# Patient Record
Sex: MALE | Race: WHITE | NOT HISPANIC OR LATINO | Employment: OTHER | URBAN - METROPOLITAN AREA
[De-identification: names, ages, dates, MRNs, and addresses within clinical notes are randomized per-mention and may not be internally consistent; named-entity substitution may affect disease eponyms.]

---

## 2017-01-18 ENCOUNTER — ANESTHESIA EVENT (OUTPATIENT)
Dept: GASTROENTEROLOGY | Facility: HOSPITAL | Age: 77
End: 2017-01-18

## 2017-01-19 ENCOUNTER — ANESTHESIA (OUTPATIENT)
Dept: GASTROENTEROLOGY | Facility: HOSPITAL | Age: 77
End: 2017-01-19

## 2017-04-07 ENCOUNTER — ALLSCRIPTS OFFICE VISIT (OUTPATIENT)
Dept: OTHER | Facility: OTHER | Age: 77
End: 2017-04-07

## 2017-07-10 ENCOUNTER — ALLSCRIPTS OFFICE VISIT (OUTPATIENT)
Dept: OTHER | Facility: OTHER | Age: 77
End: 2017-07-10

## 2017-07-25 ENCOUNTER — APPOINTMENT (EMERGENCY)
Dept: RADIOLOGY | Facility: HOSPITAL | Age: 77
DRG: 683 | End: 2017-07-25
Payer: COMMERCIAL

## 2017-07-25 ENCOUNTER — HOSPITAL ENCOUNTER (INPATIENT)
Facility: HOSPITAL | Age: 77
LOS: 2 days | Discharge: HOME HEALTH | DRG: 683 | End: 2017-07-28
Attending: EMERGENCY MEDICINE | Admitting: INTERNAL MEDICINE
Payer: COMMERCIAL

## 2017-07-25 DIAGNOSIS — M10.9 GOUT: ICD-10-CM

## 2017-07-25 DIAGNOSIS — N17.9 AKI (ACUTE KIDNEY INJURY) (HCC): ICD-10-CM

## 2017-07-25 DIAGNOSIS — R55 SYNCOPE: Primary | ICD-10-CM

## 2017-07-25 PROBLEM — J44.9 COPD (CHRONIC OBSTRUCTIVE PULMONARY DISEASE) (HCC): Status: ACTIVE | Noted: 2017-07-25

## 2017-07-25 PROBLEM — I10 HYPERTENSION: Status: ACTIVE | Noted: 2017-07-25

## 2017-07-25 PROBLEM — Z72.0 TOBACCO ABUSE: Status: ACTIVE | Noted: 2017-07-25

## 2017-07-25 PROBLEM — K20.90 ESOPHAGITIS: Status: ACTIVE | Noted: 2017-07-25

## 2017-07-25 LAB
ALBUMIN SERPL BCP-MCNC: 3.2 G/DL (ref 3.5–5)
ALP SERPL-CCNC: 113 U/L (ref 46–116)
ALT SERPL W P-5'-P-CCNC: 20 U/L (ref 12–78)
ANION GAP SERPL CALCULATED.3IONS-SCNC: 8 MMOL/L (ref 4–13)
AST SERPL W P-5'-P-CCNC: 28 U/L (ref 5–45)
ATRIAL RATE: 62 BPM
BASOPHILS # BLD AUTO: 0.03 THOUSANDS/ΜL (ref 0–0.1)
BASOPHILS NFR BLD AUTO: 0 % (ref 0–1)
BILIRUB SERPL-MCNC: 0.51 MG/DL (ref 0.2–1)
BUN SERPL-MCNC: 15 MG/DL (ref 5–25)
CALCIUM SERPL-MCNC: 8.5 MG/DL (ref 8.3–10.1)
CHLORIDE SERPL-SCNC: 99 MMOL/L (ref 100–108)
CO2 SERPL-SCNC: 26 MMOL/L (ref 21–32)
CREAT SERPL-MCNC: 1.4 MG/DL (ref 0.6–1.3)
EOSINOPHIL # BLD AUTO: 0.39 THOUSAND/ΜL (ref 0–0.61)
EOSINOPHIL NFR BLD AUTO: 5 % (ref 0–6)
ERYTHROCYTE [DISTWIDTH] IN BLOOD BY AUTOMATED COUNT: 11.9 % (ref 11.6–15.1)
GFR SERPL CREATININE-BSD FRML MDRD: 49 ML/MIN/1.73SQ M
GLUCOSE SERPL-MCNC: 120 MG/DL (ref 65–140)
HCT VFR BLD AUTO: 39.7 % (ref 36.5–49.3)
HGB BLD-MCNC: 13.7 G/DL (ref 12–17)
LYMPHOCYTES # BLD AUTO: 1.39 THOUSANDS/ΜL (ref 0.6–4.47)
LYMPHOCYTES NFR BLD AUTO: 17 % (ref 14–44)
MCH RBC QN AUTO: 31.4 PG (ref 26.8–34.3)
MCHC RBC AUTO-ENTMCNC: 34.5 G/DL (ref 31.4–37.4)
MCV RBC AUTO: 91 FL (ref 82–98)
MONOCYTES # BLD AUTO: 0.69 THOUSAND/ΜL (ref 0.17–1.22)
MONOCYTES NFR BLD AUTO: 8 % (ref 4–12)
NEUTROPHILS # BLD AUTO: 5.88 THOUSANDS/ΜL (ref 1.85–7.62)
NEUTS SEG NFR BLD AUTO: 70 % (ref 43–75)
P AXIS: 77 DEGREES
PLATELET # BLD AUTO: 264 THOUSANDS/UL (ref 149–390)
PMV BLD AUTO: 8.6 FL (ref 8.9–12.7)
POTASSIUM SERPL-SCNC: 4.2 MMOL/L (ref 3.5–5.3)
PR INTERVAL: 174 MS
PROT SERPL-MCNC: 6.7 G/DL (ref 6.4–8.2)
QRS AXIS: 63 DEGREES
QRSD INTERVAL: 94 MS
QT INTERVAL: 446 MS
QTC INTERVAL: 452 MS
RBC # BLD AUTO: 4.36 MILLION/UL (ref 3.88–5.62)
SODIUM SERPL-SCNC: 133 MMOL/L (ref 136–145)
SPECIMEN SOURCE: NORMAL
T WAVE AXIS: 70 DEGREES
TROPONIN I BLD-MCNC: 0 NG/ML (ref 0–0.08)
VENTRICULAR RATE: 62 BPM
WBC # BLD AUTO: 8.38 THOUSAND/UL (ref 4.31–10.16)

## 2017-07-25 PROCEDURE — 36415 COLL VENOUS BLD VENIPUNCTURE: CPT

## 2017-07-25 PROCEDURE — 85025 COMPLETE CBC W/AUTO DIFF WBC: CPT | Performed by: EMERGENCY MEDICINE

## 2017-07-25 PROCEDURE — 84484 ASSAY OF TROPONIN QUANT: CPT

## 2017-07-25 PROCEDURE — 93005 ELECTROCARDIOGRAM TRACING: CPT | Performed by: EMERGENCY MEDICINE

## 2017-07-25 PROCEDURE — 80053 COMPREHEN METABOLIC PANEL: CPT | Performed by: EMERGENCY MEDICINE

## 2017-07-25 PROCEDURE — 71020 HB CHEST X-RAY 2VW FRONTAL&LATL: CPT

## 2017-07-25 RX ADMIN — SODIUM CHLORIDE 1000 ML: 0.9 INJECTION, SOLUTION INTRAVENOUS at 21:14

## 2017-07-26 ENCOUNTER — APPOINTMENT (OUTPATIENT)
Dept: CT IMAGING | Facility: HOSPITAL | Age: 77
DRG: 683 | End: 2017-07-26
Payer: COMMERCIAL

## 2017-07-26 LAB
ANION GAP SERPL CALCULATED.3IONS-SCNC: 9 MMOL/L (ref 4–13)
BUN SERPL-MCNC: 14 MG/DL (ref 5–25)
CALCIUM SERPL-MCNC: 8 MG/DL (ref 8.3–10.1)
CHLORIDE SERPL-SCNC: 106 MMOL/L (ref 100–108)
CO2 SERPL-SCNC: 23 MMOL/L (ref 21–32)
CREAT SERPL-MCNC: 1.21 MG/DL (ref 0.6–1.3)
ERYTHROCYTE [DISTWIDTH] IN BLOOD BY AUTOMATED COUNT: 12.4 % (ref 11.6–15.1)
GFR SERPL CREATININE-BSD FRML MDRD: 59 ML/MIN/1.73SQ M
GLUCOSE P FAST SERPL-MCNC: 88 MG/DL (ref 65–99)
GLUCOSE SERPL-MCNC: 88 MG/DL (ref 65–140)
HCT VFR BLD AUTO: 35 % (ref 36.5–49.3)
HGB BLD-MCNC: 12.3 G/DL (ref 12–17)
MCH RBC QN AUTO: 31.7 PG (ref 26.8–34.3)
MCHC RBC AUTO-ENTMCNC: 35.1 G/DL (ref 31.4–37.4)
MCV RBC AUTO: 90 FL (ref 82–98)
PLATELET # BLD AUTO: 209 THOUSANDS/UL (ref 149–390)
PMV BLD AUTO: 9.2 FL (ref 8.9–12.7)
POTASSIUM SERPL-SCNC: 3.3 MMOL/L (ref 3.5–5.3)
RBC # BLD AUTO: 3.88 MILLION/UL (ref 3.88–5.62)
SODIUM SERPL-SCNC: 138 MMOL/L (ref 136–145)
TROPONIN I SERPL-MCNC: <0.02 NG/ML
TROPONIN I SERPL-MCNC: <0.02 NG/ML
WBC # BLD AUTO: 7.27 THOUSAND/UL (ref 4.31–10.16)

## 2017-07-26 PROCEDURE — 94760 N-INVAS EAR/PLS OXIMETRY 1: CPT

## 2017-07-26 PROCEDURE — 85027 COMPLETE CBC AUTOMATED: CPT | Performed by: PHYSICIAN ASSISTANT

## 2017-07-26 PROCEDURE — 84484 ASSAY OF TROPONIN QUANT: CPT | Performed by: PHYSICIAN ASSISTANT

## 2017-07-26 PROCEDURE — 70450 CT HEAD/BRAIN W/O DYE: CPT

## 2017-07-26 PROCEDURE — 99285 EMERGENCY DEPT VISIT HI MDM: CPT

## 2017-07-26 PROCEDURE — 80048 BASIC METABOLIC PNL TOTAL CA: CPT | Performed by: PHYSICIAN ASSISTANT

## 2017-07-26 RX ORDER — POTASSIUM CHLORIDE 20 MEQ/1
40 TABLET, EXTENDED RELEASE ORAL ONCE
Status: COMPLETED | OUTPATIENT
Start: 2017-07-26 | End: 2017-07-26

## 2017-07-26 RX ORDER — CLONIDINE HYDROCHLORIDE 0.1 MG/1
0.1 TABLET ORAL DAILY
Status: DISCONTINUED | OUTPATIENT
Start: 2017-07-26 | End: 2017-07-28 | Stop reason: HOSPADM

## 2017-07-26 RX ORDER — ATENOLOL 50 MG/1
50 TABLET ORAL DAILY
Status: DISCONTINUED | OUTPATIENT
Start: 2017-07-26 | End: 2017-07-28 | Stop reason: HOSPADM

## 2017-07-26 RX ORDER — HEPARIN SODIUM 5000 [USP'U]/ML
5000 INJECTION, SOLUTION INTRAVENOUS; SUBCUTANEOUS EVERY 8 HOURS SCHEDULED
Status: DISCONTINUED | OUTPATIENT
Start: 2017-07-26 | End: 2017-07-28 | Stop reason: HOSPADM

## 2017-07-26 RX ORDER — SODIUM CHLORIDE 9 MG/ML
100 INJECTION, SOLUTION INTRAVENOUS CONTINUOUS
Status: DISCONTINUED | OUTPATIENT
Start: 2017-07-26 | End: 2017-07-27

## 2017-07-26 RX ORDER — ACETAMINOPHEN 325 MG/1
650 TABLET ORAL EVERY 6 HOURS PRN
Status: DISCONTINUED | OUTPATIENT
Start: 2017-07-26 | End: 2017-07-28 | Stop reason: HOSPADM

## 2017-07-26 RX ORDER — HYDRALAZINE HYDROCHLORIDE 20 MG/ML
5 INJECTION INTRAMUSCULAR; INTRAVENOUS EVERY 6 HOURS PRN
Status: DISCONTINUED | OUTPATIENT
Start: 2017-07-26 | End: 2017-07-28 | Stop reason: HOSPADM

## 2017-07-26 RX ORDER — DIPHENHYDRAMINE HCL 25 MG
25 TABLET ORAL
Status: DISCONTINUED | OUTPATIENT
Start: 2017-07-26 | End: 2017-07-28 | Stop reason: HOSPADM

## 2017-07-26 RX ORDER — ONDANSETRON 2 MG/ML
4 INJECTION INTRAMUSCULAR; INTRAVENOUS EVERY 6 HOURS PRN
Status: DISCONTINUED | OUTPATIENT
Start: 2017-07-26 | End: 2017-07-28 | Stop reason: HOSPADM

## 2017-07-26 RX ORDER — NICOTINE 21 MG/24HR
1 PATCH, TRANSDERMAL 24 HOURS TRANSDERMAL DAILY
Status: DISCONTINUED | OUTPATIENT
Start: 2017-07-26 | End: 2017-07-28 | Stop reason: HOSPADM

## 2017-07-26 RX ORDER — ZOLPIDEM TARTRATE 5 MG/1
5 TABLET ORAL
Status: DISCONTINUED | OUTPATIENT
Start: 2017-07-26 | End: 2017-07-28 | Stop reason: HOSPADM

## 2017-07-26 RX ORDER — MAGNESIUM HYDROXIDE/ALUMINUM HYDROXICE/SIMETHICONE 120; 1200; 1200 MG/30ML; MG/30ML; MG/30ML
30 SUSPENSION ORAL EVERY 6 HOURS PRN
Status: DISCONTINUED | OUTPATIENT
Start: 2017-07-26 | End: 2017-07-28 | Stop reason: HOSPADM

## 2017-07-26 RX ADMIN — ZOLPIDEM TARTRATE 5 MG: 5 TABLET, FILM COATED ORAL at 22:15

## 2017-07-26 RX ADMIN — ATENOLOL 50 MG: 50 TABLET ORAL at 09:30

## 2017-07-26 RX ADMIN — SODIUM CHLORIDE 100 ML/HR: 0.9 INJECTION, SOLUTION INTRAVENOUS at 11:48

## 2017-07-26 RX ADMIN — HEPARIN SODIUM 5000 UNITS: 5000 INJECTION, SOLUTION INTRAVENOUS; SUBCUTANEOUS at 15:15

## 2017-07-26 RX ADMIN — DIPHENHYDRAMINE HCL 25 MG: 25 TABLET ORAL at 03:37

## 2017-07-26 RX ADMIN — HEPARIN SODIUM 5000 UNITS: 5000 INJECTION, SOLUTION INTRAVENOUS; SUBCUTANEOUS at 22:15

## 2017-07-26 RX ADMIN — HEPARIN SODIUM 5000 UNITS: 5000 INJECTION, SOLUTION INTRAVENOUS; SUBCUTANEOUS at 00:41

## 2017-07-26 RX ADMIN — SODIUM CHLORIDE 100 ML/HR: 0.9 INJECTION, SOLUTION INTRAVENOUS at 00:23

## 2017-07-26 RX ADMIN — CLONIDINE HYDROCHLORIDE 0.1 MG: 0.1 TABLET ORAL at 09:30

## 2017-07-26 RX ADMIN — POTASSIUM CHLORIDE 40 MEQ: 1500 TABLET, EXTENDED RELEASE ORAL at 17:11

## 2017-07-26 RX ADMIN — HYDRALAZINE HYDROCHLORIDE 5 MG: 20 INJECTION INTRAMUSCULAR; INTRAVENOUS at 00:42

## 2017-07-26 RX ADMIN — HEPARIN SODIUM 5000 UNITS: 5000 INJECTION, SOLUTION INTRAVENOUS; SUBCUTANEOUS at 06:25

## 2017-07-26 RX ADMIN — ACETAMINOPHEN 650 MG: 325 TABLET, FILM COATED ORAL at 23:36

## 2017-07-27 ENCOUNTER — APPOINTMENT (INPATIENT)
Dept: NON INVASIVE DIAGNOSTICS | Facility: HOSPITAL | Age: 77
DRG: 683 | End: 2017-07-27
Payer: COMMERCIAL

## 2017-07-27 PROBLEM — R50.9 FEVER: Status: ACTIVE | Noted: 2017-07-27

## 2017-07-27 LAB
ANION GAP SERPL CALCULATED.3IONS-SCNC: 6 MMOL/L (ref 4–13)
BUN SERPL-MCNC: 12 MG/DL (ref 5–25)
CALCIUM SERPL-MCNC: 7.9 MG/DL (ref 8.3–10.1)
CHLORIDE SERPL-SCNC: 104 MMOL/L (ref 100–108)
CO2 SERPL-SCNC: 26 MMOL/L (ref 21–32)
CREAT SERPL-MCNC: 1.21 MG/DL (ref 0.6–1.3)
GFR SERPL CREATININE-BSD FRML MDRD: 59 ML/MIN/1.73SQ M
GLUCOSE SERPL-MCNC: 97 MG/DL (ref 65–140)
POTASSIUM SERPL-SCNC: 3.8 MMOL/L (ref 3.5–5.3)
SODIUM SERPL-SCNC: 136 MMOL/L (ref 136–145)

## 2017-07-27 PROCEDURE — 80048 BASIC METABOLIC PNL TOTAL CA: CPT | Performed by: INTERNAL MEDICINE

## 2017-07-27 PROCEDURE — 93306 TTE W/DOPPLER COMPLETE: CPT

## 2017-07-27 RX ORDER — NICOTINE 21 MG/24HR
1 PATCH, TRANSDERMAL 24 HOURS TRANSDERMAL DAILY
Qty: 28 PATCH | Refills: 0 | Status: CANCELLED | OUTPATIENT
Start: 2017-07-27

## 2017-07-27 RX ORDER — INDOMETHACIN 25 MG/1
25 CAPSULE ORAL
Status: DISCONTINUED | OUTPATIENT
Start: 2017-07-27 | End: 2017-07-27

## 2017-07-27 RX ORDER — COLCHICINE 0.6 MG/1
0.6 TABLET ORAL 2 TIMES DAILY
Status: DISCONTINUED | OUTPATIENT
Start: 2017-07-27 | End: 2017-07-27

## 2017-07-27 RX ADMIN — PREDNISONE 30 MG: 20 TABLET ORAL at 15:10

## 2017-07-27 RX ADMIN — HEPARIN SODIUM 5000 UNITS: 5000 INJECTION, SOLUTION INTRAVENOUS; SUBCUTANEOUS at 22:04

## 2017-07-27 RX ADMIN — COLCHICINE 0.6 MG: 0.6 TABLET, FILM COATED ORAL at 12:52

## 2017-07-27 RX ADMIN — ATENOLOL 50 MG: 50 TABLET ORAL at 08:01

## 2017-07-27 RX ADMIN — ZOLPIDEM TARTRATE 5 MG: 5 TABLET, FILM COATED ORAL at 22:04

## 2017-07-27 RX ADMIN — CLONIDINE HYDROCHLORIDE 0.1 MG: 0.1 TABLET ORAL at 08:01

## 2017-07-27 RX ADMIN — ACETAMINOPHEN 650 MG: 325 TABLET, FILM COATED ORAL at 08:01

## 2017-07-27 RX ADMIN — HEPARIN SODIUM 5000 UNITS: 5000 INJECTION, SOLUTION INTRAVENOUS; SUBCUTANEOUS at 13:57

## 2017-07-27 RX ADMIN — HEPARIN SODIUM 5000 UNITS: 5000 INJECTION, SOLUTION INTRAVENOUS; SUBCUTANEOUS at 05:50

## 2017-07-28 ENCOUNTER — APPOINTMENT (INPATIENT)
Dept: PHYSICAL THERAPY | Facility: HOSPITAL | Age: 77
DRG: 683 | End: 2017-07-28
Payer: COMMERCIAL

## 2017-07-28 VITALS
TEMPERATURE: 97.1 F | SYSTOLIC BLOOD PRESSURE: 127 MMHG | OXYGEN SATURATION: 100 % | WEIGHT: 110.23 LBS | RESPIRATION RATE: 18 BRPM | DIASTOLIC BLOOD PRESSURE: 75 MMHG | BODY MASS INDEX: 17.3 KG/M2 | HEART RATE: 65 BPM | HEIGHT: 67 IN

## 2017-07-28 PROBLEM — N17.9 AKI (ACUTE KIDNEY INJURY) (HCC): Status: RESOLVED | Noted: 2017-07-25 | Resolved: 2017-07-28

## 2017-07-28 PROBLEM — R55 SYNCOPE: Status: RESOLVED | Noted: 2017-07-25 | Resolved: 2017-07-28

## 2017-07-28 PROBLEM — R50.9 FEVER: Status: RESOLVED | Noted: 2017-07-27 | Resolved: 2017-07-28

## 2017-07-28 PROCEDURE — G8978 MOBILITY CURRENT STATUS: HCPCS

## 2017-07-28 PROCEDURE — 97163 PT EVAL HIGH COMPLEX 45 MIN: CPT

## 2017-07-28 PROCEDURE — G8979 MOBILITY GOAL STATUS: HCPCS

## 2017-07-28 RX ORDER — PREDNISONE 10 MG/1
TABLET ORAL
Qty: 11 TABLET | Refills: 0 | Status: SHIPPED | OUTPATIENT
Start: 2017-07-28 | End: 2018-03-05

## 2017-07-28 RX ADMIN — ATENOLOL 50 MG: 50 TABLET ORAL at 08:14

## 2017-07-28 RX ADMIN — CLONIDINE HYDROCHLORIDE 0.1 MG: 0.1 TABLET ORAL at 08:14

## 2017-07-28 RX ADMIN — PREDNISONE 30 MG: 20 TABLET ORAL at 08:14

## 2017-07-28 RX ADMIN — HEPARIN SODIUM 5000 UNITS: 5000 INJECTION, SOLUTION INTRAVENOUS; SUBCUTANEOUS at 06:27

## 2017-07-31 ENCOUNTER — ALLSCRIPTS OFFICE VISIT (OUTPATIENT)
Dept: OTHER | Facility: OTHER | Age: 77
End: 2017-07-31

## 2017-10-01 DIAGNOSIS — I10 ESSENTIAL (PRIMARY) HYPERTENSION: ICD-10-CM

## 2017-10-01 DIAGNOSIS — M10.9 GOUT: ICD-10-CM

## 2017-10-01 DIAGNOSIS — F10.10 UNCOMPLICATED ALCOHOL ABUSE: ICD-10-CM

## 2017-10-10 ENCOUNTER — ALLSCRIPTS OFFICE VISIT (OUTPATIENT)
Dept: OTHER | Facility: OTHER | Age: 77
End: 2017-10-10

## 2017-10-11 NOTE — PROGRESS NOTES
Assessment  1  Benign essential hypertension (401 1) (I10)   2  Gout (274 9) (M10 9)   3  Tobacco use (305 1) (Z72 0)   4  Uncomplicated alcohol abuse (305 00) (F10 10)   5  Chronic obstructive pulmonary disease (496) (J44 9)    Plan  Benign essential hypertension, Chronic obstructive pulmonary disease, Gout, SocHx:  Tobacco use, Uncomplicated alcohol abuse    · Follow-up visit in 3 months Evaluation and Treatment  Follow-up  Status: Hold For -  Scheduling  Requested for: 88RQE6156    Discussion/Summary    He needs to get his labs as directed, as he is noncompliant  COPD and duodenitis and esophagitis stable on omeprazole  Quit tobacco and decrease alcohol use  Gout stable  Had a recent flare due to alcohol intake  Rec  continuing same meds as directed for HTN  Get labs as directed, refuses flu shot  The patient was counseled regarding  The treatment plan was reviewed with the patient/guardian  The patient/guardian understands and agrees with the treatment plan      Chief Complaint  didn't get the blood work done, no concerns today   Patient is here today for follow up of chronic conditions described in HPI  History of Present Illness  Hx of HTN and also insomnia which are stable  His COPD is stable also  Pt here for a 3 month f/u for blood pressure  No other concerns  Still smokes cigarettes 1/2 PPD  He also still drinks alcohol  No cp or sob, or ha  Gout is stable and his duodenitis and esophagitis are stable  He did not get labs as directed  He had a recent flare of gout because he drinks alcohol 3 beers a day  COPD stable  Review of Systems    Constitutional: No fever or chills, feels well, no tiredness, no recent weight gain or weight loss  Eyes: No complaints of eye pain, no red eyes, no discharge from eyes, no itchy eyes  ENT: no complaints of earache, no hearing loss, no nosebleeds, no nasal discharge, no sore throat, no hoarseness     Cardiovascular: No complaints of slow heart rate, no fast heart rate, no chest pain, no palpitations, no leg claudication, no lower extremity  Respiratory: as noted in HPI  Gastrointestinal: as noted in HPI  Genitourinary: No complaints of dysuria, no incontinence, no hesitancy, no nocturia, no genital lesion, no testicular pain  Musculoskeletal: as noted in HPI  Integumentary: No complaints of skin rash or skin lesions, no itching, no skin wound, no dry skin  Neurological: No compliants of headache, no confusion, no convulsions, no numbness or tingling, no dizziness or fainting, no limb weakness, no difficulty walking  Psychiatric: Is not suicidal, no sleep disturbances, no anxiety or depression, no change in personality, no emotional problems  Endocrine: No complaints of proptosis, no hot flashes, no muscle weakness, no erectile dysfunction, no deepening of the voice, no feelings of weakness  Hematologic/Lymphatic: No complaints of swollen glands, no swollen glands in the neck, does not bleed easily, no easy bruising  Active Problems  1  Acute kidney injury (nontraumatic) (584 9) (N17 9)   2  Benign essential hypertension (401 1) (I10)   3  Chronic obstructive pulmonary disease (496) (J44 9)   4  Colon cancer screening (V76 51) (Z12 11)   5  Cough (786 2) (R05)   6  Duodenitis (535 60) (K29 80)   7  Dysphagia (787 20) (R13 10)   8  Esophageal obstruction due to food impaction (530 3,935 1) (K22 2,T18 128A)   9  Esophagitis (530 10) (K20 9)   10  Gout (274 9) (M10 9)   11  Hyperglycemia (790 29) (R73 9)   12  Insomnia (780 52) (G47 00)   13  Medicare annual wellness visit, subsequent (V70 0) (Z00 00)   14  Pain of right hand (729 5) (M79 641)   15  Screening for genitourinary condition (V81 6) (Z13 89)   16  Special screening examination for neoplasm of prostate (V76 44) (Z12 5)   17  Tobacco use (305 1) (Z72 0)   18  Uncomplicated alcohol abuse (305 00) (F10 10)   19  Vasovagal syncope (780 2) (R55)    Past Medical History  1   History of Accidental Obstruction Of Respiratory Tract By Food (E911)   2  History of acute bronchitis (V12 69) (Z87 09)   3  History of bronchitis (V12 69) (Z87 09)   4  History of cellulitis (V13 3) (Z87 2)   5  History of eczema (V13 3) (Z87 2)   6  History of seborrheic dermatitis (V13 3) (Z87 2)   7  History of Nonspecific abnormal results of function study of thyroid (794 5) (R94 6)   8  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)   9  History of Skin rash (782 1) (R21)    Surgical History  1  History of Spinal Diskectomy Lumbar    Family History  Mother    1  Family history of Family Health Status Of Mother -   Family History    2  Family history of Situs Inversus    Social History   · Alcohol Use (History)   · Current Some Day Smoker (305 1)   ·    · Has 3 children   · Tobacco use (305 1) (Z72 0)   · Unemployed (V62 0) (Z56 0)    Current Meds   1  Atenolol 50 MG Oral Tablet; TAKE 1 TABLET DAILY; Therapy: 98TIQ2732 to (Last Rx:33Ikb1195)  Requested for: 95Ohw0938 Ordered   2  CloNIDine HCl - 0 1 MG Oral Tablet; TAKE 1 TABLET DAILY; Therapy: 85RKB6887 to (Alia Garza)  Requested for: 43Snk1507; Last   Rx:78Vhe7334 Ordered   3  Indomethacin 50 MG Oral Capsule; take 1 capsule three times daily PRN gout; Therapy: 81Dko3657 to (Evaluate:30Ako2337)  Requested for: 74YTQ3254; Last   Rx:2017 Ordered   4  Proventil  (90 Base) MCG/ACT Inhalation Aerosol Solution; INHALE 2 PUFFS   EVERY 4 HOURS AS NEEDED FOR COUGH AND WHEEZE;   Therapy: 51ETX0211 to (Evaluate:2017)  Requested for: 31OGC6674; Last   Rx:29Zzu8842 Ordered   5  Zolpidem Tartrate 5 MG Oral Tablet; TAKE 1 TABLET AT BEDTIME AS NEEDED FOR   SLEEP; Therapy: 20ZYE9326 to (82138 52 84 57)  Requested for: 16Ubj6555; Last   Rx:99Puq2311 Ordered    Allergies  1   Norvasc TABS    Vitals  Vital Signs    Recorded: 12BQG8590 96:39XP   Systolic 143   Diastolic 80   Height 5 ft 6 in   Weight 110 lb 4 oz   BMI Calculated 17 79   BSA Calculated 1 55     Physical Exam    Constitutional   General appearance: No acute distress, well appearing and well nourished  Eyes   Conjunctiva and lids: No swelling, erythema, or discharge  Pupils and irises: Equal, round and reactive to light  Ears, Nose, Mouth, and Throat   External inspection of ears and nose: Normal     Otoscopic examination: Tympanic membrance translucent with normal light reflex  Canals patent without erythema  Nasal mucosa, septum, and turbinates: Normal without edema or erythema  Oropharynx: Normal with no erythema, edema, exudate or lesions  Pulmonary   Respiratory effort: No increased work of breathing or signs of respiratory distress  Auscultation of lungs: Clear to auscultation, equal breath sounds bilaterally, no wheezes, no rales, no rhonci  Cardiovascular   Palpation of heart: Normal PMI, no thrills  Auscultation of heart: Normal rate and rhythm, normal S1 and S2, without murmurs  Examination of extremities for edema and/or varicosities: Normal     Carotid pulses: Normal     Lymphatic   Palpation of lymph nodes in neck: No lymphadenopathy  Musculoskeletal   Gait and station: Normal     Digits and nails: Normal without clubbing or cyanosis  Inspection/palpation of joints, bones, and muscles: Normal     Skin   Skin and subcutaneous tissue: Normal without rashes or lesions  Neurologic   Cranial nerves: Cranial nerves 2-12 intact  Reflexes: 2+ and symmetric  Sensation: No sensory loss  Psychiatric   Orientation to person, place and time: Normal     Mood and affect: Normal          Health Management  Colon cancer screening   COLONOSCOPY; every 10 years; Next Due: 43BYL7496; Overdue  Health Maintenance   Medicare Annual Wellness Visit; every 1 year; Last 07Apr2017; Next Due: 60LYS5051;  Active    Signatures   Electronically signed by : Esteban Ruggiero DO; Oct 10 2017  2:46PM EST                       (Author)

## 2018-01-09 NOTE — RESULT NOTES
Message   no acute fracture but may be associated with gout the swelling he is having, f-up as directed with [facility]  Verified Results  * XR HAND 3+ VIEW RIGHT 24KUH2502 02:24PM Hedy Frederick Order Number: [id number]     Test Name Result Flag Reference   XR HAND 3+ VW RIGHT (Report)     RIGHT HAND     INDICATION: Right hand pain  Pain in long finger near the DIP joint, lump on radial side of the finger  Pain for 6 months but worsening in the last 2-3 weeks, numbness in thumb  Patient states he has gout, possible arthritis  COMPARISON: None     VIEWS: 3; 3 images     For the purposes of institution wide universal language the following terms will apply: (thumb=1st digit/finger, index finger=2nd digit/finger, long finger=3rd digit/finger, ring=4th digit/finger and small finger=5th digit/finger)     FINDINGS:     There is no acute fracture or dislocation  Small degenerative changes seen of the distal interphalangeal joints, otherwise joint spaces are relatively well preserved  No lytic or blastic lesions are seen  There is marked soft tissue swelling along the radial aspect of the distal 3rd digit at the level of the distal interphalangeal joint  There is no bony erosion, or periarticular erosion  The joint space is preserved  Differential diagnosis includes    infectious or inflammatory causes  Given history of gout gouty tophus is not excluded  IMPRESSION:     No acute fracture or dislocation  Increased soft tissue density and swelling associated with the radial aspect of the distal interphalangeal joint of the 3rd digit  Differential diagnosis includes infectious or inflammatory causes, including patient's stated history of gout  No    definitive bony erosion identified         Workstation performed: [id number]     Signed by:   Cailin Cintron MD   12/1/16

## 2018-01-10 ENCOUNTER — ALLSCRIPTS OFFICE VISIT (OUTPATIENT)
Dept: OTHER | Facility: OTHER | Age: 78
End: 2018-01-10

## 2018-01-10 DIAGNOSIS — K29.80 DUODENITIS WITHOUT BLEEDING: ICD-10-CM

## 2018-01-10 DIAGNOSIS — Z12.5 ENCOUNTER FOR SCREENING FOR MALIGNANT NEOPLASM OF PROSTATE: ICD-10-CM

## 2018-01-10 DIAGNOSIS — Z72.0 TOBACCO USE: ICD-10-CM

## 2018-01-10 DIAGNOSIS — N17.9 ACUTE KIDNEY FAILURE (HCC): ICD-10-CM

## 2018-01-10 DIAGNOSIS — M10.9 GOUT: ICD-10-CM

## 2018-01-10 DIAGNOSIS — J44.9 CHRONIC OBSTRUCTIVE PULMONARY DISEASE (HCC): ICD-10-CM

## 2018-01-10 DIAGNOSIS — I10 ESSENTIAL (PRIMARY) HYPERTENSION: ICD-10-CM

## 2018-01-10 NOTE — MISCELLANEOUS
Assessment    1  Vasovagal syncope (780 2) (R55)   2  Benign essential hypertension (401 1) (I10)   3  Chronic obstructive pulmonary disease (496) (J44 9)   4  Acute kidney injury (nontraumatic) (584 9) (N17 9)    Plan  Benign essential hypertension    · (1) COMPREHENSIVE METABOLIC PANEL; Status:Active - Retrospective By Protocol  Authorization; Requested for:01Oct2017;    Perform:[facility]; OIV:[id number]; Last Updated By:Ki Maya; 7/31/2017 5:21:19 PM;Ordered; For:Benign essential hypertension; Ordered By:Stephanie Castro;   · (1) LIPID PANEL, FASTING; Status:Active - Retrospective By Protocol Authorization; Requested for:01Oct2017;    Perform:[facility]; IRX:[id number]; Last Updated By:Ki Maya; 7/31/2017 5:21:19 PM;Ordered; For:Benign essential hypertension; Ordered By:Stephanie Castro;   · (1) MICROALBUMIN CREATININE RATIO, RANDOM URINE; Status:Active - Retrospective  By Protocol Authorization; Requested for:01Oct2017;    Perform:[facility]; PSJ:[id number]; Last Updated By:Ki Maya; 7/31/2017 5:21:19 PM;Ordered; For:Benign essential hypertension; Ordered By:Stephanie Castro;   · (1) PROTEIN ELECTRO, SERUM; Status:Active - Retrospective By Protocol Authorization; Requested for:01Oct2017;    Perform:[facility]; GYP:[id number]; Last Updated By:Ki Maya; 7/31/2017 5:21:19 PM;Ordered; For:Benign essential hypertension; Ordered By:Stephanie Castro;   · (1) PTH N-TERMINAL (INTACT); Status:Active - Retrospective By Protocol Authorization; Requested for:01Oct2017;    Perform:[facility]; IDZ:[id number]; Last Updated By:Ki Maya; 7/31/2017 5:21:19 PM;Ordered; For:Benign essential hypertension; Ordered By:Stephanie Castro;   · (1) TSH; Status:Active - Retrospective By Protocol Authorization; Requested  for:01Oct2017;    Perform:[facility]; SIJ:24BJQ8005;  Last Updated By:Ki Maya; 7/31/2017 5:21:19 PM;Ordered; For:Benign essential hypertension; Ordered By:Stephanie Castro;  Benign essential hypertension, Gout    · (1) URIC ACID; Status:Active - Retrospective By Protocol Authorization; Requested  for:01Oct2017;    Perform:[facility]; TFU:[id number]; Last Updated By:Lissette Maya; 7/31/2017 5:21:19 PM;Ordered; For:Benign essential hypertension, Gout; Ordered By:Stephanie Castro;  Benign essential hypertension, Uncomplicated alcohol abuse    · (1) GGT; Status:Active - Retrospective By Protocol Authorization; Requested  for:01Oct2017;    Perform:[facility]; YMM:[id number]; Last Updated By:Lissette Maya; 7/31/2017 5:21:19 PM;Ordered; For:Benign essential hypertension, Uncomplicated alcohol abuse; Ordered By:Stephanie Castro;    Discussion/Summary  Discussion Summary:   Hosp revd in full  Syncope likely dehydration in origin  HTN stable  Reduced GFR : alondra w CKD profile before next OV  Stay hydrated  OCT w CE  Counseling Documentation With Imm: The patient was counseled regarding diagnostic results, instructions for management, risk factor reductions, prognosis, patient and family education, impressions, risks and benefits of treatment options, importance of compliance with treatment  total time of encounter was 25 minutes and > 50% minutes was spent counseling  Medication SE Review and Pt Understands Tx: The treatment plan was reviewed with the patient/guardian  The patient/guardian understands and agrees with the treatment plan      Chief Complaint  Chief Complaint Free Text Note Form: TCM  ksd,cma      History of Present Illness  Syncope (Brief): The patient is being seen for follow-up of a hospitalization for syncope  The syncopal episode occurred while the patient was seated  There is no known event that preceded symptom onset  TCM Communication [facility]: The patient is being contacted for follow-up after hospitalization  Hospital records were reviewed   He was hospitalized at [facility]  The dates of hospitalization: 07/25/17-07/28/17  Diagnosis: syncope  He was discharged to home  Medications reviewed and updated today  He scheduled a follow up appointment  Follow-up appointments with other specialists: [facility]  The patient is currently asymptomatic  Counseling was provided to the patient  Communication performed and completed by jeremy hood   HPI: 77 yo frail wm s/p hosp at [facility] s/p syncope  Was at bar and had 1 sip of beer, felt faint and slumped over  911 to ER  No major findings on work up except GFR was low  Pt states did not eat or drink that day  Review of Systems  Complete-Male:   Constitutional: as noted in HPI  Eyes: No complaints of eye pain, no red eyes, no discharge from eyes, no itchy eyes  ENT: no complaints of earache, no hearing loss, no nosebleeds, no nasal discharge, no sore throat, no hoarseness  Cardiovascular: No complaints of slow heart rate, no fast heart rate, no chest pain, no palpitations, no leg claudication, no lower extremity  Respiratory: No complaints of shortness of breath, no wheezing, no cough, no SOB on exertion, no orthopnea or PND  Gastrointestinal: No complaints of abdominal pain, no constipation, no nausea or vomiting, no diarrhea or bloody stools  Genitourinary: No complaints of dysuria, no incontinence, no hesitancy, no nocturia, no genital lesion, no testicular pain  Musculoskeletal: No complaints of arthralgia, no myalgias, no joint swelling or stiffness, no limb pain or swelling  Integumentary: no rashes  Neurological: as noted in HPI  Psychiatric: Is not suicidal, no sleep disturbances, no anxiety or depression, no change in personality, no emotional problems  Hematologic/Lymphatic: no tendency for easy bleeding  Active Problems    1  Benign essential hypertension (401 1) (I10)   2  Chronic obstructive pulmonary disease (496) (J44 9)   3  Colon cancer screening (V76 51) (Z12 11)   4  Cough (786 2) (R05)   5  Duodenitis (535 60) (K29 80)   6  Dysphagia (787 20) (R13 10)   7  Esophageal obstruction due to food impaction (530 3,935 1) (K22 2,T18 128A)   8  Esophagitis (530 10) (K20 9)   9  Gout (274 9) (M10 9)   10  Hyperglycemia (790 29) (R73 9)   11  Insomnia (780 52) (G47 00)   12  Medicare annual wellness visit, subsequent (V70 0) (Z00 00)   13  Pain of right hand (729 5) (M79 641)   14  Screening for genitourinary condition (V81 6) (Z13 89)   15  Special screening examination for neoplasm of prostate (V76 44) (Z12 5)   16  Tobacco use (305 1) (Z72 0)   17  Uncomplicated alcohol abuse (305 00) (F10 10)    Past Medical History    1  History of Accidental Obstruction Of Respiratory Tract By Food (E911)   2  History of acute bronchitis (V12 69) (Z87 09)   3  History of bronchitis (V12 69) (Z87 09)   4  History of cellulitis (V13 3) (Z87 2)   5  History of eczema (V13 3) (Z87 2)   6  History of seborrheic dermatitis (V13 3) (Z87 2)   7  History of Nonspecific abnormal results of function study of thyroid (794 5) (R94 6)   8  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)   9  History of Skin rash (782 1) (R21)    Surgical History    1  History of Spinal Diskectomy Lumbar    Family History  Mother    1  Family history of Family Health Status Of Mother -   Family History    2  Family history of Situs Inversus    Social History    · Alcohol Use (History)   · Current Some Day Smoker (305 1)   ·    · Has 3 children   · Tobacco use (305 1) (Z72 0)   · Unemployed (V62 0) (Z56 0)    Current Meds   1  Atenolol 50 MG Oral Tablet; TAKE 1 TABLET DAILY; Therapy: 40RZG4105 to (Last Rx:17Aei3226)  Requested for: 34Nax4736 Ordered   2  CloNIDine HCl - 0 1 MG Oral Tablet; TAKE 1 TABLET DAILY; Therapy: 91RHC7103 to (Evaluate:94Ivj3892)  Requested for: 09ICR5500; Last   MW:51OGG8674 Ordered   3  Indomethacin 50 MG Oral Capsule; take 1 capsule three times daily PRN gout;    Therapy: 27Idy6956 to (Evaluate:99Mey6287)  Requested for: 01IWW6954; Last   Rx:51Nln2945 Ordered   4  Omeprazole 40 MG Oral Capsule Delayed Release; take 1 capsule by mouth daily; Therapy: 95OBZ4804 to (Charles Ascencio)  Requested for: 31DLG9244; Last   Rx:31Xjc9062 Ordered   5  Proventil  (90 Base) MCG/ACT Inhalation Aerosol Solution; INHALE 2 PUFFS   EVERY 4 HOURS AS NEEDED FOR COUGH AND WHEEZE;   Therapy: 59IYQ8974 to (Evaluate:20Mar2017)  Requested for: 75ZNW3088; Last   Rx:57Iyr8109 Ordered   6  Zolpidem Tartrate 5 MG Oral Tablet; TAKE 1 TABLET AT BEDTIME AS NEEDED FOR   SLEEP; Therapy: 54ZXU8455 to ((56) 2145-2478)  Requested for: 40Xky0710; Last   Rx:23Kky3588; Status: ACTIVE - Retrospective By Protocol Authorization Ordered    Allergies    1  Norvasc TABS    Vitals  Signs   Recorded: 21SHM1900 96:50XJ   Systolic: 292  Diastolic: 74  Height: 5 ft 6 in  Weight: 109 lb   BMI Calculated: 17 59  BSA Calculated: 1 55    Physical Exam    Constitutional   General appearance: Abnormal   underweight  Eyes   Pupils and irises: Equal, round and reactive to light  Ears, Nose, Mouth, and Throat   Oropharynx: Normal with no erythema, edema, exudate or lesions  Pulmonary   Respiratory effort: No increased work of breathing or signs of respiratory distress  Auscultation of lungs: Clear to auscultation, equal breath sounds bilaterally, no wheezes, no rales, no rhonci  Cardiovascular   Auscultation of heart: Normal rate and rhythm, normal S1 and S2, without murmurs  Examination of extremities for edema and/or varicosities: Normal     Carotid pulses: Normal     Abdomen   Abdomen: Non-tender, no masses  Lymphatic   Palpation of lymph nodes in neck: No lymphadenopathy  Musculoskeletal   Gait and station: Normal     Neurologic   Cranial nerves: Cranial nerves 2-12 intact      Psychiatric   Orientation to person, place and time: Normal     Mood and affect: Normal          Health Management  Colon cancer screening   COLONOSCOPY; every 10 years; Next Due: 01ZXV0680; Overdue  Health Maintenance   Medicare Annual Wellness Visit; every 1 year; Last 07Apr2017; Next Due: 92IWV4671;  Active    Future Appointments    Date/Time Provider Specialty Site   10/10/2017 02:30 PM Court Fox DO Family [facility]     Signatures   Electronically signed by : Hien Correia DO; Aug  1 3201 12:18PM EST                       (Author)

## 2018-01-12 VITALS
DIASTOLIC BLOOD PRESSURE: 88 MMHG | HEIGHT: 66 IN | BODY MASS INDEX: 17.07 KG/M2 | SYSTOLIC BLOOD PRESSURE: 128 MMHG | WEIGHT: 106.25 LBS

## 2018-01-12 VITALS
SYSTOLIC BLOOD PRESSURE: 118 MMHG | HEIGHT: 66 IN | BODY MASS INDEX: 17.58 KG/M2 | DIASTOLIC BLOOD PRESSURE: 82 MMHG | WEIGHT: 109.38 LBS

## 2018-01-12 NOTE — PROGRESS NOTES
Assessment   1  Benign essential hypertension (401 1) (I10)   2  Gout (274 9) (M10 9)   3  Tobacco use (305 1) (Z72 0)   4  Chronic obstructive pulmonary disease (496) (J44 9)   5  Duodenitis (535 60) (K29 80)    Plan   Acute kidney injury (nontraumatic), Benign essential hypertension, Chronic obstructive    pulmonary disease, Duodenitis, Gout, SocHx: Tobacco use    · (1) CBC/PLT/DIFF; Status:Active; Requested [id number];    · (1) COMPREHENSIVE METABOLIC PANEL; Status:Active; Requested [id number];    · (1) LIPID PANEL FASTING W DIRECT LDL REFLEX; Status:Active; Requested    [id number];    · (1) VITAMIN D 25-HYDROXY; Status:Active; Requested [id number]; Acute kidney injury (nontraumatic), Benign essential hypertension, Chronic obstructive    pulmonary disease, Duodenitis, Gout, Special screening examination for neoplasm of    prostate, SocHx: Tobacco use    · (1) PSA (SCREEN) (Dx V76 44 Screen for Prostate Cancer); Status:Active; Requested    [id number]; Benign essential hypertension, Chronic obstructive pulmonary disease, Gout, SocHx:    Tobacco use    · Follow-up visit in 3 months Evaluation and Treatment  Follow-up  Status: Hold For -    Scheduling  Requested for: 23ACQ0265    Discussion/Summary      He needs to get his labs as directed, as he is noncompliant  COPD and duodenitis and esophagitis stable on omeprazole  Quit tobacco and decrease alcohol use  Gout stable  Rec  continuing same meds as directed for HTN  Get labs as directed  Advised to quit tobacco as his COPD may get worse over time if he continues to smoke cigarettes  The patient was counseled regarding  Possible side effects of new medications were reviewed with the patient/guardian today  The treatment plan was reviewed with the patient/guardian   The patient/guardian understands and agrees with the treatment plan      Chief Complaint   F/U HTN  ksd,cma    Patient is here today for follow up of chronic conditions described in HPI       History of Present Illness   Here for HTN  No cp or sob, or ha  Has a hx of tobacco use and not quitting  He also has COPD and uses inhaler prn  He also denies any abdominal pain for hx of duodenitis and did see GI in past  Gout is stable  Review of Systems        Constitutional: No fever or chills, feels well, no tiredness, no recent weight gain or weight loss  Eyes: No complaints of eye pain, no red eyes, no discharge from eyes, no itchy eyes  ENT: no complaints of earache, no hearing loss, no nosebleeds, no nasal discharge, no sore throat, no hoarseness  Cardiovascular: No complaints of slow heart rate, no fast heart rate, no chest pain, no palpitations, no leg claudication, no lower extremity  Respiratory: as noted in HPI  Gastrointestinal: as noted in HPI  Genitourinary: No complaints of dysuria, no incontinence, no hesitancy, no nocturia, no genital lesion, no testicular pain  Musculoskeletal: as noted in HPI  Integumentary: No complaints of skin rash or skin lesions, no itching, no skin wound, no dry skin  Neurological: No compliants of headache, no confusion, no convulsions, no numbness or tingling, no dizziness or fainting, no limb weakness, no difficulty walking  Psychiatric: Is not suicidal, no sleep disturbances, no anxiety or depression, no change in personality, no emotional problems  Endocrine: No complaints of proptosis, no hot flashes, no muscle weakness, no erectile dysfunction, no deepening of the voice, no feelings of weakness  Hematologic/Lymphatic: No complaints of swollen glands, no swollen glands in the neck, does not bleed easily, no easy bruising  Active Problems   1  Acute kidney injury (nontraumatic) (584 9) (N17 9)   2  Benign essential hypertension (401 1) (I10)   3  Chronic obstructive pulmonary disease (496) (J44 9)   4  Colon cancer screening (V76 51) (Z12 11)   5  Cough (786 2) (R05)   6   Duodenitis (535 60) (K29 80)   7  Dysphagia (787 20) (R13 10)   8  Esophageal obstruction due to food impaction (530 3,935 1) (K22 2,T18 128A)   9  Esophagitis (530 10) (K20 9)   10  Gout (274 9) (M10 9)   11  Hyperglycemia (790 29) (R73 9)   12  Insomnia (780 52) (G47 00)   13  Medicare annual wellness visit, subsequent (V70 0) (Z00 00)   14  Pain of right hand (729 5) (M79 641)   15  Screening for genitourinary condition (V81 6) (Z13 89)   16  Special screening examination for neoplasm of prostate (V76 44) (Z12 5)   17  Tobacco use (305 1) (Z72 0)   18  Uncomplicated alcohol abuse (305 00) (F10 10)   19  Vasovagal syncope (780 2) (R55)    Past Medical History   1  History of Accidental Obstruction Of Respiratory Tract By Food (E911)   2  History of acute bronchitis (V12 69) (Z87 09)   3  History of bronchitis (V12 69) (Z87 09)   4  History of cellulitis (V13 3) (Z87 2)   5  History of eczema (V13 3) (Z87 2)   6  History of seborrheic dermatitis (V13 3) (Z87 2)   7  History of Nonspecific abnormal results of function study of thyroid (794 5) (R94 6)   8  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)   9  History of Skin rash (782 1) (R21)    Surgical History   1  History of Spinal Diskectomy Lumbar    Family History   Mother    1  Family history of Family Health Status Of Mother -   Family History    2  Family history of Situs Inversus    Social History    · Alcohol Use (History)   · Current Some Day Smoker (305 1)   ·    · Has 3 children   · Tobacco use (305 1) (Z72 0)   · Unemployed (V62 0) (Z56 0)    Current Meds    1  Atenolol 50 MG Oral Tablet; TAKE 1 TABLET DAILY; Therapy: 41ORN3957 to (Last Rx:17Ume8358)  Requested for: 06Amk9692 Ordered   2  CloNIDine HCl - 0 1 MG Oral Tablet; TAKE 1 TABLET DAILY; Therapy: 99KPW3088 to (Juli Pro)  Requested for: 66KNM2210; Last     Rx:2017 Ordered   3  Indomethacin 50 MG Oral Capsule; take 1 capsule three times daily PRN gout;      Therapy: 13Zoq2798 to (Evaluate:88Qyq8266)  Requested for: 2017; Last     Rx:2017 Ordered   4  Proventil  (90 Base) MCG/ACT Inhalation Aerosol Solution; INHALE 2 PUFFS     EVERY 4 HOURS AS NEEDED FOR COUGH AND WHEEZE;     Therapy: 24TXX3649 to (Evaluate:2017)  Requested for: 19YJB0576; Last     Rx:37Ciz1072 Ordered   5  Ventolin  (90 Base) MCG/ACT Inhalation Aerosol Solution; INHALE 2 PUFFS     EVERY 4-6 HOURS AS NEEDED; Therapy: 04JPI7450 to (Last C32VWZ7255)  Requested for: 23IYG8657 Ordered   6  Zolpidem Tartrate 5 MG Oral Tablet; TAKE 1 TABLET AT BEDTIME AS NEEDED FOR     SLEEP; Therapy: 44QRK3794 to (PIVDOTWP:86SYB1767)  Requested for: 2017; Last     Rx:2017 Ordered    Allergies   1  Norvasc TABS    Vitals   Vital Signs    Recorded: 62WZQ7431 73:65VK   Systolic 092   Diastolic 76   Height 5 ft 6 in   Weight 113 lb 2 oz   BMI Calculated 18 26   BSA Calculated 1 57     Physical Exam        Constitutional      General appearance: No acute distress, well appearing and well nourished  Eyes      Conjunctiva and lids: No swelling, erythema, or discharge  Pupils and irises: Equal, round and reactive to light  Ears, Nose, Mouth, and Throat      External inspection of ears and nose: Normal        Otoscopic examination: Tympanic membrance translucent with normal light reflex  Canals patent without erythema  Nasal mucosa, septum, and turbinates: Normal without edema or erythema  Oropharynx: Normal with no erythema, edema, exudate or lesions  Pulmonary      Respiratory effort: No increased work of breathing or signs of respiratory distress  Auscultation of lungs: Clear to auscultation, equal breath sounds bilaterally, no wheezes, no rales, no rhonci  Cardiovascular      Palpation of heart: Normal PMI, no thrills  Auscultation of heart: Normal rate and rhythm, normal S1 and S2, without murmurs         Examination of extremities for edema and/or varicosities: Normal        Carotid pulses: Normal        Abdomen      Abdomen: Non-tender, no masses  Liver and spleen: No hepatomegaly or splenomegaly  Lymphatic      Palpation of lymph nodes in neck: No lymphadenopathy  Musculoskeletal      Gait and station: Normal        Digits and nails: Normal without clubbing or cyanosis  Inspection/palpation of joints, bones, and muscles: Normal        Skin      Skin and subcutaneous tissue: Normal without rashes or lesions  Neurologic      Cranial nerves: Cranial nerves 2-12 intact  Reflexes: 2+ and symmetric  Sensation: No sensory loss  Psychiatric      Orientation to person, place and time: Normal        Mood and affect: Normal           Health Management   Colon cancer screening   COLONOSCOPY; every 10 years; Next Due: 42KFD9070; Overdue  Health Maintenance   Medicare Annual Wellness Visit; every 1 year; Last 07Apr2017; Next Due: 50FNA6192;  Active    Signatures    Electronically signed by : Dean Gayle DO; Luis Alberto 10 2018  1:17PM EST                       (Author)

## 2018-01-13 VITALS
BODY MASS INDEX: 17.72 KG/M2 | WEIGHT: 110.25 LBS | HEIGHT: 66 IN | DIASTOLIC BLOOD PRESSURE: 80 MMHG | SYSTOLIC BLOOD PRESSURE: 120 MMHG

## 2018-01-13 VITALS
DIASTOLIC BLOOD PRESSURE: 74 MMHG | HEIGHT: 66 IN | SYSTOLIC BLOOD PRESSURE: 134 MMHG | WEIGHT: 109 LBS | BODY MASS INDEX: 17.52 KG/M2

## 2018-01-23 VITALS
BODY MASS INDEX: 18.18 KG/M2 | DIASTOLIC BLOOD PRESSURE: 76 MMHG | SYSTOLIC BLOOD PRESSURE: 124 MMHG | WEIGHT: 113.13 LBS | HEIGHT: 66 IN

## 2018-01-26 DIAGNOSIS — G47.00 INSOMNIA, UNSPECIFIED TYPE: Primary | ICD-10-CM

## 2018-01-26 RX ORDER — ZOLPIDEM TARTRATE 5 MG/1
1 TABLET ORAL
COMMUNITY
Start: 2011-09-27 | End: 2018-01-26 | Stop reason: SDUPTHER

## 2018-01-26 RX ORDER — ZOLPIDEM TARTRATE 5 MG/1
5 TABLET ORAL
Qty: 30 TABLET | Refills: 0 | Status: SHIPPED | OUTPATIENT
Start: 2018-01-26 | End: 2018-02-26 | Stop reason: SDUPTHER

## 2018-02-26 DIAGNOSIS — M10.9 GOUT, UNSPECIFIED CAUSE, UNSPECIFIED CHRONICITY, UNSPECIFIED SITE: Primary | ICD-10-CM

## 2018-02-26 DIAGNOSIS — G47.00 INSOMNIA, UNSPECIFIED TYPE: ICD-10-CM

## 2018-02-26 RX ORDER — INDOMETHACIN 50 MG/1
50 CAPSULE ORAL
Qty: 270 CAPSULE | Refills: 0 | Status: SHIPPED | OUTPATIENT
Start: 2018-02-26 | End: 2019-12-16 | Stop reason: SDUPTHER

## 2018-02-26 RX ORDER — ZOLPIDEM TARTRATE 5 MG/1
5 TABLET ORAL
Qty: 90 TABLET | Refills: 0 | OUTPATIENT
Start: 2018-02-26 | End: 2018-05-25 | Stop reason: SDUPTHER

## 2018-02-26 RX ORDER — INDOMETHACIN 50 MG/1
CAPSULE ORAL
COMMUNITY
Start: 2016-08-23 | End: 2018-02-26 | Stop reason: SDUPTHER

## 2018-03-05 ENCOUNTER — OFFICE VISIT (OUTPATIENT)
Dept: FAMILY MEDICINE CLINIC | Facility: CLINIC | Age: 78
End: 2018-03-05
Payer: COMMERCIAL

## 2018-03-05 VITALS
TEMPERATURE: 97.4 F | BODY MASS INDEX: 17.33 KG/M2 | SYSTOLIC BLOOD PRESSURE: 130 MMHG | WEIGHT: 104 LBS | HEIGHT: 65 IN | OXYGEN SATURATION: 88 % | DIASTOLIC BLOOD PRESSURE: 78 MMHG

## 2018-03-05 DIAGNOSIS — J40 BRONCHITIS: ICD-10-CM

## 2018-03-05 DIAGNOSIS — R06.02 SHORTNESS OF BREATH: ICD-10-CM

## 2018-03-05 DIAGNOSIS — R05.9 COUGH: Primary | ICD-10-CM

## 2018-03-05 DIAGNOSIS — I10 ESSENTIAL HYPERTENSION: ICD-10-CM

## 2018-03-05 DIAGNOSIS — Z72.0 TOBACCO ABUSE: ICD-10-CM

## 2018-03-05 DIAGNOSIS — J45.909 MILD ASTHMA WITHOUT COMPLICATION, UNSPECIFIED WHETHER PERSISTENT: ICD-10-CM

## 2018-03-05 PROCEDURE — 99214 OFFICE O/P EST MOD 30 MIN: CPT | Performed by: FAMILY MEDICINE

## 2018-03-05 RX ORDER — ALBUTEROL SULFATE 90 UG/1
2 AEROSOL, METERED RESPIRATORY (INHALATION)
COMMUNITY
Start: 2017-11-06

## 2018-03-05 RX ORDER — AZITHROMYCIN 250 MG/1
TABLET, FILM COATED ORAL
Qty: 6 TABLET | Refills: 0 | Status: SHIPPED | OUTPATIENT
Start: 2018-03-05 | End: 2018-03-10

## 2018-03-05 RX ORDER — PREDNISONE 10 MG/1
TABLET ORAL
Qty: 21 TABLET | Refills: 0 | Status: SHIPPED | OUTPATIENT
Start: 2018-03-05 | End: 2018-04-10 | Stop reason: ALTCHOICE

## 2018-03-05 NOTE — PROGRESS NOTES
Assessment/Plan:  Chief Complaint   Patient presents with    Cold Like Symptoms     symptoms started Wednesday    Cough     thick white mucus    Shortness of Breath     Patient Instructions   Here for cough and shortness of breath, take inhaler as directed, start abx and prednisone and check cxray r/o infiltrate, quit tobacco and recheck in 1 week  Call if worse or ER for cough and SOB  No problem-specific Assessment & Plan notes found for this encounter  Diagnoses and all orders for this visit:    Cough  -     predniSONE 10 mg tablet; Take 60 mg po day#1, 50 mg po day#2, 40 mg po day#3, 30 mg po day#4, 20 mg po day#5m and 10 mg po day#6  -     azithromycin (ZITHROMAX) 250 mg tablet; Take 2 tablets today then 1 tablet daily x 4 days  -     XR chest pa & lateral; Future    Shortness of breath  -     predniSONE 10 mg tablet; Take 60 mg po day#1, 50 mg po day#2, 40 mg po day#3, 30 mg po day#4, 20 mg po day#5m and 10 mg po day#6  -     XR chest pa & lateral; Future    Mild asthma without complication, unspecified whether persistent  -     predniSONE 10 mg tablet; Take 60 mg po day#1, 50 mg po day#2, 40 mg po day#3, 30 mg po day#4, 20 mg po day#5m and 10 mg po day#6    Bronchitis  -     azithromycin (ZITHROMAX) 250 mg tablet; Take 2 tablets today then 1 tablet daily x 4 days    Essential hypertension    Tobacco abuse  -     XR chest pa & lateral; Future          Subjective:      Patient ID: Madhuri Lott is a 78 y o  male  Hx of asthma but has been coughing up white mucous  Has some sob also at times  Has not been taking his inhaler as directed  He is a smoker and has not smoked since last Wednesday  Cough   Associated symptoms include shortness of breath  Shortness of Breath         The following portions of the patient's history were reviewed and updated as appropriate: allergies, current medications, past medical history and problem list     Review of Systems   Constitutional: Negative  HENT: Negative  Eyes: Negative  Respiratory: Positive for cough and shortness of breath  Cardiovascular: Negative  Gastrointestinal: Negative  Endocrine: Negative  Genitourinary: Negative  Musculoskeletal: Negative  Skin: Negative  Allergic/Immunologic: Negative  Neurological: Negative  Hematological: Negative  Psychiatric/Behavioral: Negative  Objective:      /78 (BP Location: Left arm, Patient Position: Sitting, Cuff Size: Standard)   Temp (!) 97 4 °F (36 3 °C) (Tympanic)   Ht 5' 5" (1 651 m)   Wt 47 2 kg (104 lb)   SpO2 (!) 88%   BMI 17 31 kg/m²          Physical Exam   Constitutional: He is oriented to person, place, and time  He appears well-developed and well-nourished  HENT:   Head: Normocephalic and atraumatic  Right Ear: External ear normal    Left Ear: External ear normal    Nose: Nose normal    Mouth/Throat: Oropharynx is clear and moist    Eyes: Conjunctivae and EOM are normal  Pupils are equal, round, and reactive to light  Neck: Normal range of motion  Neck supple  Cardiovascular: Normal rate, regular rhythm, normal heart sounds and intact distal pulses  Pulmonary/Chest: Effort normal and breath sounds normal    cough   Musculoskeletal: Normal range of motion  Neurological: He is alert and oriented to person, place, and time  He has normal reflexes  Skin: Skin is warm and dry  Psychiatric: He has a normal mood and affect   His behavior is normal

## 2018-04-10 ENCOUNTER — OFFICE VISIT (OUTPATIENT)
Dept: FAMILY MEDICINE CLINIC | Facility: CLINIC | Age: 78
End: 2018-04-10
Payer: COMMERCIAL

## 2018-04-10 VITALS
HEIGHT: 67 IN | BODY MASS INDEX: 17.3 KG/M2 | DIASTOLIC BLOOD PRESSURE: 78 MMHG | WEIGHT: 110.2 LBS | SYSTOLIC BLOOD PRESSURE: 132 MMHG

## 2018-04-10 DIAGNOSIS — Z72.0 TOBACCO ABUSE: ICD-10-CM

## 2018-04-10 DIAGNOSIS — M10.9 GOUT, UNSPECIFIED CAUSE, UNSPECIFIED CHRONICITY, UNSPECIFIED SITE: ICD-10-CM

## 2018-04-10 DIAGNOSIS — I10 ESSENTIAL HYPERTENSION: Primary | ICD-10-CM

## 2018-04-10 DIAGNOSIS — J44.9 CHRONIC OBSTRUCTIVE PULMONARY DISEASE, UNSPECIFIED COPD TYPE (HCC): ICD-10-CM

## 2018-04-10 PROCEDURE — 99214 OFFICE O/P EST MOD 30 MIN: CPT | Performed by: FAMILY MEDICINE

## 2018-04-10 PROCEDURE — 3725F SCREEN DEPRESSION PERFORMED: CPT | Performed by: FAMILY MEDICINE

## 2018-04-10 PROCEDURE — 1101F PT FALLS ASSESS-DOCD LE1/YR: CPT | Performed by: FAMILY MEDICINE

## 2018-04-10 NOTE — PATIENT INSTRUCTIONS
He needs to get his labs as directed, as he is noncompliant  COPD and duodenitis and esophagitis stable on omeprazole  Quit tobacco and decrease alcohol use  Gout stable  Rec  continuing same meds as directed for HTN  Get labs as directed  Advised to quit tobacco as his COPD may get worse over time if he continues to smoke cigarettes  he will get his labs as directed  Recheck in 3 months  Filled out a form for a handicap placard for pt  Today as he cannot walk far without having to stop to rest  He has COPD and smokes cigarettes

## 2018-04-10 NOTE — PROGRESS NOTES
Assessment/Plan:  Chief Complaint   Patient presents with    Follow-up    Blood Pressure Check    Hyperlipidemia     Patient Instructions   He needs to get his labs as directed, as he is noncompliant  COPD and duodenitis and esophagitis stable on omeprazole  Quit tobacco and decrease alcohol use  Gout stable  Rec  continuing same meds as directed for HTN  Get labs as directed  Advised to quit tobacco as his COPD may get worse over time if he continues to smoke cigarettes  he will get his labs as directed  Recheck in 3 months  Filled out a form for a handicap placard for pt  Today as he cannot walk far without having to stop to rest  He has COPD and smokes cigarettes  No problem-specific Assessment & Plan notes found for this encounter  Diagnoses and all orders for this visit:    Essential hypertension    Gout, unspecified cause, unspecified chronicity, unspecified site    Chronic obstructive pulmonary disease, unspecified COPD type ([facility] [address] )    Tobacco abuse          Subjective:      Patient ID: Kasey Marie is a 78 y o  male  Here for HTN  No cp or sob, or ha  Has a hx of tobacco use and not quitting  He also has COPD and uses inhaler prn  He also denies any abdominal pain for hx of duodenitis and did see GI in past  Gout is stable  Pt  Refuses to quit tobacco  Pt  Would like a handicap placard as he cannot walk far without stopping to rest          The following portions of the patient's history were reviewed and updated as appropriate: allergies, current medications, past social history and problem list     Review of Systems   Constitutional: Negative  HENT: Negative  Eyes: Negative  Respiratory: Negative  COPD stable   Cardiovascular: Negative  Gastrointestinal: Negative  Gerd stable   Endocrine: Negative  Genitourinary: Negative  Musculoskeletal: Negative  Skin: Negative  Allergic/Immunologic: Negative  Neurological: Negative      Hematological: Negative  Psychiatric/Behavioral: Negative  Objective:      /78   Ht 5' 6 5" (1 689 m)   Wt 50 kg (110 lb 3 2 oz)   BMI 17 52 kg/m²          Physical Exam   Constitutional: He is oriented to person, place, and time  He appears well-developed and well-nourished  HENT:   Head: Normocephalic and atraumatic  Right Ear: External ear normal    Left Ear: External ear normal    Nose: Nose normal    Mouth/Throat: Oropharynx is clear and moist    Eyes: Conjunctivae and EOM are normal  Pupils are equal, round, and reactive to light  Neck: Normal range of motion  Neck supple  Cardiovascular: Normal rate, regular rhythm, normal heart sounds and intact distal pulses  Pulmonary/Chest: Effort normal and breath sounds normal    Musculoskeletal: Normal range of motion  Neurological: He is alert and oriented to person, place, and time  He has normal reflexes  Skin: Skin is warm and dry  Psychiatric: He has a normal mood and affect   His behavior is normal

## 2018-05-03 DIAGNOSIS — I10 HYPERTENSION, UNSPECIFIED TYPE: Primary | ICD-10-CM

## 2018-05-03 RX ORDER — CLONIDINE HYDROCHLORIDE 0.1 MG/1
0.1 TABLET ORAL DAILY
Qty: 90 TABLET | Refills: 1 | Status: SHIPPED | OUTPATIENT
Start: 2018-05-03 | End: 2018-10-30 | Stop reason: SDUPTHER

## 2018-05-25 DIAGNOSIS — G47.00 INSOMNIA, UNSPECIFIED TYPE: ICD-10-CM

## 2018-05-25 RX ORDER — ZOLPIDEM TARTRATE 5 MG/1
5 TABLET ORAL
Qty: 90 TABLET | Refills: 0 | Status: SHIPPED | OUTPATIENT
Start: 2018-05-25 | End: 2018-08-27 | Stop reason: SDUPTHER

## 2018-07-31 DIAGNOSIS — I10 ESSENTIAL HYPERTENSION: Primary | ICD-10-CM

## 2018-07-31 RX ORDER — ATENOLOL 50 MG/1
50 TABLET ORAL DAILY
Qty: 90 TABLET | Refills: 0 | Status: SHIPPED | OUTPATIENT
Start: 2018-07-31 | End: 2018-10-30 | Stop reason: SDUPTHER

## 2018-08-14 ENCOUNTER — OFFICE VISIT (OUTPATIENT)
Dept: FAMILY MEDICINE CLINIC | Facility: CLINIC | Age: 78
End: 2018-08-14
Payer: COMMERCIAL

## 2018-08-14 VITALS
SYSTOLIC BLOOD PRESSURE: 128 MMHG | WEIGHT: 109.4 LBS | BODY MASS INDEX: 17.17 KG/M2 | HEIGHT: 67 IN | DIASTOLIC BLOOD PRESSURE: 78 MMHG

## 2018-08-14 DIAGNOSIS — M10.9 GOUT, UNSPECIFIED CAUSE, UNSPECIFIED CHRONICITY, UNSPECIFIED SITE: ICD-10-CM

## 2018-08-14 DIAGNOSIS — M79.644 THUMB PAIN, RIGHT: ICD-10-CM

## 2018-08-14 DIAGNOSIS — J44.9 CHRONIC OBSTRUCTIVE PULMONARY DISEASE, UNSPECIFIED COPD TYPE (HCC): ICD-10-CM

## 2018-08-14 DIAGNOSIS — I10 ESSENTIAL HYPERTENSION: Primary | ICD-10-CM

## 2018-08-14 DIAGNOSIS — Z72.0 TOBACCO ABUSE: ICD-10-CM

## 2018-08-14 PROCEDURE — 3078F DIAST BP <80 MM HG: CPT | Performed by: FAMILY MEDICINE

## 2018-08-14 PROCEDURE — 3074F SYST BP LT 130 MM HG: CPT | Performed by: FAMILY MEDICINE

## 2018-08-14 PROCEDURE — 3008F BODY MASS INDEX DOCD: CPT | Performed by: FAMILY MEDICINE

## 2018-08-14 PROCEDURE — 99214 OFFICE O/P EST MOD 30 MIN: CPT | Performed by: FAMILY MEDICINE

## 2018-08-14 NOTE — PROGRESS NOTES
Assessment/Plan:  Chief Complaint   Patient presents with    Follow-up     Here for follow up of chronic medical conditions, patient complaining of his right thumb being painful due to gout  Patient Instructions   Here for needing handicap placard form signed as he cannot walk 200 feet without stopping to rest and also needs consult with hand center for right thumb pain and gouty arthritis  He is to take all meds as directed for HTN and get labs as directed  Quit tobacco as directed and COPD stable  Use inhaler prn  No problem-specific Assessment & Plan notes found for this encounter  Diagnoses and all orders for this visit:    Essential hypertension    Gout, unspecified cause, unspecified chronicity, unspecified site    Chronic obstructive pulmonary disease, unspecified COPD type ([facility] [address] )    Tobacco abuse    Thumb pain, right          Subjective:      Patient ID: Kasey Marie is a 78 y o  male  Follow-up (Here for follow up of chronic medical conditions, patient complaining of his right thumb being painful due to gout ) Needs handicap placard due to poor ambulation and needs to rest and cannot walk 200 feet without stopping to rest  Legs are weak  Pt  Is noncompliant with labs ordered  The following portions of the patient's history were reviewed and updated as appropriate: allergies, current medications, past family history, past medical history, past social history, past surgical history and problem list     Review of Systems   Constitutional: Negative  HENT: Negative  Eyes: Negative  Respiratory: Negative  Cardiovascular: Negative  Gastrointestinal: Negative  Endocrine: Negative  Genitourinary: Negative  Musculoskeletal:        Gout right thumb pain   Skin: Negative  Allergic/Immunologic: Negative  Neurological: Negative  Hematological: Negative  Psychiatric/Behavioral: Negative            Objective:      /78 (BP Location: Left arm, Patient Position: Sitting, Cuff Size: Standard)   Ht 5' 6 5" (1 689 m)   Wt 49 6 kg (109 lb 6 4 oz)   BMI 17 39 kg/m²          Physical Exam   Constitutional: He is oriented to person, place, and time  He appears well-developed and well-nourished  HENT:   Head: Normocephalic and atraumatic  Right Ear: External ear normal    Left Ear: External ear normal    Nose: Nose normal    Mouth/Throat: Oropharynx is clear and moist    Eyes: Conjunctivae and EOM are normal  Pupils are equal, round, and reactive to light  Neck: Normal range of motion  Neck supple  Cardiovascular: Normal rate, regular rhythm, normal heart sounds and intact distal pulses  Pulmonary/Chest: Effort normal and breath sounds normal    Musculoskeletal: Normal range of motion  Right thumb pain and right thumb inflamed for a year  Neurological: He is alert and oriented to person, place, and time  He has normal reflexes  Skin: Skin is warm and dry  Psychiatric: He has a normal mood and affect   His behavior is normal

## 2018-08-27 DIAGNOSIS — G47.00 INSOMNIA, UNSPECIFIED TYPE: ICD-10-CM

## 2018-08-27 RX ORDER — ZOLPIDEM TARTRATE 5 MG/1
5 TABLET ORAL
Qty: 90 TABLET | Refills: 0 | Status: SHIPPED | OUTPATIENT
Start: 2018-08-27 | End: 2018-11-21 | Stop reason: SDUPTHER

## 2018-10-30 DIAGNOSIS — I10 ESSENTIAL HYPERTENSION: ICD-10-CM

## 2018-10-30 DIAGNOSIS — I10 HYPERTENSION, UNSPECIFIED TYPE: ICD-10-CM

## 2018-10-30 RX ORDER — ATENOLOL 50 MG/1
50 TABLET ORAL DAILY
Qty: 90 TABLET | Refills: 0 | Status: SHIPPED | OUTPATIENT
Start: 2018-10-30 | End: 2019-01-28 | Stop reason: SDUPTHER

## 2018-10-30 RX ORDER — CLONIDINE HYDROCHLORIDE 0.1 MG/1
0.1 TABLET ORAL DAILY
Qty: 90 TABLET | Refills: 0 | Status: SHIPPED | OUTPATIENT
Start: 2018-10-30 | End: 2019-01-28 | Stop reason: SDUPTHER

## 2018-11-15 ENCOUNTER — OFFICE VISIT (OUTPATIENT)
Dept: FAMILY MEDICINE CLINIC | Facility: CLINIC | Age: 78
End: 2018-11-15
Payer: COMMERCIAL

## 2018-11-15 VITALS
HEIGHT: 67 IN | WEIGHT: 110.2 LBS | SYSTOLIC BLOOD PRESSURE: 124 MMHG | DIASTOLIC BLOOD PRESSURE: 78 MMHG | BODY MASS INDEX: 17.3 KG/M2

## 2018-11-15 DIAGNOSIS — Z23 NEED FOR INFLUENZA VACCINATION: ICD-10-CM

## 2018-11-15 DIAGNOSIS — Z72.0 TOBACCO ABUSE: ICD-10-CM

## 2018-11-15 DIAGNOSIS — I10 ESSENTIAL HYPERTENSION: ICD-10-CM

## 2018-11-15 DIAGNOSIS — Z23 NEED FOR PNEUMOCOCCAL VACCINATION: Primary | ICD-10-CM

## 2018-11-15 DIAGNOSIS — J44.9 CHRONIC OBSTRUCTIVE PULMONARY DISEASE, UNSPECIFIED COPD TYPE (HCC): ICD-10-CM

## 2018-11-15 DIAGNOSIS — Z23 NEED FOR TETANUS BOOSTER: ICD-10-CM

## 2018-11-15 DIAGNOSIS — M10.9 GOUT, UNSPECIFIED CAUSE, UNSPECIFIED CHRONICITY, UNSPECIFIED SITE: ICD-10-CM

## 2018-11-15 PROCEDURE — 3008F BODY MASS INDEX DOCD: CPT | Performed by: FAMILY MEDICINE

## 2018-11-15 PROCEDURE — 3074F SYST BP LT 130 MM HG: CPT | Performed by: FAMILY MEDICINE

## 2018-11-15 PROCEDURE — 3078F DIAST BP <80 MM HG: CPT | Performed by: FAMILY MEDICINE

## 2018-11-15 PROCEDURE — 99214 OFFICE O/P EST MOD 30 MIN: CPT | Performed by: FAMILY MEDICINE

## 2018-11-15 PROCEDURE — 1160F RVW MEDS BY RX/DR IN RCRD: CPT | Performed by: FAMILY MEDICINE

## 2018-11-15 NOTE — PATIENT INSTRUCTIONS
Get handicap placard form signed at last office visit as he cannot walk 200 feet without stopping to rest and also needs consult with hand center for right thumb pain and gouty arthritis  He is to take all meds as directed for HTN and get labs as directed - he is noncompliant  Quit tobacco as directed and COPD stable  Use inhaler prn  Refuses flu shot today

## 2018-11-15 NOTE — PROGRESS NOTES
Assessment/Plan:  Chief Complaint   Patient presents with    Follow-up    Hypertension     Patient Instructions   Get handicap placard form signed at last office visit as he cannot walk 200 feet without stopping to rest and also needs consult with hand center for right thumb pain and gouty arthritis  He is to take all meds as directed for HTN and get labs as directed - he is noncompliant  Quit tobacco as directed and COPD stable  Use inhaler prn  Refuses flu shot today  No problem-specific Assessment & Plan notes found for this encounter  Diagnoses and all orders for this visit:    Need for pneumococcal vaccination  -     PNEUMOCOCCAL POLYSACCHARIDE VACCINE 23-VALENT =>3YO SQ IM    Need for tetanus booster  -     TDAP VACCINE GREATER THAN OR EQUAL TO 6YO IM    Need for influenza vaccination  -     influenza vaccine, [id number], high-dose, PF 0 5 mL, for patients 65 yr+ (FLUZONE HIGH-DOSE)    Essential hypertension    Chronic obstructive pulmonary disease, unspecified COPD type ([facility] )    Tobacco abuse    Gout, unspecified cause, unspecified chronicity, unspecified site          Subjective:      Patient ID: Shawnee Weems is a 70 y o  male  Here for recheck and doing well, gout stable and takes all meds as directed for HTN and gout  He has yet to get handicap placard or see hand specialist for gouty arthritis and thumb pain in right hand  No cp or sob, or ha  He still smokes  The following portions of the patient's history were reviewed and updated as appropriate: allergies, current medications, past family history, past medical history, past social history, past surgical history and problem list     Review of Systems   Constitutional: Negative  HENT: Negative  Eyes: Negative  Respiratory: Negative  Cardiovascular: Negative  Gastrointestinal: Negative  Endocrine: Negative  Genitourinary: Negative  Musculoskeletal:        Right thumb pain    Skin: Negative  Allergic/Immunologic: Negative  Neurological: Negative  Hematological: Negative  Psychiatric/Behavioral: Negative  Objective:      /78   Ht 5' 6 5" (1 689 m)   Wt 50 kg (110 lb 3 2 oz)   BMI 17 52 kg/m²          Physical Exam   Constitutional: He is oriented to person, place, and time  He appears well-developed and well-nourished  HENT:   Head: Normocephalic and atraumatic  Right Ear: External ear normal    Left Ear: External ear normal    Nose: Nose normal    Mouth/Throat: Oropharynx is clear and moist    Eyes: Pupils are equal, round, and reactive to light  Conjunctivae and EOM are normal    Neck: Normal range of motion  Neck supple  Cardiovascular: Normal rate, regular rhythm, normal heart sounds and intact distal pulses  Pulmonary/Chest: Effort normal and breath sounds normal    Musculoskeletal: Normal range of motion  Right thumb pain with motion    Neurological: He is alert and oriented to person, place, and time  He has normal reflexes  Skin: Skin is warm and dry  Psychiatric: He has a normal mood and affect   His behavior is normal

## 2018-11-21 DIAGNOSIS — G47.00 INSOMNIA, UNSPECIFIED TYPE: ICD-10-CM

## 2018-11-21 RX ORDER — ZOLPIDEM TARTRATE 5 MG/1
5 TABLET ORAL
Qty: 90 TABLET | Refills: 0 | Status: SHIPPED | OUTPATIENT
Start: 2018-11-21 | End: 2019-02-22 | Stop reason: SDUPTHER

## 2018-11-21 NOTE — TELEPHONE ENCOUNTER
Patient called the script line asking for a refill and would like 90 day supply    Please authorize script

## 2019-01-28 DIAGNOSIS — I10 HYPERTENSION, UNSPECIFIED TYPE: ICD-10-CM

## 2019-01-28 DIAGNOSIS — I10 ESSENTIAL HYPERTENSION: ICD-10-CM

## 2019-01-28 RX ORDER — ATENOLOL 50 MG/1
50 TABLET ORAL DAILY
Qty: 90 TABLET | Refills: 0 | Status: SHIPPED | OUTPATIENT
Start: 2019-01-28 | End: 2019-04-29 | Stop reason: SDUPTHER

## 2019-01-28 RX ORDER — CLONIDINE HYDROCHLORIDE 0.1 MG/1
0.1 TABLET ORAL DAILY
Qty: 90 TABLET | Refills: 0 | Status: SHIPPED | OUTPATIENT
Start: 2019-01-28 | End: 2019-04-29 | Stop reason: SDUPTHER

## 2019-02-22 ENCOUNTER — OFFICE VISIT (OUTPATIENT)
Dept: FAMILY MEDICINE CLINIC | Facility: CLINIC | Age: 79
End: 2019-02-22
Payer: COMMERCIAL

## 2019-02-22 VITALS
DIASTOLIC BLOOD PRESSURE: 78 MMHG | SYSTOLIC BLOOD PRESSURE: 126 MMHG | WEIGHT: 113.4 LBS | BODY MASS INDEX: 17.8 KG/M2 | HEIGHT: 67 IN

## 2019-02-22 DIAGNOSIS — M10.9 GOUT, UNSPECIFIED CAUSE, UNSPECIFIED CHRONICITY, UNSPECIFIED SITE: ICD-10-CM

## 2019-02-22 DIAGNOSIS — Z72.0 TOBACCO ABUSE: Primary | ICD-10-CM

## 2019-02-22 DIAGNOSIS — Z00.00 MEDICARE ANNUAL WELLNESS VISIT, SUBSEQUENT: ICD-10-CM

## 2019-02-22 DIAGNOSIS — G47.00 INSOMNIA, UNSPECIFIED TYPE: ICD-10-CM

## 2019-02-22 DIAGNOSIS — J44.9 CHRONIC OBSTRUCTIVE PULMONARY DISEASE, UNSPECIFIED COPD TYPE (HCC): ICD-10-CM

## 2019-02-22 DIAGNOSIS — Z00.00 HEALTH CARE MAINTENANCE: ICD-10-CM

## 2019-02-22 DIAGNOSIS — I10 ESSENTIAL HYPERTENSION: ICD-10-CM

## 2019-02-22 PROCEDURE — 3074F SYST BP LT 130 MM HG: CPT | Performed by: FAMILY MEDICINE

## 2019-02-22 PROCEDURE — G0439 PPPS, SUBSEQ VISIT: HCPCS | Performed by: FAMILY MEDICINE

## 2019-02-22 PROCEDURE — 4004F PT TOBACCO SCREEN RCVD TLK: CPT | Performed by: FAMILY MEDICINE

## 2019-02-22 PROCEDURE — 3078F DIAST BP <80 MM HG: CPT | Performed by: FAMILY MEDICINE

## 2019-02-22 PROCEDURE — 1160F RVW MEDS BY RX/DR IN RCRD: CPT | Performed by: FAMILY MEDICINE

## 2019-02-22 PROCEDURE — 1125F AMNT PAIN NOTED PAIN PRSNT: CPT | Performed by: FAMILY MEDICINE

## 2019-02-22 PROCEDURE — 1170F FXNL STATUS ASSESSED: CPT | Performed by: FAMILY MEDICINE

## 2019-02-22 PROCEDURE — 99214 OFFICE O/P EST MOD 30 MIN: CPT | Performed by: FAMILY MEDICINE

## 2019-02-22 RX ORDER — ZOLPIDEM TARTRATE 5 MG/1
5 TABLET ORAL
Qty: 90 TABLET | Refills: 0 | Status: SHIPPED | OUTPATIENT
Start: 2019-02-22 | End: 2019-05-21 | Stop reason: SDUPTHER

## 2019-02-22 NOTE — PROGRESS NOTES
Assessment and Plan:    Problem List Items Addressed This Visit        Respiratory    COPD (chronic obstructive pulmonary disease) ([facility] [address] )       Cardiovascular and Mediastinum    Hypertension       Other    Gout    Tobacco abuse - Primary      Other Visit Diagnoses     Insomnia, unspecified type        Relevant Medications    zolpidem (AMBIEN) 5 mg tablet        Health Maintenance Due   Topic Date Due    Medicare Annual Wellness Visit (AWV)  1940    SLP PLAN OF CARE  1940    BMI: Followup Plan  10/25/1960    DTaP,Tdap,and Td Vaccines (1 - Tdap) 10/25/1963    Pneumococcal PPSV23/PCV13 65+ Years / Low and Medium Risk (1 of 2 - PCV13) 10/25/2007    INFLUENZA VACCINE  07/01/2018         HPI:  Oscar Neal is a 70 y o  male here for his Subsequent Wellness Visit  Patient Active Problem List   Diagnosis    Hypertension    Gout    [facility]) [facility]    Esophagitis    Tobacco abuse     Past Medical History:   Diagnosis Date    Abnormal thyroid function test     non specified /  [place]    1/8/13      COPD (chronic obstructive pulmonary disease) (HCC)     Eczema     [place]   11/26/13       Esophagitis     Gout     Hypertension     Pyloric channel ulcer      Past Surgical History:   Procedure Laterality Date    BACK SURGERY      ESOPHAGOGASTRODUODENOSCOPY N/A 12/1/2016    Procedure: ESOPHAGOGASTRODUODENOSCOPY (EGD); Surgeon: Graciela Castañeda MD;  Location: [facility];   Service:     EYE SURGERY       Family History   Problem Relation Age of Onset    Other Mother         Dec in her 66's (gangrene foot)    Other Family         situs inversus / Dec 89 yo     Social History     Tobacco Use   Smoking Status Current Every Day Smoker    Packs/day: 0 50    Years: 60 00    Pack years: 30 00    Types: Cigarettes   Tobacco Comment    current some day smoker per allscript     Social History     Substance and Sexual Activity   Alcohol Use Yes    Alcohol/week: 16 8 oz    Types: 28 Cans of beer per week      Social History     Substance and Sexual Activity   Drug Use No       Current Outpatient Medications   Medication Sig Dispense Refill    albuterol (VENTOLIN HFA) 90 mcg/act inhaler Inhale 2 puffs      atenolol (TENORMIN) 50 mg tablet Take 1 tablet (50 mg total) by mouth daily 90 tablet 0    cloNIDine (CATAPRES) 0 1 mg tablet Take 1 tablet (0 1 mg total) by mouth daily 90 tablet 0    zolpidem (AMBIEN) 5 mg tablet Take 1 tablet (5 mg total) by mouth daily at bedtime as needed for sleep 90 tablet 0    indomethacin (INDOCIN) 50 mg capsule Take 1 capsule (50 mg total) by mouth 3 (three) times a day with meals for 90 days (Patient not taking: Reported on 11/15/2018 ) 270 capsule 0     No current facility-administered medications for this visit  Allergies   Allergen Reactions    Norvasc [Amlodipine]      There is no immunization history for the selected administration types on file for this patient  Patient Care Team:  Nisa Vincent DO as PCP - General  Shaila Calderon MD    Medicare Screening Tests and Risk Assessments:  Jean Claude Lanza is here for his Subsequent Wellness visit  Last Medicare Wellness visit information reviewed, patient interviewed and updates made to the record today  Health Risk Assessment:  Patient rates overall health as good  Patient feels that their physical health rating is Same  Eyesight was rated as Same  Hearing was rated as Same  Patient feels that their emotional and mental health rating is Same  Pain experienced by patient in the last 7 days has been None  Patient states that he has experienced no weight loss or gain in last 6 months  Emotional/Mental Health:  Patient has been feeling nervous/anxious  PHQ-9 Depression Screening:    Frequency of the following problems over the past two weeks:      1  Little interest or pleasure in doing things: 0 - not at all      2   Feeling down, depressed, or hopeless: 0 - not at all  PHQ-2 Score: 0          Broken Bones/Falls: Fall Risk Assessment:    In the past year, patient has experienced: No history of falling in past year          Bladder/Bowel:  Patient has not leaked urine accidently in the last six months  Patient reports no loss of bowel control  Immunizations:  Patient has not had a flu vaccination within the last year  Patient has not received a pneumonia shot  Patient has not received a shingles shot  Home Safety:  Patient does not have trouble with stairs inside or outside of their home  Patient currently reports that there are no safety hazards present in home, working smoke alarms, working carbon monoxide detectors  Preventative Screenings:   no prostate cancer screen performed, no colon cancer screen completed, cholesterol screen completed, no glaucoma eye exam completed    Nutrition:  Current diet: Regular with servings of the following:    Medications:  Patient is not currently taking any over-the-counter supplements  Patient is able to manage medications  Lifestyle Choices:  Patient reports current tobacco use  Patient reports alcohol use  Alcohol use per week: 24  Patient drives a vehicle  Patient wears seat belt  Current level of exercise of physical activity described by patient as: limited  Activities of Daily Living:  Can get out of bed by his or her self, able to dress self, able to make own meals, able to do own shopping, able to bathe self, can do own laundry/housekeeping, can manage own money, pay bills and track expenses    Previous Hospitalizations:  No hospitalization or ED visit in past 12 months        Advanced Directives:  Patient has decided on a power of   Patient has spoken to designated power of   Patient has not completed advanced directive          Preventative Screening/Counseling:      Cardiovascular:      General: Risks and Benefits Discussed          Diabetes:      General: Risks and Benefits Discussed          Colorectal Cancer:      General: Risks and Benefits Discussed          Prostate Cancer:      General: Risks and Benefits Discussed          Osteoporosis:      General: Risks and Benefits Discussed          AAA:      General: Risks and Benefits Discussed      Counseling: tobacco cessation counseling given          Advanced Directives:   Patient has no living will for healthcare, patient does not have an advanced directive  Information on ACP and/or AD provided

## 2019-02-22 NOTE — PATIENT INSTRUCTIONS
Here for medicare wellness and routine office visit for HTN and gout and also insomnia and COPD  Take all meds as directed for gout and HTN and COPD and also insomnia

## 2019-02-22 NOTE — PROGRESS NOTES
Assessment/Plan:  Chief Complaint   Patient presents with   [facility] [place] Wellness Visit     Patient Instructions   Here for medicare wellness and routine office visit for HTN and gout and also insomnia and COPD  Take all meds as directed for gout and HTN and COPD and also insomnia  No problem-specific Assessment & Plan notes found for this encounter  Diagnoses and all orders for this visit:    Tobacco abuse    Medicare annual wellness visit, subsequent    Health care maintenance    Essential hypertension    Chronic obstructive pulmonary disease, unspecified COPD type ([facility] [address] )    Gout, unspecified cause, unspecified chronicity, unspecified site    Insomnia, unspecified type  -     zolpidem (AMBIEN) 5 mg tablet; Take 1 tablet (5 mg total) by mouth daily at bedtime as needed for sleep          Subjective:      Patient ID: Tamika Silver is a 70 y o  male  Here for medicare wellness and routine office visit for tobacco use and also HTN and gout and insomnia  No cp or sob, or ha  Still smokes 1 Pack per week  Gout is stable  The following portions of the patient's history were reviewed and updated as appropriate: allergies, current medications, past family history, past medical history, past social history, past surgical history and problem list     Review of Systems   Constitutional: Negative  HENT: Negative  Eyes: Negative  Respiratory: Negative  Cardiovascular: Negative  Gastrointestinal: Negative  Endocrine: Negative  Genitourinary: Negative  Musculoskeletal: Negative  Gout stable   Skin: Negative  Allergic/Immunologic: Negative  Neurological: Negative  Hematological: Negative  Psychiatric/Behavioral: Negative  Insomnia stable on med         Objective:      /78   Ht 5' 6 5" (1 689 m)   Wt 51 4 kg (113 lb 6 4 oz)   BMI 18 03 kg/m²          Physical Exam   Constitutional: He is oriented to person, place, and time   He appears well-developed and well-nourished  HENT:   Head: Normocephalic and atraumatic  Right Ear: External ear normal    Left Ear: External ear normal    Nose: Nose normal    Mouth/Throat: Oropharynx is clear and moist    Eyes: Pupils are equal, round, and reactive to light  Conjunctivae and EOM are normal    Neck: Normal range of motion  Neck supple  Cardiovascular: Normal rate, regular rhythm, normal heart sounds and intact distal pulses  Pulmonary/Chest: Effort normal and breath sounds normal    Musculoskeletal: Normal range of motion  Neurological: He is alert and oriented to person, place, and time  He has normal reflexes  Skin: Skin is warm and dry  Psychiatric: He has a normal mood and affect   His behavior is normal

## 2019-04-29 DIAGNOSIS — I10 HYPERTENSION, UNSPECIFIED TYPE: ICD-10-CM

## 2019-04-29 DIAGNOSIS — I10 ESSENTIAL HYPERTENSION: ICD-10-CM

## 2019-04-29 RX ORDER — CLONIDINE HYDROCHLORIDE 0.1 MG/1
0.1 TABLET ORAL DAILY
Qty: 90 TABLET | Refills: 0 | Status: SHIPPED | OUTPATIENT
Start: 2019-04-29 | End: 2019-07-29 | Stop reason: SDUPTHER

## 2019-04-29 RX ORDER — ATENOLOL 50 MG/1
50 TABLET ORAL DAILY
Qty: 90 TABLET | Refills: 0 | Status: SHIPPED | OUTPATIENT
Start: 2019-04-29 | End: 2019-07-29 | Stop reason: SDUPTHER

## 2019-05-21 DIAGNOSIS — G47.00 INSOMNIA, UNSPECIFIED TYPE: ICD-10-CM

## 2019-05-21 RX ORDER — ZOLPIDEM TARTRATE 5 MG/1
5 TABLET ORAL
Qty: 90 TABLET | Refills: 0 | Status: SHIPPED | OUTPATIENT
Start: 2019-05-21 | End: 2019-08-19 | Stop reason: SDUPTHER

## 2019-06-10 ENCOUNTER — OFFICE VISIT (OUTPATIENT)
Dept: FAMILY MEDICINE CLINIC | Facility: CLINIC | Age: 79
End: 2019-06-10
Payer: COMMERCIAL

## 2019-06-10 VITALS
WEIGHT: 108.2 LBS | OXYGEN SATURATION: 99 % | HEART RATE: 60 BPM | DIASTOLIC BLOOD PRESSURE: 80 MMHG | BODY MASS INDEX: 16.98 KG/M2 | TEMPERATURE: 97.7 F | SYSTOLIC BLOOD PRESSURE: 139 MMHG | HEIGHT: 67 IN

## 2019-06-10 DIAGNOSIS — I10 ESSENTIAL HYPERTENSION: Primary | ICD-10-CM

## 2019-06-10 DIAGNOSIS — J44.9 CHRONIC OBSTRUCTIVE PULMONARY DISEASE, UNSPECIFIED COPD TYPE (HCC): ICD-10-CM

## 2019-06-10 DIAGNOSIS — G47.00 INSOMNIA, UNSPECIFIED TYPE: ICD-10-CM

## 2019-06-10 DIAGNOSIS — Z72.0 TOBACCO ABUSE: ICD-10-CM

## 2019-06-10 DIAGNOSIS — M79.644 THUMB PAIN, RIGHT: ICD-10-CM

## 2019-06-10 DIAGNOSIS — M10.9 GOUT, UNSPECIFIED CAUSE, UNSPECIFIED CHRONICITY, UNSPECIFIED SITE: ICD-10-CM

## 2019-06-10 PROCEDURE — 3075F SYST BP GE 130 - 139MM HG: CPT | Performed by: FAMILY MEDICINE

## 2019-06-10 PROCEDURE — 4004F PT TOBACCO SCREEN RCVD TLK: CPT | Performed by: FAMILY MEDICINE

## 2019-06-10 PROCEDURE — 1160F RVW MEDS BY RX/DR IN RCRD: CPT | Performed by: FAMILY MEDICINE

## 2019-06-10 PROCEDURE — 3079F DIAST BP 80-89 MM HG: CPT | Performed by: FAMILY MEDICINE

## 2019-06-10 PROCEDURE — 99214 OFFICE O/P EST MOD 30 MIN: CPT | Performed by: FAMILY MEDICINE

## 2019-07-29 DIAGNOSIS — I10 HYPERTENSION, UNSPECIFIED TYPE: ICD-10-CM

## 2019-07-29 DIAGNOSIS — I10 ESSENTIAL HYPERTENSION: ICD-10-CM

## 2019-07-29 RX ORDER — ATENOLOL 50 MG/1
50 TABLET ORAL DAILY
Qty: 90 TABLET | Refills: 0 | Status: SHIPPED | OUTPATIENT
Start: 2019-07-29 | End: 2019-10-25 | Stop reason: SDUPTHER

## 2019-07-29 RX ORDER — CLONIDINE HYDROCHLORIDE 0.1 MG/1
0.1 TABLET ORAL DAILY
Qty: 90 TABLET | Refills: 0 | Status: SHIPPED | OUTPATIENT
Start: 2019-07-29 | End: 2019-10-25 | Stop reason: SDUPTHER

## 2019-08-19 DIAGNOSIS — G47.00 INSOMNIA, UNSPECIFIED TYPE: ICD-10-CM

## 2019-08-19 RX ORDER — ZOLPIDEM TARTRATE 5 MG/1
5 TABLET ORAL
Qty: 90 TABLET | Refills: 0 | Status: SHIPPED | OUTPATIENT
Start: 2019-08-19 | End: 2019-11-18 | Stop reason: SDUPTHER

## 2019-09-10 ENCOUNTER — OFFICE VISIT (OUTPATIENT)
Dept: FAMILY MEDICINE CLINIC | Facility: CLINIC | Age: 79
End: 2019-09-10
Payer: COMMERCIAL

## 2019-09-10 VITALS
WEIGHT: 107.8 LBS | HEART RATE: 66 BPM | TEMPERATURE: 98.5 F | SYSTOLIC BLOOD PRESSURE: 138 MMHG | DIASTOLIC BLOOD PRESSURE: 88 MMHG | BODY MASS INDEX: 16.92 KG/M2 | HEIGHT: 67 IN | OXYGEN SATURATION: 98 %

## 2019-09-10 DIAGNOSIS — I10 ESSENTIAL HYPERTENSION: ICD-10-CM

## 2019-09-10 DIAGNOSIS — J44.9 CHRONIC OBSTRUCTIVE PULMONARY DISEASE, UNSPECIFIED COPD TYPE (HCC): ICD-10-CM

## 2019-09-10 DIAGNOSIS — M10.9 GOUT, UNSPECIFIED CAUSE, UNSPECIFIED CHRONICITY, UNSPECIFIED SITE: ICD-10-CM

## 2019-09-10 DIAGNOSIS — K20.90 ESOPHAGITIS: ICD-10-CM

## 2019-09-10 DIAGNOSIS — Z72.0 TOBACCO ABUSE: Primary | ICD-10-CM

## 2019-09-10 PROCEDURE — 99214 OFFICE O/P EST MOD 30 MIN: CPT | Performed by: FAMILY MEDICINE

## 2019-09-10 PROCEDURE — 3079F DIAST BP 80-89 MM HG: CPT | Performed by: FAMILY MEDICINE

## 2019-09-10 PROCEDURE — 3075F SYST BP GE 130 - 139MM HG: CPT | Performed by: FAMILY MEDICINE

## 2019-09-10 NOTE — PROGRESS NOTES
Assessment/Plan:  Chief Complaint   Patient presents with    Follow-up     Here for 3 month follow up, no new problems or concerns  Patient Instructions   HTN stable, take BP med as directed and rec  No gout problems  Quit tobacco as he has COPD also  Insomnia stable on zolpidem  Avoid purine rich foods and stay well hydrated for hx of gout  No problem-specific Assessment & Plan notes found for this encounter  Diagnoses and all orders for this visit:    Tobacco abuse    Essential hypertension    Gout, unspecified cause, unspecified chronicity, unspecified site    Esophagitis    Chronic obstructive pulmonary disease, unspecified COPD type ([facility] [address] )          Subjective:      Patient ID: Wayne Hilliard is a 70 y o  male  Follow-up (Here for 3 month follow up, no new problems or concerns ) Smokes 1 Pack every 4 days  No cp or sob, or ha  The following portions of the patient's history were reviewed and updated as appropriate: allergies, current medications, past family history, past medical history, past social history, past surgical history and problem list     Review of Systems   Constitutional: Negative  HENT: Negative  Eyes: Negative  Respiratory: Negative  Cardiovascular: Negative  Gastrointestinal: Negative  Endocrine: Negative  Genitourinary: Negative  Musculoskeletal: Negative  Skin: Negative  Allergic/Immunologic: Negative  Neurological: Negative  Hematological: Negative  Psychiatric/Behavioral: Negative  Objective:      /88   Pulse 66   Temp 98 5 °F (36 9 °C) (Temporal)   Ht 5' 6 5" (1 689 m)   Wt 48 9 kg (107 lb 12 8 oz)   SpO2 98%   BMI 17 14 kg/m²          Physical Exam   Constitutional: He is oriented to person, place, and time  He appears well-developed and well-nourished  HENT:   Head: Normocephalic and atraumatic     Right Ear: External ear normal    Left Ear: External ear normal    Nose: Nose normal    Mouth/Throat: Oropharynx is clear and moist    Eyes: Pupils are equal, round, and reactive to light  Conjunctivae and EOM are normal    Neck: Normal range of motion  Neck supple  Cardiovascular: Normal rate, regular rhythm, normal heart sounds and intact distal pulses  Pulmonary/Chest: Effort normal and breath sounds normal    Musculoskeletal: Normal range of motion  Neurological: He is alert and oriented to person, place, and time  He has normal reflexes  Skin: Skin is warm and dry  Psychiatric: He has a normal mood and affect   His behavior is normal

## 2019-09-10 NOTE — PATIENT INSTRUCTIONS
HTN stable, take BP med as directed and rec  No gout problems  Quit tobacco as he has COPD also  Insomnia stable on zolpidem  Avoid purine rich foods and stay well hydrated for hx of gout

## 2019-10-01 ENCOUNTER — TELEPHONE (OUTPATIENT)
Dept: FAMILY MEDICINE CLINIC | Facility: CLINIC | Age: 79
End: 2019-10-01

## 2019-10-01 DIAGNOSIS — M79.644 THUMB PAIN, RIGHT: Primary | ICD-10-CM

## 2019-10-01 NOTE — TELEPHONE ENCOUNTER
Pt's records were faxed over to [facility]  I did call and left a message for Russel Be advising the office is currently closed and he is to please call the office back tomorrow  I will touch base with him tomorrow If I do not hear from him tomorrow morning

## 2019-10-01 NOTE — TELEPHONE ENCOUNTER
Cleotha Koyanagi called the office regarding wanting to schedule an appointment for his Right  thumb with a specialist  I do see pt was supposed to be referred to [facility] I see nothing in his chart please advise   Pt's number is [phone]

## 2019-10-02 NOTE — TELEPHONE ENCOUNTER
Adriano Shi returned the office's phone call and he was informed that Nicole Cavazos referred him to [facility] [place]  Pt was given their contact number for [facility] and informed that they should call  him to schedule; but he may call if he prefers I advised pt if he calls to informed Nicole Cavazos pt's family doctor referred him for R thumb pain and they will send him to the correct department

## 2019-10-25 DIAGNOSIS — I10 ESSENTIAL HYPERTENSION: ICD-10-CM

## 2019-10-25 DIAGNOSIS — I10 HYPERTENSION, UNSPECIFIED TYPE: ICD-10-CM

## 2019-10-25 RX ORDER — CLONIDINE HYDROCHLORIDE 0.1 MG/1
0.1 TABLET ORAL DAILY
Qty: 90 TABLET | Refills: 0 | Status: SHIPPED | OUTPATIENT
Start: 2019-10-25 | End: 2020-01-22 | Stop reason: SDUPTHER

## 2019-10-25 RX ORDER — ATENOLOL 50 MG/1
50 TABLET ORAL DAILY
Qty: 90 TABLET | Refills: 0 | Status: SHIPPED | OUTPATIENT
Start: 2019-10-25 | End: 2020-01-22 | Stop reason: SDUPTHER

## 2019-11-18 DIAGNOSIS — G47.00 INSOMNIA, UNSPECIFIED TYPE: ICD-10-CM

## 2019-11-18 RX ORDER — ZOLPIDEM TARTRATE 5 MG/1
5 TABLET ORAL
Qty: 90 TABLET | Refills: 0 | Status: SHIPPED | OUTPATIENT
Start: 2019-11-18 | End: 2020-02-12 | Stop reason: SDUPTHER

## 2019-12-10 ENCOUNTER — OFFICE VISIT (OUTPATIENT)
Dept: FAMILY MEDICINE CLINIC | Facility: CLINIC | Age: 79
End: 2019-12-10
Payer: COMMERCIAL

## 2019-12-10 VITALS
SYSTOLIC BLOOD PRESSURE: 136 MMHG | BODY MASS INDEX: 17.45 KG/M2 | HEIGHT: 66 IN | DIASTOLIC BLOOD PRESSURE: 84 MMHG | WEIGHT: 108.6 LBS | TEMPERATURE: 97.1 F | RESPIRATION RATE: 16 BRPM | HEART RATE: 71 BPM | OXYGEN SATURATION: 98 %

## 2019-12-10 DIAGNOSIS — I10 ESSENTIAL HYPERTENSION: ICD-10-CM

## 2019-12-10 DIAGNOSIS — J44.9 CHRONIC OBSTRUCTIVE PULMONARY DISEASE, UNSPECIFIED COPD TYPE (HCC): ICD-10-CM

## 2019-12-10 DIAGNOSIS — M10.9 GOUT, UNSPECIFIED CAUSE, UNSPECIFIED CHRONICITY, UNSPECIFIED SITE: ICD-10-CM

## 2019-12-10 DIAGNOSIS — Z72.0 TOBACCO ABUSE: Primary | ICD-10-CM

## 2019-12-10 DIAGNOSIS — G47.00 INSOMNIA, UNSPECIFIED TYPE: ICD-10-CM

## 2019-12-10 PROCEDURE — 4004F PT TOBACCO SCREEN RCVD TLK: CPT | Performed by: FAMILY MEDICINE

## 2019-12-10 PROCEDURE — 3075F SYST BP GE 130 - 139MM HG: CPT | Performed by: FAMILY MEDICINE

## 2019-12-10 PROCEDURE — 3079F DIAST BP 80-89 MM HG: CPT | Performed by: FAMILY MEDICINE

## 2019-12-10 PROCEDURE — 99214 OFFICE O/P EST MOD 30 MIN: CPT | Performed by: FAMILY MEDICINE

## 2019-12-10 PROCEDURE — 1160F RVW MEDS BY RX/DR IN RCRD: CPT | Performed by: FAMILY MEDICINE

## 2019-12-10 NOTE — PROGRESS NOTES
Assessment/Plan:  Chief Complaint   Patient presents with    Follow-up     Pt presents for a 3 month f/u  Patient Instructions   HTN stable, take BP med as directed and rec  Quitting tobacco  No gout problems other than gout right thumb area  Quit tobacco as he has COPD also  Insomnia stable on zolpidem  Avoid alcohol and purine rich foods and stay well hydrated for hx of gout  Recheck BP in 3 months  Refuses flu shot today  No problem-specific Assessment & Plan notes found for this encounter  Diagnoses and all orders for this visit:    Tobacco abuse    Essential hypertension    Chronic obstructive pulmonary disease, unspecified COPD type ([address] )    Insomnia, unspecified type    Gout, unspecified cause, unspecified chronicity, unspecified site          Subjective:      Patient ID: Nisa Munroe is a 70 y o  male  Follow-up (Pt presents for a 3 month f/u  ) Still smokes 1 pack every 3-4 days  No cp or sob, or ha  Takes all meds as directed for HTN and has gout and also COPD and takes med for insomnia which is stable  He does drink beers 4 bottles of [employer] light when he goes to the bar/club  The following portions of the patient's history were reviewed and updated as appropriate: allergies, current medications, past family history, past medical history, past social history, past surgical history and problem list     Review of Systems   Constitutional: Negative  HENT: Negative  Eyes: Negative  Respiratory: Negative  Cardiovascular: Negative  Gastrointestinal: Negative  Endocrine: Negative  Genitourinary: Negative  Musculoskeletal:        Gout right thumb   Skin: Negative  Allergic/Immunologic: Negative  Neurological: Negative  Hematological: Negative      Psychiatric/Behavioral:        Insomnia         Objective:      /84 (BP Location: Left arm, Patient Position: Sitting, Cuff Size: Adult)   Pulse 71   Temp (!) 97 1 °F (36 2 °C) (Tympanic)   Resp 16 Ht 5' 6" (1 676 m)   Wt 49 3 kg (108 lb 9 6 oz)   SpO2 98%   BMI 17 53 kg/m²          Physical Exam   Constitutional: He is oriented to person, place, and time  He appears well-developed and well-nourished  HENT:   Head: Normocephalic and atraumatic  Right Ear: External ear normal    Left Ear: External ear normal    Nose: Nose normal    Mouth/Throat: Oropharynx is clear and moist    Eyes: Pupils are equal, round, and reactive to light  Conjunctivae and EOM are normal    Neck: Normal range of motion  Neck supple  Cardiovascular: Normal rate, regular rhythm, normal heart sounds and intact distal pulses  Pulmonary/Chest: Effort normal and breath sounds normal    Musculoskeletal: Normal range of motion  Gout right thumb   Neurological: He is alert and oriented to person, place, and time  He has normal reflexes  Skin: Skin is warm and dry  Psychiatric: He has a normal mood and affect   His behavior is normal    Insomnia stable

## 2019-12-10 NOTE — PATIENT INSTRUCTIONS
HTN stable, take BP med as directed and rec  Quitting tobacco  No gout problems other than gout right thumb area  Quit tobacco as he has COPD also  Insomnia stable on zolpidem  Avoid alcohol and purine rich foods and stay well hydrated for hx of gout  Recheck BP in 3 months  Refuses flu shot today

## 2019-12-16 DIAGNOSIS — M10.9 GOUT, UNSPECIFIED CAUSE, UNSPECIFIED CHRONICITY, UNSPECIFIED SITE: ICD-10-CM

## 2019-12-16 RX ORDER — INDOMETHACIN 50 MG/1
50 CAPSULE ORAL
Qty: 270 CAPSULE | Refills: 0 | Status: SHIPPED | OUTPATIENT
Start: 2019-12-16 | End: 2020-11-12

## 2020-01-22 DIAGNOSIS — I10 ESSENTIAL HYPERTENSION: ICD-10-CM

## 2020-01-22 DIAGNOSIS — I10 HYPERTENSION, UNSPECIFIED TYPE: ICD-10-CM

## 2020-01-22 RX ORDER — CLONIDINE HYDROCHLORIDE 0.1 MG/1
0.1 TABLET ORAL DAILY
Qty: 90 TABLET | Refills: 0 | Status: SHIPPED | OUTPATIENT
Start: 2020-01-22 | End: 2020-04-22 | Stop reason: SDUPTHER

## 2020-01-22 RX ORDER — ATENOLOL 50 MG/1
50 TABLET ORAL DAILY
Qty: 90 TABLET | Refills: 0 | Status: SHIPPED | OUTPATIENT
Start: 2020-01-22 | End: 2020-04-22 | Stop reason: SDUPTHER

## 2020-02-12 DIAGNOSIS — G47.00 INSOMNIA, UNSPECIFIED TYPE: ICD-10-CM

## 2020-02-12 RX ORDER — ZOLPIDEM TARTRATE 5 MG/1
5 TABLET ORAL
Qty: 90 TABLET | Refills: 0 | Status: SHIPPED | OUTPATIENT
Start: 2020-02-12 | End: 2020-05-12 | Stop reason: SDUPTHER

## 2020-03-19 DIAGNOSIS — G47.00 INSOMNIA, UNSPECIFIED TYPE: ICD-10-CM

## 2020-03-20 RX ORDER — ZOLPIDEM TARTRATE 5 MG/1
TABLET ORAL
Qty: 90 TABLET | Refills: 0 | OUTPATIENT
Start: 2020-03-20

## 2020-04-11 ENCOUNTER — OFFICE VISIT (OUTPATIENT)
Dept: URGENT CARE | Age: 80
End: 2020-04-11
Payer: COMMERCIAL

## 2020-04-11 VITALS
WEIGHT: 108 LBS | BODY MASS INDEX: 17.36 KG/M2 | HEART RATE: 70 BPM | HEIGHT: 66 IN | DIASTOLIC BLOOD PRESSURE: 84 MMHG | RESPIRATION RATE: 18 BRPM | OXYGEN SATURATION: 98 % | SYSTOLIC BLOOD PRESSURE: 174 MMHG | TEMPERATURE: 97.2 F

## 2020-04-11 DIAGNOSIS — L03.011 CELLULITIS OF RIGHT THUMB: Primary | ICD-10-CM

## 2020-04-11 PROCEDURE — 99203 OFFICE O/P NEW LOW 30 MIN: CPT | Performed by: PHYSICIAN ASSISTANT

## 2020-04-11 RX ORDER — SULFAMETHOXAZOLE AND TRIMETHOPRIM 800; 160 MG/1; MG/1
1 TABLET ORAL EVERY 12 HOURS SCHEDULED
Qty: 14 TABLET | Refills: 0 | Status: SHIPPED | OUTPATIENT
Start: 2020-04-11 | End: 2020-04-18

## 2020-04-11 RX ORDER — CEPHALEXIN 500 MG/1
500 CAPSULE ORAL EVERY 8 HOURS SCHEDULED
Qty: 21 CAPSULE | Refills: 0 | Status: SHIPPED | OUTPATIENT
Start: 2020-04-11 | End: 2020-04-18

## 2020-04-22 DIAGNOSIS — I10 HYPERTENSION, UNSPECIFIED TYPE: ICD-10-CM

## 2020-04-22 DIAGNOSIS — I10 ESSENTIAL HYPERTENSION: ICD-10-CM

## 2020-04-22 RX ORDER — CLONIDINE HYDROCHLORIDE 0.1 MG/1
0.1 TABLET ORAL DAILY
Qty: 90 TABLET | Refills: 0 | Status: SHIPPED | OUTPATIENT
Start: 2020-04-22 | End: 2020-07-20 | Stop reason: SDUPTHER

## 2020-04-22 RX ORDER — ATENOLOL 50 MG/1
50 TABLET ORAL DAILY
Qty: 90 TABLET | Refills: 0 | Status: SHIPPED | OUTPATIENT
Start: 2020-04-22 | End: 2020-07-20 | Stop reason: SDUPTHER

## 2020-05-06 ENCOUNTER — TELEMEDICINE (OUTPATIENT)
Dept: FAMILY MEDICINE CLINIC | Facility: CLINIC | Age: 80
End: 2020-05-06
Payer: COMMERCIAL

## 2020-05-06 ENCOUNTER — TELEPHONE (OUTPATIENT)
Dept: FAMILY MEDICINE CLINIC | Facility: CLINIC | Age: 80
End: 2020-05-06

## 2020-05-06 DIAGNOSIS — I10 ESSENTIAL HYPERTENSION: Primary | ICD-10-CM

## 2020-05-06 DIAGNOSIS — Z13.220 SCREENING FOR HYPERLIPIDEMIA: ICD-10-CM

## 2020-05-06 DIAGNOSIS — J44.9 CHRONIC OBSTRUCTIVE PULMONARY DISEASE, UNSPECIFIED COPD TYPE (HCC): ICD-10-CM

## 2020-05-06 DIAGNOSIS — M10.9 GOUT, UNSPECIFIED CAUSE, UNSPECIFIED CHRONICITY, UNSPECIFIED SITE: ICD-10-CM

## 2020-05-06 DIAGNOSIS — G47.00 INSOMNIA, UNSPECIFIED TYPE: ICD-10-CM

## 2020-05-06 DIAGNOSIS — Z12.5 SCREENING FOR PROSTATE CANCER: ICD-10-CM

## 2020-05-06 DIAGNOSIS — Z72.0 TOBACCO ABUSE: ICD-10-CM

## 2020-05-06 PROCEDURE — 99214 OFFICE O/P EST MOD 30 MIN: CPT | Performed by: FAMILY MEDICINE

## 2020-05-06 PROCEDURE — 1160F RVW MEDS BY RX/DR IN RCRD: CPT | Performed by: FAMILY MEDICINE

## 2020-05-12 DIAGNOSIS — G47.00 INSOMNIA, UNSPECIFIED TYPE: ICD-10-CM

## 2020-05-12 RX ORDER — ZOLPIDEM TARTRATE 5 MG/1
5 TABLET ORAL
Qty: 90 TABLET | Refills: 0 | Status: SHIPPED | OUTPATIENT
Start: 2020-05-12 | End: 2020-08-10 | Stop reason: SDUPTHER

## 2020-06-01 ENCOUNTER — TELEPHONE (OUTPATIENT)
Dept: OTHER | Facility: OTHER | Age: 80
End: 2020-06-01

## 2020-06-08 ENCOUNTER — APPOINTMENT (OUTPATIENT)
Dept: LAB | Age: 80
End: 2020-06-08
Payer: COMMERCIAL

## 2020-06-08 DIAGNOSIS — Z72.0 TOBACCO ABUSE: ICD-10-CM

## 2020-06-08 DIAGNOSIS — R74.8 ELEVATED CREATINE KINASE LEVEL: Primary | ICD-10-CM

## 2020-06-08 DIAGNOSIS — G47.00 INSOMNIA, UNSPECIFIED TYPE: ICD-10-CM

## 2020-06-08 DIAGNOSIS — M10.9 GOUT, UNSPECIFIED CAUSE, UNSPECIFIED CHRONICITY, UNSPECIFIED SITE: ICD-10-CM

## 2020-06-08 DIAGNOSIS — Z12.5 SCREENING FOR PROSTATE CANCER: ICD-10-CM

## 2020-06-08 DIAGNOSIS — I10 ESSENTIAL HYPERTENSION: ICD-10-CM

## 2020-06-08 DIAGNOSIS — Z13.220 SCREENING FOR HYPERLIPIDEMIA: ICD-10-CM

## 2020-06-08 DIAGNOSIS — J44.9 CHRONIC OBSTRUCTIVE PULMONARY DISEASE, UNSPECIFIED COPD TYPE (HCC): ICD-10-CM

## 2020-06-08 LAB
ALBUMIN SERPL BCP-MCNC: 4 G/DL (ref 3.5–5)
ALP SERPL-CCNC: 138 U/L (ref 46–116)
ALT SERPL W P-5'-P-CCNC: 14 U/L (ref 12–78)
ANION GAP SERPL CALCULATED.3IONS-SCNC: 6 MMOL/L (ref 4–13)
AST SERPL W P-5'-P-CCNC: 16 U/L (ref 5–45)
BASOPHILS # BLD AUTO: 0.08 THOUSANDS/ΜL (ref 0–0.1)
BASOPHILS NFR BLD AUTO: 1 % (ref 0–1)
BILIRUB SERPL-MCNC: 0.94 MG/DL (ref 0.2–1)
BUN SERPL-MCNC: 25 MG/DL (ref 5–25)
CALCIUM SERPL-MCNC: 9.3 MG/DL (ref 8.3–10.1)
CHLORIDE SERPL-SCNC: 106 MMOL/L (ref 100–108)
CHOLEST SERPL-MCNC: 155 MG/DL (ref 50–200)
CO2 SERPL-SCNC: 26 MMOL/L (ref 21–32)
CREAT SERPL-MCNC: 1.8 MG/DL (ref 0.6–1.3)
EOSINOPHIL # BLD AUTO: 0.48 THOUSAND/ΜL (ref 0–0.61)
EOSINOPHIL NFR BLD AUTO: 5 % (ref 0–6)
ERYTHROCYTE [DISTWIDTH] IN BLOOD BY AUTOMATED COUNT: 12.6 % (ref 11.6–15.1)
GFR SERPL CREATININE-BSD FRML MDRD: 36 ML/MIN/1.73SQ M
GLUCOSE P FAST SERPL-MCNC: 96 MG/DL (ref 65–99)
HCT VFR BLD AUTO: 42.7 % (ref 36.5–49.3)
HDLC SERPL-MCNC: 37 MG/DL
HGB BLD-MCNC: 13.9 G/DL (ref 12–17)
IMM GRANULOCYTES # BLD AUTO: 0.04 THOUSAND/UL (ref 0–0.2)
IMM GRANULOCYTES NFR BLD AUTO: 0 % (ref 0–2)
LDLC SERPL CALC-MCNC: 102 MG/DL (ref 0–100)
LYMPHOCYTES # BLD AUTO: 3.13 THOUSANDS/ΜL (ref 0.6–4.47)
LYMPHOCYTES NFR BLD AUTO: 32 % (ref 14–44)
MCH RBC QN AUTO: 29.7 PG (ref 26.8–34.3)
MCHC RBC AUTO-ENTMCNC: 32.6 G/DL (ref 31.4–37.4)
MCV RBC AUTO: 91 FL (ref 82–98)
MONOCYTES # BLD AUTO: 0.78 THOUSAND/ΜL (ref 0.17–1.22)
MONOCYTES NFR BLD AUTO: 8 % (ref 4–12)
NEUTROPHILS # BLD AUTO: 5.37 THOUSANDS/ΜL (ref 1.85–7.62)
NEUTS SEG NFR BLD AUTO: 54 % (ref 43–75)
NRBC BLD AUTO-RTO: 0 /100 WBCS
PLATELET # BLD AUTO: 318 THOUSANDS/UL (ref 149–390)
PMV BLD AUTO: 9.4 FL (ref 8.9–12.7)
POTASSIUM SERPL-SCNC: 4.3 MMOL/L (ref 3.5–5.3)
PROT SERPL-MCNC: 7.4 G/DL (ref 6.4–8.2)
RBC # BLD AUTO: 4.68 MILLION/UL (ref 3.88–5.62)
SODIUM SERPL-SCNC: 138 MMOL/L (ref 136–145)
TRIGL SERPL-MCNC: 82 MG/DL
WBC # BLD AUTO: 9.88 THOUSAND/UL (ref 4.31–10.16)

## 2020-06-08 PROCEDURE — 80061 LIPID PANEL: CPT

## 2020-06-08 PROCEDURE — 85025 COMPLETE CBC W/AUTO DIFF WBC: CPT

## 2020-06-08 PROCEDURE — 84153 ASSAY OF PSA TOTAL: CPT

## 2020-06-08 PROCEDURE — 36415 COLL VENOUS BLD VENIPUNCTURE: CPT

## 2020-06-08 PROCEDURE — 84154 ASSAY OF PSA FREE: CPT

## 2020-06-08 PROCEDURE — 80053 COMPREHEN METABOLIC PANEL: CPT

## 2020-06-09 LAB
PSA FREE MFR SERPL: 33.8 %
PSA FREE SERPL-MCNC: 0.27 NG/ML
PSA SERPL-MCNC: 0.8 NG/ML (ref 0–4)

## 2020-07-20 DIAGNOSIS — I10 HYPERTENSION, UNSPECIFIED TYPE: ICD-10-CM

## 2020-07-20 DIAGNOSIS — I10 ESSENTIAL HYPERTENSION: ICD-10-CM

## 2020-07-20 RX ORDER — ATENOLOL 50 MG/1
50 TABLET ORAL DAILY
Qty: 90 TABLET | Refills: 0 | Status: SHIPPED | OUTPATIENT
Start: 2020-07-20 | End: 2020-10-19 | Stop reason: SDUPTHER

## 2020-07-20 RX ORDER — CLONIDINE HYDROCHLORIDE 0.1 MG/1
0.1 TABLET ORAL DAILY
Qty: 90 TABLET | Refills: 0 | Status: SHIPPED | OUTPATIENT
Start: 2020-07-20 | End: 2020-10-19 | Stop reason: SDUPTHER

## 2020-08-06 ENCOUNTER — OFFICE VISIT (OUTPATIENT)
Dept: FAMILY MEDICINE CLINIC | Facility: CLINIC | Age: 80
End: 2020-08-06
Payer: COMMERCIAL

## 2020-08-06 ENCOUNTER — APPOINTMENT (OUTPATIENT)
Dept: LAB | Facility: CLINIC | Age: 80
End: 2020-08-06
Payer: COMMERCIAL

## 2020-08-06 VITALS
TEMPERATURE: 98 F | HEIGHT: 66 IN | HEART RATE: 76 BPM | RESPIRATION RATE: 18 BRPM | DIASTOLIC BLOOD PRESSURE: 80 MMHG | WEIGHT: 103 LBS | SYSTOLIC BLOOD PRESSURE: 140 MMHG | BODY MASS INDEX: 16.55 KG/M2 | OXYGEN SATURATION: 100 %

## 2020-08-06 DIAGNOSIS — N18.30 CKD (CHRONIC KIDNEY DISEASE) STAGE 3, GFR 30-59 ML/MIN (HCC): ICD-10-CM

## 2020-08-06 DIAGNOSIS — M10.9 GOUT, UNSPECIFIED CAUSE, UNSPECIFIED CHRONICITY, UNSPECIFIED SITE: ICD-10-CM

## 2020-08-06 DIAGNOSIS — N18.30 CKD (CHRONIC KIDNEY DISEASE) STAGE 3, GFR 30-59 ML/MIN (HCC): Primary | ICD-10-CM

## 2020-08-06 DIAGNOSIS — Z72.0 TOBACCO ABUSE: ICD-10-CM

## 2020-08-06 DIAGNOSIS — I10 ESSENTIAL HYPERTENSION: ICD-10-CM

## 2020-08-06 DIAGNOSIS — J44.9 CHRONIC OBSTRUCTIVE PULMONARY DISEASE, UNSPECIFIED COPD TYPE (HCC): ICD-10-CM

## 2020-08-06 LAB
ALBUMIN SERPL BCP-MCNC: 3.9 G/DL (ref 3.5–5)
ALP SERPL-CCNC: 112 U/L (ref 46–116)
ALT SERPL W P-5'-P-CCNC: 13 U/L (ref 12–78)
ANION GAP SERPL CALCULATED.3IONS-SCNC: 8 MMOL/L (ref 4–13)
AST SERPL W P-5'-P-CCNC: 12 U/L (ref 5–45)
BILIRUB SERPL-MCNC: 0.61 MG/DL (ref 0.2–1)
BUN SERPL-MCNC: 34 MG/DL (ref 5–25)
CALCIUM SERPL-MCNC: 9.3 MG/DL (ref 8.3–10.1)
CHLORIDE SERPL-SCNC: 105 MMOL/L (ref 100–108)
CO2 SERPL-SCNC: 24 MMOL/L (ref 21–32)
CREAT SERPL-MCNC: 1.86 MG/DL (ref 0.6–1.3)
GFR SERPL CREATININE-BSD FRML MDRD: 34 ML/MIN/1.73SQ M
GLUCOSE SERPL-MCNC: 118 MG/DL (ref 65–140)
POTASSIUM SERPL-SCNC: 4.9 MMOL/L (ref 3.5–5.3)
PROT SERPL-MCNC: 7.3 G/DL (ref 6.4–8.2)
SODIUM SERPL-SCNC: 137 MMOL/L (ref 136–145)

## 2020-08-06 PROCEDURE — 36415 COLL VENOUS BLD VENIPUNCTURE: CPT

## 2020-08-06 PROCEDURE — 80053 COMPREHEN METABOLIC PANEL: CPT

## 2020-08-06 PROCEDURE — 3725F SCREEN DEPRESSION PERFORMED: CPT | Performed by: FAMILY MEDICINE

## 2020-08-06 PROCEDURE — 3077F SYST BP >= 140 MM HG: CPT | Performed by: FAMILY MEDICINE

## 2020-08-06 PROCEDURE — 3079F DIAST BP 80-89 MM HG: CPT | Performed by: FAMILY MEDICINE

## 2020-08-06 PROCEDURE — 3288F FALL RISK ASSESSMENT DOCD: CPT | Performed by: FAMILY MEDICINE

## 2020-08-06 PROCEDURE — 99214 OFFICE O/P EST MOD 30 MIN: CPT | Performed by: FAMILY MEDICINE

## 2020-08-06 PROCEDURE — 1101F PT FALLS ASSESS-DOCD LE1/YR: CPT | Performed by: FAMILY MEDICINE

## 2020-08-06 PROCEDURE — 3008F BODY MASS INDEX DOCD: CPT | Performed by: FAMILY MEDICINE

## 2020-08-06 PROCEDURE — 1160F RVW MEDS BY RX/DR IN RCRD: CPT | Performed by: FAMILY MEDICINE

## 2020-08-06 PROCEDURE — 4004F PT TOBACCO SCREEN RCVD TLK: CPT | Performed by: FAMILY MEDICINE

## 2020-08-06 NOTE — PROGRESS NOTES
Assessment/Plan:  Chief Complaint   Patient presents with    Follow-up     3 months- labs done 6/8/20     Patient Instructions   Pt  Here for recheck and last labs showed CKD and increased creatinine with GFR at 36  Quit tobacco and stay well hydrated and also rec no NSAIDs  He cancelled his appt  With Nephrology  Await CMP today  Recheck GFR and creatinine  Hx of GOUT  Avoid indomethacin as directed  No problem-specific Assessment & Plan notes found for this encounter  Diagnoses and all orders for this visit:    CKD (chronic kidney disease) stage 3, GFR 30-59 ml/min ([facility])  -     Comprehensive metabolic panel; Future    Chronic obstructive pulmonary disease, unspecified COPD type ([facility] [address] )    Essential hypertension    Tobacco abuse    Gout, unspecified cause, unspecified chronicity, unspecified site          Subjective:      Patient ID: Scarlett Hawk is a 70 y o  male  Here for recheck of CKD and states he takes Indomethacin everyday for gout  Takes HTn med also and still smokes cigarettes  The following portions of the patient's history were reviewed and updated as appropriate: allergies, current medications, past family history, past medical history, past social history, past surgical history and problem list     Review of Systems   Constitutional: Negative  HENT: Negative  Eyes: Negative  Respiratory: Negative  Cardiovascular: Negative  Gastrointestinal: Negative  Endocrine: Negative  Genitourinary: Negative  Musculoskeletal: Negative  Skin: Negative  Allergic/Immunologic: Negative  Neurological: Negative  Hematological: Negative  Psychiatric/Behavioral: Negative  Objective:      /80   Pulse 76   Temp 98 °F (36 7 °C) (Temporal)   Resp 18   Ht 5' 6" (1 676 m)   Wt 46 7 kg (103 lb)   SpO2 100%   BMI 16 62 kg/m²          Physical Exam   Constitutional: He is oriented to person, place, and time  He appears well-developed     HENT: Head: Normocephalic and atraumatic  Right Ear: External ear normal    Left Ear: External ear normal    Nose: Nose normal    Eyes: Pupils are equal, round, and reactive to light  Conjunctivae are normal    Neck: Normal range of motion  Neck supple  Cardiovascular: Normal rate, regular rhythm and normal heart sounds  Pulmonary/Chest: Effort normal and breath sounds normal    Musculoskeletal: Normal range of motion  Neurological: He is alert and oriented to person, place, and time  He has normal reflexes  Skin: Skin is warm and dry     Psychiatric: His behavior is normal

## 2020-08-06 NOTE — PATIENT INSTRUCTIONS
Pt  Here for recheck and last labs showed CKD and increased creatinine with GFR at 36  Quit tobacco and stay well hydrated and also rec no NSAIDs  He cancelled his appt  With Nephrology  Await CMP today  Recheck GFR and creatinine  Hx of GOUT  Avoid indomethacin as directed

## 2020-08-10 DIAGNOSIS — G47.00 INSOMNIA, UNSPECIFIED TYPE: ICD-10-CM

## 2020-08-10 RX ORDER — ZOLPIDEM TARTRATE 5 MG/1
5 TABLET ORAL
Qty: 90 TABLET | Refills: 0 | Status: SHIPPED | OUTPATIENT
Start: 2020-08-10 | End: 2020-11-06 | Stop reason: SDUPTHER

## 2020-08-12 ENCOUNTER — TELEPHONE (OUTPATIENT)
Dept: FAMILY MEDICINE CLINIC | Facility: CLINIC | Age: 80
End: 2020-08-12

## 2020-08-12 NOTE — TELEPHONE ENCOUNTER
Pt called and states he is having 9 teeth pulled tomorrow  and is wondering what he can take for the pain  In the last office note you stated to avoid NSAIDS  Please advise   Thanks

## 2020-10-19 DIAGNOSIS — I10 ESSENTIAL HYPERTENSION: ICD-10-CM

## 2020-10-19 DIAGNOSIS — I10 HYPERTENSION, UNSPECIFIED TYPE: ICD-10-CM

## 2020-10-19 RX ORDER — ATENOLOL 50 MG/1
50 TABLET ORAL DAILY
Qty: 90 TABLET | Refills: 0 | Status: SHIPPED | OUTPATIENT
Start: 2020-10-19 | End: 2021-01-18 | Stop reason: SDUPTHER

## 2020-10-19 RX ORDER — CLONIDINE HYDROCHLORIDE 0.1 MG/1
0.1 TABLET ORAL DAILY
Qty: 90 TABLET | Refills: 0 | Status: SHIPPED | OUTPATIENT
Start: 2020-10-19 | End: 2021-01-18 | Stop reason: SDUPTHER

## 2020-11-06 DIAGNOSIS — G47.00 INSOMNIA, UNSPECIFIED TYPE: ICD-10-CM

## 2020-11-06 RX ORDER — ZOLPIDEM TARTRATE 5 MG/1
5 TABLET ORAL
Qty: 90 TABLET | Refills: 0 | Status: SHIPPED | OUTPATIENT
Start: 2020-11-06 | End: 2021-02-05 | Stop reason: SDUPTHER

## 2020-11-12 ENCOUNTER — OFFICE VISIT (OUTPATIENT)
Dept: FAMILY MEDICINE CLINIC | Facility: CLINIC | Age: 80
End: 2020-11-12
Payer: COMMERCIAL

## 2020-11-12 VITALS
WEIGHT: 97.8 LBS | OXYGEN SATURATION: 97 % | TEMPERATURE: 96.8 F | DIASTOLIC BLOOD PRESSURE: 80 MMHG | BODY MASS INDEX: 15.79 KG/M2 | HEART RATE: 76 BPM | SYSTOLIC BLOOD PRESSURE: 122 MMHG

## 2020-11-12 DIAGNOSIS — G47.00 INSOMNIA, UNSPECIFIED TYPE: ICD-10-CM

## 2020-11-12 DIAGNOSIS — Z72.0 TOBACCO ABUSE: ICD-10-CM

## 2020-11-12 DIAGNOSIS — I10 ESSENTIAL HYPERTENSION: ICD-10-CM

## 2020-11-12 DIAGNOSIS — N18.30 STAGE 3 CHRONIC KIDNEY DISEASE, UNSPECIFIED WHETHER STAGE 3A OR 3B CKD (HCC): Primary | ICD-10-CM

## 2020-11-12 DIAGNOSIS — J44.9 CHRONIC OBSTRUCTIVE PULMONARY DISEASE, UNSPECIFIED COPD TYPE (HCC): ICD-10-CM

## 2020-11-12 DIAGNOSIS — M10.9 GOUT, UNSPECIFIED CAUSE, UNSPECIFIED CHRONICITY, UNSPECIFIED SITE: ICD-10-CM

## 2020-11-12 PROCEDURE — 4004F PT TOBACCO SCREEN RCVD TLK: CPT | Performed by: FAMILY MEDICINE

## 2020-11-12 PROCEDURE — 3074F SYST BP LT 130 MM HG: CPT | Performed by: FAMILY MEDICINE

## 2020-11-12 PROCEDURE — 3079F DIAST BP 80-89 MM HG: CPT | Performed by: FAMILY MEDICINE

## 2020-11-12 PROCEDURE — 1160F RVW MEDS BY RX/DR IN RCRD: CPT | Performed by: FAMILY MEDICINE

## 2020-11-12 PROCEDURE — 99214 OFFICE O/P EST MOD 30 MIN: CPT | Performed by: FAMILY MEDICINE

## 2020-11-18 ENCOUNTER — VBI (OUTPATIENT)
Dept: ADMINISTRATIVE | Facility: OTHER | Age: 80
End: 2020-11-18

## 2020-11-20 ENCOUNTER — TELEPHONE (OUTPATIENT)
Dept: FAMILY MEDICINE CLINIC | Facility: CLINIC | Age: 80
End: 2020-11-20

## 2020-11-20 DIAGNOSIS — M10.9 GOUT, UNSPECIFIED CAUSE, UNSPECIFIED CHRONICITY, UNSPECIFIED SITE: Primary | ICD-10-CM

## 2020-11-20 RX ORDER — PREDNISONE 20 MG/1
40 TABLET ORAL DAILY
Qty: 6 TABLET | Refills: 0 | Status: SHIPPED | OUTPATIENT
Start: 2020-11-20 | End: 2020-11-23

## 2020-11-30 ENCOUNTER — TELEPHONE (OUTPATIENT)
Dept: NEPHROLOGY | Facility: CLINIC | Age: 80
End: 2020-11-30

## 2020-12-01 ENCOUNTER — APPOINTMENT (OUTPATIENT)
Dept: LAB | Age: 80
End: 2020-12-01
Payer: COMMERCIAL

## 2020-12-01 ENCOUNTER — CONSULT (OUTPATIENT)
Dept: NEPHROLOGY | Facility: CLINIC | Age: 80
End: 2020-12-01
Payer: COMMERCIAL

## 2020-12-01 VITALS
HEART RATE: 72 BPM | WEIGHT: 102 LBS | RESPIRATION RATE: 18 BRPM | SYSTOLIC BLOOD PRESSURE: 180 MMHG | TEMPERATURE: 97 F | DIASTOLIC BLOOD PRESSURE: 100 MMHG | HEIGHT: 66 IN | BODY MASS INDEX: 16.39 KG/M2

## 2020-12-01 DIAGNOSIS — M10.9 GOUT, UNSPECIFIED CAUSE, UNSPECIFIED CHRONICITY, UNSPECIFIED SITE: ICD-10-CM

## 2020-12-01 DIAGNOSIS — N18.31 STAGE 3A CHRONIC KIDNEY DISEASE (HCC): Primary | ICD-10-CM

## 2020-12-01 DIAGNOSIS — I10 ESSENTIAL HYPERTENSION: ICD-10-CM

## 2020-12-01 DIAGNOSIS — N18.30 STAGE 3 CHRONIC KIDNEY DISEASE, UNSPECIFIED WHETHER STAGE 3A OR 3B CKD (HCC): ICD-10-CM

## 2020-12-01 LAB
ALBUMIN SERPL BCP-MCNC: 3.5 G/DL (ref 3.5–5)
ALP SERPL-CCNC: 111 U/L (ref 46–116)
ALT SERPL W P-5'-P-CCNC: 20 U/L (ref 12–78)
ANION GAP SERPL CALCULATED.3IONS-SCNC: 5 MMOL/L (ref 4–13)
AST SERPL W P-5'-P-CCNC: 16 U/L (ref 5–45)
BILIRUB SERPL-MCNC: 0.78 MG/DL (ref 0.2–1)
BUN SERPL-MCNC: 24 MG/DL (ref 5–25)
CALCIUM SERPL-MCNC: 9.5 MG/DL (ref 8.3–10.1)
CHLORIDE SERPL-SCNC: 109 MMOL/L (ref 100–108)
CO2 SERPL-SCNC: 27 MMOL/L (ref 21–32)
CREAT SERPL-MCNC: 1.41 MG/DL (ref 0.6–1.3)
GFR SERPL CREATININE-BSD FRML MDRD: 47 ML/MIN/1.73SQ M
GLUCOSE P FAST SERPL-MCNC: 102 MG/DL (ref 65–99)
POTASSIUM SERPL-SCNC: 4.7 MMOL/L (ref 3.5–5.3)
PROT SERPL-MCNC: 6.9 G/DL (ref 6.4–8.2)
SL AMB  POCT GLUCOSE, UA: NEGATIVE
SL AMB LEUKOCYTE ESTERASE,UA: NEGATIVE
SL AMB POCT BILIRUBIN,UA: NEGATIVE
SL AMB POCT BLOOD,UA: NEGATIVE
SL AMB POCT CLARITY,UA: CLEAR
SL AMB POCT COLOR,UA: YELLOW
SL AMB POCT KETONES,UA: NEGATIVE
SL AMB POCT NITRITE,UA: NEGATIVE
SL AMB POCT PH,UA: 5
SL AMB POCT SPECIFIC GRAVITY,UA: 1.02
SL AMB POCT URINE PROTEIN: NEGATIVE
SL AMB POCT UROBILINOGEN: NEGATIVE
SODIUM SERPL-SCNC: 141 MMOL/L (ref 136–145)

## 2020-12-01 PROCEDURE — 80053 COMPREHEN METABOLIC PANEL: CPT

## 2020-12-01 PROCEDURE — 3077F SYST BP >= 140 MM HG: CPT | Performed by: INTERNAL MEDICINE

## 2020-12-01 PROCEDURE — 4004F PT TOBACCO SCREEN RCVD TLK: CPT | Performed by: INTERNAL MEDICINE

## 2020-12-01 PROCEDURE — 99204 OFFICE O/P NEW MOD 45 MIN: CPT | Performed by: INTERNAL MEDICINE

## 2020-12-01 PROCEDURE — 81002 URINALYSIS NONAUTO W/O SCOPE: CPT | Performed by: INTERNAL MEDICINE

## 2020-12-01 PROCEDURE — 36415 COLL VENOUS BLD VENIPUNCTURE: CPT

## 2020-12-01 PROCEDURE — 3080F DIAST BP >= 90 MM HG: CPT | Performed by: INTERNAL MEDICINE

## 2021-01-18 DIAGNOSIS — I10 HYPERTENSION, UNSPECIFIED TYPE: ICD-10-CM

## 2021-01-18 DIAGNOSIS — I10 ESSENTIAL HYPERTENSION: ICD-10-CM

## 2021-01-18 RX ORDER — CLONIDINE HYDROCHLORIDE 0.1 MG/1
0.1 TABLET ORAL DAILY
Qty: 90 TABLET | Refills: 0 | Status: SHIPPED | OUTPATIENT
Start: 2021-01-18 | End: 2021-04-16 | Stop reason: SDUPTHER

## 2021-01-18 RX ORDER — ATENOLOL 50 MG/1
50 TABLET ORAL DAILY
Qty: 90 TABLET | Refills: 0 | Status: SHIPPED | OUTPATIENT
Start: 2021-01-18 | End: 2021-04-16 | Stop reason: SDUPTHER

## 2021-02-05 DIAGNOSIS — G47.00 INSOMNIA, UNSPECIFIED TYPE: ICD-10-CM

## 2021-02-05 RX ORDER — ZOLPIDEM TARTRATE 5 MG/1
5 TABLET ORAL
Qty: 90 TABLET | Refills: 0 | Status: SHIPPED | OUTPATIENT
Start: 2021-02-05 | End: 2021-05-07 | Stop reason: SDUPTHER

## 2021-02-05 NOTE — TELEPHONE ENCOUNTER
Pt called the office please refill Zolpidem and send to the [address] in [place]  Pt has 3 pills left     PDMP checked:  11/06/2020  1  11/06/2020  ZOLPIDEM TARTRATE 5 MG TABLET  90 0  90  CR EST  [id number]

## 2021-02-25 ENCOUNTER — OFFICE VISIT (OUTPATIENT)
Dept: FAMILY MEDICINE CLINIC | Facility: CLINIC | Age: 81
End: 2021-02-25
Payer: COMMERCIAL

## 2021-02-25 VITALS
TEMPERATURE: 96.8 F | DIASTOLIC BLOOD PRESSURE: 80 MMHG | OXYGEN SATURATION: 97 % | BODY MASS INDEX: 15.94 KG/M2 | SYSTOLIC BLOOD PRESSURE: 122 MMHG | RESPIRATION RATE: 16 BRPM | WEIGHT: 99.2 LBS | HEIGHT: 66 IN | HEART RATE: 73 BPM

## 2021-02-25 DIAGNOSIS — J44.9 CHRONIC OBSTRUCTIVE PULMONARY DISEASE, UNSPECIFIED COPD TYPE (HCC): ICD-10-CM

## 2021-02-25 DIAGNOSIS — I10 ESSENTIAL HYPERTENSION: ICD-10-CM

## 2021-02-25 DIAGNOSIS — N18.31 STAGE 3A CHRONIC KIDNEY DISEASE (HCC): ICD-10-CM

## 2021-02-25 DIAGNOSIS — M10.9 GOUT, UNSPECIFIED CAUSE, UNSPECIFIED CHRONICITY, UNSPECIFIED SITE: ICD-10-CM

## 2021-02-25 DIAGNOSIS — G47.00 INSOMNIA, UNSPECIFIED TYPE: ICD-10-CM

## 2021-02-25 DIAGNOSIS — Z23 ENCOUNTER FOR IMMUNIZATION: ICD-10-CM

## 2021-02-25 DIAGNOSIS — Z72.0 TOBACCO ABUSE: Primary | ICD-10-CM

## 2021-02-25 PROCEDURE — 4004F PT TOBACCO SCREEN RCVD TLK: CPT | Performed by: FAMILY MEDICINE

## 2021-02-25 PROCEDURE — 3079F DIAST BP 80-89 MM HG: CPT | Performed by: FAMILY MEDICINE

## 2021-02-25 PROCEDURE — 1160F RVW MEDS BY RX/DR IN RCRD: CPT | Performed by: FAMILY MEDICINE

## 2021-02-25 PROCEDURE — 99214 OFFICE O/P EST MOD 30 MIN: CPT | Performed by: FAMILY MEDICINE

## 2021-02-25 PROCEDURE — 3074F SYST BP LT 130 MM HG: CPT | Performed by: FAMILY MEDICINE

## 2021-02-25 NOTE — PATIENT INSTRUCTIONS
Pt  here for recheck and last labs during summer showed CKD and refuses to see nephrologist as directed  Quit tobacco and stay well hydrated and also rec no NSAIDs  Get labs as directed by nephrology  Hx of GOUT  Avoid indomethacin as directed  He takes ambien prn sleep  Refuses covid 19 vaccine  Follow CDC guidelines for covid 19 prevention  Avoid purine rich foods and alcohol as directed for hx of Gout  Tylenol prn pain   Quit tobacco

## 2021-02-25 NOTE — PROGRESS NOTES
Assessment/Plan:  Chief Complaint   Patient presents with    Follow-up     14 week     Patient Instructions   Pt  here for recheck and last labs during summer showed CKD and refuses to see nephrologist as directed  Quit tobacco and stay well hydrated and also rec no NSAIDs  Get labs as directed by nephrology  Hx of GOUT  Avoid indomethacin as directed  He takes ambien prn sleep  Refuses covid 19 vaccine  Follow CDC guidelines for covid 19 prevention  Avoid purine rich foods and alcohol as directed for hx of Gout  Tylenol prn pain  Quit tobacco          No problem-specific Assessment & Plan notes found for this encounter  Diagnoses and all orders for this visit:    Tobacco abuse    Encounter for immunization  -     TDAP VACCINE GREATER THAN OR EQUAL TO 6YO IM  -     PNEUMOCOCCAL POLYSACCHARIDE VACCINE 23-VALENT =>1YO SQ IM    Stage 3a chronic kidney disease    Insomnia, unspecified type    Essential hypertension    Gout, unspecified cause, unspecified chronicity, unspecified site    Chronic obstructive pulmonary disease, unspecified COPD type ([facility] )    Other orders  -     CT lung screening program; Future          Subjective:      Patient ID: Fanny Dey is a 68 y o  male  Follow-up (14 week)      The following portions of the patient's history were reviewed and updated as appropriate: allergies, current medications, past family history, past medical history, past social history, past surgical history and problem list     Review of Systems   Constitutional: Negative  HENT: Negative  Eyes: Negative  Respiratory: Negative  Cardiovascular: Negative  Gastrointestinal: Negative  Endocrine: Negative  Genitourinary: Negative  Musculoskeletal: Negative  Skin: Negative  Allergic/Immunologic: Negative  Neurological: Negative  Hematological: Negative  Psychiatric/Behavioral: Negative            Objective:      /80 (BP Location: Right arm, Patient Position: Sitting, Cuff Size: Adult)   Pulse 73   Temp (!) 96 8 °F (36 °C) (Temporal)   Resp 16   Ht 5' 6" (1 676 m)   Wt 45 kg (99 lb 3 2 oz)   SpO2 97%   BMI 16 01 kg/m²          Physical Exam  Constitutional:       Appearance: He is well-developed  HENT:      Head: Normocephalic and atraumatic  Right Ear: External ear normal       Left Ear: External ear normal       Nose: Nose normal    Eyes:      Conjunctiva/sclera: Conjunctivae normal       Pupils: Pupils are equal, round, and reactive to light  Neck:      Musculoskeletal: Normal range of motion and neck supple  Cardiovascular:      Rate and Rhythm: Normal rate and regular rhythm  Heart sounds: Normal heart sounds  Pulmonary:      Effort: Pulmonary effort is normal       Breath sounds: Normal breath sounds  Musculoskeletal: Normal range of motion  Skin:     General: Skin is warm and dry  Neurological:      Mental Status: He is alert and oriented to person, place, and time  Deep Tendon Reflexes: Reflexes are normal and symmetric     Psychiatric:         Behavior: Behavior normal

## 2021-03-02 ENCOUNTER — TELEPHONE (OUTPATIENT)
Dept: NEPHROLOGY | Facility: CLINIC | Age: 81
End: 2021-03-02

## 2021-03-09 NOTE — TELEPHONE ENCOUNTER
Left another message to schedule pt's follow up with Dr Colette Yan  This is the 2nd attempt a letter will be mailed

## 2021-03-19 ENCOUNTER — LAB (OUTPATIENT)
Dept: LAB | Age: 81
End: 2021-03-19
Payer: COMMERCIAL

## 2021-03-19 DIAGNOSIS — I10 ESSENTIAL HYPERTENSION: ICD-10-CM

## 2021-03-19 DIAGNOSIS — M10.9 GOUT, UNSPECIFIED CAUSE, UNSPECIFIED CHRONICITY, UNSPECIFIED SITE: ICD-10-CM

## 2021-03-19 DIAGNOSIS — N18.31 STAGE 3A CHRONIC KIDNEY DISEASE (HCC): ICD-10-CM

## 2021-03-19 LAB
ANION GAP SERPL CALCULATED.3IONS-SCNC: 5 MMOL/L (ref 4–13)
BACTERIA UR QL AUTO: ABNORMAL /HPF
BILIRUB UR QL STRIP: NEGATIVE
BUN SERPL-MCNC: 26 MG/DL (ref 5–25)
CALCIUM SERPL-MCNC: 9.6 MG/DL (ref 8.3–10.1)
CHLORIDE SERPL-SCNC: 106 MMOL/L (ref 100–108)
CLARITY UR: CLEAR
CO2 SERPL-SCNC: 27 MMOL/L (ref 21–32)
COLOR UR: YELLOW
CREAT SERPL-MCNC: 1.42 MG/DL (ref 0.6–1.3)
CREAT UR-MCNC: 91.4 MG/DL
ERYTHROCYTE [DISTWIDTH] IN BLOOD BY AUTOMATED COUNT: 12.6 % (ref 11.6–15.1)
GFR SERPL CREATININE-BSD FRML MDRD: 47 ML/MIN/1.73SQ M
GLUCOSE SERPL-MCNC: 111 MG/DL (ref 65–140)
GLUCOSE UR STRIP-MCNC: NEGATIVE MG/DL
HCT VFR BLD AUTO: 44.7 % (ref 36.5–49.3)
HGB BLD-MCNC: 14.2 G/DL (ref 12–17)
HGB UR QL STRIP.AUTO: NEGATIVE
HYALINE CASTS #/AREA URNS LPF: ABNORMAL /LPF
KETONES UR STRIP-MCNC: NEGATIVE MG/DL
LEUKOCYTE ESTERASE UR QL STRIP: NEGATIVE
MAGNESIUM SERPL-MCNC: 1.8 MG/DL (ref 1.6–2.6)
MCH RBC QN AUTO: 29 PG (ref 26.8–34.3)
MCHC RBC AUTO-ENTMCNC: 31.8 G/DL (ref 31.4–37.4)
MCV RBC AUTO: 91 FL (ref 82–98)
NITRITE UR QL STRIP: NEGATIVE
NON-SQ EPI CELLS URNS QL MICRO: ABNORMAL /HPF
PH UR STRIP.AUTO: 6 [PH]
PHOSPHATE SERPL-MCNC: 3.4 MG/DL (ref 2.3–4.1)
PLATELET # BLD AUTO: 321 THOUSANDS/UL (ref 149–390)
PMV BLD AUTO: 9.5 FL (ref 8.9–12.7)
POTASSIUM SERPL-SCNC: 4.3 MMOL/L (ref 3.5–5.3)
PROT UR STRIP-MCNC: ABNORMAL MG/DL
PROT UR-MCNC: 25 MG/DL
PROT/CREAT UR: 0.27 MG/G{CREAT} (ref 0–0.1)
PTH-INTACT SERPL-MCNC: 132.1 PG/ML (ref 18.4–80.1)
RBC # BLD AUTO: 4.89 MILLION/UL (ref 3.88–5.62)
RBC #/AREA URNS AUTO: ABNORMAL /HPF
SODIUM SERPL-SCNC: 138 MMOL/L (ref 136–145)
SP GR UR STRIP.AUTO: 1.01 (ref 1–1.03)
URATE SERPL-MCNC: 8.8 MG/DL (ref 4.2–8)
UROBILINOGEN UR QL STRIP.AUTO: 0.2 E.U./DL
WBC # BLD AUTO: 11.11 THOUSAND/UL (ref 4.31–10.16)
WBC #/AREA URNS AUTO: ABNORMAL /HPF

## 2021-03-19 PROCEDURE — 36415 COLL VENOUS BLD VENIPUNCTURE: CPT

## 2021-03-19 PROCEDURE — 84550 ASSAY OF BLOOD/URIC ACID: CPT

## 2021-03-19 PROCEDURE — 83970 ASSAY OF PARATHORMONE: CPT

## 2021-03-19 PROCEDURE — 83735 ASSAY OF MAGNESIUM: CPT

## 2021-03-19 PROCEDURE — 81001 URINALYSIS AUTO W/SCOPE: CPT

## 2021-03-19 PROCEDURE — 80048 BASIC METABOLIC PNL TOTAL CA: CPT

## 2021-03-19 PROCEDURE — 84100 ASSAY OF PHOSPHORUS: CPT

## 2021-03-19 PROCEDURE — 84156 ASSAY OF PROTEIN URINE: CPT

## 2021-03-19 PROCEDURE — 82570 ASSAY OF URINE CREATININE: CPT

## 2021-03-19 PROCEDURE — 85027 COMPLETE CBC AUTOMATED: CPT

## 2021-03-23 ENCOUNTER — TELEPHONE (OUTPATIENT)
Dept: NEPHROLOGY | Facility: CLINIC | Age: 81
End: 2021-03-23

## 2021-03-23 NOTE — TELEPHONE ENCOUNTER
Spoke with patient and made him aware recent labs showed renal function stable at 1 4 and electrolytes stable  However, PTH and uric acid level elevated, informed patient this will be discussed at follow up  Attempted to scheduled follow up and patient stated he can not scheduled at this time and requested call back sometime this week  Patient is on April recall

## 2021-03-23 NOTE — TELEPHONE ENCOUNTER
----- Message from Paige Ruggiero MD sent at 3/22/2021  5:50 PM EDT -----  Please inform patient that renal function is stable at creatinine 1 4, electrolytes are stable, PTH and uric acid level is elevated, will discuss during office visit   please arrange for follow-up appointment, it appears a letter was sent out

## 2021-03-26 ENCOUNTER — TELEPHONE (OUTPATIENT)
Dept: NEPHROLOGY | Facility: CLINIC | Age: 81
End: 2021-03-26

## 2021-03-26 NOTE — TELEPHONE ENCOUNTER
Spoke with patient to attempt to schedule a April follow up appointment  Patient stated his daughter makes all appointment arrangements  I attempted to call daughter, Jackie Edwards and was unsuccessful due to busy dial tone, patient does not know the phone number to verify

## 2021-04-16 DIAGNOSIS — I10 HYPERTENSION, UNSPECIFIED TYPE: ICD-10-CM

## 2021-04-16 DIAGNOSIS — I10 ESSENTIAL HYPERTENSION: ICD-10-CM

## 2021-04-16 RX ORDER — ATENOLOL 50 MG/1
50 TABLET ORAL DAILY
Qty: 90 TABLET | Refills: 0 | Status: SHIPPED | OUTPATIENT
Start: 2021-04-16 | End: 2021-07-14 | Stop reason: SDUPTHER

## 2021-04-16 RX ORDER — CLONIDINE HYDROCHLORIDE 0.1 MG/1
0.1 TABLET ORAL DAILY
Qty: 90 TABLET | Refills: 0 | Status: SHIPPED | OUTPATIENT
Start: 2021-04-16 | End: 2021-07-14 | Stop reason: SDUPTHER

## 2021-04-16 NOTE — TELEPHONE ENCOUNTER
Patient calling for refills for atenolol and clonidine  Please send to the [address] in [place]  Thank you

## 2021-05-07 DIAGNOSIS — G47.00 INSOMNIA, UNSPECIFIED TYPE: ICD-10-CM

## 2021-05-07 RX ORDER — ZOLPIDEM TARTRATE 5 MG/1
5 TABLET ORAL
Qty: 90 TABLET | Refills: 0 | Status: SHIPPED | OUTPATIENT
Start: 2021-05-07 | End: 2021-08-04 | Stop reason: SDUPTHER

## 2021-06-25 ENCOUNTER — OFFICE VISIT (OUTPATIENT)
Dept: FAMILY MEDICINE CLINIC | Facility: CLINIC | Age: 81
End: 2021-06-25
Payer: COMMERCIAL

## 2021-06-25 VITALS
HEART RATE: 67 BPM | RESPIRATION RATE: 16 BRPM | TEMPERATURE: 97.3 F | OXYGEN SATURATION: 97 % | WEIGHT: 96.4 LBS | DIASTOLIC BLOOD PRESSURE: 80 MMHG | HEIGHT: 66 IN | BODY MASS INDEX: 15.49 KG/M2 | SYSTOLIC BLOOD PRESSURE: 140 MMHG

## 2021-06-25 DIAGNOSIS — G47.00 INSOMNIA, UNSPECIFIED TYPE: ICD-10-CM

## 2021-06-25 DIAGNOSIS — Z72.0 TOBACCO ABUSE: ICD-10-CM

## 2021-06-25 DIAGNOSIS — Z12.5 SCREENING FOR PROSTATE CANCER: ICD-10-CM

## 2021-06-25 DIAGNOSIS — H61.22 IMPACTED CERUMEN OF LEFT EAR: ICD-10-CM

## 2021-06-25 DIAGNOSIS — I10 ESSENTIAL HYPERTENSION: ICD-10-CM

## 2021-06-25 DIAGNOSIS — N18.31 STAGE 3A CHRONIC KIDNEY DISEASE (HCC): ICD-10-CM

## 2021-06-25 DIAGNOSIS — Z87.891 PERSONAL HISTORY OF NICOTINE DEPENDENCE: ICD-10-CM

## 2021-06-25 DIAGNOSIS — M10.9 GOUT, UNSPECIFIED CAUSE, UNSPECIFIED CHRONICITY, UNSPECIFIED SITE: ICD-10-CM

## 2021-06-25 DIAGNOSIS — J44.9 CHRONIC OBSTRUCTIVE PULMONARY DISEASE, UNSPECIFIED COPD TYPE (HCC): ICD-10-CM

## 2021-06-25 DIAGNOSIS — Z13.220 SCREENING FOR HYPERLIPIDEMIA: Primary | ICD-10-CM

## 2021-06-25 PROCEDURE — 3079F DIAST BP 80-89 MM HG: CPT | Performed by: FAMILY MEDICINE

## 2021-06-25 PROCEDURE — 3725F SCREEN DEPRESSION PERFORMED: CPT | Performed by: FAMILY MEDICINE

## 2021-06-25 PROCEDURE — 1100F PTFALLS ASSESS-DOCD GE2>/YR: CPT | Performed by: FAMILY MEDICINE

## 2021-06-25 PROCEDURE — 4004F PT TOBACCO SCREEN RCVD TLK: CPT | Performed by: FAMILY MEDICINE

## 2021-06-25 PROCEDURE — 3077F SYST BP >= 140 MM HG: CPT | Performed by: FAMILY MEDICINE

## 2021-06-25 PROCEDURE — 99214 OFFICE O/P EST MOD 30 MIN: CPT | Performed by: FAMILY MEDICINE

## 2021-06-25 PROCEDURE — 3288F FALL RISK ASSESSMENT DOCD: CPT | Performed by: FAMILY MEDICINE

## 2021-06-25 PROCEDURE — 1160F RVW MEDS BY RX/DR IN RCRD: CPT | Performed by: FAMILY MEDICINE

## 2021-06-25 NOTE — PROGRESS NOTES
Assessment/Plan:  Chief Complaint   Patient presents with    Follow-up     4 month followup for blood pressure    Ear Problem     Pt states he cant hear out of left ear      Patient Instructions   Pt  here for recheck and last labs showed CKD and refuses to see nephrologist as directed  Quit tobacco and stay well hydrated and also rec no NSAIDs  He does not want to see Nephrology  Hx of GOUT  Avoid indomethacin as directed  He takes ambien prn sleep  Refuses covid 19 vaccine still, not interested  Follow CDC guidelines for covid 19 prevention  Avoid purine rich foods and alcohol as directed for hx of Gout  Tylenol prn pain  Quit tobacco  Consult ENT for left ear cerumen impaction  No problem-specific Assessment & Plan notes found for this encounter  Diagnoses and all orders for this visit:    Screening for hyperlipidemia  -     Lipid Panel with Direct LDL reflex; Future    Impacted cerumen of left ear  -     Ambulatory Referral to Otolaryngology; Future    Tobacco abuse    Stage 3a chronic kidney disease (HCC)  -     Comprehensive metabolic panel; Future    Insomnia, unspecified type    Essential hypertension  -     Comprehensive metabolic panel; Future    Gout, unspecified cause, unspecified chronicity, unspecified site    Chronic obstructive pulmonary disease, unspecified COPD type ([facility] [address] )    Personal history of nicotine dependence    Screening for prostate cancer  -     PSA, Total Screen; Future    Other orders  -     Cancel: CT lung screening program; Future          Subjective:      Patient ID: Oscar Neal is a 68 y o  male      Follow-up (4 month followup for blood pressure)  Ear Problem (Pt states he cant hear out of left ear )        The following portions of the patient's history were reviewed and updated as appropriate: allergies, current medications, past family history, past medical history, past social history, past surgical history and problem list     Review of Systems Constitutional: Negative  HENT: Positive for hearing loss (left ear for 1 week)  Eyes: Negative  Respiratory: Negative  Cardiovascular: Negative  Gastrointestinal: Negative  Endocrine: Negative  Genitourinary: Negative  Musculoskeletal: Negative  Skin: Negative  Allergic/Immunologic: Negative  Neurological: Negative  Hematological: Negative  Psychiatric/Behavioral: Negative  Objective:      /80   Pulse 67   Temp (!) 97 3 °F (36 3 °C) (Temporal)   Resp 16   Ht 5' 6" (1 676 m)   Wt 43 7 kg (96 lb 6 4 oz)   SpO2 97%   BMI 15 56 kg/m²          Physical Exam  Constitutional:       Appearance: He is well-developed  HENT:      Head: Normocephalic and atraumatic  Right Ear: External ear normal       Left Ear: External ear normal  There is impacted cerumen  Nose: Nose normal    Eyes:      Conjunctiva/sclera: Conjunctivae normal       Pupils: Pupils are equal, round, and reactive to light  Cardiovascular:      Rate and Rhythm: Normal rate and regular rhythm  Heart sounds: Normal heart sounds  Pulmonary:      Effort: Pulmonary effort is normal       Breath sounds: Normal breath sounds  Musculoskeletal:         General: Normal range of motion  Cervical back: Normal range of motion and neck supple  Skin:     General: Skin is warm and dry  Neurological:      Mental Status: He is alert and oriented to person, place, and time  Deep Tendon Reflexes: Reflexes are normal and symmetric     Psychiatric:         Behavior: Behavior normal

## 2021-06-25 NOTE — PATIENT INSTRUCTIONS
Pt  here for recheck and last labs showed CKD and refuses to see nephrologist as directed  Quit tobacco and stay well hydrated and also rec no NSAIDs  He does not want to see Nephrology  Hx of GOUT  Avoid indomethacin as directed  He takes ambien prn sleep  Refuses covid 19 vaccine still, not interested  Follow CDC guidelines for covid 19 prevention  Avoid purine rich foods and alcohol as directed for hx of Gout  Tylenol prn pain  Quit tobacco  Consult ENT for left ear cerumen impaction

## 2021-07-14 DIAGNOSIS — I10 ESSENTIAL HYPERTENSION: ICD-10-CM

## 2021-07-14 DIAGNOSIS — I10 HYPERTENSION, UNSPECIFIED TYPE: ICD-10-CM

## 2021-07-14 RX ORDER — ATENOLOL 50 MG/1
50 TABLET ORAL DAILY
Qty: 90 TABLET | Refills: 0 | Status: SHIPPED | OUTPATIENT
Start: 2021-07-14 | End: 2021-10-12 | Stop reason: SDUPTHER

## 2021-07-14 RX ORDER — CLONIDINE HYDROCHLORIDE 0.1 MG/1
0.1 TABLET ORAL DAILY
Qty: 90 TABLET | Refills: 0 | Status: SHIPPED | OUTPATIENT
Start: 2021-07-14 | End: 2021-10-12 | Stop reason: SDUPTHER

## 2021-08-04 DIAGNOSIS — G47.00 INSOMNIA, UNSPECIFIED TYPE: ICD-10-CM

## 2021-08-04 RX ORDER — ZOLPIDEM TARTRATE 5 MG/1
5 TABLET ORAL
Qty: 90 TABLET | Refills: 0 | Status: SHIPPED | OUTPATIENT
Start: 2021-08-04 | End: 2021-11-01 | Stop reason: SDUPTHER

## 2021-08-04 NOTE — TELEPHONE ENCOUNTER
Requested medication(s) are due for refill today: Yes  Patient has already received a courtesy refill: No  Other reason request has been forwarded to provider:   Patient called requesting refill for Zolpidem 5 mg sent to [address]

## 2021-10-12 DIAGNOSIS — I10 ESSENTIAL HYPERTENSION: ICD-10-CM

## 2021-10-12 DIAGNOSIS — I10 HYPERTENSION, UNSPECIFIED TYPE: ICD-10-CM

## 2021-10-12 RX ORDER — CLONIDINE HYDROCHLORIDE 0.1 MG/1
0.1 TABLET ORAL DAILY
Qty: 90 TABLET | Refills: 0 | Status: SHIPPED | OUTPATIENT
Start: 2021-10-12 | End: 2022-01-11 | Stop reason: SDUPTHER

## 2021-10-12 RX ORDER — ATENOLOL 50 MG/1
50 TABLET ORAL DAILY
Qty: 90 TABLET | Refills: 0 | Status: SHIPPED | OUTPATIENT
Start: 2021-10-12 | End: 2022-01-11 | Stop reason: SDUPTHER

## 2021-10-13 ENCOUNTER — APPOINTMENT (OUTPATIENT)
Dept: LAB | Age: 81
End: 2021-10-13
Payer: COMMERCIAL

## 2021-10-13 DIAGNOSIS — Z12.5 SCREENING FOR PROSTATE CANCER: ICD-10-CM

## 2021-10-13 DIAGNOSIS — Z13.220 SCREENING FOR HYPERLIPIDEMIA: ICD-10-CM

## 2021-10-13 DIAGNOSIS — N18.31 STAGE 3A CHRONIC KIDNEY DISEASE (HCC): ICD-10-CM

## 2021-10-13 DIAGNOSIS — I10 ESSENTIAL HYPERTENSION: ICD-10-CM

## 2021-10-13 LAB
ALBUMIN SERPL BCP-MCNC: 3.7 G/DL (ref 3.5–5)
ALP SERPL-CCNC: 139 U/L (ref 46–116)
ALT SERPL W P-5'-P-CCNC: 25 U/L (ref 12–78)
ANION GAP SERPL CALCULATED.3IONS-SCNC: 6 MMOL/L (ref 4–13)
AST SERPL W P-5'-P-CCNC: 20 U/L (ref 5–45)
BILIRUB SERPL-MCNC: 0.78 MG/DL (ref 0.2–1)
BUN SERPL-MCNC: 21 MG/DL (ref 5–25)
CALCIUM SERPL-MCNC: 10.2 MG/DL (ref 8.3–10.1)
CHLORIDE SERPL-SCNC: 105 MMOL/L (ref 100–108)
CHOLEST SERPL-MCNC: 163 MG/DL (ref 50–200)
CO2 SERPL-SCNC: 27 MMOL/L (ref 21–32)
CREAT SERPL-MCNC: 1.54 MG/DL (ref 0.6–1.3)
GFR SERPL CREATININE-BSD FRML MDRD: 43 ML/MIN/1.73SQ M
GLUCOSE P FAST SERPL-MCNC: 118 MG/DL (ref 65–99)
HDLC SERPL-MCNC: 47 MG/DL
LDLC SERPL CALC-MCNC: 97 MG/DL (ref 0–100)
POTASSIUM SERPL-SCNC: 4.6 MMOL/L (ref 3.5–5.3)
PROT SERPL-MCNC: 7.8 G/DL (ref 6.4–8.2)
PSA SERPL-MCNC: 1.1 NG/ML (ref 0–4)
SODIUM SERPL-SCNC: 138 MMOL/L (ref 136–145)
TRIGL SERPL-MCNC: 96 MG/DL

## 2021-10-13 PROCEDURE — G0103 PSA SCREENING: HCPCS

## 2021-10-13 PROCEDURE — 80053 COMPREHEN METABOLIC PANEL: CPT

## 2021-10-13 PROCEDURE — 80061 LIPID PANEL: CPT

## 2021-10-13 PROCEDURE — 36415 COLL VENOUS BLD VENIPUNCTURE: CPT

## 2021-11-01 DIAGNOSIS — G47.00 INSOMNIA, UNSPECIFIED TYPE: ICD-10-CM

## 2021-11-01 RX ORDER — ZOLPIDEM TARTRATE 5 MG/1
5 TABLET ORAL
Qty: 90 TABLET | Refills: 0 | Status: SHIPPED | OUTPATIENT
Start: 2021-11-01 | End: 2022-02-22 | Stop reason: SDUPTHER

## 2021-11-03 ENCOUNTER — RA CDI HCC (OUTPATIENT)
Dept: OTHER | Facility: HOSPITAL | Age: 81
End: 2021-11-03

## 2021-11-10 ENCOUNTER — OFFICE VISIT (OUTPATIENT)
Dept: FAMILY MEDICINE CLINIC | Facility: CLINIC | Age: 81
End: 2021-11-10
Payer: COMMERCIAL

## 2021-11-10 VITALS
BODY MASS INDEX: 15.91 KG/M2 | HEART RATE: 85 BPM | SYSTOLIC BLOOD PRESSURE: 132 MMHG | DIASTOLIC BLOOD PRESSURE: 80 MMHG | OXYGEN SATURATION: 99 % | TEMPERATURE: 97.2 F | WEIGHT: 99 LBS | HEIGHT: 66 IN

## 2021-11-10 DIAGNOSIS — J44.9 CHRONIC OBSTRUCTIVE PULMONARY DISEASE, UNSPECIFIED COPD TYPE (HCC): ICD-10-CM

## 2021-11-10 DIAGNOSIS — G47.00 INSOMNIA, UNSPECIFIED TYPE: ICD-10-CM

## 2021-11-10 DIAGNOSIS — M10.9 GOUT, UNSPECIFIED CAUSE, UNSPECIFIED CHRONICITY, UNSPECIFIED SITE: ICD-10-CM

## 2021-11-10 DIAGNOSIS — N18.31 STAGE 3A CHRONIC KIDNEY DISEASE (HCC): Primary | ICD-10-CM

## 2021-11-10 DIAGNOSIS — I10 PRIMARY HYPERTENSION: ICD-10-CM

## 2021-11-10 DIAGNOSIS — Z72.0 TOBACCO ABUSE: ICD-10-CM

## 2021-11-10 PROCEDURE — 3079F DIAST BP 80-89 MM HG: CPT | Performed by: FAMILY MEDICINE

## 2021-11-10 PROCEDURE — 99214 OFFICE O/P EST MOD 30 MIN: CPT | Performed by: FAMILY MEDICINE

## 2021-11-10 PROCEDURE — 4004F PT TOBACCO SCREEN RCVD TLK: CPT | Performed by: FAMILY MEDICINE

## 2021-11-10 PROCEDURE — 1160F RVW MEDS BY RX/DR IN RCRD: CPT | Performed by: FAMILY MEDICINE

## 2021-11-10 PROCEDURE — 3075F SYST BP GE 130 - 139MM HG: CPT | Performed by: FAMILY MEDICINE

## 2021-11-26 ENCOUNTER — VBI (OUTPATIENT)
Dept: ADMINISTRATIVE | Facility: OTHER | Age: 81
End: 2021-11-26

## 2022-01-07 ENCOUNTER — VBI (OUTPATIENT)
Dept: ADMINISTRATIVE | Facility: OTHER | Age: 82
End: 2022-01-07

## 2022-01-11 DIAGNOSIS — I10 ESSENTIAL HYPERTENSION: ICD-10-CM

## 2022-01-11 DIAGNOSIS — I10 HYPERTENSION, UNSPECIFIED TYPE: ICD-10-CM

## 2022-01-11 RX ORDER — CLONIDINE HYDROCHLORIDE 0.1 MG/1
0.1 TABLET ORAL DAILY
Qty: 90 TABLET | Refills: 1 | Status: SHIPPED | OUTPATIENT
Start: 2022-01-11 | End: 2022-04-11 | Stop reason: SDUPTHER

## 2022-01-11 RX ORDER — ATENOLOL 50 MG/1
50 TABLET ORAL DAILY
Qty: 90 TABLET | Refills: 1 | Status: SHIPPED | OUTPATIENT
Start: 2022-01-11 | End: 2022-04-11 | Stop reason: SDUPTHER

## 2022-02-22 DIAGNOSIS — G47.00 INSOMNIA, UNSPECIFIED TYPE: ICD-10-CM

## 2022-02-22 RX ORDER — ZOLPIDEM TARTRATE 5 MG/1
5 TABLET ORAL
Qty: 90 TABLET | Refills: 0 | Status: SHIPPED | OUTPATIENT
Start: 2022-02-22 | End: 2022-08-09 | Stop reason: SDUPTHER

## 2022-03-03 ENCOUNTER — RA CDI HCC (OUTPATIENT)
Dept: OTHER | Facility: HOSPITAL | Age: 82
End: 2022-03-03

## 2022-03-03 NOTE — PROGRESS NOTES
[facility]  coding opportunities       Chart reviewed, no opportunity found: CHART REVIEWED, NO OPPORTUNITY FOUND                        Patients insurance company:  [employer] [facility] (Medicare Advantage and Commercial)

## 2022-03-15 ENCOUNTER — OFFICE VISIT (OUTPATIENT)
Dept: FAMILY MEDICINE CLINIC | Facility: CLINIC | Age: 82
End: 2022-03-15
Payer: COMMERCIAL

## 2022-03-15 VITALS
BODY MASS INDEX: 15.62 KG/M2 | SYSTOLIC BLOOD PRESSURE: 122 MMHG | HEIGHT: 66 IN | DIASTOLIC BLOOD PRESSURE: 80 MMHG | WEIGHT: 97.2 LBS | RESPIRATION RATE: 16 BRPM | HEART RATE: 74 BPM | OXYGEN SATURATION: 96 % | TEMPERATURE: 97.5 F

## 2022-03-15 DIAGNOSIS — I10 PRIMARY HYPERTENSION: Primary | ICD-10-CM

## 2022-03-15 DIAGNOSIS — N18.31 STAGE 3A CHRONIC KIDNEY DISEASE (HCC): ICD-10-CM

## 2022-03-15 DIAGNOSIS — M10.9 GOUT, UNSPECIFIED CAUSE, UNSPECIFIED CHRONICITY, UNSPECIFIED SITE: ICD-10-CM

## 2022-03-15 DIAGNOSIS — Z12.2 ENCOUNTER FOR SCREENING FOR LUNG CANCER: ICD-10-CM

## 2022-03-15 DIAGNOSIS — G47.00 INSOMNIA, UNSPECIFIED TYPE: ICD-10-CM

## 2022-03-15 DIAGNOSIS — Z87.891 PERSONAL HISTORY OF NICOTINE DEPENDENCE: ICD-10-CM

## 2022-03-15 DIAGNOSIS — J44.9 CHRONIC OBSTRUCTIVE PULMONARY DISEASE, UNSPECIFIED COPD TYPE (HCC): ICD-10-CM

## 2022-03-15 DIAGNOSIS — Z72.0 TOBACCO ABUSE: ICD-10-CM

## 2022-03-15 PROCEDURE — 99214 OFFICE O/P EST MOD 30 MIN: CPT | Performed by: FAMILY MEDICINE

## 2022-03-15 PROCEDURE — 4004F PT TOBACCO SCREEN RCVD TLK: CPT | Performed by: FAMILY MEDICINE

## 2022-03-15 PROCEDURE — 3079F DIAST BP 80-89 MM HG: CPT | Performed by: FAMILY MEDICINE

## 2022-03-15 PROCEDURE — 1160F RVW MEDS BY RX/DR IN RCRD: CPT | Performed by: FAMILY MEDICINE

## 2022-03-15 NOTE — PROGRESS NOTES
BMI Counseling: There is no height or weight on file to calculate BMI  The BMI is below normal  Patient was advised to gain weight

## 2022-03-15 NOTE — PROGRESS NOTES
Assessment/Plan:  Chief Complaint   Patient presents with    Follow-up     4 month follow up, no COVID vaccine      Patient Instructions   Pt  here for recheck and last labs showed CKD GFR at 43 down from 47 and refuses to see nephrologist as directed  Patient has yet to get updated labs  Quit tobacco and stay well hydrated and also rec no NSAIDs  He does not want to see Nephrology at this time  Hx of GOUT  Avoid NSAIDs as directed  He takes ambien prn sleep 2 5  Refuses covid 19 vaccine still, not interested in this or flu shot this past year  Follow CDC guidelines for covid 19 prevention  Avoid purine rich foods and alcohol as directed for hx of Gout   Tylenol prn pain  Quit tobacco  Await labs which were stable except GFR lower at 43 from 47             No problem-specific Assessment & Plan notes found for this encounter  Diagnoses and all orders for this visit:    Primary hypertension    Encounter for screening for lung cancer    Personal history of nicotine dependence    Tobacco abuse    Insomnia, unspecified type    Chronic obstructive pulmonary disease, unspecified COPD type ([facility] [address] )    Gout, unspecified cause, unspecified chronicity, unspecified site    Stage [facility] )          Subjective:      Patient ID: Patrice Mcclendon is a 80 y o  male  Follow-up (4 month follow up, no COVID vaccine ) Still smoking cigarettes  The following portions of the patient's history were reviewed and updated as appropriate: allergies, current medications, past family history, past medical history, past social history, past surgical history and problem list     Review of Systems   Constitutional: Negative  HENT: Negative  Eyes: Negative  Respiratory: Negative  Cardiovascular: Negative  Gastrointestinal: Negative  Endocrine: Negative  Genitourinary: Negative  Musculoskeletal: Negative  Skin: Negative  Allergic/Immunologic: Negative  Neurological: Negative  Hematological: Negative  Psychiatric/Behavioral: Negative  Objective:      /80 (BP Location: Left arm, Patient Position: Sitting, Cuff Size: Adult)   Pulse 74   Temp 97 5 °F (36 4 °C) (Temporal)   Resp 16   Ht 5' 6" (1 676 m)   Wt 44 1 kg (97 lb 3 2 oz)   SpO2 96%   BMI 15 69 kg/m²          Physical Exam  Constitutional:       Appearance: He is well-developed  HENT:      Head: Normocephalic and atraumatic  Eyes:      Conjunctiva/sclera: Conjunctivae normal       Pupils: Pupils are equal, round, and reactive to light  Cardiovascular:      Rate and Rhythm: Normal rate and regular rhythm  Heart sounds: Normal heart sounds  Pulmonary:      Effort: Pulmonary effort is normal       Breath sounds: Normal breath sounds  Musculoskeletal:         General: Normal range of motion  Cervical back: Normal range of motion and neck supple  Skin:     General: Skin is warm and dry  Neurological:      Mental Status: He is alert and oriented to person, place, and time  Deep Tendon Reflexes: Reflexes are normal and symmetric     Psychiatric:         Behavior: Behavior normal

## 2022-03-15 NOTE — PATIENT INSTRUCTIONS
Pt  here for recheck and last labs showed CKD GFR at 43 down from 47 and refuses to see nephrologist as directed  Patient has yet to get updated labs  Quit tobacco and stay well hydrated and also rec no NSAIDs  He does not want to see Nephrology at this time  Hx of GOUT  Avoid NSAIDs as directed  He takes ambien prn sleep 2 5  Refuses covid 19 vaccine still, not interested in this or flu shot this past year  Follow CDC guidelines for covid 19 prevention  Avoid purine rich foods and alcohol as directed for hx of Gout   Tylenol prn pain   Quit tobacco  Await labs which were stable except GFR lower at 43 from 47

## 2022-03-16 ENCOUNTER — APPOINTMENT (OUTPATIENT)
Dept: LAB | Age: 82
End: 2022-03-16
Payer: COMMERCIAL

## 2022-03-16 DIAGNOSIS — I10 PRIMARY HYPERTENSION: ICD-10-CM

## 2022-03-16 DIAGNOSIS — N18.31 STAGE 3A CHRONIC KIDNEY DISEASE (HCC): ICD-10-CM

## 2022-03-16 DIAGNOSIS — M10.9 GOUT, UNSPECIFIED CAUSE, UNSPECIFIED CHRONICITY, UNSPECIFIED SITE: ICD-10-CM

## 2022-03-16 LAB
ALBUMIN SERPL BCP-MCNC: 3.9 G/DL (ref 3.5–5)
ALP SERPL-CCNC: 112 U/L (ref 46–116)
ALT SERPL W P-5'-P-CCNC: 19 U/L (ref 12–78)
ANION GAP SERPL CALCULATED.3IONS-SCNC: 10 MMOL/L (ref 4–13)
AST SERPL W P-5'-P-CCNC: 23 U/L (ref 5–45)
BILIRUB SERPL-MCNC: 1.2 MG/DL (ref 0.2–1)
BUN SERPL-MCNC: 28 MG/DL (ref 5–25)
CALCIUM SERPL-MCNC: 9.6 MG/DL (ref 8.3–10.1)
CHLORIDE SERPL-SCNC: 106 MMOL/L (ref 100–108)
CO2 SERPL-SCNC: 24 MMOL/L (ref 21–32)
CREAT SERPL-MCNC: 1.65 MG/DL (ref 0.6–1.3)
GFR SERPL CREATININE-BSD FRML MDRD: 38 ML/MIN/1.73SQ M
GLUCOSE SERPL-MCNC: 117 MG/DL (ref 65–140)
POTASSIUM SERPL-SCNC: 4.2 MMOL/L (ref 3.5–5.3)
PROT SERPL-MCNC: 7.5 G/DL (ref 6.4–8.2)
SODIUM SERPL-SCNC: 140 MMOL/L (ref 136–145)

## 2022-03-16 PROCEDURE — 80053 COMPREHEN METABOLIC PANEL: CPT

## 2022-03-16 PROCEDURE — 36415 COLL VENOUS BLD VENIPUNCTURE: CPT

## 2022-04-11 DIAGNOSIS — I10 HYPERTENSION, UNSPECIFIED TYPE: ICD-10-CM

## 2022-04-11 DIAGNOSIS — I10 ESSENTIAL HYPERTENSION: ICD-10-CM

## 2022-04-11 RX ORDER — CLONIDINE HYDROCHLORIDE 0.1 MG/1
0.1 TABLET ORAL DAILY
Qty: 90 TABLET | Refills: 1 | Status: SHIPPED | OUTPATIENT
Start: 2022-04-11 | End: 2022-07-11 | Stop reason: SDUPTHER

## 2022-04-11 RX ORDER — ATENOLOL 50 MG/1
50 TABLET ORAL DAILY
Qty: 90 TABLET | Refills: 1 | Status: SHIPPED | OUTPATIENT
Start: 2022-04-11 | End: 2022-07-11 | Stop reason: SDUPTHER

## 2022-05-20 ENCOUNTER — TELEPHONE (OUTPATIENT)
Dept: NEPHROLOGY | Facility: CLINIC | Age: 82
End: 2022-05-20

## 2022-05-20 NOTE — TELEPHONE ENCOUNTER
Appointment Confirmation   Person confirmed appointment with  If not patient, name of the person Voice msg    Date and time of appointment 5/23 2:00    Patient acknowledged and will be at appointment? no    Did you advise the patient that they will need a urine sample if they are a new patient?  N/A    Did you advise the patient to bring their current medications for verification? (including any OTC) Yes    Additional Information

## 2022-05-23 ENCOUNTER — OFFICE VISIT (OUTPATIENT)
Dept: NEPHROLOGY | Facility: CLINIC | Age: 82
End: 2022-05-23
Payer: COMMERCIAL

## 2022-05-23 VITALS
SYSTOLIC BLOOD PRESSURE: 180 MMHG | HEIGHT: 66 IN | OXYGEN SATURATION: 97 % | DIASTOLIC BLOOD PRESSURE: 90 MMHG | BODY MASS INDEX: 15.75 KG/M2 | WEIGHT: 98 LBS | HEART RATE: 67 BPM

## 2022-05-23 DIAGNOSIS — I10 ESSENTIAL HYPERTENSION: ICD-10-CM

## 2022-05-23 DIAGNOSIS — E79.0 HYPERURICEMIA: ICD-10-CM

## 2022-05-23 DIAGNOSIS — N18.32 STAGE 3B CHRONIC KIDNEY DISEASE (HCC): Primary | ICD-10-CM

## 2022-05-23 DIAGNOSIS — N25.81 SECONDARY HYPERPARATHYROIDISM OF RENAL ORIGIN (HCC): ICD-10-CM

## 2022-05-23 PROCEDURE — 3077F SYST BP >= 140 MM HG: CPT | Performed by: INTERNAL MEDICINE

## 2022-05-23 PROCEDURE — 99214 OFFICE O/P EST MOD 30 MIN: CPT | Performed by: INTERNAL MEDICINE

## 2022-05-23 PROCEDURE — 1160F RVW MEDS BY RX/DR IN RCRD: CPT | Performed by: INTERNAL MEDICINE

## 2022-05-23 PROCEDURE — 3080F DIAST BP >= 90 MM HG: CPT | Performed by: INTERNAL MEDICINE

## 2022-05-23 PROCEDURE — 4004F PT TOBACCO SCREEN RCVD TLK: CPT | Performed by: INTERNAL MEDICINE

## 2022-05-23 NOTE — PATIENT INSTRUCTIONS
Blood pressure is elevated  Recommend home monitoring of blood pressure   -Recommend 2 g sodium diet  -Recommend daily exercise and weight loss  -Discussed home monitoring of BP and maintaining a log of blood pressure   -Recommend goal BP less than 130/80  Please inform me if SBP below 110 or above 140's persistently  -Renal Function is stable  -You have Chronic Kidney Disease Stage 3  -Avoid NSAIDs like Ibuprofen/Motrin, Naproxen/Aleve, Celebrex, meloxicam/Mobic, Diclofenac and other NSAIDs   -Okay to take Acetaminophen/Tylenol if you do not have any liver problems  -Avoid IV contrast used for CT scan and cardiac catheterization     -If plan for any study with IV contrast, please let me know so we could hydrate with fluids before and after IV contrast  -Dosage  of certain medications may need to be adjusted depending on Kidney function  Recommend low purine diet    Follow-up in 4 months with repeat labs    Low Purine Diet   WHAT YOU NEED TO KNOW:   A low-purine diet is a meal plan based on foods that are low in purine content  Purine is a substance that is found in foods and is produced naturally by the body  Purines are broken down by the body and changed to uric acid  The kidneys normally filter the uric acid, and it leaves the body through the urine  However, people with gout sometimes have a buildup of uric acid in the blood  This buildup of uric acid can cause swelling and pain (a gout attack)  A low-purine diet may help to treat and prevent gout attacks  DISCHARGE INSTRUCTIONS:   Foods to include: The following foods are low in purine    Eggs, nuts, and peanut butter    Low-fat and fat-free cheese and ice cream    Skim or 1% milk    Soup made without meat extract or broth    Vegetables that are not on the medium-purine list below    All fruit and fruit juice    Bread, pasta, rice, cakes, cornbread, and popcorn    Water, soda, tea, coffee, and cocoa    Sugar, sweets, and gelatin    Fat and oil    Foods to limit:   Medium-purine foods:     Meats:  Limit the following to 4 to 6 ounces each day  Meat and poultry     Crab, lobster, oysters, and shrimp    Vegetables:  Limit the following vegetables to ½ cup each day  Asparagus    Cauliflower    Spinach    Mushrooms    Green peas    Beans, peas, and lentils (limit to 1 cup each day)    Oats and oatmeal (limit to ? cup uncooked each day)    Wheat germ and bran (limit to ¼ cup each day)    High-purine foods:  Limit or avoid foods high in purine  Anchovies, sardines, scallops, and mussels    [place], codfish, herring, and [employer], like goose and duck    Organ meats, such as brains, heart, kidney, liver, and sweetbreads    Gravies and sauces made with meat    Yeast extracts taken in the form of a supplement    Other guidelines to follow:   Increase liquid intake  Drink 8 to 16 (eight-ounce) cups of liquid each day  At least half of the liquid you drink should be water  Liquid can help your body get rid of extra uric acid  Limit or avoid alcohol  Alcohol (especially beer) increases your risk of a gout attack  Beer contains a high amount of purine  Maintain a healthy weight  If you are overweight, you should lose weight slowly  Weight loss can help decrease the amount of stress on your joints  Regular exercise can help you lose weight if you are overweight, or maintain your weight if you are at a normal weight  Talk to your healthcare provider before you begin an exercise program     © Copyright [employer] 2022 Information is for End User's use only and may not be sold, redistributed or otherwise used for commercial purposes  All illustrations and images included in CareNotes® are the copyrighted property of [employer]  or Marlene Moore   The above information is an  only  It is not intended as medical advice for individual conditions or treatments   Talk to your doctor, nurse or pharmacist before following any medical regimen to see if it is safe and effective for you

## 2022-05-23 NOTE — PROGRESS NOTES
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 and age-related nephron loss  Also use of NSAIDs and smoking contributing  -UA from March 2021 showed trace protein, upc ratio was 0 27, will continue to monitor with repeat upc ratio  -stressed on oral hydration, continue to monitor renal function  Stressed on better blood pressure control to slow CKD progression  Stressed on quitting smoking     Primary hypertension with chronic kidney disease stage 3  -Current medication: on Atenolol 50 mg and clonidine 0 1mg daily      -Current blood pressure: BP elevated but patient reluctant for medication changes  Blood pressure was well controlled during recent PCP visit    -Plan:    ? Could be due to anxiety  Stressed on home monitoring of BP    ? If blood pressure is persistently elevated, would increase the dose of atenolol if heart rate is more than 60  Discussed about risk of stroke with uncontrolled hypertension  -Recommend 2 g sodium diet  -Recommend daily exercise and weight loss  -Discussed home monitoring of BP and maintaining a log of blood pressure   -Recommend goal BP less than 130/80  Secondary hyperparathyroidism of renal origin   1, elevated  -check vitamin D as well as PTH with the next blood work    No need for calcitriol at this time, continue to monitor    Proteinuria, unspecified, last upc ratio was 0 27, continue to monitor  -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  -currently not on ACE inhibitor a or ARB  If blood pressure is persistently elevated and if proteinuria persists may need to add ARB but patient is reluctant with adding more medication at this time     History of gout  -uric level 8 8   -patient denies any recent gout attacks  Discussed about starting allopurinol and risk and benefit of allopurinol as well as as adverse effect like bone marrow suppression and liver toxicity  Patient is not interested at this time, wants to follow-up urine diet and monitor, list provided  Monitor uric acid level       Patient Instructions   Blood pressure is elevated  Recommend home monitoring of blood pressure   -Recommend 2 g sodium diet  -Recommend daily exercise and weight loss  -Discussed home monitoring of BP and maintaining a log of blood pressure   -Recommend goal BP less than 130/80  Please inform me if SBP below 110 or above 140's persistently  -Renal Function is stable  -You have Chronic Kidney Disease Stage 3  -Avoid NSAIDs like Ibuprofen/Motrin, Naproxen/Aleve, Celebrex, meloxicam/Mobic, Diclofenac and other NSAIDs   -Okay to take Acetaminophen/Tylenol if you do not have any liver problems  -Avoid IV contrast used for CT scan and cardiac catheterization     -If plan for any study with IV contrast, please let me know so we could hydrate with fluids before and after IV contrast  -Dosage  of certain medications may need to be adjusted depending on Kidney function  Recommend low purine diet    Follow-up in 4 months with repeat labs    Low Purine Diet   WHAT YOU NEED TO KNOW:   A low-purine diet is a meal plan based on foods that are low in purine content  Purine is a substance that is found in foods and is produced naturally by the body  Purines are broken down by the body and changed to uric acid  The kidneys normally filter the uric acid, and it leaves the body through the urine   However, people with gout sometimes have a buildup of uric acid in the blood  This buildup of uric acid can cause swelling and pain (a gout attack)  A low-purine diet may help to treat and prevent gout attacks  DISCHARGE INSTRUCTIONS:   Foods to include: The following foods are low in purine  · Eggs, nuts, and peanut butter    · Low-fat and fat-free cheese and ice cream    · Skim or 1% milk    · Soup made without meat extract or broth    · Vegetables that are not on the medium-purine list below    · All fruit and fruit juice    · Bread, pasta, rice, cakes, cornbread, and popcorn    · Water, soda, tea, coffee, and cocoa    · Sugar, sweets, and gelatin    · Fat and oil    Foods to limit:   1  Medium-purine foods:     ? Meats:  Limit the following to 4 to 6 ounces each day  ? Meat and poultry     ? Crab, lobster, oysters, and shrimp    ? Vegetables:  Limit the following vegetables to ½ cup each day  ? Asparagus    ? Cauliflower    ? Spinach    ? Mushrooms    ? Green peas    ? Beans, peas, and lentils (limit to 1 cup each day)    ? Oats and oatmeal (limit to ? cup uncooked each day)    ? Wheat germ and bran (limit to ¼ cup each day)    2  High-purine foods:  Limit or avoid foods high in purine  ? Anchovies, sardines, scallops, and mussels    ? Tuna, codfish, herring, and krystle    ? [employer], like goose and duck    ? Organ meats, such as brains, heart, kidney, liver, and sweetbreads    ? Gravies and sauces made with meat    ? Yeast extracts taken in the form of a supplement    Other guidelines to follow:   · Increase liquid intake  Drink 8 to 16 (eight-ounce) cups of liquid each day  At least half of the liquid you drink should be water  Liquid can help your body get rid of extra uric acid  · Limit or avoid alcohol  Alcohol (especially beer) increases your risk of a gout attack  Beer contains a high amount of purine  · Maintain a healthy weight  If you are overweight, you should lose weight slowly   Weight loss can help decrease the amount of stress on your joints  Regular exercise can help you lose weight if you are overweight, or maintain your weight if you are at a normal weight  Talk to your healthcare provider before you begin an exercise program     © Copyright [employer] 2022 Information is for End User's use only and may not be sold, redistributed or otherwise used for commercial purposes  All illustrations and images included in CareNotes® are the copyrighted property of A [employer] A M [employer] Inc  or [facility] Gigi Moore   The above information is an  only  It is not intended as medical advice for individual conditions or treatments  Talk to your doctor, nurse or pharmacist before following any medical regimen to see if it is safe and effective for you  Diagnoses and all orders for this visit:    Stage 3b chronic kidney disease ([facility] [address] )  -     Basic metabolic panel; Future  -     Vitamin D 25 hydroxy; Future  -     PTH, intact; Future  -     Uric acid; Future  -     Protein / creatinine ratio, urine; Future  -     Magnesium; Future  -     Phosphorus; Future    Essential hypertension  -     Basic metabolic panel; Future    Secondary hyperparathyroidism of renal origin ([facility] [address] )  -     PTH, intact; Future    Hyperuricemia  -     Uric acid; Future            SUBJECTIVE / HPI:  Martha Dillon is a 80 y o   male with medical issues of HTN x 1985, history of gout x 20yrs who presents for initial consultation for chronic kidney disease stage III      Patient has elevated creatinine since 2017 when it was around 1 2-1 4  No labs from 2018 and 2019 but in June 2020 creatinine trended up to 1 8, EGFR 36  It was 1 8 in August 2020 but improved  blood work from 12/01/2020 to creatinine 1 4 , EGFR 47    Elevated creatinine was suspected to be due to use of indomethacin and renal function improved with stopping indomethacin    Blood work from 03/16/2022 showed creatinine 1 65 mg/dL with stable electrolytes, total bilirubin was slightly on higher side at 1 2 but stable liver enzymes  Upc ratio was 0 27, uric acid level was 8 8     Patient denies any new complaints- no edema    Does not check BP at home   Reviewed last PCP note and patient is anxious today  REVIEW OF SYSTEMS:    Review of Systems   Constitutional: Negative for activity change, appetite change, chills, diaphoresis, fatigue and fever  HENT: Negative for congestion, facial swelling and nosebleeds  Eyes: Negative for pain and visual disturbance  Respiratory: Negative for cough, chest tightness and shortness of breath  Cardiovascular: Negative for chest pain and palpitations  Gastrointestinal: Negative for abdominal distention, abdominal pain, diarrhea, nausea and vomiting  Genitourinary: Negative for difficulty urinating, dysuria, flank pain, frequency and hematuria  Musculoskeletal: Negative for arthralgias, back pain and joint swelling  Skin: Negative for rash  Neurological: Negative for dizziness, seizures, syncope, weakness and headaches  Psychiatric/Behavioral: Negative for agitation and confusion  The patient is not nervous/anxious  More than 10 point review of systems were obtained and discussed in length with the patient  Complete review of systems were negative / unremarkable except mentioned above  PAST MEDICAL HISTORY:  Past Medical History:   Diagnosis Date    Abnormal thyroid function test     non specified /  [place]    1/8/13      Chronic kidney disease     COPD (chronic obstructive pulmonary disease) (HCC)     Eczema     [place]   11/26/13       Esophagitis     Gout     Hypertension     Pyloric channel ulcer        PAST SURGICAL HISTORY:  Past Surgical History:   Procedure Laterality Date    BACK SURGERY      ESOPHAGOGASTRODUODENOSCOPY N/A 12/1/2016    Procedure: ESOPHAGOGASTRODUODENOSCOPY (EGD); Surgeon: Ronda Ramirez MD;  Location: [facility];   Service:     EYE SURGERY         SOCIAL HISTORY:  Social History     Substance and Sexual Activity   Alcohol Use Not Currently     Social History     Substance and Sexual Activity   Drug Use No     Social History     Tobacco Use   Smoking Status Current Every Day Smoker    Packs/day: 0 50    Years: 60 00    Pack years: 30 00    Types: Cigarettes   Smokeless Tobacco Current User   Tobacco Comment    current some day smoker per allscript       FAMILY HISTORY:  Family History   Problem Relation Age of Onset    Other Mother         Dec in her 66's (gangrene foot)    Heart failure Mother     Diabetes type II Mother     Peripheral vascular disease Mother     Other Family         situs inversus / Dec 90 yo    Rectal cancer Father        MEDICATIONS:    Current Outpatient Medications:     albuterol (PROVENTIL HFA,VENTOLIN HFA) 90 mcg/act inhaler, Inhale 2 puffs, Disp: , Rfl:     atenolol (TENORMIN) 50 mg tablet, Take 1 tablet (50 mg total) by mouth daily, Disp: 90 tablet, Rfl: 1    cloNIDine (CATAPRES) 0 1 mg tablet, Take 1 tablet (0 1 mg total) by mouth daily, Disp: 90 tablet, Rfl: 1    zolpidem (AMBIEN) 5 mg tablet, Take 1 tablet (5 mg total) by mouth daily at bedtime as needed for sleep, Disp: 90 tablet, Rfl: 0      PHYSICAL EXAM:  Vitals:    05/23/22 1350 05/23/22 1421   BP: 142/86 (!) 180/90   BP Location: Left arm    Patient Position: Sitting    Cuff Size: Adult    Pulse: 67    SpO2: 97%    Weight: 44 5 kg (98 lb)    Height: 5' 6" (1 676 m)      Body mass index is 15 82 kg/m²  Physical Exam  Constitutional:       General: He is not in acute distress  Appearance: He is well-developed  He is not diaphoretic  HENT:      Head: Normocephalic and atraumatic  Mouth/Throat:      Mouth: Mucous membranes are moist    Eyes:      General: No scleral icterus  Conjunctiva/sclera: Conjunctivae normal       Pupils: Pupils are equal, round, and reactive to light  Neck:      Thyroid: No thyromegaly  Cardiovascular:      Rate and Rhythm: Normal rate and regular rhythm  Heart sounds: Normal heart sounds  No murmur heard  No friction rub  Pulmonary:      Effort: Pulmonary effort is normal  No respiratory distress  Breath sounds: Normal breath sounds  No wheezing or rales  Abdominal:      General: Bowel sounds are normal  There is no distension  Palpations: Abdomen is soft  Tenderness: There is no abdominal tenderness  Musculoskeletal:         General: No deformity  Cervical back: Neck supple  Right lower leg: No edema  Left lower leg: No edema  Lymphadenopathy:      Cervical: No cervical adenopathy  Skin:     Coloration: Skin is not pale  Nails: There is no clubbing  Neurological:      Mental Status: He is alert and oriented to person, place, and time  He is not disoriented  Psychiatric:         Mood and Affect: Mood normal  Mood is not anxious  Affect is not inappropriate  Behavior: Behavior normal          Thought Content:  Thought content normal          Lab Results:   Results for orders placed or performed in visit on 03/16/22   Comprehensive metabolic panel   Result Value Ref Range    Sodium 140 136 - 145 mmol/L    Potassium 4 2 3 5 - 5 3 mmol/L    Chloride 106 100 - 108 mmol/L    CO2 24 21 - 32 mmol/L    ANION GAP 10 4 - 13 mmol/L    BUN 28 (H) 5 - 25 mg/dL    Creatinine 1 65 (H) 0 60 - 1 30 mg/dL    Glucose 117 65 - 140 mg/dL    Calcium 9 6 8 3 - 10 1 mg/dL    AST 23 5 - 45 U/L    ALT 19 12 - 78 U/L    Alkaline Phosphatase 112 46 - 116 U/L    Total Protein 7 5 6 4 - 8 2 g/dL    Albumin 3 9 3 5 - 5 0 g/dL    Total Bilirubin 1 20 (H) 0 20 - 1 00 mg/dL    eGFR 38 ml/min/1 73sq m

## 2022-06-24 ENCOUNTER — TELEPHONE (OUTPATIENT)
Dept: NEPHROLOGY | Facility: CLINIC | Age: 82
End: 2022-06-24

## 2022-06-24 NOTE — TELEPHONE ENCOUNTER
Patient's daughter Pat Marie called the office to verify if labs are in the [address] [place], [place] [address]'s system for her  I confirmed

## 2022-07-11 DIAGNOSIS — I10 ESSENTIAL HYPERTENSION: ICD-10-CM

## 2022-07-11 DIAGNOSIS — I10 HYPERTENSION, UNSPECIFIED TYPE: ICD-10-CM

## 2022-07-11 RX ORDER — ATENOLOL 50 MG/1
50 TABLET ORAL DAILY
Qty: 90 TABLET | Refills: 1 | Status: SHIPPED | OUTPATIENT
Start: 2022-07-11 | End: 2022-10-07 | Stop reason: SDUPTHER

## 2022-07-11 RX ORDER — CLONIDINE HYDROCHLORIDE 0.1 MG/1
0.1 TABLET ORAL DAILY
Qty: 90 TABLET | Refills: 1 | Status: SHIPPED | OUTPATIENT
Start: 2022-07-11 | End: 2022-10-07 | Stop reason: SDUPTHER

## 2022-07-19 ENCOUNTER — OFFICE VISIT (OUTPATIENT)
Dept: FAMILY MEDICINE CLINIC | Facility: CLINIC | Age: 82
End: 2022-07-19
Payer: COMMERCIAL

## 2022-07-19 VITALS
OXYGEN SATURATION: 99 % | BODY MASS INDEX: 15.76 KG/M2 | HEART RATE: 67 BPM | RESPIRATION RATE: 16 BRPM | HEIGHT: 65 IN | DIASTOLIC BLOOD PRESSURE: 80 MMHG | WEIGHT: 94.6 LBS | TEMPERATURE: 98.6 F | SYSTOLIC BLOOD PRESSURE: 124 MMHG

## 2022-07-19 DIAGNOSIS — Z00.00 MEDICARE ANNUAL WELLNESS VISIT, SUBSEQUENT: ICD-10-CM

## 2022-07-19 DIAGNOSIS — M10.9 GOUT, UNSPECIFIED CAUSE, UNSPECIFIED CHRONICITY, UNSPECIFIED SITE: ICD-10-CM

## 2022-07-19 DIAGNOSIS — Z72.0 TOBACCO ABUSE: ICD-10-CM

## 2022-07-19 DIAGNOSIS — Z87.891 PERSONAL HISTORY OF NICOTINE DEPENDENCE: ICD-10-CM

## 2022-07-19 DIAGNOSIS — I10 ESSENTIAL HYPERTENSION: ICD-10-CM

## 2022-07-19 DIAGNOSIS — J44.9 CHRONIC OBSTRUCTIVE PULMONARY DISEASE, UNSPECIFIED COPD TYPE (HCC): ICD-10-CM

## 2022-07-19 DIAGNOSIS — Z00.00 HEALTH CARE MAINTENANCE: Primary | ICD-10-CM

## 2022-07-19 DIAGNOSIS — N18.32 STAGE 3B CHRONIC KIDNEY DISEASE (HCC): ICD-10-CM

## 2022-07-19 DIAGNOSIS — G47.00 INSOMNIA, UNSPECIFIED TYPE: ICD-10-CM

## 2022-07-19 PROCEDURE — 99214 OFFICE O/P EST MOD 30 MIN: CPT | Performed by: FAMILY MEDICINE

## 2022-07-19 PROCEDURE — 3288F FALL RISK ASSESSMENT DOCD: CPT | Performed by: FAMILY MEDICINE

## 2022-07-19 PROCEDURE — 1160F RVW MEDS BY RX/DR IN RCRD: CPT | Performed by: FAMILY MEDICINE

## 2022-07-19 PROCEDURE — 1125F AMNT PAIN NOTED PAIN PRSNT: CPT | Performed by: FAMILY MEDICINE

## 2022-07-19 PROCEDURE — 3079F DIAST BP 80-89 MM HG: CPT | Performed by: FAMILY MEDICINE

## 2022-07-19 PROCEDURE — 3074F SYST BP LT 130 MM HG: CPT | Performed by: FAMILY MEDICINE

## 2022-07-19 PROCEDURE — G0439 PPPS, SUBSEQ VISIT: HCPCS | Performed by: FAMILY MEDICINE

## 2022-07-19 PROCEDURE — 1170F FXNL STATUS ASSESSED: CPT | Performed by: FAMILY MEDICINE

## 2022-07-19 PROCEDURE — 3725F SCREEN DEPRESSION PERFORMED: CPT | Performed by: FAMILY MEDICINE

## 2022-07-19 NOTE — PROGRESS NOTES
Assessment and Plan:     Problem List Items Addressed This Visit        Respiratory    COPD (chronic obstructive pulmonary disease) ([facility] [address] )       Cardiovascular and Mediastinum    Essential hypertension       Genitourinary    Stage 3b chronic kidney disease ([facility] [address] )       Other    Gout    Tobacco abuse    Insomnia      Other Visit Diagnoses     Health care maintenance    -  Primary    Medicare annual wellness visit, subsequent        Personal history of nicotine dependence               Preventive health issues were discussed with patient, and age appropriate screening tests were ordered as noted in patient's After Visit Summary  Personalized health advice and appropriate referrals for health education or preventive services given if needed, as noted in patient's After Visit Summary  History of Present Illness:     Patient presents for a Medicare Wellness Visit    Here for AWV and recheck  He still smokes and home is safe  Patient Care Team:  Hung Butler DO as PCP - General  Graciela Castañeda MD     Review of Systems:     Review of Systems   Constitutional: Negative  HENT: Negative  Eyes: Negative  Respiratory: Negative  Cardiovascular: Negative  Gastrointestinal: Negative  Endocrine: Negative  Genitourinary: Negative  Musculoskeletal: Negative  Skin: Negative  Allergic/Immunologic: Negative  Neurological: Negative  Hematological: Negative  Psychiatric/Behavioral: Negative           Problem List:     Patient Active Problem List   Diagnosis    Essential hypertension    Gout    COPD (chronic obstructive pulmonary disease) ([facility] [address] )    Esophagitis    Tobacco abuse    Insomnia    Stage 3a chronic kidney disease ([facility] [address] )    Stage 3b chronic kidney disease ([facility] [address] )    Secondary hyperparathyroidism of renal origin ([facility] [address] )    Hyperuricemia      Past Medical and Surgical History:     Past Medical History:   Diagnosis Date    Abnormal thyroid function test     non specified / [place] 1/8/13      Chronic kidney disease     COPD (chronic obstructive pulmonary disease) (HCC)     Eczema     [place]   11/26/13       Esophagitis     Gout     Hypertension     Pyloric channel ulcer      Past Surgical History:   Procedure Laterality Date    BACK SURGERY      ESOPHAGOGASTRODUODENOSCOPY N/A 12/1/2016    Procedure: ESOPHAGOGASTRODUODENOSCOPY (EGD); Surgeon: Renea Maya MD;  Location: [facility];   Service:     EYE SURGERY        Family History:     Family History   Problem Relation Age of Onset    Other Mother         Dec in her 66's (gangrene foot)    Heart failure Mother     Diabetes type II Mother     Peripheral vascular disease Mother     Other Family         situs inversus / Dec 92 yo    Rectal cancer Father       Social History:     Social History     Socioeconomic History    Marital status:      Spouse name: None    Number of children: 3    Years of education: None    Highest education level: None   Occupational History     Comment: unemployed   Tobacco Use    Smoking status: Current Every Day Smoker     Packs/day: 0 50     Years: 60 00     Pack years: 30 00     Types: Cigarettes    Smokeless tobacco: Current User    Tobacco comment: current some day smoker per [employer]script   Vaping Use    Vaping Use: Never used   Substance and Sexual Activity    Alcohol use: Not Currently    Drug use: No    Sexual activity: None   Other Topics Concern    None   Social History Narrative    None     Social Determinants of Health     Financial Resource Strain: Not on file   Food Insecurity: Not on file   Transportation Needs: Not on file   Physical Activity: Not on file   Stress: Not on file   Social Connections: Not on file   Intimate Partner Violence: Not on file   Housing Stability: Not on file      Medications and Allergies:     Current Outpatient Medications   Medication Sig Dispense Refill    albuterol (PROVENTIL HFA,VENTOLIN HFA) 90 mcg/act inhaler Inhale 2 puffs      atenolol (TENORMIN) 50 mg tablet Take 1 tablet (50 mg total) by mouth daily 90 tablet 1    cloNIDine (CATAPRES) 0 1 mg tablet Take 1 tablet (0 1 mg total) by mouth daily 90 tablet 1    zolpidem (AMBIEN) 5 mg tablet Take 1 tablet (5 mg total) by mouth daily at bedtime as needed for sleep 90 tablet 0     No current facility-administered medications for this visit  Allergies   Allergen Reactions    Norvasc [Amlodipine]       Immunizations: There is no immunization history for the selected administration types on file for this patient  Health Maintenance:         Topic Date Due    Hepatitis C Screening  Never done    Lung Cancer Screening  Never done         Topic Date Due    COVID-19 Vaccine (1) Never done    Pneumococcal Vaccine: 65+ Years (1 - PCV) Never done    Influenza Vaccine (1) 09/01/2022      Medicare Screening Tests and Risk Assessments:     Emy Haro is here for his Subsequent Wellness visit  Health Risk Assessment:   Patient rates overall health as good  Patient feels that their physical health rating is slightly worse  Patient is satisfied with their life  Eyesight was rated as same  Hearing was rated as same  Patient feels that their emotional and mental health rating is same  Patients states they are never, rarely angry  Patient states they are never, rarely unusually tired/fatigued  Pain experienced in the last 7 days has been none  Patient states that he has experienced no weight loss or gain in last 6 months  Depression Screening:   PHQ-2 Score: 0      Fall Risk Screening: In the past year, patient has experienced: history of falling in past year    Number of falls: 2 or more  Injured during fall?: No    Feels unsteady when standing or walking?: Yes    Worried about falling?: Yes      Home Safety:  Patient does not have trouble with stairs inside or outside of their home  Patient has working smoke alarms and has working carbon monoxide detector  Home safety hazards include: none  Nutrition:   Current diet is Regular  Medications:   Patient is currently taking over-the-counter supplements  OTC medications include: see medication list  Patient is able to manage medications  Activities of Daily Living (ADLs)/Instrumental Activities of Daily Living (IADLs):   Walk and transfer into and out of bed and chair?: Yes  Dress and groom yourself?: Yes    Bathe or shower yourself?: Yes    Feed yourself? Yes  Do your laundry/housekeeping?: Yes  Manage your money, pay your bills and track your expenses?: Yes  Make your own meals?: Yes    Do your own shopping?: Yes    Previous Hospitalizations:   Any hospitalizations or ED visits within the last 12 months?: No      Advance Care Planning:   Living will: No    Durable POA for healthcare: Yes    Advanced directive: No    Five wishes given: Yes      Comments: Needs living will and advanced directives    PREVENTIVE SCREENINGS      Cardiovascular Screening:    General: Screening Current      Diabetes Screening:     General: Screening Current      Prostate Cancer Screening:    General: Screening Not Indicated      Abdominal Aortic Aneurysm (AAA) Screening:    Risk factors include: tobacco use        Lung Cancer Screening:     General: Screening Not Indicated    Screening, Brief Intervention, and Referral to Treatment (SBIRT)    Screening  Typical number of drinks in a day: 0  Typical number of drinks in a week: 0  Interpretation: Low risk drinking behavior      Single Item Drug Screening:  How often have you used an illegal drug (including marijuana) or a prescription medication for non-medical reasons in the past year? never    Single Item Drug Screen Score: 0  Interpretation: Negative screen for possible drug use disorder    No exam data present     Physical Exam:     /80   Pulse 67   Temp 98 6 °F (37 °C) (Temporal)   Resp 16   Ht 5' 5 35" (1 66 m)   Wt 42 9 kg (94 lb 9 6 oz)   SpO2 99%   BMI 15 57 kg/m²     Physical Exam  Vitals and nursing note reviewed  Constitutional:       Appearance: He is well-developed  HENT:      Head: Normocephalic and atraumatic  Eyes:      Conjunctiva/sclera: Conjunctivae normal    Cardiovascular:      Rate and Rhythm: Normal rate and regular rhythm  Heart sounds: No murmur heard  Pulmonary:      Effort: Pulmonary effort is normal  No respiratory distress  Breath sounds: Normal breath sounds  Abdominal:      Palpations: Abdomen is soft  Tenderness: There is no abdominal tenderness  Musculoskeletal:      Cervical back: Neck supple  Comments: Uses a cane   Skin:     General: Skin is warm and dry  Neurological:      General: No focal deficit present  Mental Status: He is alert and oriented to person, place, and time  Psychiatric:         Mood and Affect: Mood normal          Behavior: Behavior normal          Thought Content:  Thought content normal          Judgment: Judgment normal           Tay Harris, DO

## 2022-07-19 NOTE — PATIENT INSTRUCTIONS
Medicare Preventive Visit Patient Instructions  Thank you for completing your Welcome to Medicare Visit or Medicare Annual Wellness Visit today  Your next wellness visit will be due in one year (7/20/2023)  The screening/preventive services that you may require over the next 5-10 years are detailed below  Some tests may not apply to you based off risk factors and/or age  Screening tests ordered at today's visit but not completed yet may show as past due  Also, please note that scanned in results may not display below  Preventive Screenings:  Service Recommendations Previous Testing/Comments   Colorectal Cancer Screening  Colonoscopy    Fecal Occult Blood Test (FOBT)/Fecal Immunochemical Test (FIT)  Fecal DNA/Cologuard Test  Flexible Sigmoidoscopy Age: 54-65 years old   Colonoscopy: every 10 years (May be performed more frequently if at higher risk)  OR  FOBT/FIT: every 1 year  OR  Cologuard: every 3 years  OR  Sigmoidoscopy: every 5 years  Screening may be recommended earlier than age 48 if at higher risk for colorectal cancer  Also, an individualized decision between you and your healthcare provider will decide whether screening between the ages of 74-80 would be appropriate   Colonoscopy: Not on file  FOBT/FIT: Not on file  Cologuard: Not on file  Sigmoidoscopy: Not on file          Prostate Cancer Screening Individualized decision between patient and health care provider in men between ages of 53-78   Medicare will cover every 12 months beginning on the day after your 50th birthday PSA: 1 1 ng/mL     Screening Not Indicated     Hepatitis C Screening Once for adults born between [facility]  More frequently in patients at high risk for Hepatitis C Hep C Antibody: Not on file        Diabetes Screening 1-2 times per year if you're at risk for diabetes or have pre-diabetes Fasting glucose: 118 mg/dL   A1C: No results in last 5 years    Screening Current   Cholesterol Screening Once every 5 years if you don't have a lipid disorder  May order more often based on risk factors  Lipid panel: 10/13/2021    Screening Current      Other Preventive Screenings Covered by Medicare:  Abdominal Aortic Aneurysm (AAA) Screening: covered once if your at risk  You're considered to be at risk if you have a family history of AAA or a male between the age of 73-68 who smoking at least 100 cigarettes in your lifetime  Lung Cancer Screening: covers low dose CT scan once per year if you meet all of the following conditions: (1) Age 50-69; (2) No signs or symptoms of lung cancer; (3) Current smoker or have quit smoking within the last 15 years; (4) You have a tobacco smoking history of at least 30 pack years (packs per day x number of years you smoked); (5) You get a written order from a healthcare provider  Glaucoma Screening: covered annually if you're considered high risk: (1) You have diabetes OR (2) Family history of glaucoma OR (3)  aged 48 and older OR (3)  American aged 72 and older  Osteoporosis Screening: covered every 2 years if you meet one of the following conditions: (1) Have a vertebral abnormality; (2) On glucocorticoid therapy for more than 3 months; (3) Have primary hyperparathyroidism; (4) On osteoporosis medications and need to assess response to drug therapy  HIV Screening: covered annually if you're between the age of 12-76  Also covered annually if you are younger than 13 and older than 72 with risk factors for HIV infection  For pregnant patients, it is covered up to 3 times per pregnancy      Immunizations:  Immunization Recommendations   Influenza Vaccine Annual influenza vaccination during flu season is recommended for all persons aged >= 6 months who do not have contraindications   Pneumococcal Vaccine (Prevnar and Pneumovax)  * Prevnar = PCV13  * Pneumovax = PPSV23 Adults 25-60 years old: 1-3 doses may be recommended based on certain risk factors  Adults 72 years old: Prevnar (PCV13) vaccine recommended followed by Pneumovax (PPSV23) vaccine  If already received PPSV23 since turning 65, then PCV13 recommended at least one year after PPSV23 dose  Hepatitis B Vaccine 3 dose series if at intermediate or high risk (ex: diabetes, end stage renal disease, liver disease)   Tetanus (Td) Vaccine - COST NOT COVERED BY MEDICARE PART B Following completion of primary series, a booster dose should be given every 10 years to maintain immunity against tetanus  Td may also be given as tetanus wound prophylaxis  Tdap Vaccine - COST NOT COVERED BY MEDICARE PART B Recommended at least once for all adults  For pregnant patients, recommended with each pregnancy  Shingles Vaccine (Shingrix) - COST NOT COVERED BY MEDICARE PART B  2 shot series recommended in those aged 48 and above     Health Maintenance Due:      Topic Date Due    Hepatitis C Screening  Never done    Lung Cancer Screening  Never done     Immunizations Due:      Topic Date Due    COVID-19 Vaccine (1) Never done    Pneumococcal Vaccine: 65+ Years (1 - PCV) Never done    Influenza Vaccine (1) 09/01/2022     Advance Directives   What are advance directives? Advance directives are legal documents that state your wishes and plans for medical care  These plans are made ahead of time in case you lose your ability to make decisions for yourself  Advance directives can apply to any medical decision, such as the treatments you want, and if you want to donate organs  What are the types of advance directives? There are many types of advance directives, and each state has rules about how to use them  You may choose a combination of any of the following:  Living will: This is a written record of the treatment you want  You can also choose which treatments you do not want, which to limit, and which to stop at a certain time  This includes surgery, medicine, IV fluid, and tube feedings  Durable power of  for healthcare [facility]):   This is a written record that states who you want to make healthcare choices for you when you are unable to make them for yourself  This person, called a proxy, is usually a family member or a friend  You may choose more than 1 proxy  Do not resuscitate (DNR) order:  A DNR order is used in case your heart stops beating or you stop breathing  It is a request not to have certain forms of treatment, such as CPR  A DNR order may be included in other types of advance directives  Medical directive: This covers the care that you want if you are in a coma, near death, or unable to make decisions for yourself  You can list the treatments you want for each condition  Treatment may include pain medicine, surgery, blood transfusions, dialysis, IV or tube feedings, and a ventilator (breathing machine)  Values history: This document has questions about your views, beliefs, and how you feel and think about life  This information can help others choose the care that you would choose  Why are advance directives important? An advance directive helps you control your care  Although spoken wishes may be used, it is better to have your wishes written down  Spoken wishes can be misunderstood, or not followed  Treatments may be given even if you do not want them  An advance directive may make it easier for your family to make difficult choices about your care  Fall Prevention    Fall prevention  includes ways to make your home and other areas safer  It also includes ways you can move more carefully to prevent a fall  Health conditions that cause changes in your blood pressure, vision, or muscle strength and coordination may increase your risk for falls  Medicines may also increase your risk for falls if they make you dizzy, weak, or sleepy  Fall prevention tips:   Stand or sit up slowly  Use assistive devices as directed  Wear shoes that fit well and have soles that   Wear a personal alarm  Stay active      Manage your medical conditions  Home Safety Tips:  Add items to prevent falls in the bathroom  Keep paths clear  Install bright lights in your home  Keep items you use often on shelves within reach  [place] or place reflective tape on the edges of your stairs  Cigarette Smoking and Your Health   Risks to your health if you smoke:  Nicotine and other chemicals found in tobacco damage every cell in your body  Even if you are a light smoker, you have an increased risk for cancer, heart disease, and lung disease  If you are pregnant or have diabetes, smoking increases your risk for complications  Benefits to your health if you stop smoking: You decrease respiratory symptoms such as coughing, wheezing, and shortness of breath  You reduce your risk for cancers of the lung, mouth, throat, kidney, bladder, pancreas, stomach, and cervix  If you already have cancer, you increase the benefits of chemotherapy  You also reduce your risk for cancer returning or a second cancer from developing  You reduce your risk for heart disease, blood clots, heart attack, and stroke  You reduce your risk for lung infections, and diseases such as pneumonia, asthma, chronic bronchitis, and emphysema  Your circulation improves  More oxygen can be delivered to your body  If you have diabetes, you lower your risk for complications, such as kidney, artery, and eye diseases  You also lower your risk for nerve damage  Nerve damage can lead to amputations, poor vision, and blindness  You improve your body's ability to heal and to fight infections  For more information and support to stop smoking:   [employer]  Phone: 5- 787 - 329-4463  Web Address: [employer]  How to Quit Using Smokeless Tobacco   Why it is important to stop using smokeless tobacco:  Smokeless tobacco comes in many forms  Examples include chew, snuff, dip, dissolvable tobacco, and snus   All smokeless tobacco products contain nicotine and may contain as much nicotine as 3 cigarettes  You may be physically dependent on nicotine  You may also be emotionally addicted to it  The cravings can be strong, but it is important to quit using smokeless tobacco  You will improve your health and decrease your cancer, stroke, and heart attack risk  Mouth sores and tooth problems will also improve when you quit  You can benefit from quitting no matter how long you have used smokeless tobacco    Prepare to stop using smokeless tobacco:  Nicotine is a highly addictive drug  Withdrawal symptoms can happen when you stop and make it hard to quit  The following can help keep you on track:  Set a quit date  Tell friends, family, and coworkers that you plan to quit  Remove all smokeless tobacco products from your home, car, and workplace  Manage weight gain after you quit:  Nicotine can affect your metabolism  You may gain a few pounds after you quit  The following can help you control your weight:  Eat healthy foods  Drink water before, during, and between meals  Exercise as directed  Underweight  Underweight is defined as having a body mass index (BMI) of less than 18 5 kg/m2   Anorexia  is a loss of appetite, decreased food intake, or both  Your appetite naturally decreases as you get older  You also get full faster than you used to  This occurs because your body needs less energy  Other body changes can also lead to a decreased appetite  Even though some appetite loss is normal, you still need to get enough calories and nutrients to keep you healthy  You can start to lose too much weight if you do not eat as much food as your body needs  Unwanted weight loss can cause health problems, or worsen health problems you already have  You can also become dehydrated if you do not drink enough liquid  How to eat healthy and get enough nutrients:   Choose healthy foods  Eat a variety of fruits, vegetables, whole grains, low-fat dairy foods, lean meats, and other protein foods   Limit foods high in fat, sugar, and salt  Limit or avoid alcohol as directed  Work with a dietitian to help you plan your meals if you need to follow a special diet  A dietitian can also teach you how to modify foods if you have trouble chewing or swallowing  Snack on healthy foods between meals  if you only eat a small amount during meals  Snacks provide extra healthy nutrients and calories between meals  Examples include fruit, cheese, and whole grain crackers  Drink liquids as directed  to avoid dehydration  Drink liquids between meals if they cause you to get full too quickly during meals  Ask how much liquid to drink each day and which liquids are best for you  Use herbs, spices, and flavor enhancers to add flavor to foods  Avoid using herbs and spice blends that also contain sodium  Ask your healthcare provider or dietitian about flavor enhancers  Flavor enhancers with ham, natural fritz, and roast beef flavors can also be sprinkled on food to add flavor  Share meals with others as often as you can  Eating with others may help you to eat better during meal time  Ask family members, neighbors, or friends to join you for lunch  There are also senior centers where you can meet people, and share meals with them  Ask family and friends for help  with shopping or preparing foods  Ask for a ride to the grocery store, if needed  © Copyright [employer] 2018 Information is for End User's use only and may not be sold, redistributed or otherwise used for commercial purposes  All illustrations and images included in CareNotes® are the copyrighted property of [employer]  or [address]         AWV UTD and ADL's stable, home is safe  Rec rechecking yearly and rec also to quit tobacco  Here with son today  Pt  here for recheck and last labs showed CKD GFR at 38 and sees nephrologist as directed  Patient has  to get updated labs before seeing nephrology   Quit tobacco and stay well hydrated and also rec no NSAIDs  Hx of GOUT  Avoid NSAIDs as directed  He takes ambien prn sleep 2 5  Refuses covid 19 vaccine still, not interested in this or flu shot this past year  Follow CDC guidelines for covid 19 prevention  Avoid purine rich foods and alcohol as directed for hx of Gout  Tylenol prn pain  Quit tobacco  Recheck in 6 months

## 2022-08-09 DIAGNOSIS — G47.00 INSOMNIA, UNSPECIFIED TYPE: ICD-10-CM

## 2022-08-09 RX ORDER — ZOLPIDEM TARTRATE 5 MG/1
5 TABLET ORAL
Qty: 90 TABLET | Refills: 0 | Status: SHIPPED | OUTPATIENT
Start: 2022-08-09

## 2022-10-07 DIAGNOSIS — I10 ESSENTIAL HYPERTENSION: ICD-10-CM

## 2022-10-07 DIAGNOSIS — I10 HYPERTENSION, UNSPECIFIED TYPE: ICD-10-CM

## 2022-10-07 RX ORDER — CLONIDINE HYDROCHLORIDE 0.1 MG/1
0.1 TABLET ORAL DAILY
Qty: 90 TABLET | Refills: 1 | Status: SHIPPED | OUTPATIENT
Start: 2022-10-07

## 2022-10-07 RX ORDER — ATENOLOL 50 MG/1
50 TABLET ORAL DAILY
Qty: 90 TABLET | Refills: 1 | Status: SHIPPED | OUTPATIENT
Start: 2022-10-07

## 2022-10-20 ENCOUNTER — APPOINTMENT (OUTPATIENT)
Dept: LAB | Age: 82
End: 2022-10-20
Payer: COMMERCIAL

## 2022-10-20 DIAGNOSIS — I10 ESSENTIAL HYPERTENSION: ICD-10-CM

## 2022-10-20 DIAGNOSIS — E79.0 HYPERURICEMIA: ICD-10-CM

## 2022-10-20 DIAGNOSIS — N18.32 STAGE 3B CHRONIC KIDNEY DISEASE (HCC): ICD-10-CM

## 2022-10-20 DIAGNOSIS — N25.81 SECONDARY HYPERPARATHYROIDISM OF RENAL ORIGIN (HCC): ICD-10-CM

## 2022-10-20 LAB
25(OH)D3 SERPL-MCNC: 63.8 NG/ML (ref 30–100)
ANION GAP SERPL CALCULATED.3IONS-SCNC: 8 MMOL/L (ref 4–13)
BUN SERPL-MCNC: 32 MG/DL (ref 5–25)
CALCIUM SERPL-MCNC: 9.8 MG/DL (ref 8.3–10.1)
CHLORIDE SERPL-SCNC: 105 MMOL/L (ref 96–108)
CO2 SERPL-SCNC: 24 MMOL/L (ref 21–32)
CREAT SERPL-MCNC: 1.57 MG/DL (ref 0.6–1.3)
CREAT UR-MCNC: 62.6 MG/DL
GFR SERPL CREATININE-BSD FRML MDRD: 41 ML/MIN/1.73SQ M
GLUCOSE P FAST SERPL-MCNC: 106 MG/DL (ref 65–99)
MAGNESIUM SERPL-MCNC: 2 MG/DL (ref 1.6–2.6)
PHOSPHATE SERPL-MCNC: 3 MG/DL (ref 2.3–4.1)
POTASSIUM SERPL-SCNC: 4.5 MMOL/L (ref 3.5–5.3)
PROT UR-MCNC: 17 MG/DL
PROT/CREAT UR: 0.27 MG/G{CREAT} (ref 0–0.1)
PTH-INTACT SERPL-MCNC: 77.4 PG/ML (ref 18.4–80.1)
SODIUM SERPL-SCNC: 137 MMOL/L (ref 135–147)
URATE SERPL-MCNC: 9.3 MG/DL (ref 3.5–8.5)

## 2022-10-20 PROCEDURE — 82570 ASSAY OF URINE CREATININE: CPT

## 2022-10-20 PROCEDURE — 82306 VITAMIN D 25 HYDROXY: CPT

## 2022-10-20 PROCEDURE — 84156 ASSAY OF PROTEIN URINE: CPT

## 2022-10-20 PROCEDURE — 84100 ASSAY OF PHOSPHORUS: CPT

## 2022-10-20 PROCEDURE — 80048 BASIC METABOLIC PNL TOTAL CA: CPT

## 2022-10-20 PROCEDURE — 84550 ASSAY OF BLOOD/URIC ACID: CPT

## 2022-10-20 PROCEDURE — 36415 COLL VENOUS BLD VENIPUNCTURE: CPT

## 2022-10-20 PROCEDURE — 83970 ASSAY OF PARATHORMONE: CPT

## 2022-10-20 PROCEDURE — 83735 ASSAY OF MAGNESIUM: CPT

## 2022-10-25 ENCOUNTER — TELEPHONE (OUTPATIENT)
Dept: NEPHROLOGY | Facility: CLINIC | Age: 82
End: 2022-10-25

## 2022-10-25 NOTE — TELEPHONE ENCOUNTER
Appointment Confirmation   Person confirmed appointment with  If not patient, name of the person Patient    Date and time of appointment 10/26    Patient acknowledged and will be at appointment? yes    Did you advise the patient that they will need a urine sample if they are a new patient?  No    Did you advise the patient to bring their current medications for verification? (including any OTC) Yes    Additional Information

## 2022-10-26 ENCOUNTER — OFFICE VISIT (OUTPATIENT)
Dept: NEPHROLOGY | Facility: CLINIC | Age: 82
End: 2022-10-26
Payer: COMMERCIAL

## 2022-10-26 VITALS
HEIGHT: 65 IN | DIASTOLIC BLOOD PRESSURE: 82 MMHG | SYSTOLIC BLOOD PRESSURE: 138 MMHG | BODY MASS INDEX: 16.33 KG/M2 | WEIGHT: 98 LBS | HEART RATE: 60 BPM

## 2022-10-26 DIAGNOSIS — I12.9 BENIGN HYPERTENSION WITH CHRONIC KIDNEY DISEASE, STAGE III (HCC): ICD-10-CM

## 2022-10-26 DIAGNOSIS — R80.1 PERSISTENT PROTEINURIA, UNSPECIFIED: ICD-10-CM

## 2022-10-26 DIAGNOSIS — N25.81 SECONDARY HYPERPARATHYROIDISM OF RENAL ORIGIN (HCC): ICD-10-CM

## 2022-10-26 DIAGNOSIS — N18.30 BENIGN HYPERTENSION WITH CHRONIC KIDNEY DISEASE, STAGE III (HCC): ICD-10-CM

## 2022-10-26 DIAGNOSIS — E79.0 HYPERURICEMIA: ICD-10-CM

## 2022-10-26 DIAGNOSIS — N18.32 STAGE 3B CHRONIC KIDNEY DISEASE (HCC): Primary | ICD-10-CM

## 2022-10-26 PROCEDURE — 99214 OFFICE O/P EST MOD 30 MIN: CPT | Performed by: INTERNAL MEDICINE

## 2022-10-26 RX ORDER — MELATONIN
1000 DAILY
COMMUNITY

## 2022-10-26 NOTE — PATIENT INSTRUCTIONS
-Renal Function is stable  -You have Chronic Kidney Disease Stage 3B  -Avoid NSAIDs like Ibuprofen/Motrin, Naproxen/Aleve, Celebrex, meloxicam/Mobic, Diclofenac and other NSAIDs   -Okay to take Acetaminophen/Tylenol if you do not have any liver problems  -Avoid IV contrast used for CT scan and cardiac catheterization     -If plan for any study with IV contrast, please let me know so we could hydrate with fluids before and after IV contrast  -Dosage  of certain medications may need to be adjusted depending on Kidney function  Blood pressure is improved on repeat check, continue same treatment   -Recommend 2 g sodium diet  -Recommend daily exercise and weight loss  -Discussed home monitoring of BP and maintaining a log of blood pressure   -Recommend goal BP less than 130/80  Please inform me if SBP below 110 or above 140's persistently  Follow-up in 6 months with repeat labs    Low Purine Diet   WHAT YOU NEED TO KNOW:   A low-purine diet is a meal plan based on foods that are low in purine content  Purine is a substance that is found in foods and is produced naturally by the body  Purines are broken down by the body and changed to uric acid  The kidneys normally filter the uric acid, and it leaves the body through the urine  However, people with gout sometimes have a buildup of uric acid in the blood  This buildup of uric acid can cause swelling and pain (a gout attack)  A low-purine diet may help to treat and prevent gout attacks  DISCHARGE INSTRUCTIONS:   Foods to include: The following foods are low in purine    Eggs, nuts, and peanut butter    Low-fat and fat-free cheese and ice cream    Skim or 1% milk    Soup made without meat extract or broth    Vegetables that are not on the medium-purine list below    All fruit and fruit juice    Bread, pasta, rice, cakes, cornbread, and popcorn    Water, soda, tea, coffee, and cocoa    Sugar, sweets, and gelatin    Fat and oil    Foods to limit:   Medium-purine foods: Meats:  Limit the following to 4 to 6 ounces each day  Meat and poultry     Crab, lobster, oysters, and shrimp    Vegetables:  Limit the following vegetables to ½ cup each day  Asparagus    Cauliflower    Spinach    Mushrooms    Green peas    Beans, peas, and lentils (limit to 1 cup each day)    Oats and oatmeal (limit to ? cup uncooked each day)    Wheat germ and bran (limit to ¼ cup each day)    High-purine foods:  Limit or avoid foods high in purine  Anchovies, sardines, scallops, and mussels    [place], codfish, herring, and [employer], like goose and duck    Organ meats, such as brains, heart, kidney, liver, and sweetbreads    Gravies and sauces made with meat    Yeast extracts taken in the form of a supplement    Other guidelines to follow:   Increase liquid intake  Drink 8 to 16 (eight-ounce) cups of liquid each day  At least half of the liquid you drink should be water  Liquid can help your body get rid of extra uric acid  Limit or avoid alcohol  Alcohol (especially beer) increases your risk of a gout attack  Beer contains a high amount of purine  Maintain a healthy weight  If you are overweight, you should lose weight slowly  Weight loss can help decrease the amount of stress on your joints  Regular exercise can help you lose weight if you are overweight, or maintain your weight if you are at a normal weight  Talk to your healthcare provider before you begin an exercise program     © Copyright [employer] 2022 Information is for End User's use only and may not be sold, redistributed or otherwise used for commercial purposes  All illustrations and images included in CareNotes® are the copyrighted property of [employer]  or Marlene Moore   The above information is an  only  It is not intended as medical advice for individual conditions or treatments   Talk to your doctor, nurse or pharmacist before following any medical regimen to see if it is safe and effective for you

## 2022-10-26 NOTE — PROGRESS NOTES
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 Continue same treatment  Recommend home monitoring of blood pressure  ? 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  -currently not on ACE inhibitor a or ARB  Recommended to start Losartan to help with proteinuria but patient not interested with any changes at this time        History of gout  -uric level trended up to 9 3  -patient denies any recent gout attacks    Discussed again about starting allopurinol and risk and benefit of allopurinol as well as as adverse effect like bone marrow suppression and liver toxicity     -Patient is not interested at this time,wants to follow-up low purine diet and monitor, list provided  Monitor uric acid level    Patient Instructions   -Renal Function is stable  -You have Chronic Kidney Disease Stage 3B  -Avoid NSAIDs like Ibuprofen/Motrin, Naproxen/Aleve, Celebrex, meloxicam/Mobic, Diclofenac and other NSAIDs   -Okay to take Acetaminophen/Tylenol if you do not have any liver problems  -Avoid IV contrast used for CT scan and cardiac catheterization     -If plan for any study with IV contrast, please let me know so we could hydrate with fluids before and after IV contrast  -Dosage  of certain medications may need to be adjusted depending on Kidney function  Blood pressure is improved on repeat check, continue same treatment   -Recommend 2 g sodium diet  -Recommend daily exercise and weight loss  -Discussed home monitoring of BP and maintaining a log of blood pressure   -Recommend goal BP less than 130/80  Please inform me if SBP below 110 or above 140's persistently  Follow-up in 6 months with repeat labs       Diagnoses and all orders for this visit:    Stage 3b chronic kidney disease ([facility] [address] )  -     Basic metabolic panel; Future  -     Uric acid; Future  -     PTH, intact; Future  -     Urinalysis with microscopic; Future  -     Protein / creatinine ratio, urine; Future  -     Phosphorus; Future  -     Magnesium; Future    Benign hypertension with chronic kidney disease, stage III (HCC)  -     Basic metabolic panel; Future    Secondary hyperparathyroidism of renal origin [facility])  -     Basic metabolic panel; Future  -     Vitamin D 25 hydroxy; Future    Persistent proteinuria, unspecified  -     Protein / creatinine ratio, urine; Future    Hyperuricemia  -     Uric acid; Future    Other orders  -     cholecalciferol (VITAMIN D3) 1,000 units tablet;  Take 1,000 Units by mouth daily            SUBJECTIVE / HPI:  Bhupendra Nelson is a [de-identified] y o   male with medical issues of HTN x 1985, history of gout x 20yrs who presents for follow-up of chronic kidney disease stage III      Patient has elevated creatinine since 2017 when it was around 1 2-1 4   No labs from 2018 and 2019 but in June 2020 creatinine trended up to 1 8, EGFR 36  It was 1 8 in August 2020 but improved  blood work from 12/01/2020 to creatinine 1 4 , EGFR 47  Elevated creatinine was suspected to be due to use of indomethacin and renal function improved with stopping indomethacin    Baseline creatinine is around 1 5-1 6    Reviewed blood work from 10/20/2022:  Renal function stable at creatinine 1 57 mg/dL, EGFR 41  Electrolytes are stable  Phosphorus and magnesium at normal range  PTH level improving to 77 4 from previous level of 132 1  Upc ratio 0 27  Uric acid elevated and trending up to 9 3      Patient denies any new complaints      REVIEW OF SYSTEMS:    Review of Systems   Constitutional: Negative for activity change, appetite change, chills, diaphoresis, fatigue and fever  HENT: Negative for congestion, facial swelling and nosebleeds  Eyes: Negative for pain and visual disturbance  Respiratory: Negative for cough, chest tightness and shortness of breath  Cardiovascular: Negative for chest pain and palpitations  Gastrointestinal: Negative for abdominal distention, abdominal pain, diarrhea, nausea and vomiting  Genitourinary: Negative for difficulty urinating, dysuria, flank pain, frequency and hematuria  Musculoskeletal: Negative for arthralgias, back pain and joint swelling  Skin: Negative for rash  Neurological: Negative for dizziness, seizures, syncope, weakness and headaches  Psychiatric/Behavioral: Negative for agitation and confusion  The patient is not nervous/anxious  More than 10 point review of systems were obtained and discussed in length with the patient  Complete review of systems were negative / unremarkable except mentioned above  PAST MEDICAL HISTORY:  Past Medical History:   Diagnosis Date   • Abnormal thyroid function test     non specified /  LA    1/8/13     • Chronic kidney disease    • COPD (chronic obstructive pulmonary disease) (HCC)    • Eczema     LA   11/26/13      • Esophagitis    • Gout    • Hypertension    • Pyloric channel ulcer        PAST SURGICAL HISTORY:  Past Surgical History:   Procedure Laterality Date   • BACK SURGERY     • ESOPHAGOGASTRODUODENOSCOPY N/A 12/1/2016    Procedure: ESOPHAGOGASTRODUODENOSCOPY (EGD); Surgeon: Annalisa Patel MD;  Location: [facility];   Service:    • EYE SURGERY         SOCIAL HISTORY:  Social History     Substance and Sexual Activity   Alcohol Use Not Currently     Social History     Substance and Sexual Activity   Drug Use No     Social History     Tobacco Use   Smoking Status Current Every Day Smoker   • Packs/day: 0 50   • Years: 60 00   • Pack years: 30 00   • Types: Cigarettes   Smokeless Tobacco Current User   Tobacco Comment    current some day smoker per allscript       FAMILY HISTORY:  Family History   Problem Relation Age of Onset   • Other Mother         Dec in her 66's (gangrene foot)   • Heart failure Mother    • Diabetes type II Mother    • Peripheral vascular disease Mother    • Other Family         situs inversus / Dec 92 yo   • Rectal cancer Father        MEDICATIONS:    Current Outpatient Medications:   •  albuterol (PROVENTIL HFA,VENTOLIN HFA) 90 mcg/act inhaler, Inhale 2 puffs, Disp: , Rfl:   •  atenolol (TENORMIN) 50 mg tablet, Take 1 tablet (50 mg total) by mouth daily, Disp: 90 tablet, Rfl: 1  •  cholecalciferol (VITAMIN D3) 1,000 units tablet, Take 1,000 Units by mouth daily, Disp: , Rfl:   •  cloNIDine (CATAPRES) 0 1 mg tablet, Take 1 tablet (0 1 mg total) by mouth daily, Disp: 90 tablet, Rfl: 1  •  zolpidem (AMBIEN) 5 mg tablet, Take 1 tablet (5 mg total) by mouth daily at bedtime as needed for sleep, Disp: 90 tablet, Rfl: 0      PHYSICAL EXAM:  Vitals:    10/26/22 1450 10/26/22 1510   BP: 170/90 138/82   BP Location: Left arm    Patient Position: Sitting    Cuff Size: Standard    Pulse:  60   Weight: 44 5 kg (98 lb)    Height: 5' 5 35" (1 66 m)      Body mass index is 16 13 kg/m²  Physical Exam  Constitutional:       General: He is not in acute distress  Appearance: He is well-developed  He is not diaphoretic  HENT:      Head: Normocephalic and atraumatic  Mouth/Throat:      Mouth: Mucous membranes are moist    Eyes:      General: No scleral icterus  Conjunctiva/sclera: Conjunctivae normal       Pupils: Pupils are equal, round, and reactive to light  Neck:      Thyroid: No thyromegaly  Cardiovascular:      Rate and Rhythm: Normal rate and regular rhythm  Heart sounds: Normal heart sounds  No murmur heard  No friction rub  Pulmonary:      Effort: Pulmonary effort is normal  No respiratory distress  Breath sounds: Normal breath sounds  No wheezing or rales  Abdominal:      General: Bowel sounds are normal  There is no distension  Palpations: Abdomen is soft  Tenderness: There is no abdominal tenderness  Musculoskeletal:         General: No deformity  Cervical back: Neck supple  Right lower leg: No edema  Left lower leg: No edema  Lymphadenopathy:      Cervical: No cervical adenopathy  Skin:     Coloration: Skin is not pale  Nails: There is no clubbing  Neurological:      Mental Status: He is alert and oriented to person, place, and time  He is not disoriented  Psychiatric:         Mood and Affect: Mood normal  Mood is not anxious  Affect is not inappropriate  Behavior: Behavior normal          Thought Content:  Thought content normal          Lab Results:   Results for orders placed or performed in visit on 10/60/91   Basic metabolic panel   Result Value Ref Range    Sodium 137 135 - 147 mmol/L    Potassium 4 5 3 5 - 5 3 mmol/L    Chloride 105 96 - 108 mmol/L    CO2 24 21 - 32 mmol/L    ANION GAP 8 4 - 13 mmol/L    BUN 32 (H) 5 - 25 mg/dL    Creatinine 1 57 (H) 0 60 - 1 30 mg/dL    Glucose, Fasting 106 (H) 65 - 99 mg/dL    Calcium 9 8 8 3 - 10 1 mg/dL    eGFR 41 ml/min/1 73sq m   Vitamin D 25 hydroxy   Result Value Ref Range    Vit D, 25-Hydroxy 63 8 30 0 - 100 0 ng/mL   PTH, intact   Result Value Ref Range    PTH 77 4 18 4 - 80 1 pg/mL   Uric acid   Result Value Ref Range    Uric Acid 9 3 (H) 3 5 - 8 5 mg/dL   Protein / creatinine ratio, urine   Result Value Ref Range    Creatinine, Ur 62 6 mg/dL    Protein Urine Random 17 mg/dL    Prot/Creat Ratio, Ur 0 27 (H) 0 00 - 0 10   Magnesium   Result Value Ref Range    Magnesium 2 0 1 6 - 2 6 mg/dL   Phosphorus   Result Value Ref Range    Phosphorus 3 0 2 3 - 4 1 mg/dL

## 2022-11-12 ENCOUNTER — APPOINTMENT (EMERGENCY)
Dept: RADIOLOGY | Facility: HOSPITAL | Age: 82
End: 2022-11-12

## 2022-11-12 ENCOUNTER — APPOINTMENT (EMERGENCY)
Dept: CT IMAGING | Facility: HOSPITAL | Age: 82
End: 2022-11-12

## 2022-11-12 ENCOUNTER — HOSPITAL ENCOUNTER (INPATIENT)
Facility: HOSPITAL | Age: 82
LOS: 2 days | Discharge: HOME | End: 2022-11-15
Attending: EMERGENCY MEDICINE | Admitting: INTERNAL MEDICINE

## 2022-11-12 DIAGNOSIS — R59.0 AXILLARY LYMPHADENOPATHY: ICD-10-CM

## 2022-11-12 DIAGNOSIS — R06.03 RESPIRATORY DISTRESS: ICD-10-CM

## 2022-11-12 DIAGNOSIS — U07.1 COVID-19: ICD-10-CM

## 2022-11-12 DIAGNOSIS — E43 SEVERE PROTEIN-CALORIE MALNUTRITION (HCC): Primary | ICD-10-CM

## 2022-11-12 DIAGNOSIS — J44.1 COPD WITH ACUTE EXACERBATION (HCC): ICD-10-CM

## 2022-11-12 DIAGNOSIS — E87.20 LACTIC ACIDOSIS: ICD-10-CM

## 2022-11-12 LAB
ALBUMIN SERPL BCP-MCNC: 3.9 G/DL (ref 3.5–5)
ALP SERPL-CCNC: 118 U/L (ref 46–116)
ALT SERPL W P-5'-P-CCNC: 24 U/L (ref 12–78)
ANION GAP SERPL CALCULATED.3IONS-SCNC: 10 MMOL/L (ref 4–13)
APTT PPP: 27 SECONDS (ref 23–37)
BASE EX.OXY STD BLDV CALC-SCNC: 25.3 % (ref 60–80)
BASE EXCESS BLDV CALC-SCNC: -3.6 MMOL/L
BASOPHILS # BLD AUTO: 0.04 THOUSANDS/ÂΜL (ref 0–0.1)
BASOPHILS NFR BLD AUTO: 0 % (ref 0–1)
BILIRUB SERPL-MCNC: 0.48 MG/DL (ref 0.2–1)
BUN SERPL-MCNC: 28 MG/DL (ref 5–25)
CALCIUM SERPL-MCNC: 9.1 MG/DL (ref 8.3–10.1)
CARDIAC TROPONIN I PNL SERPL HS: 11 NG/L
CHLORIDE SERPL-SCNC: 101 MMOL/L (ref 96–108)
CO2 SERPL-SCNC: 28 MMOL/L (ref 21–32)
CREAT SERPL-MCNC: 1.58 MG/DL (ref 0.6–1.3)
D DIMER PPP FEU-MCNC: 1.32 UG/ML FEU
EOSINOPHIL # BLD AUTO: 0.31 THOUSAND/ÂΜL (ref 0–0.61)
EOSINOPHIL NFR BLD AUTO: 3 % (ref 0–6)
ERYTHROCYTE [DISTWIDTH] IN BLOOD BY AUTOMATED COUNT: 12.2 % (ref 11.6–15.1)
FLUAV RNA RESP QL NAA+PROBE: NEGATIVE
FLUBV RNA RESP QL NAA+PROBE: NEGATIVE
GFR SERPL CREATININE-BSD FRML MDRD: 40 ML/MIN/1.73SQ M
GLUCOSE SERPL-MCNC: 133 MG/DL (ref 65–140)
HCO3 BLDV-SCNC: 23.8 MMOL/L (ref 24–30)
HCT VFR BLD AUTO: 44.3 % (ref 36.5–49.3)
HGB BLD-MCNC: 14.3 G/DL (ref 12–17)
IMM GRANULOCYTES # BLD AUTO: 0.04 THOUSAND/UL (ref 0–0.2)
IMM GRANULOCYTES NFR BLD AUTO: 0 % (ref 0–2)
INR PPP: 0.99 (ref 0.84–1.19)
LACTATE SERPL-SCNC: 2.3 MMOL/L (ref 0.5–2)
LIPASE SERPL-CCNC: 174 U/L (ref 73–393)
LYMPHOCYTES # BLD AUTO: 2.28 THOUSANDS/ÂΜL (ref 0.6–4.47)
LYMPHOCYTES NFR BLD AUTO: 19 % (ref 14–44)
MCH RBC QN AUTO: 29.7 PG (ref 26.8–34.3)
MCHC RBC AUTO-ENTMCNC: 32.3 G/DL (ref 31.4–37.4)
MCV RBC AUTO: 92 FL (ref 82–98)
MONOCYTES # BLD AUTO: 1.33 THOUSAND/ÂΜL (ref 0.17–1.22)
MONOCYTES NFR BLD AUTO: 11 % (ref 4–12)
NEUTROPHILS # BLD AUTO: 7.78 THOUSANDS/ÂΜL (ref 1.85–7.62)
NEUTS SEG NFR BLD AUTO: 67 % (ref 43–75)
NRBC BLD AUTO-RTO: 0 /100 WBCS
NT-PROBNP SERPL-MCNC: 8799 PG/ML
O2 CT BLDV-SCNC: 5.2 ML/DL
PCO2 BLDV: 52.4 MM HG (ref 42–50)
PH BLDV: 7.28 [PH] (ref 7.3–7.4)
PLATELET # BLD AUTO: 281 THOUSANDS/UL (ref 149–390)
PMV BLD AUTO: 8.8 FL (ref 8.9–12.7)
PO2 BLDV: 19.1 MM HG (ref 35–45)
POTASSIUM SERPL-SCNC: 4.5 MMOL/L (ref 3.5–5.3)
PROCALCITONIN SERPL-MCNC: 0.1 NG/ML
PROT SERPL-MCNC: 7.5 G/DL (ref 6.4–8.4)
PROTHROMBIN TIME: 13.1 SECONDS (ref 11.6–14.5)
RBC # BLD AUTO: 4.81 MILLION/UL (ref 3.88–5.62)
RSV RNA RESP QL NAA+PROBE: NEGATIVE
SARS-COV-2 RNA RESP QL NAA+PROBE: POSITIVE
SODIUM SERPL-SCNC: 139 MMOL/L (ref 135–147)
WBC # BLD AUTO: 11.78 THOUSAND/UL (ref 4.31–10.16)

## 2022-11-12 RX ORDER — SODIUM CHLORIDE FOR INHALATION 0.9 %
3 VIAL, NEBULIZER (ML) INHALATION ONCE
Status: COMPLETED | OUTPATIENT
Start: 2022-11-12 | End: 2022-11-12

## 2022-11-12 RX ORDER — METHYLPREDNISOLONE SODIUM SUCCINATE 125 MG/2ML
125 INJECTION, POWDER, LYOPHILIZED, FOR SOLUTION INTRAMUSCULAR; INTRAVENOUS ONCE
Status: COMPLETED | OUTPATIENT
Start: 2022-11-12 | End: 2022-11-12

## 2022-11-12 RX ORDER — MAGNESIUM SULFATE HEPTAHYDRATE 40 MG/ML
2 INJECTION, SOLUTION INTRAVENOUS ONCE
Status: COMPLETED | OUTPATIENT
Start: 2022-11-12 | End: 2022-11-12

## 2022-11-12 RX ADMIN — MAGNESIUM SULFATE HEPTAHYDRATE 2 G: 40 INJECTION, SOLUTION INTRAVENOUS at 22:51

## 2022-11-12 RX ADMIN — IPRATROPIUM BROMIDE 1 MG: 0.5 SOLUTION RESPIRATORY (INHALATION) at 22:50

## 2022-11-12 RX ADMIN — METHYLPREDNISOLONE SODIUM SUCCINATE 125 MG: 125 INJECTION, POWDER, FOR SOLUTION INTRAMUSCULAR; INTRAVENOUS at 22:50

## 2022-11-12 RX ADMIN — ISODIUM CHLORIDE 3 ML: 0.03 SOLUTION RESPIRATORY (INHALATION) at 22:51

## 2022-11-12 RX ADMIN — ALBUTEROL SULFATE 10 MG: 2.5 SOLUTION RESPIRATORY (INHALATION) at 22:50

## 2022-11-13 PROBLEM — U07.1 COVID: Status: ACTIVE | Noted: 2022-11-13

## 2022-11-13 PROBLEM — J96.01 ACUTE RESPIRATORY FAILURE WITH HYPOXIA (HCC): Status: ACTIVE | Noted: 2022-11-13

## 2022-11-13 PROBLEM — A41.9 SEPSIS (HCC): Status: ACTIVE | Noted: 2022-11-13

## 2022-11-13 PROBLEM — N18.31 STAGE 3A CHRONIC KIDNEY DISEASE (HCC): Status: RESOLVED | Noted: 2020-12-01 | Resolved: 2022-11-13

## 2022-11-13 PROBLEM — E43 SEVERE PROTEIN-CALORIE MALNUTRITION (HCC): Status: ACTIVE | Noted: 2022-11-13

## 2022-11-13 LAB
2HR DELTA HS TROPONIN: 6 NG/L
4HR DELTA HS TROPONIN: 18 NG/L
ALBUMIN SERPL BCP-MCNC: 3.4 G/DL (ref 3.5–5)
ALP SERPL-CCNC: 99 U/L (ref 46–116)
ALT SERPL W P-5'-P-CCNC: 20 U/L (ref 12–78)
ANION GAP SERPL CALCULATED.3IONS-SCNC: 11 MMOL/L (ref 4–13)
APTT PPP: 35 SECONDS (ref 23–37)
AST SERPL W P-5'-P-CCNC: 28 U/L (ref 5–45)
ATRIAL RATE: 105 BPM
ATRIAL RATE: 106 BPM
ATRIAL RATE: 139 BPM
ATRIAL RATE: 147 BPM
BASOPHILS # BLD AUTO: 0 THOUSANDS/ÂΜL (ref 0–0.1)
BASOPHILS NFR BLD AUTO: 0 % (ref 0–1)
BILIRUB SERPL-MCNC: 0.41 MG/DL (ref 0.2–1)
BILIRUB UR QL STRIP: NEGATIVE
BUN SERPL-MCNC: 26 MG/DL (ref 5–25)
CALCIUM ALBUM COR SERPL-MCNC: 9.5 MG/DL (ref 8.3–10.1)
CALCIUM SERPL-MCNC: 9 MG/DL (ref 8.3–10.1)
CARDIAC TROPONIN I PNL SERPL HS: 17 NG/L
CARDIAC TROPONIN I PNL SERPL HS: 29 NG/L
CHLORIDE SERPL-SCNC: 102 MMOL/L (ref 96–108)
CLARITY UR: CLEAR
CO2 SERPL-SCNC: 23 MMOL/L (ref 21–32)
COLOR UR: YELLOW
CREAT SERPL-MCNC: 1.53 MG/DL (ref 0.6–1.3)
D DIMER PPP FEU-MCNC: 0.93 UG/ML FEU
EOSINOPHIL # BLD AUTO: 0 THOUSAND/ÂΜL (ref 0–0.61)
EOSINOPHIL NFR BLD AUTO: 0 % (ref 0–6)
ERYTHROCYTE [DISTWIDTH] IN BLOOD BY AUTOMATED COUNT: 12.2 % (ref 11.6–15.1)
GFR SERPL CREATININE-BSD FRML MDRD: 42 ML/MIN/1.73SQ M
GLUCOSE SERPL-MCNC: 167 MG/DL (ref 65–140)
GLUCOSE SERPL-MCNC: 168 MG/DL (ref 65–140)
GLUCOSE SERPL-MCNC: 172 MG/DL (ref 65–140)
GLUCOSE UR STRIP-MCNC: NEGATIVE MG/DL
HCT VFR BLD AUTO: 39.9 % (ref 36.5–49.3)
HGB BLD-MCNC: 13 G/DL (ref 12–17)
HGB UR QL STRIP.AUTO: NEGATIVE
IMM GRANULOCYTES # BLD AUTO: 0.02 THOUSAND/UL (ref 0–0.2)
IMM GRANULOCYTES NFR BLD AUTO: 0 % (ref 0–2)
INR PPP: 1.12 (ref 0.84–1.19)
KETONES UR STRIP-MCNC: NEGATIVE MG/DL
LACTATE SERPL-SCNC: 2.2 MMOL/L (ref 0.5–2)
LACTATE SERPL-SCNC: 2.6 MMOL/L (ref 0.5–2)
LACTATE SERPL-SCNC: 2.9 MMOL/L (ref 0.5–2)
LACTATE SERPL-SCNC: 3 MMOL/L (ref 0.5–2)
LEUKOCYTE ESTERASE UR QL STRIP: NEGATIVE
LYMPHOCYTES # BLD AUTO: 0.72 THOUSANDS/ÂΜL (ref 0.6–4.47)
LYMPHOCYTES NFR BLD AUTO: 14 % (ref 14–44)
MAGNESIUM SERPL-MCNC: 2.1 MG/DL (ref 1.6–2.6)
MCH RBC QN AUTO: 29.7 PG (ref 26.8–34.3)
MCHC RBC AUTO-ENTMCNC: 32.6 G/DL (ref 31.4–37.4)
MCV RBC AUTO: 91 FL (ref 82–98)
MONOCYTES # BLD AUTO: 0.15 THOUSAND/ÂΜL (ref 0.17–1.22)
MONOCYTES NFR BLD AUTO: 3 % (ref 4–12)
NEUTROPHILS # BLD AUTO: 4.4 THOUSANDS/ÂΜL (ref 1.85–7.62)
NEUTS SEG NFR BLD AUTO: 83 % (ref 43–75)
NITRITE UR QL STRIP: NEGATIVE
NRBC BLD AUTO-RTO: 0 /100 WBCS
P AXIS: 265 DEGREES
P AXIS: 269 DEGREES
P AXIS: 84 DEGREES
P AXIS: 96 DEGREES
PH UR STRIP.AUTO: 6 [PH]
PLATELET # BLD AUTO: 231 THOUSANDS/UL (ref 149–390)
PMV BLD AUTO: 9.1 FL (ref 8.9–12.7)
POTASSIUM SERPL-SCNC: 4.8 MMOL/L (ref 3.5–5.3)
PR INTERVAL: 154 MS
PR INTERVAL: 160 MS
PR INTERVAL: 168 MS
PR INTERVAL: 178 MS
PROCALCITONIN SERPL-MCNC: 0.19 NG/ML
PROT SERPL-MCNC: 6.7 G/DL (ref 6.4–8.4)
PROT UR STRIP-MCNC: NEGATIVE MG/DL
PROTHROMBIN TIME: 14.5 SECONDS (ref 11.6–14.5)
QRS AXIS: 55 DEGREES
QRS AXIS: 63 DEGREES
QRS AXIS: 67 DEGREES
QRS AXIS: 72 DEGREES
QRSD INTERVAL: 82 MS
QRSD INTERVAL: 94 MS
QRSD INTERVAL: 96 MS
QRSD INTERVAL: 96 MS
QT INTERVAL: 284 MS
QT INTERVAL: 296 MS
QT INTERVAL: 322 MS
QT INTERVAL: 368 MS
QTC INTERVAL: 425 MS
QTC INTERVAL: 444 MS
QTC INTERVAL: 450 MS
QTC INTERVAL: 488 MS
RBC # BLD AUTO: 4.38 MILLION/UL (ref 3.88–5.62)
SODIUM SERPL-SCNC: 136 MMOL/L (ref 135–147)
SP GR UR STRIP.AUTO: 1.01 (ref 1–1.03)
T WAVE AXIS: -65 DEGREES
T WAVE AXIS: -86 DEGREES
T WAVE AXIS: 72 DEGREES
T WAVE AXIS: 85 DEGREES
UROBILINOGEN UR QL STRIP.AUTO: 0.2 E.U./DL
VENTRICULAR RATE: 105 BPM
VENTRICULAR RATE: 106 BPM
VENTRICULAR RATE: 139 BPM
VENTRICULAR RATE: 147 BPM
WBC # BLD AUTO: 5.29 THOUSAND/UL (ref 4.31–10.16)

## 2022-11-13 PROCEDURE — XW033E5 INTRODUCTION OF REMDESIVIR ANTI-INFECTIVE INTO PERIPHERAL VEIN, PERCUTANEOUS APPROACH, NEW TECHNOLOGY GROUP 5: ICD-10-PCS | Performed by: HOSPITALIST

## 2022-11-13 RX ORDER — DEXAMETHASONE SODIUM PHOSPHATE 4 MG/ML
0.1 INJECTION, SOLUTION INTRA-ARTICULAR; INTRALESIONAL; INTRAMUSCULAR; INTRAVENOUS; SOFT TISSUE EVERY 12 HOURS
Status: DISCONTINUED | OUTPATIENT
Start: 2022-11-13 | End: 2022-11-15 | Stop reason: HOSPADM

## 2022-11-13 RX ORDER — ACETAMINOPHEN 325 MG/1
650 TABLET ORAL EVERY 6 HOURS PRN
Status: DISCONTINUED | OUTPATIENT
Start: 2022-11-13 | End: 2022-11-15 | Stop reason: HOSPADM

## 2022-11-13 RX ORDER — ZOLPIDEM TARTRATE 5 MG/1
5 TABLET ORAL ONCE
Status: COMPLETED | OUTPATIENT
Start: 2022-11-13 | End: 2022-11-13

## 2022-11-13 RX ORDER — LABETALOL HYDROCHLORIDE 5 MG/ML
10 INJECTION, SOLUTION INTRAVENOUS EVERY 6 HOURS PRN
Status: DISCONTINUED | OUTPATIENT
Start: 2022-11-13 | End: 2022-11-15 | Stop reason: HOSPADM

## 2022-11-13 RX ORDER — ATENOLOL 50 MG/1
50 TABLET ORAL DAILY
Status: DISCONTINUED | OUTPATIENT
Start: 2022-11-13 | End: 2022-11-15 | Stop reason: HOSPADM

## 2022-11-13 RX ORDER — DOXYCYCLINE HYCLATE 100 MG/1
100 CAPSULE ORAL EVERY 12 HOURS
Status: DISCONTINUED | OUTPATIENT
Start: 2022-11-13 | End: 2022-11-13

## 2022-11-13 RX ORDER — ZOLPIDEM TARTRATE 5 MG/1
5 TABLET ORAL
Status: DISCONTINUED | OUTPATIENT
Start: 2022-11-13 | End: 2022-11-15 | Stop reason: HOSPADM

## 2022-11-13 RX ORDER — LEVALBUTEROL 1.25 MG/.5ML
1.25 SOLUTION, CONCENTRATE RESPIRATORY (INHALATION)
Status: DISCONTINUED | OUTPATIENT
Start: 2022-11-13 | End: 2022-11-14

## 2022-11-13 RX ORDER — SODIUM CHLORIDE FOR INHALATION 0.9 %
3 VIAL, NEBULIZER (ML) INHALATION
Status: DISCONTINUED | OUTPATIENT
Start: 2022-11-13 | End: 2022-11-13

## 2022-11-13 RX ORDER — HEPARIN SODIUM 5000 [USP'U]/ML
5000 INJECTION, SOLUTION INTRAVENOUS; SUBCUTANEOUS EVERY 8 HOURS SCHEDULED
Status: DISCONTINUED | OUTPATIENT
Start: 2022-11-13 | End: 2022-11-15 | Stop reason: HOSPADM

## 2022-11-13 RX ADMIN — HEPARIN SODIUM 5000 UNITS: 5000 INJECTION INTRAVENOUS; SUBCUTANEOUS at 14:53

## 2022-11-13 RX ADMIN — LABETALOL HYDROCHLORIDE 10 MG: 5 INJECTION, SOLUTION INTRAVENOUS at 23:14

## 2022-11-13 RX ADMIN — HEPARIN SODIUM 5000 UNITS: 5000 INJECTION INTRAVENOUS; SUBCUTANEOUS at 06:53

## 2022-11-13 RX ADMIN — CEFTRIAXONE SODIUM 1000 MG: 10 INJECTION, POWDER, FOR SOLUTION INTRAVENOUS at 06:53

## 2022-11-13 RX ADMIN — ATENOLOL 50 MG: 50 TABLET ORAL at 07:57

## 2022-11-13 RX ADMIN — ZOLPIDEM TARTRATE 5 MG: 5 TABLET, FILM COATED ORAL at 02:42

## 2022-11-13 RX ADMIN — DEXAMETHASONE SODIUM PHOSPHATE 4.44 MG: 4 INJECTION INTRA-ARTICULAR; INTRALESIONAL; INTRAMUSCULAR; INTRAVENOUS; SOFT TISSUE at 18:02

## 2022-11-13 RX ADMIN — SODIUM CHLORIDE 500 ML: 0.9 INJECTION, SOLUTION INTRAVENOUS at 12:00

## 2022-11-13 RX ADMIN — REMDESIVIR 200 MG: 100 INJECTION, POWDER, LYOPHILIZED, FOR SOLUTION INTRAVENOUS at 10:21

## 2022-11-13 RX ADMIN — IPRATROPIUM BROMIDE 0.5 MG: 0.5 SOLUTION RESPIRATORY (INHALATION) at 19:30

## 2022-11-13 RX ADMIN — DOXYCYCLINE 100 MG: 100 CAPSULE ORAL at 06:53

## 2022-11-13 RX ADMIN — LEVALBUTEROL 1.25 MG: 1.25 SOLUTION, CONCENTRATE RESPIRATORY (INHALATION) at 07:45

## 2022-11-13 RX ADMIN — ZOLPIDEM TARTRATE 5 MG: 5 TABLET, COATED ORAL at 22:46

## 2022-11-13 RX ADMIN — SODIUM CHLORIDE 1000 ML: 0.9 INJECTION, SOLUTION INTRAVENOUS at 06:52

## 2022-11-13 RX ADMIN — IOHEXOL 70 ML: 350 INJECTION, SOLUTION INTRAVENOUS at 01:43

## 2022-11-13 RX ADMIN — DEXAMETHASONE SODIUM PHOSPHATE 4.44 MG: 4 INJECTION INTRA-ARTICULAR; INTRALESIONAL; INTRAMUSCULAR; INTRAVENOUS; SOFT TISSUE at 06:53

## 2022-11-13 RX ADMIN — LEVALBUTEROL 1.25 MG: 1.25 SOLUTION, CONCENTRATE RESPIRATORY (INHALATION) at 19:30

## 2022-11-13 RX ADMIN — HEPARIN SODIUM 5000 UNITS: 5000 INJECTION INTRAVENOUS; SUBCUTANEOUS at 22:46

## 2022-11-13 RX ADMIN — SODIUM CHLORIDE 500 ML: 0.9 INJECTION, SOLUTION INTRAVENOUS at 03:09

## 2022-11-13 RX ADMIN — IPRATROPIUM BROMIDE 0.5 MG: 0.5 SOLUTION RESPIRATORY (INHALATION) at 07:45

## 2022-11-13 NOTE — SPEECH THERAPY NOTE
Speech Language/Pathology  Speech/Language Pathology  Assessment    Patient Name: Miguel Khan  NWEYX'P Date: 11/13/2022     Problem List  Principal Problem:    Acute respiratory failure with hypoxia ([facility] [address] )  Active Problems:    COPD (chronic obstructive pulmonary disease) ([facility] [address] )    Stage 3b chronic kidney disease ([facility] [address] )    Sepsis ([facility] [address] )    COVID    Severe protein-calorie malnutrition ([facility] )    Past Medical History  Past Medical History:   Diagnosis Date   • Abnormal thyroid function test     non specified /  LA    1/8/13     • Chronic kidney disease    • COPD (chronic obstructive pulmonary disease) (HCC)    • Eczema     LA   11/26/13      • Esophagitis    • Gout    • Hypertension    • Pyloric channel ulcer      Past Surgical History  Past Surgical History:   Procedure Laterality Date   • BACK SURGERY     • ESOPHAGOGASTRODUODENOSCOPY N/A 12/1/2016    Procedure: ESOPHAGOGASTRODUODENOSCOPY (EGD); Surgeon: Yadi Miranda MD;  Location: [facility]; Service:    • EYE SURGERY          Bedside Swallow Evaluation:    Summary:  Pt presented alert and pleasant  Son visiting  Both report pt has no history of swallowing difficulty  H/o egd 2016, a small food bolus was pushed thru the ge junction, etc  (see below)  Denied any current issues  Took thin liquids by straw wnl  No cough or wet vocal quality  Ate all of the beef w/ adequate mastication and transfer  "full" after eating beef  I asked how much he ate at home  Son reported he would maybe eat a hamburger and fries happy meal and then just snack the rest of the day  He was drinking  approx 2 bottles of  Ensure at home  Pt stated he was coughing while laying flat the previous night  ? If he is refluxing Given his history  Advised to sleep elevated and gave rationale  Pt then pointed to his stomach and said "yeah, sometimes I feel things right here"  Currently satting at 95/96 on RA  Oral/pharyngeal stages wnl  Can not r/o esophageal component       Recommendations:  Diet: regular  Liquid: thin  Meds: as tolerated/desired  Supervision: none  Positioning:Upright  Strategies: sips after every few bites  Stop eating if full  Remain upright after meals  Pt to take PO/Meds only when fully alert and upright  Oral care  Reflux precautions  Eval only, No f/u tx indicated  Consider consult w/:  Rehab  GI  ?? If concerns arise  Nutrition  Drank ensure at home  Likes ania, Juan Ramon Mustache    H&P/Admit info/ pertinent provider notes: (PMH noted above)  Chief Complaint:     Hypoxic respriatory failure, covid, copd   History of Present Illness:  Poppy Yanez is a [de-identified] y o  male who has past medical history significant for COPD, Tobacco Abuse, CKD who presented to [facility] ER on the AM of 11/13 with symptoms of acute onset SOB that began about 1 hour prior to arrival in the ER  Patient reported that about 1 hour prior to coming to the ER he had sob that the symptoms  Patient reports that the symptoms have worsened throughout the day  Patient does report a productive cough throughout the day  Patient denies any recorded fevers  Patient does report decreased po intake over the last several days  Burns April does reports a h/o copd and reports that he uses his inhalers but denies ever having had to be hospitalized for copd exacerbation  Burns April denies any chest pain on my exam        Special Studies:  CTA ed 11/13/22  Some images are suboptimal secondary to respiratory motion, this decreases the sensitivity for evaluation of peripheral pulmonary emboli, subject to this, no pulmonary embolism is seen  Mild scattered pulmonary emphysematous changes  Coronary atherosclerosis  Shotty and asymmetrically prominent right axillary lymph nodes, nonspecific  Previous VBS:  none    Patient's goal: none stated    Did the pt report pain? no  If yes, was nursing notified/was it addressed?  na    Reason for consult:  R/o aspiration  Determine safest and least restrictive diet  respiratory compromise  poor intake  weight loss  Son reported his lost a lot of weight prior but seems to be holding steady  Precautions:  Contact  [place]  Fall  Food allergies:  none  Current diet:  dysphagia 2 w/ nectar  Premorbid diet[de-identified]  regular  O2 requirement:  none  Voice/Speech:  wnl  Social/Prior living environment:  Lives alone  Follows commands:  yes                       Cognitive Status:  alert  Oral mec exam:  Dentition: upper plate, lower partial dentition  Oral mech wnl       Esophageal stage:  egd 12/1/16    Aspiration precautions posted    Results d/w:  Pt, nursing, family, physician

## 2022-11-13 NOTE — ED NOTES
Patient transported to [facility]163, [address] [facility]  11/13/22 013

## 2022-11-13 NOTE — ASSESSMENT & PLAN NOTE
Lab Results   Component Value Date    EGFR 40 11/12/2022    EGFR 41 10/20/2022    EGFR 38 03/16/2022    CREATININE 1 58 (H) 11/12/2022    CREATININE 1 57 (H) 10/20/2022    CREATININE 1 65 (H) 03/16/2022     -Cr at baseline   Plan:  >Avoid nephrotoxic agents and hypotensive episodes  >Daily BMP

## 2022-11-13 NOTE — ED NOTES
S2 charge RN aware that pt is on the way to the floor and on tele        Zaria Michael, JODY  11/13/22 9624

## 2022-11-13 NOTE — ASSESSMENT & PLAN NOTE
-, RR 24, Covid positive  -Elevated lactic acid as well as hypoxic respiratory failure   -CTA chest above  -UA negative for infection  Plan:  >As above in Hypoxic respiratory failure section  F/u blood cultures drawn in ER

## 2022-11-13 NOTE — ASSESSMENT & PLAN NOTE
Malnutrition Findings: BMI 16, several muscle wasting   Plan:  >Dietary consultation                                BMI Findings: There is no height or weight on file to calculate BMI

## 2022-11-13 NOTE — NURSING NOTE
Patient's oxygen reduced until patient on RA  Patient now on RA in 90%s, not in any respiratory distress  Patient states breathing feeling "good"  Informed patient to let RN know if he feels SOB  Patient verbalized understanding      Princess Hinkle 11/13/2022 6:35 PM

## 2022-11-13 NOTE — ED NOTES
RT at bedside to place patient on [address] [place] [address]  11/12/22 2967

## 2022-11-13 NOTE — ED PROVIDER NOTES
History  Chief Complaint   Patient presents with   • Shortness of Breath     Sudden onset of sob starting tonight  States some cp when couldn't breathe  Some tobacco use  Reports no home O2  Miguel Khan is a [de-identified] y o   male with PMH of hypertension, COPD, and CKD who presents to the emergency department with respiratory distress  Patient is accompanied by son who supplements history  Patient was in his normal state health throughout the day today and then tonight approximately 1 hour prior to arrival he had sudden-onset shortness of breath  States that shortness of breath started also on the than continue to worsen  Notes that he may have had a mild dry nonproductive cough throughout the day today however did not think much of it  No fevers, chills, palpitations, GI symptoms, lower extremity swelling or rashes  He states he does have a history of COPD and is prescribed inhalers however never has had a severe exacerbation and his COPD is mild  Patient does note he has associated chest pain/chest tightness              History provided by:  Patient   used: No    Shortness of Breath  Severity:  Severe  Onset quality:  Sudden  Duration:  1 hour  Timing:  Constant  Progression:  Worsening  Chronicity:  New  Context: URI    Context: not activity, not animal exposure, not emotional upset, not fumes, not known allergens, not occupational exposure, not pollens, not smoke exposure, not strong odors and not weather changes    Relieved by:  Nothing  Worsened by:  Nothing  Ineffective treatments:  None tried  Associated symptoms: chest pain, cough, sputum production and wheezing    Associated symptoms: no abdominal pain, no claudication, no diaphoresis, no ear pain, no fever, no headaches, no hemoptysis, no neck pain, no PND, no rash, no sore throat, no syncope, no swollen glands and no vomiting    Chest pain:     Quality: tightness      Severity:  Moderate    Onset quality:  Sudden    Timing: Constant  Cough:     Cough characteristics:  Non-productive    Sputum characteristics:  Nondescript    Severity:  Mild    Timing:  Constant    Progression:  Unchanged  Wheezing:     Severity:  Moderate    Onset quality:  Gradual    Timing:  Constant    Progression:  Unchanged      Prior to Admission Medications   Prescriptions Last Dose Informant Patient Reported? Taking? albuterol (PROVENTIL HFA,VENTOLIN HFA) 90 mcg/act inhaler   Yes No   Sig: Inhale 2 puffs   atenolol (TENORMIN) 50 mg tablet   No No   Sig: Take 1 tablet (50 mg total) by mouth daily   cholecalciferol (VITAMIN D3) 1,000 units tablet   Yes No   Sig: Take 1,000 Units by mouth daily   cloNIDine (CATAPRES) 0 1 mg tablet   No No   Sig: Take 1 tablet (0 1 mg total) by mouth daily   zolpidem (AMBIEN) 5 mg tablet   No No   Sig: Take 1 tablet (5 mg total) by mouth daily at bedtime as needed for sleep      Facility-Administered Medications: None       Past Medical History:   Diagnosis Date   • Abnormal thyroid function test     non specified /  [place]    1/8/13     • Chronic kidney disease    • COPD (chronic obstructive pulmonary disease) (HCC)    • Eczema     [place]   11/26/13      • Esophagitis    • Gout    • Hypertension    • Pyloric channel ulcer        Past Surgical History:   Procedure Laterality Date   • BACK SURGERY     • ESOPHAGOGASTRODUODENOSCOPY N/A 12/1/2016    Procedure: ESOPHAGOGASTRODUODENOSCOPY (EGD); Surgeon: Tanja Otero MD;  Location: [facility]; Service:    • EYE SURGERY         Family History   Problem Relation Age of Onset   • Other Mother         Dec in her 66's (gangrene foot)   • Heart failure Mother    • Diabetes type II Mother    • Peripheral vascular disease Mother    • Other Family         situs inversus / Dec 90 yo   • Rectal cancer Father      I have reviewed and agree with the history as documented      E-Cigarette/Vaping   • E-Cigarette Use Never User      E-Cigarette/Vaping Substances   • Nicotine No    • THC No    • CBD No    • Flavoring No      Social History     Tobacco Use   • Smoking status: Current Every Day Smoker     Packs/day: 0 50     Years: 60 00     Pack years: 30 00     Types: Cigarettes   • Smokeless tobacco: Current User   • Tobacco comment: current some day smoker per [employer]   Vaping Use   • Vaping Use: Never used   Substance Use Topics   • Alcohol use: Not Currently   • Drug use: No       Review of Systems   Constitutional: Negative for activity change, appetite change, chills, diaphoresis, fever and unexpected weight change  HENT: Negative for congestion, ear discharge, ear pain, postnasal drip, rhinorrhea, sinus pressure, sinus pain and sore throat  Respiratory: Positive for cough, sputum production, shortness of breath and wheezing  Negative for apnea, hemoptysis, choking, chest tightness and stridor  Cardiovascular: Positive for chest pain  Negative for palpitations, claudication, leg swelling, syncope and PND  Gastrointestinal: Negative for abdominal pain, blood in stool, constipation, diarrhea, nausea and vomiting  Genitourinary: Negative for dysuria, flank pain, frequency and urgency  Musculoskeletal: Negative for arthralgias, myalgias, neck pain and neck stiffness  Skin: Negative for color change, pallor, rash and wound  Neurological: Negative for dizziness, tremors, seizures, syncope, light-headedness, numbness and headaches  All other systems reviewed and are negative  Physical Exam  Physical Exam  Vitals and nursing note reviewed  Constitutional:       General: He is in acute distress  Appearance: He is well-developed and normal weight  He is ill-appearing and toxic-appearing  HENT:      Head: Normocephalic and atraumatic  Mouth/Throat:      Mouth: Mucous membranes are moist       Pharynx: Oropharynx is clear  Eyes:      Extraocular Movements: Extraocular movements intact  Pupils: Pupils are equal, round, and reactive to light  Neck:      Thyroid: No thyromegaly  Vascular: No hepatojugular reflux or JVD  Trachea: No tracheal deviation  Cardiovascular:      Rate and Rhythm: Regular rhythm  Tachycardia present  No extrasystoles are present  Pulses: No decreased pulses  Heart sounds: No murmur heard  No friction rub  No gallop  Pulmonary:      Effort: Tachypnea, accessory muscle usage and respiratory distress present  Breath sounds: Examination of the right-upper field reveals decreased breath sounds  Examination of the left-upper field reveals decreased breath sounds  Examination of the right-middle field reveals decreased breath sounds  Examination of the left-middle field reveals decreased breath sounds  Examination of the right-lower field reveals decreased breath sounds and wheezing  Examination of the left-lower field reveals decreased breath sounds and wheezing  Decreased breath sounds and wheezing present  No rhonchi or rales  Comments: Patient arrives in respiratory distress  Increased work of breathing, hypoxia, decreased breath sounds bilaterally with mild wheezes  No rales or rhonchi  Chest:      Chest wall: No mass, deformity, tenderness, crepitus or edema  There is no dullness to percussion  Abdominal:      General: Bowel sounds are normal       Palpations: Abdomen is soft  There is no hepatomegaly, splenomegaly or mass  Tenderness: There is no abdominal tenderness  Musculoskeletal:         General: Normal range of motion  Cervical back: Normal range of motion and neck supple  Right lower leg: No tenderness  No edema  Left lower leg: No tenderness  No edema  Lymphadenopathy:      Cervical: No cervical adenopathy  Skin:     General: Skin is warm  Capillary Refill: Capillary refill takes less than 2 seconds  Neurological:      General: No focal deficit present  Mental Status: He is alert and oriented to person, place, and time     Psychiatric:         Mood and Affect: Mood normal  Vital Signs  ED Triage Vitals   Temperature Pulse Respirations Blood Pressure SpO2   11/12/22 2238 11/12/22 2231 11/12/22 2231 11/12/22 2231 11/12/22 2231   98 3 °F (36 8 °C) (!) 109 (!) 24 (!) 179/105 94 %      Temp Source Heart Rate Source Patient Position - Orthostatic VS BP Location FiO2 (%)   11/12/22 2238 11/12/22 2231 11/12/22 2231 11/12/22 2231 --   Oral Monitor Sitting Left arm       Pain Score       --                  Vitals:    11/13/22 0245 11/13/22 0345 11/13/22 0437 11/13/22 0507   BP: 117/66 128/67 112/61 126/77   Pulse: (!) 122 87 88 82   Patient Position - Orthostatic VS: Lying Lying Lying Lying         Visual Acuity      ED Medications  Medications   atenolol (TENORMIN) tablet 50 mg (has no administration in time range)   acetaminophen (TYLENOL) tablet 650 mg (has no administration in time range)   levalbuterol (XOPENEX) inhalation solution 1 25 mg (has no administration in time range)     And   sodium chloride 0 9 % inhalation solution 3 mL (has no administration in time range)   ipratropium (ATROVENT) 0 02 % inhalation solution 0 5 mg (has no administration in time range)   doxycycline hyclate (VIBRAMYCIN) capsule 100 mg (has no administration in time range)   heparin (porcine) subcutaneous injection 5,000 Units (has no administration in time range)   dexamethasone (DECADRON) injection 4 44 mg (has no administration in time range)   sodium chloride 0 9 % bolus 1,000 mL (has no administration in time range)   ceftriaxone (ROCEPHIN) 1 g/50 mL in dextrose IVPB (has no administration in time range)   magnesium sulfate 2 g/50 mL IVPB (premix) 2 g (0 g Intravenous Stopped 11/12/22 2334)   albuterol inhalation solution 10 mg (10 mg Nebulization Given 11/12/22 2250)     And   ipratropium (ATROVENT) 0 02 % inhalation solution 1 mg (1 mg Nebulization Given 11/12/22 2250)     And   sodium chloride 0 9 % inhalation solution 3 mL (3 mL Nebulization Given 11/12/22 2251)   methylPREDNISolone sodium succinate (Solu-MEDROL) injection 125 mg (125 mg Intravenous Given 11/12/22 2250)   iohexol (OMNIPAQUE) 350 MG/ML injection (SINGLE-DOSE) 70 mL (70 mL Intravenous Given 11/13/22 0143)   zolpidem (AMBIEN) tablet 5 mg (5 mg Oral Given 11/13/22 0242)   sodium chloride 0 9 % bolus 500 mL (0 mL Intravenous Stopped 11/13/22 0609)       Diagnostic Studies  Results Reviewed     Procedure Component Value Units Date/Time    Blood gas, venous [[id number]]     Lab Status: No result Specimen: Blood     Lactic acid, plasma [[id number]]  (Abnormal) Collected: 11/13/22 0428    Lab Status: Final result Specimen: Blood from Arm, Left Updated: 11/13/22 0509     LACTIC ACID 3 0 mmol/L     Narrative:      Result may be elevated if tourniquet was used during collection      Lactic acid 2 Hours [[id number]]     Lab Status: No result Specimen: Blood     Protime-INR [[id number]]     Lab Status: No result Specimen: Blood     APTT [[id number]]     Lab Status: No result Specimen: Blood     CBC and differential [[id number]]     Lab Status: No result Specimen: Blood     Comprehensive metabolic panel [[id number]]     Lab Status: No result Specimen: Blood     Magnesium [[id number]]     Lab Status: No result Specimen: Blood     D-dimer, quantitative [[id number]]     Lab Status: No result Specimen: Blood     Blood culture [[id number]]     Lab Status: No result Specimen: Blood     Blood culture [[id number]]     Lab Status: No result Specimen: Blood     Procalcitonin [[id number]]     Lab Status: No result Specimen: Blood     HS Troponin I 4hr [[id number]]  (Normal) Collected: 11/13/22 0251    Lab Status: Final result Specimen: Blood Updated: 11/13/22 0341     hs TnI 4hr 29 ng/L      Delta 4hr hsTnI 18 ng/L     UA w Reflex to Microscopic w Reflex to Culture [[id number]] Collected: 11/13/22 0250    Lab Status: Final result Specimen: Urine, Clean Catch Updated: 11/13/22 0318     Color, UA Yellow     Clarity, UA Clear     Specific Gravity, UA 1 010     pH, UA 6 0 Leukocytes, UA Negative     Nitrite, UA Negative     Protein, UA Negative mg/dl      Glucose, UA Negative mg/dl      Ketones, UA Negative mg/dl      Urobilinogen, UA 0 2 E U /dl      Bilirubin, UA Negative     Occult Blood, UA Negative    Lactic acid 2 Hours [[id number]]  (Abnormal) Collected: 11/13/22 0207    Lab Status: Final result Specimen: Blood from Arm, Left Updated: 11/13/22 0238     LACTIC ACID 2 6 mmol/L     Narrative:      Result may be elevated if tourniquet was used during collection  HS Troponin I 2hr [[id number]]  (Normal) Collected: 11/13/22 0105    Lab Status: Final result Specimen: Blood from Arm, Left Updated: 11/13/22 0212     hs TnI 2hr 17 ng/L      Delta 2hr hsTnI 6 ng/L     Lactic acid [[id number]]  (Abnormal) Collected: 11/12/22 2254    Lab Status: Final result Specimen: Blood from Arm, Left Updated: 11/12/22 2341     LACTIC ACID 2 3 mmol/L     Narrative:      Result may be elevated if tourniquet was used during collection  COVID19, Influenza A/B, RSV PCR, [facility] - 2 hour STAT [[id number]]  (Abnormal) Collected: 11/12/22 2247    Lab Status: Final result Specimen: Nares from Nose Updated: 11/12/22 2336     SARS-CoV-2 Positive     INFLUENZA A PCR Negative     INFLUENZA B PCR Negative     RSV PCR Negative    Narrative:      FOR PEDIATRIC PATIENTS - copy/paste COVID Guidelines URL to browser: [email or web address]    SARS-CoV-2 assay is a Nucleic Acid Amplification assay intended for the  qualitative detection of nucleic acid from SARS-CoV-2 in nasopharyngeal  swabs  Results are for the presumptive identification of SARS-CoV-2 RNA  Positive results are indicative of infection with SARS-CoV-2, the virus  causing COVID-19, but do not rule out bacterial infection or co-infection  with other viruses   Laboratories within the [place] and its  territories are required to report all positive results to the appropriate  public health authorities  Negative results do not preclude SARS-CoV-2  infection and should not be used as the sole basis for treatment or other  patient management decisions  Negative results must be combined with  clinical observations, patient history, and epidemiological information  This test has not been FDA cleared or approved  This test has been authorized by FDA under an Emergency Use Authorization  (EUA)  This test is only authorized for the duration of time the  declaration that circumstances exist justifying the authorization of the  emergency use of an in vitro diagnostic tests for detection of SARS-CoV-2  virus and/or diagnosis of COVID-19 infection under section 564(b)(1) of  the Act, 21 U  S C  518MTZ-1(M)(9), unless the authorization is terminated  or revoked sooner  The test has been validated but independent review by FDA  and CLIA is pending  Test performed using [employer] GeneXpert: This RT-PCR assay targets N2,  a region unique to SARS-CoV-2  A conserved region in the E-gene was chosen  for pan-Sarbecovirus detection which includes SARS-CoV-2  According to CMS-2020-01-R, this platform meets the definition of high-throughput technology  Comprehensive metabolic panel [[id number]]  (Abnormal) Collected: 11/12/22 2247    Lab Status: Final result Specimen: Blood from Arm, Left Updated: 11/12/22 2325     Sodium 139 mmol/L      Potassium 4 5 mmol/L      Chloride 101 mmol/L      CO2 28 mmol/L      ANION GAP 10 mmol/L      BUN 28 mg/dL      Creatinine 1 58 mg/dL      Glucose 133 mg/dL      Calcium 9 1 mg/dL      AST --     ALT 24 U/L      Alkaline Phosphatase 118 U/L      Total Protein 7 5 g/dL      Albumin 3 9 g/dL      Total Bilirubin 0 48 mg/dL      eGFR 40 ml/min/1 73sq m     Narrative:      No results for AST  Ii an AST is required for patient care, it must be ordered separately    [place] guidelines for Chronic Kidney Disease (CKD):   •  Stage 1 with normal or high GFR (GFR > 90 mL/min/1 73 square meters)  •  Stage 2 Mild CKD (GFR = 60-89 mL/min/1 73 square meters)  •  Stage 3A Moderate CKD (GFR = 45-59 mL/min/1 73 square meters)  •  Stage 3B Moderate CKD (GFR = 30-44 mL/min/1 73 square meters)  •  Stage 4 Severe CKD (GFR = 15-29 mL/min/1 73 square meters)  •  Stage 5 End Stage CKD (GFR <15 mL/min/1 73 square meters)  Note: GFR calculation is accurate only with a steady state creatinine    Procalcitonin [[id number]]  (Normal) Collected: 11/12/22 2247    Lab Status: Final result Specimen: Blood from Arm, Left Updated: 11/12/22 2324     Procalcitonin 0 10 ng/ml     HS Troponin 0hr (reflex protocol) [[id number]]  (Normal) Collected: 11/12/22 2247    Lab Status: Final result Specimen: Blood from Arm, Left Updated: 11/12/22 2321     hs TnI 0hr 11 ng/L     D-dimer, quantitative [[id number]]  (Abnormal) Collected: 11/12/22 2247    Lab Status: Final result Specimen: Blood from Arm, Left Updated: 11/12/22 2318     D-Dimer, Quant 1 32 ug/ml FEU     Narrative: In the evaluation for possible pulmonary embolism, in the appropriate (Well's Score of 4 or less) patient, the age adjusted d-dimer cutoff for this patient can be calculated as:    Age x 0 01 (in ug/mL) for Age-adjusted D-dimer exclusion threshold for a patient over 50 years      Lipase [[id number]]  (Normal) Collected: 11/12/22 2247    Lab Status: Final result Specimen: Blood from Arm, Left Updated: 11/12/22 2317     Lipase 174 u/L     NT-BNP PRO [[id number]]  (Abnormal) Collected: 11/12/22 2247    Lab Status: Final result Specimen: Blood from Arm, Left Updated: 11/12/22 2317     NT-proBNP 8,799 pg/mL     Protime-INR [[id number]]  (Normal) Collected: 11/12/22 2247    Lab Status: Final result Specimen: Blood from Arm, Left Updated: 11/12/22 2314     Protime 13 1 seconds      INR 0 99    APTT [[id number]]  (Normal) Collected: 11/12/22 6870    Lab Status: Final result Specimen: Blood from Arm, Left Updated: 11/12/22 3746     PTT 27 seconds Blood gas, Venous [[id number]]  (Abnormal) Collected: 11/12/22 2247    Lab Status: Final result Specimen: Blood from Arm, Left Updated: 11/12/22 2309     pH, Mateo 7 275     pCO2, Mateo 52 4 mm Hg      pO2, Mateo 19 1 mm Hg      HCO3, Mateo 23 8 mmol/L      Base Excess, Mateo -3 6 mmol/L      O2 Content, Mateo 5 2 ml/dL      O2 HGB, VENOUS 25 3 %     CBC and differential [[id number]]  (Abnormal) Collected: 11/12/22 2247    Lab Status: Final result Specimen: Blood from Arm, Left Updated: 11/12/22 2254     WBC 11 78 Thousand/uL      RBC 4 81 Million/uL      Hemoglobin 14 3 g/dL      Hematocrit 44 3 %      MCV 92 fL      MCH 29 7 pg      MCHC 32 3 g/dL      RDW 12 2 %      MPV 8 8 fL      Platelets 427 Thousands/uL      nRBC 0 /100 WBCs      Neutrophils Relative 67 %      Immat GRANS % 0 %      Lymphocytes Relative 19 %      Monocytes Relative 11 %      Eosinophils Relative 3 %      Basophils Relative 0 %      Neutrophils Absolute 7 78 Thousands/µL      Immature Grans Absolute 0 04 Thousand/uL      Lymphocytes Absolute 2 28 Thousands/µL      Monocytes Absolute 1 33 Thousand/µL      Eosinophils Absolute 0 31 Thousand/µL      Basophils Absolute 0 04 Thousands/µL                  CTA ED chest PE Study   Final Result by Ashvin Sheppard DO (11/13 3753)      Some images are suboptimal secondary to respiratory motion, this decreases the sensitivity for evaluation of peripheral pulmonary emboli, subject to this, no pulmonary embolism is seen  Mild scattered pulmonary emphysematous changes      Coronary atherosclerosis  Shotty and asymmetrically prominent right axillary lymph nodes, nonspecific  Other findings as above  Workstation performed: [id number]         XR chest 1 view portable   ED Interpretation by Michael Albert PA-C (11/12 5886)   Hyperinflated  No infiltrate, cardiac silhouette normal, no pleural effusions or pulmonary edema                      Procedures  ECG 12 Lead Documentation Only    Date/Time: 11/12/2022 10:57 PM  Performed by: Alcira Cisneros PA-C  Authorized by: Alcira Cisneros PA-C     Indications / Diagnosis:  SOB  ECG reviewed by me, the ED Provider: yes    Patient location:  ED  Previous ECG:     Previous ECG:  Compared to current    Comparison ECG info:  7/25/17    Similarity:  Changes noted    Comparison to cardiac monitor: No    Interpretation:     Interpretation: normal    Rate:     ECG rate:  105  Rhythm:     Rhythm: sinus tachycardia    Ectopy:     Ectopy: none    QRS:     QRS axis:  Normal    QRS intervals:  Normal  Conduction:     Conduction: normal    ST segments:     ST segments:  Normal  T waves:     T waves: peaked      Peaked:  V3 and V4  ECG 12 Lead Documentation Only    Date/Time: 11/13/2022 1:36 AM  Performed by: Alcira Cisneros PA-C  Authorized by: Alcira Cisneros PA-C     Indications / Diagnosis:  Delta  Patient location:  ED  Previous ECG:     Previous ECG:  Compared to current    Similarity:  Changes noted  Interpretation:     Interpretation: abnormal    Rate:     ECG rate:  139    ECG rate assessment: tachycardic    Rhythm:     Rhythm: atrial flutter    Ectopy:     Ectopy: none    QRS:     QRS axis:  Normal    QRS intervals:  Normal  Conduction:     Conduction: normal    ST segments:     ST segments:  Normal  T waves:     T waves: normal               ED Course                               SBIRT 20yo+    Flowsheet Row Most Recent Value   SBIRT (25 yo +)    In order to provide better care to our patients, we are screening all of our patients for alcohol and drug use  Would it be okay to ask you these screening questions? Yes Filed at: 11/13/2022 0211   Initial Alcohol Screen: US AUDIT-C     1  How often do you have a drink containing alcohol? 0 Filed at: 11/13/2022 0211   2  How many drinks containing alcohol do you have on a typical day you are drinking? 0 Filed at: 11/13/2022 0211   3b  FEMALE Any Age, or MALE 65+:  How often do you have 4 or more drinks on one occassion? 0 Filed at: 11/13/2022 0211   Audit-C Score 0 Filed at: 11/13/2022 0211   JEROME: How many times in the past year have you    Used an illegal drug or used a prescription medication for non-medical reasons? Never Filed at: 11/13/2022 0211                    MDM  Number of Diagnoses or Management Options  Axillary lymphadenopathy: minor  COPD with acute exacerbation [facility]): new and requires workup  COVID-19: new and requires workup  Lactic acidosis: new and requires workup  Respiratory distress: new and requires workup  Diagnosis management comments: Patient was seen and examined  in the emergency department for chief complaint of respiratory distress  The patient presented sudden onset respiratory distress approximate 1 hour prior to arrival   Has associated cough and congestion prior to  On arrival patient is tachypneic, tachycardic, increased work of breathing and hypoxic  Placed on oxygen immediately  Due to increased work of breathing placed was then placed on BiPAP  Decreased breath sounds bilaterally with expiratory Will wheezes  Placed on heart neb, steroids, magnesium  Patient immediately started have improvement in symptoms  Patient is accompanied by son who supplemented history  Patient was in normal state health and well  Had mild cough  No fevers or chills  No other URI symptoms  The patient states he has mild COPD  DDX including but not limited to: pneumonia, pleural effusion, CHF, PE, PTX, ACS, MI, asthma exacerbation, COPD exacerbation, COVID 19, EVALI, anemia, metabolic abnormality, renal failure  Workup:  Check labs, EKG, chest x-ray  Heart neb steroids magnesium  On reassessment patient symptomatically feeling better  He is much more well-appearing on BiPAP  Patient was liberated off BiPAP after heart neb completed  He is saturating well on 4 L  Patient is intermittently could becoming tachycardic to the 150s    Patient's D-dimer is positive will add on PE scan   Will await for heart rate control until after PE scan  There is a chance tachycardia may be secondary to heart neb versus PE versus dehydration  Mild respiratory acidosis as well as leukocytosis  Patient also has mild lactic acidosis  Patient is COVID positive  Creatinine is at baseline     2:01 AM  Heart rate is improving  Awaiting PE scan results and admission  PE scan without acute pulmonary embolism  We discussed incidental findings with the patient- including lymphadenopathy  Discussed need for follow-up  Will admit at this point  Disposition:  General impression 8year-old male COPD exacerbation presented respiratory distress, much improved after treatment  Will admit for further observation management  Plan agreed upon by Dr Diego Hough, inpatient telemetry  He accepts to his service  The patient was admitted to the internal medicine service for further evaluation, management, and observation  The patient remained stable while under my care   Care was transferred with full report to admitting team         Amount and/or Complexity of Data Reviewed  Clinical lab tests: ordered and reviewed  Tests in the radiology section of CPT®: ordered and reviewed  Tests in the medicine section of CPT®: ordered and reviewed  Review and summarize past medical records: yes  Independent visualization of images, tracings, or specimens: yes    Risk of Complications, Morbidity, and/or Mortality  Presenting problems: moderate  Diagnostic procedures: moderate  Management options: moderate    Patient Progress  Patient progress: stable      Disposition  Final diagnoses:   COPD with acute exacerbation ([address] )   Respiratory distress   COVID-19   Lactic acidosis   Axillary lymphadenopathy     Time reflects when diagnosis was documented in both MDM as applicable and the Disposition within this note     Time User Action Codes Description Comment    11/13/2022  2:01 AM Ana Casiano Add [J44 1] COPD with acute exacerbation ([address] )     11/13/2022  2:01 AM Lu Shells Add [R06 03] Respiratory distress     11/13/2022  4:21 AM Lu Shells Add [U07 1] COVID-19     11/13/2022  4:21 AM Lu Shells Add [E87 20] Lactic acidosis     11/13/2022  4:22 AM Lu Shells Add [R59 0] Axillary lymphadenopathy       ED Disposition     ED Disposition   Admit    Condition   Stable    Date/Time   Sun Nov 13, 2022  4:22 AM    Comment   Case was discussed with Dr Savanna Bauer and the patient's admission status was agreed to be Admission Status: inpatient status to the service of Dr Savanna Bauer   Follow-up Information    None         Current Discharge Medication List      CONTINUE these medications which have NOT CHANGED    Details   albuterol (PROVENTIL HFA,VENTOLIN HFA) 90 mcg/act inhaler Inhale 2 puffs      atenolol (TENORMIN) 50 mg tablet Take 1 tablet (50 mg total) by mouth daily  Qty: 90 tablet, Refills: 1    Associated Diagnoses: Essential hypertension      cholecalciferol (VITAMIN D3) 1,000 units tablet Take 1,000 Units by mouth daily      cloNIDine (CATAPRES) 0 1 mg tablet Take 1 tablet (0 1 mg total) by mouth daily  Qty: 90 tablet, Refills: 1    Associated Diagnoses: Hypertension, unspecified type      zolpidem (AMBIEN) 5 mg tablet Take 1 tablet (5 mg total) by mouth daily at bedtime as needed for sleep  Qty: 90 tablet, Refills: 0    Associated Diagnoses: Insomnia, unspecified type             No discharge procedures on file      PDMP Review       Value Time User    PDMP Reviewed  Yes 8/9/2022 10:40 AM Kylee Blackburn DO          ED Provider  Electronically Signed by           Ghislaine Chaidez PA-C  11/13/22 0237

## 2022-11-13 NOTE — CASE MANAGEMENT
Case Management Assessment & Discharge Planning Note    Patient name Tana Manuel  Location [facility] 2 /[facility] 2 Annabelle Jimenez* MRN [id number]  : 1940 Date 2022       Current Admission Date: 2022  Current Admission Diagnosis:Acute respiratory failure with hypoxia [facility])   Patient Active Problem List    Diagnosis Date Noted   • Acute respiratory failure with hypoxia ([facility] [facility] ) 2022   • Sepsis ([facility] [address] ) 2022   • COVID 2022   • Severe protein-calorie malnutrition ([facility] [facility] ) 2022   • Benign hypertension with chronic kidney disease, stage III ([facility] ) 10/26/2022   • Stage 3b chronic kidney disease ([facility] [address] ) 2022   • Secondary hyperparathyroidism of renal origin ([facility] ) 2022   • Hyperuricemia 2022   • Insomnia 12/10/2019   • Essential hypertension 2017   • Gout 2017   • COPD (chronic obstructive pulmonary disease) ([facility] [address] ) 2017   • Esophagitis 2017   • Tobacco abuse 2017      LOS (days): 0  Geometric Mean LOS (GMLOS) (days):   Days to GMLOS:     OBJECTIVE:    Risk of Unplanned Readmission Score: 10 41         Current admission status: Inpatient       Preferred Pharmacy:   [facility] DR IVAN ACE [address] [place] [address]  Phone: [phone] Fax: [phone]    Primary Care Provider: Rafaela Kim DO    Primary Insurance: [facility]  Secondary Insurance:     ASSESSMENT:  Alphonso Scott Proxies    There are no active Health Care Proxies on file  Advance Directives  Does patient have a Health Care POA?: No  Was patient offered paperwork?: Yes (Emailed to pt's son at [email or web address])  Does patient currently have a Health Care decision maker?: Yes, please see Health Care Proxy section (Pt's son and daughter are next of kin per [place] 169)  Does patient have Advance Directives?: No  Was patient offered paperwork?: Yes (Emailed to pt's son at [email or web address])  Primary Contact: Luke Benson/Son ([phone])    Readmission Root Cause  30 Day Readmission: No    Patient Information  Admitted from[de-identified] Home  Mental Status: Alert  During Assessment patient was accompanied by: Son  Assessment information provided by[de-identified] Son  Primary Caregiver: Self  Support Systems: Son, Daughter  [place] of Residence: [facility] do you live in?: [address] entry access options   Select all that apply : Stairs  Number of steps to enter home : One Flight  Do the steps have railings?: Yes  Type of Current Residence: Apartment  Floor Level: 2  Upon entering residence, is there a bedroom on the main floor (no further steps)?: Yes  Upon entering residence, is there a bathroom on the main floor (no further steps)?: Yes  In the last 12 months, was there a time when you were not able to pay the mortgage or rent on time?: No  In the last 12 months, was there a time when you did not have a steady place to sleep or slept in a shelter (including now)?: No  Homeless/housing insecurity resource given?: N/A  Living Arrangements: Lives Alone (Lives in an attic apartment)    Activities of Daily Living Prior to Admission  Functional Status: Independent  Completes ADLs independently?: Yes  Ambulates independently?: Yes  Does patient use assisted devices?: Yes  Assisted Devices (DME) used: [employer]  Does patient currently own DME?: Yes  What DME does the patient currently own?: [employer]  Does patient have a history of Outpatient Therapy (PT/OT)?: No  Does the patient have a history of Short-Term Rehab?: No  Does patient have a history of HHC?: Yes ([facility])  Does patient currently have [address]?: No    Patient Information Continued  Income Source: Pension/correction  Does patient have prescription coverage?: Yes  Within the past 12 months, you worried that your food would run out before you got the money to buy more : Never true  Within the past 12 months, the food you bought just didn't last and you didn't have money to get more : Never true  Food insecurity resource given?: N/A  Does patient receive dialysis treatments?: No  Does patient have a history of substance abuse?: No  Does patient have a history of Mental Health Diagnosis?: No    Means of Transportation  Means of Transport to Appts[de-identified] Family transport  In the past 12 months, has lack of transportation kept you from medical appointments or from getting medications?: No  In the past 12 months, has lack of transportation kept you from meetings, work, or from getting things needed for daily living?: No  Was application for public transport provided?: N/A    DISCHARGE DETAILS:    Discharge planning discussed with[de-identified] Pt's sonMelissa Agent of Choice: Yes  Comments - Freedom of Choice: Awaiting PT/OT recommendations    CM contacted family/caregiver?: Yes (Assessment completed by son over phone)  Were Treatment Team discharge recommendations reviewed with patient/caregiver?: Yes  Did patient/caregiver verbalize understanding of patient care needs?: Yes  Were patient/caregiver advised of the risks associated with not following Treatment Team discharge recommendations?: Yes    Contacts  Patient Contacts: Norma Benson/Son  Relationship to Patient[de-identified] Family  Contact Method: Phone  Phone Number: [phone]  Reason/Outcome: Continuity of Care, Emergency Contact, Discharge Planning    DME Referral Provided  Referral made for DME?: No    Other Referral/Resources/Interventions Provided:  Interventions: None Indicated    Treatment Team Recommendation: Home  Discharge Destination Plan[de-identified] Home  Transport at Discharge : Family

## 2022-11-13 NOTE — ED NOTES
Patient placed on 4 L NC, per PA-C to call RT for BIPAP   Pt's pulse ox increased to 92%     Preethi Douglas, RN  11/12/22 200 Tia Gould RN  11/12/22 2208

## 2022-11-13 NOTE — ED NOTES
BiPap removed at this time and pt placed back onto 6 L NC  Pulse ox remains 100% at this time   Pt denies pain     Thurman Favre, RN  11/13/22 4211

## 2022-11-13 NOTE — PLAN OF CARE
Problem: Potential for Falls  Goal: Patient will remain free of falls  Description: INTERVENTIONS:  - Educate patient/family on patient safety including physical limitations  - Instruct patient to call for assistance with activity   - Consult OT/PT to assist with strengthening/mobility   - Keep Call bell within reach  - Keep bed low and locked with side rails adjusted as appropriate  - Keep care items and personal belongings within reach  - Initiate and maintain comfort rounds  - Make Fall Risk Sign visible to staff  - Apply yellow socks and bracelet for high fall risk patients  - Consider moving patient to room near nurses station  Outcome: Progressing     Problem: MOBILITY - ADULT  Goal: Maintain or return to baseline ADL function  Description: INTERVENTIONS:  -  Assess patient's ability to carry out ADLs; assess patient's baseline for ADL function and identify physical deficits which impact ability to perform ADLs (bathing, care of mouth/teeth, toileting, grooming, dressing, etc )  - Assess/evaluate cause of self-care deficits   - Assess range of motion  - Assess patient's mobility; develop plan if impaired  - Assess patient's need for assistive devices and provide as appropriate  - Encourage maximum independence but intervene and supervise when necessary  - Involve family in performance of ADLs  - Assess for home care needs following discharge   - Consider OT consult to assist with ADL evaluation and planning for discharge  - Provide patient education as appropriate  Outcome: Progressing  Goal: Maintains/Returns to pre admission functional level  Description: INTERVENTIONS:  - Perform BMAT or MOVE assessment daily    - Set and communicate daily mobility goal to care team and patient/family/caregiver     - Collaborate with rehabilitation services on mobility goals if consulted  - Out of bed for toileting  - Record patient progress and toleration of activity level   Outcome: Progressing     Problem: RESPIRATORY - ADULT  Goal: Achieves optimal ventilation and oxygenation  Description: INTERVENTIONS:  - Assess for changes in respiratory status  - Assess for changes in mentation and behavior  - Position to facilitate oxygenation and minimize respiratory effort  - Oxygen administered by appropriate delivery if ordered  - Initiate smoking cessation education as indicated  - Encourage broncho-pulmonary hygiene including cough, deep breathe, Incentive Spirometry  - Assess the need for suctioning and aspirate as needed  - Assess and instruct to report SOB or any respiratory difficulty  - Respiratory Therapy support as indicated  Outcome: Progressing     Problem: HEMATOLOGIC - ADULT  Goal: Maintains hematologic stability  Description: INTERVENTIONS  - Assess for signs and symptoms of bleeding or hemorrhage  - Monitor labs  - Administer supportive blood products/factors as ordered and appropriate  Outcome: Progressing     Problem: MUSCULOSKELETAL - ADULT  Goal: Maintain or return mobility to safest level of function  Description: INTERVENTIONS:  - Assess patient's ability to carry out ADLs; assess patient's baseline for ADL function and identify physical deficits which impact ability to perform ADLs (bathing, care of mouth/teeth, toileting, grooming, dressing, etc )  - Assess/evaluate cause of self-care deficits   - Assess range of motion  - Assess patient's mobility  - Assess patient's need for assistive devices and provide as appropriate  - Encourage maximum independence but intervene and supervise when necessary  - Involve family in performance of ADLs  - Assess for home care needs following discharge   - Consider OT consult to assist with ADL evaluation and planning for discharge  - Provide patient education as appropriate  Outcome: Progressing     Problem: PAIN - ADULT  Goal: Verbalizes/displays adequate comfort level or baseline comfort level  Description: Interventions:  - Encourage patient to monitor pain and request assistance  - Assess pain using appropriate pain scale  - Administer analgesics based on type and severity of pain and evaluate response  - Implement non-pharmacological measures as appropriate and evaluate response  - Consider cultural and social influences on pain and pain management  - Notify physician/advanced practitioner if interventions unsuccessful or patient reports new pain  Outcome: Progressing     Problem: INFECTION - ADULT  Goal: Absence or prevention of progression during hospitalization  Description: INTERVENTIONS:  - Assess and monitor for signs and symptoms of infection  - Monitor lab/diagnostic results  - Monitor all insertion sites, i e  indwelling lines, tubes, and drains  - Monitor endotracheal if appropriate and nasal secretions for changes in amount and color  - [place] appropriate cooling/warming therapies per order  - Administer medications as ordered  - Instruct and encourage patient and family to use good hand hygiene technique  - Identify and instruct in appropriate isolation precautions for identified infection/condition  Outcome: Progressing     Problem: SAFETY ADULT  Goal: Patient will remain free of falls  Description: INTERVENTIONS:  - Educate patient/family on patient safety including physical limitations  - Instruct patient to call for assistance with activity   - Consult OT/PT to assist with strengthening/mobility   - Keep Call bell within reach  - Keep bed low and locked with side rails adjusted as appropriate  - Keep care items and personal belongings within reach  - Initiate and maintain comfort rounds  - Make Fall Risk Sign visible to staff  - Apply yellow socks and bracelet for high fall risk patients  - Consider moving patient to room near nurses station  Outcome: Progressing  Goal: Maintain or return to baseline ADL function  Description: INTERVENTIONS:  -  Assess patient's ability to carry out ADLs; assess patient's baseline for ADL function and identify physical deficits which impact ability to perform ADLs (bathing, care of mouth/teeth, toileting, grooming, dressing, etc )  - Assess/evaluate cause of self-care deficits   - Assess range of motion  - Assess patient's mobility; develop plan if impaired  - Assess patient's need for assistive devices and provide as appropriate  - Encourage maximum independence but intervene and supervise when necessary  - Involve family in performance of ADLs  - Assess for home care needs following discharge   - Consider OT consult to assist with ADL evaluation and planning for discharge  - Provide patient education as appropriate  Outcome: Progressing  Goal: Maintains/Returns to pre admission functional level  Description: INTERVENTIONS:  - Perform BMAT or MOVE assessment daily    - Set and communicate daily mobility goal to care team and patient/family/caregiver     - Collaborate with rehabilitation services on mobility goals if consulted  - Out of bed for toileting  - Record patient progress and toleration of activity level   Outcome: Progressing     Problem: DISCHARGE PLANNING  Goal: Discharge to home or other facility with appropriate resources  Description: INTERVENTIONS:  - Identify barriers to discharge w/patient and caregiver  - Arrange for needed discharge resources and transportation as appropriate  - Identify discharge learning needs (meds, wound care, etc )  - Arrange for interpretive services to assist at discharge as needed  - Refer to Case Management Department for coordinating discharge planning if the patient needs post-hospital services based on physician/advanced practitioner order or complex needs related to functional status, cognitive ability, or social support system  Outcome: Progressing     Problem: Knowledge Deficit  Goal: Patient/family/caregiver demonstrates understanding of disease process, treatment plan, medications, and discharge instructions  Description: Complete learning assessment and assess knowledge base   Interventions:  - Provide teaching at level of understanding  - Provide teaching via preferred learning methods  Outcome: Progressing

## 2022-11-13 NOTE — ASSESSMENT & PLAN NOTE
-sudden onset shortness of breath earlier this evening  -in severe respiratory distress requiring BiPAP in the ER  -inspiratory and expiratory wheezing on exam (history of COPD) and also found to be COVID positive (has not been vaccinated)  -CTA PE Protocol: Some images are suboptimal secondary to respiratory motion, this decreases the sensitivity for evaluation of peripheral pulmonary emboli, subject to this, no pulmonary embolism is seen  Mild scattered pulmonary emphysematous changes  Coronary atherosclerosis  Shotty and asymmetrically prominent right axillary lymph nodes, nonspecific  Other findings as above  -COVID positive  -initial procalcitonin 0 10  -initial lactic 2 3-->2 6-->3 0  -titrated down to 3 L O2 in the ER with improvement in respiratory status with treatment of COPD with Solu-Medrol and nebulizer treatments  Plan  > admit to medicine on telemetry  Will start Decadron b i d  Per severe COVID protocol  Scheduled nebulizer treatments t i d  Consult pulmonology  Appreciate their recommendations with further covid medications     > Although procalcitonin negative, patient's increasing lactic acid somewhat concerning and thus will continue ceftriaxone and doxycyline  We will additionally will give 1L IVF bolus currently and repeat lactic acid as well as VBG at 0830 after the 1L finishes  Clinically, I went to check up on patient again with lactic up to 3 0 and patient is A&Ox3 and reports that he feels well and is on 4L O2 without tachypnea or any reported trouble breathing  He does look dehydrated with dry mucus membranes     > with regards to patient's elevated BNP, we will order a 2D echocardiogram to further evaluate, but patient appears hypovolemic on exam    > Continuous pulse ox  > Daily labs

## 2022-11-13 NOTE — H&P
Lennie 50  H&P- Spring Moreau 1940, [de-identified] y o  male MRN: [id number]  Unit/Bed#: [facility] 68 2 -01 Encounter: [id number]  Primary Care Provider: Yudelka Costello DO   Date and time admitted to hospital: 11/12/2022 10:31 PM    * Acute respiratory failure with hypoxia (HCC)  Assessment & Plan  -sudden onset shortness of breath earlier this evening  -in severe respiratory distress requiring BiPAP in the ER  -inspiratory and expiratory wheezing on exam (history of COPD) and also found to be COVID positive (has not been vaccinated)  -CTA PE Protocol: Some images are suboptimal secondary to respiratory motion, this decreases the sensitivity for evaluation of peripheral pulmonary emboli, subject to this, no pulmonary embolism is seen  Mild scattered pulmonary emphysematous changes  Coronary atherosclerosis  Shotty and asymmetrically prominent right axillary lymph nodes, nonspecific  Other findings as above  -COVID positive  -initial procalcitonin 0 10  -initial lactic 2 3-->2 6-->3 0  -titrated down to 3 L O2 in the ER with improvement in respiratory status with treatment of COPD with Solu-Medrol and nebulizer treatments  Plan  > admit to medicine on telemetry  Will start Decadron b i d  Per severe COVID protocol  Scheduled nebulizer treatments t i d  Consult pulmonology  Appreciate their recommendations with further covid medications     > Although procalcitonin negative, patient's increasing lactic acid somewhat concerning and thus will continue ceftriaxone and doxycyline  We will additionally will give 1L IVF bolus currently and repeat lactic acid as well as VBG at 0830 after the 1L finishes  Clinically, I went to check up on patient again with lactic up to 3 0 and patient is A&Ox3 and reports that he feels well and is on 4L O2 without tachypnea or any reported trouble breathing  He does look dehydrated with dry mucus membranes     > with regards to patient's elevated BNP, we will order a 2D echocardiogram to further evaluate, but patient appears hypovolemic on exam    > Continuous pulse ox  > Daily labs    Sepsis ([facility] )  Assessment & Plan  -, RR 24, Covid positive  -Elevated lactic acid as well as hypoxic respiratory failure   -CTA chest above  -UA negative for infection  Plan:  >As above in Hypoxic respiratory failure section  F/u blood cultures drawn in ER  Severe protein-calorie malnutrition ([facility] )  Assessment & Plan  Malnutrition Findings: BMI 16, several muscle wasting   Plan:  >Dietary consultation                                BMI Findings: There is no height or weight on file to calculate BMI  COVID  Assessment & Plan  See hypoxic respiratory failure section above    Stage 3b chronic kidney disease [facility])  Assessment & Plan  Lab Results   Component Value Date    EGFR 40 11/12/2022    EGFR 41 10/20/2022    EGFR 38 03/16/2022    CREATININE 1 58 (H) 11/12/2022    CREATININE 1 57 (H) 10/20/2022    CREATININE 1 65 (H) 03/16/2022     -Cr at baseline   Plan:  >Avoid nephrotoxic agents and hypotensive episodes  >Daily BMP    COPD (chronic obstructive pulmonary disease) ([facility] )  Assessment & Plan  See hypoxic respiratory failure above           VTE Prophylaxis: Heparin  / sequential compression device   Code Status: Level 1 - Full Code       Anticipated Length of Stay:  Patient will be admitted on an Inpatient basis with an anticipated length of stay of  < 2 midnights  Justification for Hospital Stay: Please see detailed plans noted above  Chief Complaint:     Hypoxic respriatory failure, covid, copd   History of Present Illness:  Marifer Leon is a [de-identified] y o  male who has past medical history significant for COPD, Tobacco Abuse, CKD who presented to [address] [place] [address] ER on the AM of 11/13 with symptoms of acute onset SOB that began about 1 hour prior to arrival in the ER  Patient reported that about 1 hour prior to coming to the ER he had sob that the symptoms   Patient reports that the symptoms have worsened throughout the day  Patient does report a productive cough throughout the day  Patient denies any recorded fevers  Patient does report decreased po intake over the last several days  Oracio Cartagena does reports a h/o copd and reports that he uses his inhalers but denies ever having had to be hospitalized for copd exacerbation  Oracio Cartagena denies any chest pain on my exam        Review of Systems:    Constitutional:  Denies fever or chills   Eyes:  Denies change in visual acuity   HENT:  Denies nasal congestion or sore throat   Respiratory:  Positive for sob   Cardiovascular:  Denies chest pain or edema   GI:  Denies abdominal pain or bloody stools  :  Denies dysuria   Musculoskeletal:  Denies back pain or joint pain   Integument:  Denies rash   Neurologic:  Denies headache or sensory changes   Endocrine:  Denies polyuria or polydipsia   Lymphatic:  Denies swollen glands   Psychiatric:  Denies depression or anxiety     Past Medical and Surgical History:   Past Medical History:   Diagnosis Date   • Abnormal thyroid function test     non specified /  LA    1/8/13     • Chronic kidney disease    • COPD (chronic obstructive pulmonary disease) (HCC)    • Eczema     LA   11/26/13      • Esophagitis    • Gout    • Hypertension    • Pyloric channel ulcer      Past Surgical History:   Procedure Laterality Date   • BACK SURGERY     • ESOPHAGOGASTRODUODENOSCOPY N/A 12/1/2016    Procedure: ESOPHAGOGASTRODUODENOSCOPY (EGD); Surgeon: Lucia Eisenberg MD;  Location: [facility];   Service:    • EYE SURGERY         Meds/Allergies:  Medications Prior to Admission   Medication Sig Dispense Refill Last Dose   • albuterol (PROVENTIL HFA,VENTOLIN HFA) 90 mcg/act inhaler Inhale 2 puffs      • atenolol (TENORMIN) 50 mg tablet Take 1 tablet (50 mg total) by mouth daily 90 tablet 1    • cholecalciferol (VITAMIN D3) 1,000 units tablet Take 1,000 Units by mouth daily      • cloNIDine (CATAPRES) 0 1 mg tablet Take 1 tablet (0 1 mg total) by mouth daily 90 tablet 1    • zolpidem (AMBIEN) 5 mg tablet Take 1 tablet (5 mg total) by mouth daily at bedtime as needed for sleep 90 tablet 0        Allergies: Allergies   Allergen Reactions   • Norvasc [Amlodipine]        History:  Marital Status:      Substance Use History:   Social History     Substance and Sexual Activity   Alcohol Use Not Currently     Social History     Tobacco Use   Smoking Status Current Every Day Smoker   • Packs/day: 0 50   • Years: 60 00   • Pack years: 30 00   • Types: Cigarettes   Smokeless Tobacco Current User   Tobacco Comment    current some day smoker per allscript     Social History     Substance and Sexual Activity   Drug Use No       Family History:  Family History   Problem Relation Age of Onset   • Other Mother         Dec in her 66's (gangrene foot)   • Heart failure Mother    • Diabetes type II Mother    • Peripheral vascular disease Mother    • Other Family         situs inversus / Dec 90 yo   • Rectal cancer Father        Physical Exam:     Vitals:   Blood Pressure: 126/77 (11/13/22 0507)  Pulse: 82 (11/13/22 0507)  Temperature: 98 3 °F (36 8 °C) (11/12/22 2238)  Temp Source: Oral (11/12/22 2238)  Respirations: 15 (11/13/22 0507)  SpO2: 100 % (11/13/22 0507)    Constitutional:  A&Ox4, Answers questions when prompted  Eyes:  EOMI, No scleral icterus   HENT:   oropharynx moist, external ears normal, external nose normal   Respiratory:  Diffuse expiratory wheezing  Cardiovascular:  Normal rate, no murmurs   GI:  Soft, nondistended, no guarding   :  No costovertebral angle tenderness   Musculoskeletal:  Severe cachexia with muscle wasting of the extremities  Integument:  no jaundice, no rash   Neurologic:  Alert &awake, communicative, CN 2-12 normal,  no focal deficits noted         Lab Results: I have personally reviewed pertinent reports        Results from last 7 days   Lab Units 11/12/22  2247   WBC Thousand/uL 11 78*   HEMOGLOBIN g/dL 14 3 HEMATOCRIT % 44 3   PLATELETS Thousands/uL 281   NEUTROS PCT % 67   LYMPHS PCT % 19   MONOS PCT % 11   EOS PCT % 3     Results from last 7 days   Lab Units 11/12/22  2247   POTASSIUM mmol/L 4 5   CHLORIDE mmol/L 101   CO2 mmol/L 28   BUN mg/dL 28*   CREATININE mg/dL 1 58*   CALCIUM mg/dL 9 1   ALK PHOS U/L 118*   ALT U/L 24     Results from last 7 days   Lab Units 11/12/22  2247   INR  0 99         Imaging: I have personally reviewed pertinent reports  CTA ED chest PE Study    Result Date: 11/13/2022  Narrative: CTA - CHEST WITH IV CONTRAST - PULMONARY ANGIOGRAM INDICATION:   Pulmonary embolism (PE) suspected, positive D-dimer Shortness of breath, tachycardia, sudden onset  Positive D-dimer  Patient has confirmed COVID-19  COMPARISON: Chest x-ray dated the same TECHNIQUE: CTA examination of the chest was performed using angiographic technique according to a protocol specifically tailored to evaluate for pulmonary embolism  Axial, sagittal, and coronal 2D reformatted images were created from the source data and  submitted for interpretation  In addition, coronal 3D MIP postprocessing was performed on the acquisition scanner  Radiation dose length product (DLP) for this visit:  147 04 mGy-cm   This examination, like all CT scans performed in the [facility], was performed utilizing techniques to minimize radiation dose exposure, including the use of iterative  reconstruction and automated exposure control  IV Contrast:  70 mL of iohexol (OMNIPAQUE)  FINDINGS: PULMONARY ARTERIAL TREE:  Some images are suboptimal secondary to respiratory motion, this decreases the sensitivity for evaluation of peripheral pulmonary emboli, subject to this, no pulmonary embolism is seen  LUNGS/PLEURA:  Mild scattered pulmonary emphysematous changes  Mild biapical pleural/parenchymal scarring  Lungs otherwise appear grossly clear  HEART/GREAT VESSELS:  Moderate coronary atherosclerosis    Heart appears normal in size  Mild calcification of the aorta  Ectasia of the proximal aorta with aortic diameter measuring approximately 3 3 cm in size  MEDIASTINUM AND MARILU:  Unremarkable  CHEST WALL AND LOWER NECK:   Shotty and asymmetrically prominent right axillary lymph nodes, nonspecific, largest measures approximately 1 cm in short axis dimensions  VISUALIZED STRUCTURES IN THE UPPER ABDOMEN:  Grossly unremarkable  OSSEOUS STRUCTURES:  No acute fracture or destructive osseous lesion  Impression: Some images are suboptimal secondary to respiratory motion, this decreases the sensitivity for evaluation of peripheral pulmonary emboli, subject to this, no pulmonary embolism is seen  Mild scattered pulmonary emphysematous changes Coronary atherosclerosis  Shotty and asymmetrically prominent right axillary lymph nodes, nonspecific  Other findings as above  Workstation performed: [id number]       Total time for visit, including counseling/coordination of care: 45 minutes  Greater than 50% of this total time spent on direct patient counseling and coorination of care  Epic Records Reviewed as well as Records in Care Everywhere    ** Please Note: Dragon 360 Dictation voice to text software was used in the creation of this document   **

## 2022-11-14 ENCOUNTER — APPOINTMENT (INPATIENT)
Dept: NON INVASIVE DIAGNOSTICS | Facility: HOSPITAL | Age: 82
End: 2022-11-14

## 2022-11-14 LAB
ANION GAP SERPL CALCULATED.3IONS-SCNC: 11 MMOL/L (ref 4–13)
APICAL FOUR CHAMBER EJECTION FRACTION: 55 %
BUN SERPL-MCNC: 32 MG/DL (ref 5–25)
CALCIUM SERPL-MCNC: 8.9 MG/DL (ref 8.3–10.1)
CHLORIDE SERPL-SCNC: 105 MMOL/L (ref 96–108)
CO2 SERPL-SCNC: 23 MMOL/L (ref 21–32)
CREAT SERPL-MCNC: 1.75 MG/DL (ref 0.6–1.3)
CRP SERPL QL: 8.4 MG/L
E WAVE DECELERATION TIME: 149 MS
ERYTHROCYTE [DISTWIDTH] IN BLOOD BY AUTOMATED COUNT: 12.5 % (ref 11.6–15.1)
GFR SERPL CREATININE-BSD FRML MDRD: 35 ML/MIN/1.73SQ M
GLUCOSE SERPL-MCNC: 135 MG/DL (ref 65–140)
HCT VFR BLD AUTO: 36.8 % (ref 36.5–49.3)
HGB BLD-MCNC: 12.2 G/DL (ref 12–17)
LACTATE SERPL-SCNC: 1.7 MMOL/L (ref 0.5–2)
MCH RBC QN AUTO: 29.6 PG (ref 26.8–34.3)
MCHC RBC AUTO-ENTMCNC: 33.2 G/DL (ref 31.4–37.4)
MCV RBC AUTO: 89 FL (ref 82–98)
MV E'TISSUE VEL-SEP: 11 CM/S
MV PEAK A VEL: 0.8 M/S
MV PEAK E VEL: 36 CM/S
MV STENOSIS PRESSURE HALF TIME: 43 MS
MV VALVE AREA P 1/2 METHOD: 5.12
PLATELET # BLD AUTO: 253 THOUSANDS/UL (ref 149–390)
PMV BLD AUTO: 9.4 FL (ref 8.9–12.7)
POTASSIUM SERPL-SCNC: 4.1 MMOL/L (ref 3.5–5.3)
PROCALCITONIN SERPL-MCNC: 0.25 NG/ML
RBC # BLD AUTO: 4.12 MILLION/UL (ref 3.88–5.62)
SL CV LV EF: 55
SODIUM SERPL-SCNC: 139 MMOL/L (ref 135–147)
WBC # BLD AUTO: 18.73 THOUSAND/UL (ref 4.31–10.16)

## 2022-11-14 RX ORDER — LEVALBUTEROL 1.25 MG/.5ML
1.25 SOLUTION, CONCENTRATE RESPIRATORY (INHALATION) EVERY 6 HOURS PRN
Status: DISCONTINUED | OUTPATIENT
Start: 2022-11-14 | End: 2022-11-15 | Stop reason: HOSPADM

## 2022-11-14 RX ORDER — POLYETHYLENE GLYCOL 3350 17 G/17G
17 POWDER, FOR SOLUTION ORAL DAILY
Status: DISCONTINUED | OUTPATIENT
Start: 2022-11-15 | End: 2022-11-15 | Stop reason: HOSPADM

## 2022-11-14 RX ADMIN — HEPARIN SODIUM 5000 UNITS: 5000 INJECTION INTRAVENOUS; SUBCUTANEOUS at 05:36

## 2022-11-14 RX ADMIN — HEPARIN SODIUM 5000 UNITS: 5000 INJECTION INTRAVENOUS; SUBCUTANEOUS at 21:30

## 2022-11-14 RX ADMIN — REMDESIVIR 100 MG: 100 INJECTION, POWDER, LYOPHILIZED, FOR SOLUTION INTRAVENOUS at 07:47

## 2022-11-14 RX ADMIN — HEPARIN SODIUM 5000 UNITS: 5000 INJECTION INTRAVENOUS; SUBCUTANEOUS at 14:53

## 2022-11-14 RX ADMIN — DEXAMETHASONE SODIUM PHOSPHATE 4.44 MG: 4 INJECTION INTRA-ARTICULAR; INTRALESIONAL; INTRAMUSCULAR; INTRAVENOUS; SOFT TISSUE at 16:31

## 2022-11-14 RX ADMIN — LABETALOL HYDROCHLORIDE 10 MG: 5 INJECTION, SOLUTION INTRAVENOUS at 11:00

## 2022-11-14 RX ADMIN — ZOLPIDEM TARTRATE 5 MG: 5 TABLET, COATED ORAL at 22:28

## 2022-11-14 RX ADMIN — ATENOLOL 50 MG: 50 TABLET ORAL at 07:47

## 2022-11-14 RX ADMIN — DEXAMETHASONE SODIUM PHOSPHATE 4.44 MG: 4 INJECTION INTRA-ARTICULAR; INTRALESIONAL; INTRAMUSCULAR; INTRAVENOUS; SOFT TISSUE at 05:36

## 2022-11-14 NOTE — PLAN OF CARE
Problem: Potential for Falls  Goal: Patient will remain free of falls  Description: INTERVENTIONS:  - Educate patient/family on patient safety including physical limitations  - Instruct patient to call for assistance with activity   - Consult OT/PT to assist with strengthening/mobility   - Keep Call bell within reach  - Keep bed low and locked with side rails adjusted as appropriate  - Keep care items and personal belongings within reach  - Initiate and maintain comfort rounds  - Make Fall Risk Sign visible to staff  - Apply yellow socks and bracelet for high fall risk patients  - Consider moving patient to room near nurses station  Outcome: Progressing     Problem: MOBILITY - ADULT  Goal: Maintain or return to baseline ADL function  Description: INTERVENTIONS:  -  Assess patient's ability to carry out ADLs; assess patient's baseline for ADL function and identify physical deficits which impact ability to perform ADLs (bathing, care of mouth/teeth, toileting, grooming, dressing, etc )  - Assess/evaluate cause of self-care deficits   - Assess range of motion  - Assess patient's mobility; develop plan if impaired  - Assess patient's need for assistive devices and provide as appropriate  - Encourage maximum independence but intervene and supervise when necessary  - Involve family in performance of ADLs  - Assess for home care needs following discharge   - Consider OT consult to assist with ADL evaluation and planning for discharge  - Provide patient education as appropriate  Outcome: Progressing  Goal: Maintains/Returns to pre admission functional level  Description: INTERVENTIONS:  - Perform BMAT or MOVE assessment daily    - Set and communicate daily mobility goal to care team and patient/family/caregiver     - Collaborate with rehabilitation services on mobility goals if consulted  - Out of bed for toileting  - Record patient progress and toleration of activity level   Outcome: Progressing     Problem: RESPIRATORY - ADULT  Goal: Achieves optimal ventilation and oxygenation  Description: INTERVENTIONS:  - Assess for changes in respiratory status  - Assess for changes in mentation and behavior  - Position to facilitate oxygenation and minimize respiratory effort  - Oxygen administered by appropriate delivery if ordered  - Initiate smoking cessation education as indicated  - Encourage broncho-pulmonary hygiene including cough, deep breathe, Incentive Spirometry  - Assess the need for suctioning and aspirate as needed  - Assess and instruct to report SOB or any respiratory difficulty  - Respiratory Therapy support as indicated  Outcome: Progressing     Problem: HEMATOLOGIC - ADULT  Goal: Maintains hematologic stability  Description: INTERVENTIONS  - Assess for signs and symptoms of bleeding or hemorrhage  - Monitor labs  - Administer supportive blood products/factors as ordered and appropriate  Outcome: Progressing     Problem: MUSCULOSKELETAL - ADULT  Goal: Maintain or return mobility to safest level of function  Description: INTERVENTIONS:  - Assess patient's ability to carry out ADLs; assess patient's baseline for ADL function and identify physical deficits which impact ability to perform ADLs (bathing, care of mouth/teeth, toileting, grooming, dressing, etc )  - Assess/evaluate cause of self-care deficits   - Assess range of motion  - Assess patient's mobility  - Assess patient's need for assistive devices and provide as appropriate  - Encourage maximum independence but intervene and supervise when necessary  - Involve family in performance of ADLs  - Assess for home care needs following discharge   - Consider OT consult to assist with ADL evaluation and planning for discharge  - Provide patient education as appropriate  Outcome: Progressing     Problem: PAIN - ADULT  Goal: Verbalizes/displays adequate comfort level or baseline comfort level  Description: Interventions:  - Encourage patient to monitor pain and request assistance  - Assess pain using appropriate pain scale  - Administer analgesics based on type and severity of pain and evaluate response  - Implement non-pharmacological measures as appropriate and evaluate response  - Consider cultural and social influences on pain and pain management  - Notify physician/advanced practitioner if interventions unsuccessful or patient reports new pain  Outcome: Progressing     Problem: INFECTION - ADULT  Goal: Absence or prevention of progression during hospitalization  Description: INTERVENTIONS:  - Assess and monitor for signs and symptoms of infection  - Monitor lab/diagnostic results  - Monitor all insertion sites, i e  indwelling lines, tubes, and drains  - Monitor endotracheal if appropriate and nasal secretions for changes in amount and color  - [place] appropriate cooling/warming therapies per order  - Administer medications as ordered  - Instruct and encourage patient and family to use good hand hygiene technique  - Identify and instruct in appropriate isolation precautions for identified infection/condition  Outcome: Progressing     Problem: SAFETY ADULT  Goal: Patient will remain free of falls  Description: INTERVENTIONS:  - Educate patient/family on patient safety including physical limitations  - Instruct patient to call for assistance with activity   - Consult OT/PT to assist with strengthening/mobility   - Keep Call bell within reach  - Keep bed low and locked with side rails adjusted as appropriate  - Keep care items and personal belongings within reach  - Initiate and maintain comfort rounds  - Make Fall Risk Sign visible to staff  - Apply yellow socks and bracelet for high fall risk patients  - Consider moving patient to room near nurses station  Outcome: Progressing  Goal: Maintain or return to baseline ADL function  Description: INTERVENTIONS:  -  Assess patient's ability to carry out ADLs; assess patient's baseline for ADL function and identify physical deficits which impact ability to perform ADLs (bathing, care of mouth/teeth, toileting, grooming, dressing, etc )  - Assess/evaluate cause of self-care deficits   - Assess range of motion  - Assess patient's mobility; develop plan if impaired  - Assess patient's need for assistive devices and provide as appropriate  - Encourage maximum independence but intervene and supervise when necessary  - Involve family in performance of ADLs  - Assess for home care needs following discharge   - Consider OT consult to assist with ADL evaluation and planning for discharge  - Provide patient education as appropriate  Outcome: Progressing  Goal: Maintains/Returns to pre admission functional level  Description: INTERVENTIONS:  - Perform BMAT or MOVE assessment daily    - Set and communicate daily mobility goal to care team and patient/family/caregiver     - Collaborate with rehabilitation services on mobility goals if consulted  - Out of bed for toileting  - Record patient progress and toleration of activity level   Outcome: Progressing     Problem: DISCHARGE PLANNING  Goal: Discharge to home or other facility with appropriate resources  Description: INTERVENTIONS:  - Identify barriers to discharge w/patient and caregiver  - Arrange for needed discharge resources and transportation as appropriate  - Identify discharge learning needs (meds, wound care, etc )  - Arrange for interpretive services to assist at discharge as needed  - Refer to Case Management Department for coordinating discharge planning if the patient needs post-hospital services based on physician/advanced practitioner order or complex needs related to functional status, cognitive ability, or social support system  Outcome: Progressing     Problem: Knowledge Deficit  Goal: Patient/family/caregiver demonstrates understanding of disease process, treatment plan, medications, and discharge instructions  Description: Complete learning assessment and assess knowledge base   Interventions:  - Provide teaching at level of understanding  - Provide teaching via preferred learning methods  Outcome: Progressing     Problem: Prexisting or High Potential for Compromised Skin Integrity  Goal: Skin integrity is maintained or improved  Description: INTERVENTIONS:  - Identify patients at risk for skin breakdown  - Assess and monitor skin integrity  - Assess and monitor nutrition and hydration status  - Monitor labs   - Assess for incontinence   - Turn and reposition patient  - Assist with mobility/ambulation  - Relieve pressure over bony prominences  - Avoid friction and shearing  - Provide appropriate hygiene as needed including keeping skin clean and dry  - Evaluate need for skin moisturizer/barrier cream  - Collaborate with interdisciplinary team   - Patient/family teaching  - Consider wound care consult   Outcome: Progressing     Problem: Nutrition/Hydration-ADULT  Goal: Nutrient/Hydration intake appropriate for improving, restoring or maintaining nutritional needs  Description: Monitor and assess patient's nutrition/hydration status for malnutrition  Collaborate with interdisciplinary team and initiate plan and interventions as ordered  Monitor patient's weight and dietary intake as ordered or per policy  Utilize nutrition screening tool and intervene as necessary  Determine patient's food preferences and provide high-protein, high-caloric foods as appropriate       INTERVENTIONS:  - Monitor oral intake, urinary output, labs, and treatment plans  - Assess nutrition and hydration status and recommend course of action  - Evaluate amount of meals eaten  - Assist patient with eating if necessary   - Allow adequate time for meals  - Recommend/ encourage appropriate diets, oral nutritional supplements, and vitamin/mineral supplements  - Order, calculate, and assess calorie counts as needed  - Recommend, monitor, and adjust tube feedings and TPN/PPN based on assessed needs  - Assess need for intravenous fluids  - Provide specific nutrition/hydration education as appropriate  - Include patient/family/caregiver in decisions related to nutrition  Outcome: Progressing

## 2022-11-14 NOTE — CASE MANAGEMENT
Case Management Discharge Planning Note    Patient name Melida Saldivar  Location [facility] /[facility] [address] Gagan Baird* MRN [id number]  : 1940 Date 2022       Current Admission Date: 2022  Current Admission Diagnosis:Acute respiratory failure with hypoxia [facility])   Patient Active Problem List    Diagnosis Date Noted   • [facility] ) 2022   • Sepsis ([facility] ) 2022   • COVID 2022   • Severe protein-calorie malnutrition ([facility] ) 2022   • Benign hypertension with chronic kidney disease, stage III ([facility] ) 10/26/2022   • Stage 3b chronic kidney disease ([facility] ) 2022   • Secondary hyperparathyroidism of renal origin ([place] [id number] ) 2022   • Hyperuricemia 2022   • Insomnia 12/10/2019   • Essential hypertension 2017   • Gout 2017   • COPD (chronic obstructive pulmonary disease) ([facility] ) 2017   • Esophagitis 2017   • Tobacco abuse 2017      LOS (days): 1  Geometric Mean LOS (GMLOS) (days):   Days to GMLOS:     OBJECTIVE:  Risk of Unplanned Readmission Score: 10 72         Current admission status: Inpatient   Preferred Pharmacy:   [address] [place] [address]  Phone: [phone] Fax: [phone]    Primary Care Provider: Gordon Maciel DO    Primary Insurance: [facility]  Secondary Insurance:     DISCHARGE DETAILS:     Additional Comments: Pt being recommended for OPPT at time of discharge, pt in agreement with recommendation  Dr Fidel Trevizo aware of same  CM department to remain available for additional discharge concerns or needs

## 2022-11-14 NOTE — UTILIZATION REVIEW
Initial Clinical Review    Admission: Date/Time/Statement:   Admission Orders (From admission, onward)     Ordered        11/13/22 0421  INPATIENT ADMISSION  Once                      Orders Placed This Encounter   Procedures   • INPATIENT ADMISSION     Standing Status:   Standing     Number of Occurrences:   1     Order Specific Question:   Level of Care     Answer:   Med Surg [16]     Order Specific Question:   Estimated length of stay     Answer:   More than 2 Midnights     Order Specific Question:   Certification     Answer:   I certify that inpatient services are medically necessary for this patient for a duration of greater than two midnights  See H&P and MD Progress Notes for additional information about the patient's course of treatment  ED Arrival Information     Expected   -    Arrival   11/12/2022 22:26    Acuity   Emergent            Means of arrival   Walk-In    Escorted by   Family Member    Service   Hospitalist    Admission type   Emergency            Arrival complaint   sob           Chief Complaint   Patient presents with   • Shortness of Breath     Sudden onset of sob starting tonight  States some cp when couldn't breathe  Some tobacco use  Reports no home O2  Initial Presentation: [de-identified] y o  male   To ER From home,  Admitted IP status medsurg level of care for management of  Acute resp failure w/hypoxia  2/2  COPD exacerbation     PMH:    Patient does reports a h/o copd and reports that he uses his inhalers but denies ever having had to be hospitalized for copd exacerbation  presented to [address] ER on the AM of 11/13 with symptoms of acute onset SOB that began about 1 hour prior to arrival in the ER  IN ER,   in severe respiratory distress requiring BiPAP,  inspiratory and expiratory wheezing on exam,  tachypnic and tachycardic  Tested COVID POS  CTAP suboptimal 2/2 respiratory motion, no pulmonary embolism is seen  Mild scattered pulmonary emphysematous changes  Coronary atherosclerosis  initial procalcitonin 0 10   initial lactic 2 3-->2 6-->3 0    titrated down to 3 L O2 in the ER with improvement in respiratory status following Solu-Medrol and nebulizer treatments  crt at baseline    Continue telemetry/continuous pulse oximetry  Will start Decadron b i d  Scheduled nebulizer treatments t i d  Consult pulmonology  Remdesivir IV  Although procalcitonin negative, patient's increasing lactic acid somewhat concerning and thus will continue ceftriaxone and doxycyline  We will additionally will give 1L IVF bolus currently and repeat lactic acid as well as VBG at 0830 after the 1L finishes  Due to elevated BNP  Obtain a 2D Echo  Continuous pulse oximetry  Malnutrition Findings: BMI 16, several muscle wasting     Order Nutrition consult; Follow WBC, VS, cultures    IN ER11/13  @  2230       Clinically, remains A&Ox3 ,  Tolerating 4L O2 without tachypnea or any reported trouble breathing  He does look dehydrated with dry mucus membranes       Date: 11/14       Day 2:     Patient's oxygen reduced until patient on RA, satting in  90%s, not in any respiratory distress  Patient states breathing feeling "good"  Need to monitor pulse ox thru noc to evaluate if home oxygen needed  Cont remdesivir and decadron         ED Triage Vitals   Temperature Pulse Respirations Blood Pressure SpO2   11/12/22 2238 11/12/22 2231 11/12/22 2231 11/12/22 2231 11/12/22 2231   98 3 °F (36 8 °C) (!) 109 (!) 24 (!) 179/105 94 %      Temp Source Heart Rate Source Patient Position - Orthostatic VS BP Location FiO2 (%)   11/12/22 2238 11/12/22 2231 11/12/22 2231 11/12/22 2231 --   Oral Monitor Sitting Left arm       Pain Score       11/13/22 1100       No Pain          Wt Readings from Last 1 Encounters:   10/26/22 44 5 kg (98 lb)     Additional Vital Signs:   11/14/22 10:57:26 -- 71 -- 193/95 Abnormal  128 94 % -- --   11/14/22 10:56:30 -- 73 -- 192/95 Abnormal  127 95 % -- --   11/14/22 08:02:49 98 2 °F  78 16 192/95 Abnormal  127 96 % -- --   11/14/22 03:30:19 -- 70 16 166/89 115 94 % -- --   11/13/22 22:51:20 -- 83 -- 180/88 Abnormal  119 94 % -- --   11/13/22 2223 -- -- -- -- -- -- -- None (Room air)   11/13/22 21:09:57 97 7 °F 89 16 171/87 Abnormal  115 93 % -- --   11/13/22 1930 -- -- -- -- -- 94 % -- --   11/13/22 14:32:38 97 6 °F  78 16 149/83 105 97 % -- --   11/13/22 1100 -- -- -- -- -- 96 % -- None (Room air)   11/13/22 0815 98 °F  104 18 130/72 -- 98 % 3 L/min Nasal cannula   11/13/22 0507 -- 82 15 126/77 96 100 % 3 L/min Nasal cannula   11/13/22 0437 -- 88 16 112/61 80 100 % 3 L/min Nasal cannula   11/13/22 0345 -- 87 20 128/67 90 100 % 3 L/min Nasal cannula   11/13/22 0245 -- 122 Abnormal  44 Abnormal  117/66 84 100 % -- None (Room air)   11/13/22 0207 -- 114 Abnormal  22 118/66 86 100 % 3 L/min Nasal cannula   11/13/22 0119 -- 112 Abnormal  21 -- -- 98 % -- --   11/13/22 0107 -- 140 Abnormal  23 Abnormal  147/80 106 99 % -- Nasal cannula   11/13/22 0007 -- 152 Abnormal  29 Abnormal  136/86 107 100 % 6 L/min Nasal cannula   11/12/22 2337 -- 134 Abnormal  25 Abnormal  148/74 101 100 % -- BiPAP   11/12/22 2331 -- 120 Abnormal  24 Abnormal  173/88 Abnormal  119 100 % -- BiPAP   11/12/22 2253 -- -- -- -- -- 100 % -- BiPAP    11/12/22 2242 -- 104 39 Abnormal  204/99 Abnormal  137 94 % -- --   11/12/22 2240 -- 106 Abnormal  33 Abnormal  241/116 Abnormal  166 90 % -- --   11/12/22 2238 98 3 °F  107 Abnormal  29 Abnormal  191/109 Abnormal  143 86 % Abnormal  -- None (Room air)         Pertinent Labs/Diagnostic Test Results:   11/11   EKG:  Sinus tachycardia  Septal infarct , age undetermined  Abnormal ECG    11/13  EKG :   Sinus tachycardia  Septal infarct (cited on or before 13-NOV-2022)  Abnormal ECG  When compared with ECG of 13-NOV-2022 01:06, (unconfirmed)  Sinus rhythm has replaced  suspected atrial flutter    11/13   EKG:   Unusual P axis, possible ectopic atrial tachycardia  or atrial flutter with two-to-one conduction  Septal infarct , age undetermined  Marked ST abnormality, possible inferior subendocardial injury  Abnormal ECG  When compared with ECG of 12-NOV-2022 23:44, (unconfirmed)  No significant change was found      CTA ED chest PE Study   Final Result by Мария Lui DO (06/54 2285)   Some images are suboptimal secondary to respiratory motion, this decreases the sensitivity for evaluation of peripheral pulmonary emboli, subject to this, no pulmonary embolism is seen  Mild scattered pulmonary emphysematous changes   Coronary atherosclerosis  Shotty and asymmetrically prominent right axillary lymph nodes, nonspecific  XR chest 1 view portable   ED Interpretation by Daxa Roa PA-C (11/12 [id number])   Hyperinflated  No infiltrate, cardiac silhouette normal, no pleural effusions or pulmonary edema  Final Result by Kari Silver MD (21/97 7626)   No acute cardiopulmonary disease             Results from last 7 days   Lab Units 11/12/22  2247   SARS-COV-2  Positive*     Results from last 7 days   Lab Units 11/14/22  0548 11/13/22  0959 11/12/22  2247   WBC Thousand/uL 18 73* 5 29 11 78*   HEMOGLOBIN g/dL 12 2 13 0 14 3   HEMATOCRIT % 36 8 39 9 44 3   PLATELETS Thousands/uL 253 231 281   NEUTROS ABS Thousands/µL  --  4 40 7 78*         Results from last 7 days   Lab Units 11/14/22  0548 11/13/22  0959 11/12/22  2247   SODIUM mmol/L 139 136 139   POTASSIUM mmol/L 4 1 4 8 4 5   CHLORIDE mmol/L 105 102 101   CO2 mmol/L 23 23 28   ANION GAP mmol/L 11 11 10   BUN mg/dL 32* 26* 28*   CREATININE mg/dL 1 75* 1 53* 1 58*   EGFR ml/min/1 73sq m 35 42 40   CALCIUM mg/dL 8 9 9 0 9 1   MAGNESIUM mg/dL  --  2 1  --      Results from last 7 days   Lab Units 11/13/22  0959 11/12/22  2247   AST U/L 28  --    ALT U/L 20 24   ALK PHOS U/L 99 118*   TOTAL PROTEIN g/dL 6 7 7 5   ALBUMIN g/dL 3 4* 3 9   TOTAL BILIRUBIN mg/dL 0 41 0 48     Results from last 7 days   Lab Units 11/13/22  2100 11/13/22  0708   POC GLUCOSE mg/dl 168* 172*     Results from last 7 days   Lab Units 11/14/22  0548 11/13/22  0959 11/12/22  2247   GLUCOSE RANDOM mg/dL 135 167* 133            Results from last 7 days   Lab Units 11/12/22  2247   PH SLICK  7 275*   PCO2 SLICK mm Hg 52 4*   PO2 SLICK mm Hg 19 1*   HCO3 SLICK mmol/L 23 8*   BASE EXC SLICK mmol/L -3 6   O2 CONTENT SLICK ml/dL 5 2   O2 HGB, VENOUS % 25 3*             Results from last 7 days   Lab Units 11/13/22  0251 11/13/22  0105 11/12/22  2247   HS TNI 0HR ng/L  --   --  11   HS TNI 2HR ng/L  --  17  --    HSTNI D2 ng/L  --  6  --    HS TNI 4HR ng/L 29  --   --    HSTNI D4 ng/L 18  --   --      Results from last 7 days   Lab Units 11/13/22  0959 11/12/22  2247   D-DIMER QUANTITATIVE ug/ml FEU 0 93* 1 32*     Results from last 7 days   Lab Units 11/13/22  0959 11/12/22  2247   PROTIME seconds 14 5 13 1   INR  1 12 0 99   PTT seconds 35 27         Results from last 7 days   Lab Units 11/14/22  0548 11/13/22  0959 11/12/22  2247   PROCALCITONIN ng/ml 0 25 0 19 0 10     Results from last 7 days   Lab Units 11/14/22  0548 11/13/22  1742 11/13/22  0959 11/13/22  0428 11/13/22  0207 11/12/22  2254   LACTIC ACID mmol/L 1 7 2 2* 2 9* 3 0* 2 6* 2 3*             Results from last 7 days   Lab Units 11/12/22  2247   NT-PRO BNP pg/mL 8,799*             Results from last 7 days   Lab Units 11/12/22  2247   LIPASE u/L 174     Results from last 7 days   Lab Units 11/14/22  0548   CRP mg/L 8 4*             Results from last 7 days   Lab Units 11/13/22  0250   CLARITY UA  Clear   COLOR UA  Yellow   SPEC GRAV UA  1 010   PH UA  6 0   GLUCOSE UA mg/dl Negative   KETONES UA mg/dl Negative   BLOOD UA  Negative   PROTEIN UA mg/dl Negative   NITRITE UA  Negative   BILIRUBIN UA  Negative   UROBILINOGEN UA E U /dl 0 2   LEUKOCYTES UA  Negative     Results from last 7 days   Lab Units 11/12/22  5045   INFLUENZA A PCR  Negative   INFLUENZA B PCR  Negative   RSV PCR  Negative     Results from last 7 days Lab Units 11/13/22  0959   BLOOD CULTURE  Received in Microbiology Lab  Culture in Progress  Received in Microbiology Lab  Culture in Progress  ED Treatment:   Medication Administration from 11/12/2022 2226 to 11/13/2022 4957    Date/Time Order Dose Route Action   11/12/2022 2251 magnesium sulfate 2 g/50 mL IVPB (premix) 2 g 2 g Intravenous New Bag   11/12/2022 2250 albuterol inhalation solution 10 mg 10 mg Nebulization Given   11/12/2022 2250 ipratropium (ATROVENT) 0 02 % inhalation solution 1 mg 1 mg Nebulization Given   11/12/2022 2251 sodium chloride 0 9 % inhalation solution 3 mL 3 mL Nebulization Given   11/12/2022 2250 methylPREDNISolone sodium succinate (Solu-MEDROL) injection 125 mg 125 mg Intravenous Given   11/13/2022 0242 zolpidem (AMBIEN) tablet 5 mg 5 mg Oral Given   11/13/2022 0309 sodium chloride 0 9 % bolus 500 mL 500 mL Intravenous New Bag        Past Medical History:   Diagnosis Date   • Abnormal thyroid function test     non specified /  LA    1/8/13     • Chronic kidney disease    • COPD (chronic obstructive pulmonary disease) (HCC)    • Eczema     LA   11/26/13      • Esophagitis    • Gout    • Hypertension    • Pyloric channel ulcer      Present on Admission:  • COPD (chronic obstructive pulmonary disease) (HCC)  • (Resolved) Stage 3a chronic kidney disease (HCC)  • Stage 3b chronic kidney disease (HCC)      Admitting Diagnosis: Shortness of breath [R06 02]  Lactic acidosis [E87 20]  Respiratory distress [R06 03]  Axillary lymphadenopathy [R59 0]  COPD with acute exacerbation (HCC) [J44 1]  COVID-19 [U07 1]  Age/Sex: [de-identified] y o  male     Admission Orders:    scd's b/l LE;  PT/OT ;  Hour[employer] inc spirometry; Continuous pulse oximetry   Scheduled Medications:  atenolol, 50 mg, Oral, Daily  dexamethasone, 0 1 mg/kg, Intravenous, Q12H  heparin (porcine), 5,000 Units, Subcutaneous, Q8H [address]  remdesivir, 100 mg, Intravenous, Q24H      Continuous IV Infusions:     PRN Meds:  acetaminophen, 650 mg, Oral, Q6H PRN  ipratropium, 0 5 mg, Nebulization, Q6H PRN  labetalol, 10 mg, Intravenous, Q6H PRN  levalbuterol, 1 25 mg, Nebulization, Q6H PRN  zolpidem, 5 mg, Oral, HS PRN        IP CONSULT TO NUTRITION SERVICES    Network Utilization Review Department  ATTENTION: Please call with any questions or concerns to [phone] and carefully listen to the prompts so that you are directed to the right person  All voicemails are confidential   Karely Angeles all requests for admission clinical reviews, approved or denied determinations and any other requests to dedicated fax number below belonging to the campus where the patient is receiving treatment   List of dedicated fax numbers for the Facilities:  [address] DENIALS (Administrative/Medical Necessity) [phone]   [address] (Maternity/NICU/Pediatrics) [phone]   [address] [phone]   [facility] [place] 77 [phone]   [address] [facility] 28 [phone]   [address] [facility] [place] [facility] 815 [facility] [phone]

## 2022-11-14 NOTE — PLAN OF CARE
Problem: Potential for Falls  Goal: Patient will remain free of falls  Description: INTERVENTIONS:  - Educate patient/family on patient safety including physical limitations  - Instruct patient to call for assistance with activity   - Consult OT/PT to assist with strengthening/mobility   - Keep Call bell within reach  - Keep bed low and locked with side rails adjusted as appropriate  - Keep care items and personal belongings within reach  - Initiate and maintain comfort rounds  - Make Fall Risk Sign visible to staff  - Apply yellow socks and bracelet for high fall risk patients  - Consider moving patient to room near nurses station  Outcome: Progressing     Problem: MOBILITY - ADULT  Goal: Maintain or return to baseline ADL function  Description: INTERVENTIONS:  -  Assess patient's ability to carry out ADLs; assess patient's baseline for ADL function and identify physical deficits which impact ability to perform ADLs (bathing, care of mouth/teeth, toileting, grooming, dressing, etc )  - Assess/evaluate cause of self-care deficits   - Assess range of motion  - Assess patient's mobility; develop plan if impaired  - Assess patient's need for assistive devices and provide as appropriate  - Encourage maximum independence but intervene and supervise when necessary  - Involve family in performance of ADLs  - Assess for home care needs following discharge   - Consider OT consult to assist with ADL evaluation and planning for discharge  - Provide patient education as appropriate  Outcome: Progressing  Goal: Maintains/Returns to pre admission functional level  Description: INTERVENTIONS:  - Perform BMAT or MOVE assessment daily    - Set and communicate daily mobility goal to care team and patient/family/caregiver     - Collaborate with rehabilitation services on mobility goals if consulted  - Out of bed for toileting  - Record patient progress and toleration of activity level   Outcome: Progressing     Problem: RESPIRATORY - ADULT  Goal: Achieves optimal ventilation and oxygenation  Description: INTERVENTIONS:  - Assess for changes in respiratory status  - Assess for changes in mentation and behavior  - Position to facilitate oxygenation and minimize respiratory effort  - Oxygen administered by appropriate delivery if ordered  - Initiate smoking cessation education as indicated  - Encourage broncho-pulmonary hygiene including cough, deep breathe, Incentive Spirometry  - Assess the need for suctioning and aspirate as needed  - Assess and instruct to report SOB or any respiratory difficulty  - Respiratory Therapy support as indicated  Outcome: Progressing     Problem: HEMATOLOGIC - ADULT  Goal: Maintains hematologic stability  Description: INTERVENTIONS  - Assess for signs and symptoms of bleeding or hemorrhage  - Monitor labs  - Administer supportive blood products/factors as ordered and appropriate  Outcome: Progressing     Problem: MUSCULOSKELETAL - ADULT  Goal: Maintain or return mobility to safest level of function  Description: INTERVENTIONS:  - Assess patient's ability to carry out ADLs; assess patient's baseline for ADL function and identify physical deficits which impact ability to perform ADLs (bathing, care of mouth/teeth, toileting, grooming, dressing, etc )  - Assess/evaluate cause of self-care deficits   - Assess range of motion  - Assess patient's mobility  - Assess patient's need for assistive devices and provide as appropriate  - Encourage maximum independence but intervene and supervise when necessary  - Involve family in performance of ADLs  - Assess for home care needs following discharge   - Consider OT consult to assist with ADL evaluation and planning for discharge  - Provide patient education as appropriate  Outcome: Progressing     Problem: PAIN - ADULT  Goal: Verbalizes/displays adequate comfort level or baseline comfort level  Description: Interventions:  - Encourage patient to monitor pain and request assistance  - Assess pain using appropriate pain scale  - Administer analgesics based on type and severity of pain and evaluate response  - Implement non-pharmacological measures as appropriate and evaluate response  - Consider cultural and social influences on pain and pain management  - Notify physician/advanced practitioner if interventions unsuccessful or patient reports new pain  Outcome: Progressing     Problem: INFECTION - ADULT  Goal: Absence or prevention of progression during hospitalization  Description: INTERVENTIONS:  - Assess and monitor for signs and symptoms of infection  - Monitor lab/diagnostic results  - Monitor all insertion sites, i e  indwelling lines, tubes, and drains  - Monitor endotracheal if appropriate and nasal secretions for changes in amount and color  - [place] appropriate cooling/warming therapies per order  - Administer medications as ordered  - Instruct and encourage patient and family to use good hand hygiene technique  - Identify and instruct in appropriate isolation precautions for identified infection/condition  Outcome: Progressing     Problem: SAFETY ADULT  Goal: Patient will remain free of falls  Description: INTERVENTIONS:  - Educate patient/family on patient safety including physical limitations  - Instruct patient to call for assistance with activity   - Consult OT/PT to assist with strengthening/mobility   - Keep Call bell within reach  - Keep bed low and locked with side rails adjusted as appropriate  - Keep care items and personal belongings within reach  - Initiate and maintain comfort rounds  - Make Fall Risk Sign visible to staff  - Apply yellow socks and bracelet for high fall risk patients  - Consider moving patient to room near nurses station  Outcome: Progressing  Goal: Maintain or return to baseline ADL function  Description: INTERVENTIONS:  -  Assess patient's ability to carry out ADLs; assess patient's baseline for ADL function and identify physical deficits which impact ability to perform ADLs (bathing, care of mouth/teeth, toileting, grooming, dressing, etc )  - Assess/evaluate cause of self-care deficits   - Assess range of motion  - Assess patient's mobility; develop plan if impaired  - Assess patient's need for assistive devices and provide as appropriate  - Encourage maximum independence but intervene and supervise when necessary  - Involve family in performance of ADLs  - Assess for home care needs following discharge   - Consider OT consult to assist with ADL evaluation and planning for discharge  - Provide patient education as appropriate  Outcome: Progressing  Goal: Maintains/Returns to pre admission functional level  Description: INTERVENTIONS:  - Perform BMAT or MOVE assessment daily    - Set and communicate daily mobility goal to care team and patient/family/caregiver     - Collaborate with rehabilitation services on mobility goals if consulted  - Out of bed for toileting  - Record patient progress and toleration of activity level   Outcome: Progressing     Problem: DISCHARGE PLANNING  Goal: Discharge to home or other facility with appropriate resources  Description: INTERVENTIONS:  - Identify barriers to discharge w/patient and caregiver  - Arrange for needed discharge resources and transportation as appropriate  - Identify discharge learning needs (meds, wound care, etc )  - Arrange for interpretive services to assist at discharge as needed  - Refer to Case Management Department for coordinating discharge planning if the patient needs post-hospital services based on physician/advanced practitioner order or complex needs related to functional status, cognitive ability, or social support system  Outcome: Progressing     Problem: Knowledge Deficit  Goal: Patient/family/caregiver demonstrates understanding of disease process, treatment plan, medications, and discharge instructions  Description: Complete learning assessment and assess knowledge base   Interventions:  - Provide teaching at level of understanding  - Provide teaching via preferred learning methods  Outcome: Progressing     Problem: Prexisting or High Potential for Compromised Skin Integrity  Goal: Skin integrity is maintained or improved  Description: INTERVENTIONS:  - Identify patients at risk for skin breakdown  - Assess and monitor skin integrity  - Assess and monitor nutrition and hydration status  - Monitor labs   - Assess for incontinence   - Turn and reposition patient  - Assist with mobility/ambulation  - Relieve pressure over bony prominences  - Avoid friction and shearing  - Provide appropriate hygiene as needed including keeping skin clean and dry  - Evaluate need for skin moisturizer/barrier cream  - Collaborate with interdisciplinary team   - Patient/family teaching  - Consider wound care consult   Outcome: Progressing

## 2022-11-14 NOTE — OCCUPATIONAL THERAPY NOTE
Occupational Therapy Evaluation     Patient Name: Martha Dillon  HVENB'K Date: 11/14/2022  Problem List  Principal Problem:    Acute respiratory failure with hypoxia ([facility] )  Active Problems:    COPD (chronic obstructive pulmonary disease) (HCC)    Stage 3b chronic kidney disease ([facility] )    Sepsis ([facility] )    COVID    Severe protein-calorie malnutrition ([facility] )    Past Medical History  Past Medical History:   Diagnosis Date    Abnormal thyroid function test     non specified /  LA    1/8/13      Chronic kidney disease     COPD (chronic obstructive pulmonary disease) (HCC)     Eczema     LA   11/26/13       Esophagitis     Gout     Hypertension     Pyloric channel ulcer      Past Surgical History  Past Surgical History:   Procedure Laterality Date    BACK SURGERY      ESOPHAGOGASTRODUODENOSCOPY N/A 12/1/2016    Procedure: ESOPHAGOGASTRODUODENOSCOPY (EGD); Surgeon: Kathrine Guajardo MD;  Location: [facility]; Service:     EYE SURGERY           11/14/22 1030   OT Last Visit   OT Visit Date 11/14/22   Note Type   Note type Evaluation   Additional Comments Pt presents supine in bed, agreeable to OT evaluation  Pain Assessment   Pain Assessment Tool 0-10   Pain Score No Pain   Restrictions/Precautions   Weight Bearing Precautions Per Order No   Other Precautions Fall Risk;Telemetry;Contact/isolation; Airborne/isolation   Home Living   Type of [address] One level;Stairs to enter with rails  (20 FAN with unilateral HR)   Bathroom Shower/Tub Walk-in shower   Bathroom Toilet Standard   Home Equipment Walker;Cane   Additional Comments SPC for community mobility; FWW for in-home use at baseline   Prior Function   Level of [place] Independent with ADLs; Independent with functional mobility; Needs assistance with IADLS   Lives With Alone   Receives Help From Family   IADLs Family/Friend/Other provides transportation; Family/Friend/Other provides meals; Family/Friend/Other provides medication management   Falls in the last 6 months 0   Vocational Retired   Comments Pt's son lives a mile away and picks him up every morning to get breakfast    Lifestyle   Autonomy Pt lives alone in an apartment with 20 FAN  He is (I) with ADLs, (A) with IADLs  Pt's son sees him daily, takes him out of the home daily for meals   Daughter calls daily   Reciprocal Relationships Son, daughter   Intrinsic Gratification Watching TV   Subjective   Subjective "I think I'm moving about the same as before"   ADL   Where Assessed Edge of bed   Eating Assistance 7  Independent   Grooming Assistance 5  Supervision/Setup   UB Bathing Assistance 5  Supervision/Setup   LB Bathing Assistance 5  Supervision/Setup   UB Dressing Assistance 5  Supervision/Setup   LB Dressing Assistance 5  [address]  5  Supervision/Setup   Functional Assistance 5  Supervision/Setup   Bed Mobility   Supine to Sit 6  Modified independent   Additional Comments Pt OOB to chair at end of session   Transfers   Sit to Stand 5  Supervision   Stand to Sit 5  Supervision   Functional Mobility   Functional Mobility 5  Supervision   Additional Comments S with FWW, CGA with SPC; recommend FWW   Balance   Static Sitting Good   Dynamic Sitting Fair +   Static Standing Fair   Dynamic Standing Fair -   Ambulatory Fair -   Activity Tolerance   Activity Tolerance Patient tolerated treatment well;Treatment limited secondary to medical complications (Comment)  (Elevated BP)   Medical Staff Made Aware Harrison Campos PT   Nurse Made Aware Juan Carlos VERA   RUE Assessment   RUE Assessment WFL   LUE Assessment   LUE Assessment WFL   Hand Function   Gross Motor Coordination Functional   Fine Motor Coordination Functional   Sensation   Light Touch No apparent deficits   Proprioception   Proprioception No apparent deficits   Vision-Basic Assessment   Current Vision Does not wear glasses   Vision - Complex Assessment   Ocular Range of Motion Intact   Acuity Able to read employee name badge without difficulty   Perception   Inattention/Neglect Appears intact   Cognition   Overall Cognitive Status WFL   Arousal/Participation Alert; Responsive; Cooperative   Attention Attends with cues to redirect   Orientation Level Oriented X4   Memory Decreased recall of precautions   Following Commands Follows multistep commands without difficulty   Comments Pleasant and cooperative; can be impulsive at times   Assessment   Limitation Decreased ADL status; Decreased UE strength;Decreased Safe judgement during ADL;Decreased endurance;Decreased self-care trans   Prognosis Good   Assessment Patient is a [de-identified]y o  year old male seen for OT eval s/p admit to [facility] on 11/12/2022 with acute resp failure with hypoxia, sepsis, and COVID-19  Comorbidities affecting pt’s functional performance include a significant PMH of: COPD, CKD 3, HTN, gout, esophagitis, tobacco use, hyperuricemia  Personal factors affecting pt at time of IE include: difficulty performing ADLs and IADLs, difficulty with functional mobility/transfers  Prior to admission, patient was (I) with ADLs, requires assistance with IADLs  Upon evaluation, patient’s functional status as follows: eating: (I), grooming: S, UB bathing: S, LB bathing: S, UB dressing: S, LB dressing: S, toileting: S; functional transfers: S with FWW, bed mobility: Jennifer, functional mobility: S with FWW; CGA with SPC, sitting/standing tolerance: ~4 min with unilateral UE support - due to the following deficits impacting occupational performance: decreased B UE strength, decreased static/dynamic sitting/standing balance, decreased activity tolerance, decreased safety awareness, and increased pain  Pt has no dizziness, SOB, or pain; at end of session, BP obtained in sitting post ambulation: 193/95  Did obtain again: 192/95  RN Kalin Courts notified   Patient would benefit from continued skilled OT therapy while in acute setting to address deficits as defined above and maximize (I) with ADLs and functional mobility  Occupational performance areas to address include: grooming, bathing, toileting, UB/LB dressing, functional mobility, household maintenance, and medication management  The patient's raw score on the AM-PAC Daily Activity inpatient short form is 20, standardized score is 42 03, greater than 39 4  Patients at this level are likely to benefit from discharge to home with OP PT  Please refer to the recommendation of the Occupational Therapist for safe discharge planning  Goals   Patient Goals to return home   LTG Time Frame 10-14   Plan   Treatment Interventions ADL retraining;Functional transfer training;UE strengthening/ROM; Endurance training;Patient/family training;Equipment evaluation/education; Compensatory technique education;Continued evaluation; Energy conservation; Activityengagement   Goal Expiration Date 11/28/22   OT Treatment Day 0   OT Frequency 2-3x/wk   Recommendation   OT Discharge Recommendation Home with outpatient rehabilitation  (OP PT)   [facility] Daily Activity Inpatient   Lower Body Dressing 3   Bathing 3   Toileting 3   Upper Body Dressing 3   Grooming 4   Eating 4   Daily Activity Raw Score 20   Daily Activity Standardized Score (Calc for Raw Score >=11) 42 03   AM-PAC Applied Cognition Inpatient   Following a Speech/Presentation 4   Understanding Ordinary Conversation 4   Taking Medications 2   Remembering Where Things Are Placed or Put Away 3   Remembering List of 4-5 Errands 3   Taking Care of Complicated Tasks 2   Applied Cognition Raw Score 18   Applied Cognition Standardized Score 38 07     Occupational Therapy goals: In 7-14 days:     1- Patient will verbalize and demonstrate use of energy conservation/deep breathing technique and work simplification skills during functional activity with no verbal cues       2- Patient will verbalize and demonstrate good body mechanics and joint protection techniques during ADLs/IADLs with no verbal cues     3- Pt will complete bed mobility at a Mod I level w/ G balance/safety demonstrated to decrease caregiver assistance required     4- Patient will increase OOB/ sitting tolerance to 2-4 hours per day for increased participation in self care and leisure tasks with no s/s of exertion  5-Patient will increase standing tolerance time to 5 minutes with unilateral UE support to complete sink level ADLs@ mod I level      5- Patient will increase sitting tolerance at edge of bed to 20 minutes to complete UB ADLs @ set up assist level       6- Pt will improve functional transfers to Mod I on/off all surfaces using DME as needed w/ G balance/safety     7- Patient will complete UB ADLs with Jennifer utilizing appropriate DME/AE PRN     8- Patient will complete LB ADLs with Jennifer utilizing appropriate DME/AE PRN     9- Patient will complete toileting hygiene with Jennifer with G hygiene/thoroughness utilizing appropriate DME/AE PRN     10- Pt will improve functional mobility during ADL/IADL/leisure tasks to Mod I using DME as needed w/ G balance/safety      11- Pt will be attentive 100% of the time during ongoing cognitive assessment w/ G participation to assist w/ safe d/c planning/recommendations     12- Pt will participate in simulated IADL management task to increase independence to Mod I w/ G safety and endurance     13- Pt will tolerate continued OOB assessment and appropriate functional transfer/mobility goals will be established by OTR as applicable     14- Pt will increase BUE strength by 1MM grade via AROM/AAROM/PROM exercises to increase independence in ADLs and transfers       Jacqui Woodson OTR/GURU

## 2022-11-14 NOTE — PROGRESS NOTES
[address] [place] [address]  Progress Note Yoseph Miguel 1940, [de-identified] y o  male MRN: [id number]  Unit/Bed#: [place] [address] -01 Encounter: [id number]  Primary Care Provider: Ashley Ernandez DO   Date and time admitted to hospital: 2022 10:31 PM    * Acute respiratory failure with hypoxia [facility])  Assessment & Plan  covid positive  Initially requiring BIPAP  He has much improved  If he remains off of oxygen overnight, he can likely go home in the morning  COVID  Assessment & Plan  On remdesivir and decadron  improving            Subjective:   Doing better  Much less short of breath  No cough  No fever      Objective:     Vitals:   Temp (24hrs), Av °F (36 7 °C), Min:97 7 °F (36 5 °C), Max:98 2 °F (36 8 °C)    Temp:  [97 7 °F (36 5 °C)-98 2 °F (36 8 °C)] 98 2 °F (36 8 °C)  HR:  [70-89] 73  Resp:  [16-17] 17  BP: (154-193)/(87-95) 154/91  SpO2:  [93 %-96 %] 96 %  Body mass index is 16 31 kg/m²  Input and Output Summary (last 24 hours): Intake/Output Summary (Last 24 hours) at 2022 1735  Last data filed at 2022 2251  Gross per 24 hour   Intake 240 ml   Output 400 ml   Net -160 ml       Physical Exam:     Physical Exam  Vitals and nursing note reviewed  HENT:      Head: Normocephalic and atraumatic  Eyes:      Pupils: Pupils are equal, round, and reactive to light  Cardiovascular:      Rate and Rhythm: Normal rate and regular rhythm  Heart sounds: No murmur heard  No friction rub  No gallop  Pulmonary:      Effort: Pulmonary effort is normal       Breath sounds: Normal breath sounds  No wheezing or rales  Abdominal:      General: Bowel sounds are normal       Palpations: Abdomen is soft  Tenderness: There is no abdominal tenderness  Musculoskeletal:      Right lower leg: No edema  Left lower leg: No edema                 Additional Data:     Labs:    Results from last 7 days   Lab Units 22  0548 22  0959   WBC Thousand/uL 18 73* 5  29   HEMOGLOBIN g/dL 12 2 13 0   HEMATOCRIT % 36 8 39 9   PLATELETS Thousands/uL 253 231   NEUTROS PCT %  --  83*   LYMPHS PCT %  --  14   MONOS PCT %  --  3*   EOS PCT %  --  0     Results from last 7 days   Lab Units 11/14/22  0548 11/13/22  0959   POTASSIUM mmol/L 4 1 4 8   CHLORIDE mmol/L 105 102   CO2 mmol/L 23 23   BUN mg/dL 32* 26*   CREATININE mg/dL 1 75* 1 53*   CALCIUM mg/dL 8 9 9 0   ALK PHOS U/L  --  99   ALT U/L  --  20   AST U/L  --  28     Results from last 7 days   Lab Units 11/13/22  0959   INR  1 12     Results from last 7 days   Lab Units 11/13/22  2109 11/13/22  0708   POC GLUCOSE mg/dl 168* 172*               * I Have Reviewed All Lab Data     Recent Cultures (last 7 days):     Results from last 7 days   Lab Units 11/13/22  0959   BLOOD CULTURE  No Growth at 24 hrs  No Growth at 24 hrs           Last 24 Hours Medication List:   Current Facility-Administered Medications   Medication Dose Route Frequency Provider Last Rate   • acetaminophen  650 mg Oral Q6H PRN Keyshawn Boykin DO     • atenolol  50 mg Oral Daily Keyshawn Boykin DO     • dexamethasone  0 1 mg/kg Intravenous Q12H Keyshawn Boykin DO     • heparin (porcine)  5,000 Units Subcutaneous Q8H [address] Keyshawn Boykin DO     • ipratropium  0 5 mg Nebulization Q6H PRN Geralene Allemariel Nguyen, DO     • labetalol  10 mg Intravenous Q6H PRN NATE Craig     • levalbuterol  1 25 mg Nebulization Q6H PRN Geralene Allegra Patrick, DO     • [START ON 11/15/2022] polyethylene glycol  17 g Oral Daily Chandler S Patrick DO     • remdesivir  100 mg Intravenous Q24H Sandra Longoria DO     • zolpidem  5 mg Oral HS PRN NATE Craig           VTE Pharmacologic Prophylaxis:   Pharmacologic: Heparin      Current Length of Stay: 1 day(s)    Current Patient Status: Inpatient       Discharge Plan:  Home likely tomorrow    Code Status: Level 1 - Full Code           Today, Patient Was Seen By: Alber Dong DO    ** Please Note: Dictation voice to text software may have been used in the creation of this document   **

## 2022-11-14 NOTE — PLAN OF CARE
Problem: Potential for Falls  Goal: Patient will remain free of falls  Description: INTERVENTIONS:  - Educate patient/family on patient safety including physical limitations  - Instruct patient to call for assistance with activity   - Consult OT/PT to assist with strengthening/mobility   - Keep Call bell within reach  - Keep bed low and locked with side rails adjusted as appropriate  - Keep care items and personal belongings within reach  - Initiate and maintain comfort rounds  - Make Fall Risk Sign visible to staff  - Apply yellow socks and bracelet for high fall risk patients  - Consider moving patient to room near nurses station  11/14/2022 1056 by Kylie Pineda RN  Outcome: Progressing  11/14/2022 1056 by Kylie Pineda RN  Outcome: Progressing     Problem: MOBILITY - ADULT  Goal: Maintain or return to baseline ADL function  Description: INTERVENTIONS:  -  Assess patient's ability to carry out ADLs; assess patient's baseline for ADL function and identify physical deficits which impact ability to perform ADLs (bathing, care of mouth/teeth, toileting, grooming, dressing, etc )  - Assess/evaluate cause of self-care deficits   - Assess range of motion  - Assess patient's mobility; develop plan if impaired  - Assess patient's need for assistive devices and provide as appropriate  - Encourage maximum independence but intervene and supervise when necessary  - Involve family in performance of ADLs  - Assess for home care needs following discharge   - Consider OT consult to assist with ADL evaluation and planning for discharge  - Provide patient education as appropriate  11/14/2022 1056 by Kylie Pineda RN  Outcome: Progressing  11/14/2022 1056 by Kylie Pineda RN  Outcome: Progressing  Goal: Maintains/Returns to pre admission functional level  Description: INTERVENTIONS:  - Perform BMAT or MOVE assessment daily    - Set and communicate daily mobility goal to care team and patient/family/caregiver     - Collaborate with rehabilitation services on mobility goals if consulted  - Out of bed for toileting  - Record patient progress and toleration of activity level   11/14/2022 1056 by Vargas Syed RN  Outcome: Progressing  11/14/2022 1056 by Vargas Syed RN  Outcome: Progressing     Problem: RESPIRATORY - ADULT  Goal: Achieves optimal ventilation and oxygenation  Description: INTERVENTIONS:  - Assess for changes in respiratory status  - Assess for changes in mentation and behavior  - Position to facilitate oxygenation and minimize respiratory effort  - Oxygen administered by appropriate delivery if ordered  - Initiate smoking cessation education as indicated  - Encourage broncho-pulmonary hygiene including cough, deep breathe, Incentive Spirometry  - Assess the need for suctioning and aspirate as needed  - Assess and instruct to report SOB or any respiratory difficulty  - Respiratory Therapy support as indicated  11/14/2022 1056 by Vargas Syed RN  Outcome: Progressing  11/14/2022 1056 by Vargas Syed RN  Outcome: Progressing     Problem: HEMATOLOGIC - ADULT  Goal: Maintains hematologic stability  Description: INTERVENTIONS  - Assess for signs and symptoms of bleeding or hemorrhage  - Monitor labs  - Administer supportive blood products/factors as ordered and appropriate  11/14/2022 1056 by Vargas Syed RN  Outcome: Progressing  11/14/2022 1056 by Vargas Syed RN  Outcome: Progressing     Problem: MUSCULOSKELETAL - ADULT  Goal: Maintain or return mobility to safest level of function  Description: INTERVENTIONS:  - Assess patient's ability to carry out ADLs; assess patient's baseline for ADL function and identify physical deficits which impact ability to perform ADLs (bathing, care of mouth/teeth, toileting, grooming, dressing, etc )  - Assess/evaluate cause of self-care deficits   - Assess range of motion  - Assess patient's mobility  - Assess patient's need for assistive devices and provide as appropriate  - Encourage maximum independence but intervene and supervise when necessary  - Involve family in performance of ADLs  - Assess for home care needs following discharge   - Consider OT consult to assist with ADL evaluation and planning for discharge  - Provide patient education as appropriate  11/14/2022 1056 by Benja Mary RN  Outcome: Progressing  11/14/2022 1056 by Benja Mary RN  Outcome: Progressing     Problem: PAIN - ADULT  Goal: Verbalizes/displays adequate comfort level or baseline comfort level  Description: Interventions:  - Encourage patient to monitor pain and request assistance  - Assess pain using appropriate pain scale  - Administer analgesics based on type and severity of pain and evaluate response  - Implement non-pharmacological measures as appropriate and evaluate response  - Consider cultural and social influences on pain and pain management  - Notify physician/advanced practitioner if interventions unsuccessful or patient reports new pain  11/14/2022 1056 by Benja Mary RN  Outcome: Progressing  11/14/2022 1056 by Benja Mary RN  Outcome: Progressing     Problem: INFECTION - ADULT  Goal: Absence or prevention of progression during hospitalization  Description: INTERVENTIONS:  - Assess and monitor for signs and symptoms of infection  - Monitor lab/diagnostic results  - Monitor all insertion sites, i e  indwelling lines, tubes, and drains  - Monitor endotracheal if appropriate and nasal secretions for changes in amount and color  - [place] appropriate cooling/warming therapies per order  - Administer medications as ordered  - Instruct and encourage patient and family to use good hand hygiene technique  - Identify and instruct in appropriate isolation precautions for identified infection/condition  11/14/2022 1056 by Benja Mary RN  Outcome: Progressing  11/14/2022 1056 by Benja Mary RN  Outcome: Progressing     Problem: SAFETY ADULT  Goal: Patient will remain free of falls  Description: INTERVENTIONS:  - Educate patient/family on patient safety including physical limitations  - Instruct patient to call for assistance with activity   - Consult OT/PT to assist with strengthening/mobility   - Keep Call bell within reach  - Keep bed low and locked with side rails adjusted as appropriate  - Keep care items and personal belongings within reach  - Initiate and maintain comfort rounds  - Make Fall Risk Sign visible to staff  - Apply yellow socks and bracelet for high fall risk patients  - Consider moving patient to room near nurses station  11/14/2022 1056 by Derick Bernstein RN  Outcome: Progressing  11/14/2022 1056 by Derick Bernstein RN  Outcome: Progressing  Goal: Maintain or return to baseline ADL function  Description: INTERVENTIONS:  -  Assess patient's ability to carry out ADLs; assess patient's baseline for ADL function and identify physical deficits which impact ability to perform ADLs (bathing, care of mouth/teeth, toileting, grooming, dressing, etc )  - Assess/evaluate cause of self-care deficits   - Assess range of motion  - Assess patient's mobility; develop plan if impaired  - Assess patient's need for assistive devices and provide as appropriate  - Encourage maximum independence but intervene and supervise when necessary  - Involve family in performance of ADLs  - Assess for home care needs following discharge   - Consider OT consult to assist with ADL evaluation and planning for discharge  - Provide patient education as appropriate  11/14/2022 1056 by Derick Bernstein RN  Outcome: Progressing  11/14/2022 1056 by Derick Bernstein RN  Outcome: Progressing  Goal: Maintains/Returns to pre admission functional level  Description: INTERVENTIONS:  - Perform BMAT or MOVE assessment daily    - Set and communicate daily mobility goal to care team and patient/family/caregiver     - Collaborate with rehabilitation services on mobility goals if consulted  - Out of bed for toileting  - Record patient progress and toleration of activity level   11/14/2022 1056 by Alek Suarez RN  Outcome: Progressing  11/14/2022 1056 by Alek Suarez RN  Outcome: Progressing     Problem: DISCHARGE PLANNING  Goal: Discharge to home or other facility with appropriate resources  Description: INTERVENTIONS:  - Identify barriers to discharge w/patient and caregiver  - Arrange for needed discharge resources and transportation as appropriate  - Identify discharge learning needs (meds, wound care, etc )  - Arrange for interpretive services to assist at discharge as needed  - Refer to Case Management Department for coordinating discharge planning if the patient needs post-hospital services based on physician/advanced practitioner order or complex needs related to functional status, cognitive ability, or social support system  11/14/2022 1056 by Alek Suarez RN  Outcome: Progressing  11/14/2022 1056 by Alek Suarez RN  Outcome: Progressing     Problem: Knowledge Deficit  Goal: Patient/family/caregiver demonstrates understanding of disease process, treatment plan, medications, and discharge instructions  Description: Complete learning assessment and assess knowledge base    Interventions:  - Provide teaching at level of understanding  - Provide teaching via preferred learning methods  11/14/2022 1056 by Alek Suarez RN  Outcome: Progressing  11/14/2022 1056 by Alek Suarez RN  Outcome: Progressing     Problem: Prexisting or High Potential for Compromised Skin Integrity  Goal: Skin integrity is maintained or improved  Description: INTERVENTIONS:  - Identify patients at risk for skin breakdown  - Assess and monitor skin integrity  - Assess and monitor nutrition and hydration status  - Monitor labs   - Assess for incontinence   - Turn and reposition patient  - Assist with mobility/ambulation  - Relieve pressure over bony prominences  - Avoid friction and shearing  - Provide appropriate hygiene as needed including keeping skin clean and dry  - Evaluate need for skin moisturizer/barrier cream  - Collaborate with interdisciplinary team   - Patient/family teaching  - Consider wound care consult   11/14/2022 1056 by Ev Reis RN  Outcome: Progressing  11/14/2022 1056 by Ev Reis RN  Outcome: Progressing     Problem: Nutrition/Hydration-ADULT  Goal: Nutrient/Hydration intake appropriate for improving, restoring or maintaining nutritional needs  Description: Monitor and assess patient's nutrition/hydration status for malnutrition  Collaborate with interdisciplinary team and initiate plan and interventions as ordered  Monitor patient's weight and dietary intake as ordered or per policy  Utilize nutrition screening tool and intervene as necessary  Determine patient's food preferences and provide high-protein, high-caloric foods as appropriate       INTERVENTIONS:  - Monitor oral intake, urinary output, labs, and treatment plans  - Assess nutrition and hydration status and recommend course of action  - Evaluate amount of meals eaten  - Assist patient with eating if necessary   - Allow adequate time for meals  - Recommend/ encourage appropriate diets, oral nutritional supplements, and vitamin/mineral supplements  - Order, calculate, and assess calorie counts as needed  - Recommend, monitor, and adjust tube feedings and TPN/PPN based on assessed needs  - Assess need for intravenous fluids  - Provide specific nutrition/hydration education as appropriate  - Include patient/family/caregiver in decisions related to nutrition  11/14/2022 1056 by Ev Reis RN  Outcome: Progressing  11/14/2022 1056 by Ev Reis RN  Outcome: Progressing

## 2022-11-14 NOTE — ASSESSMENT & PLAN NOTE
covid positive  Initially requiring BIPAP  He has much improved  If he remains off of oxygen overnight, he can likely go home in the morning

## 2022-11-14 NOTE — PLAN OF CARE
Problem: OCCUPATIONAL THERAPY ADULT  Goal: Performs self-care activities at highest level of function for planned discharge setting  See evaluation for individualized goals  Description: Treatment Interventions: ADL retraining, Functional transfer training, UE strengthening/ROM, Endurance training, Patient/family training, Equipment evaluation/education, Compensatory technique education, Continued evaluation, Energy conservation, Activityengagement          See flowsheet documentation for full assessment, interventions and recommendations  Note: Limitation: Decreased ADL status, Decreased UE strength, Decreased Safe judgement during ADL, Decreased endurance, Decreased self-care trans  Prognosis: Good  Assessment: Patient is a [de-identified]y o  year old male seen for OT eval s/p admit to [facility] on 11/12/2022 with acute resp failure with hypoxia, sepsis, and COVID-19  Comorbidities affecting pt’s functional performance include a significant PMH of: COPD, CKD 3, HTN, gout, esophagitis, tobacco use, hyperuricemia  Personal factors affecting pt at time of IE include: difficulty performing ADLs and IADLs, difficulty with functional mobility/transfers  Prior to admission, patient was (I) with ADLs, requires assistance with IADLs  Upon evaluation, patient’s functional status as follows: eating: (I), grooming: S, UB bathing: S, LB bathing: S, UB dressing: S, LB dressing: S, toileting: S; functional transfers: S with FWW, bed mobility: Jennifer, functional mobility: S with FWW; CGA with SPC, sitting/standing tolerance: ~4 min with unilateral UE support - due to the following deficits impacting occupational performance: decreased B UE strength, decreased static/dynamic sitting/standing balance, decreased activity tolerance, decreased safety awareness, and increased pain  Pt has no dizziness, SOB, or pain; at end of session, BP obtained in sitting post ambulation: 193/95  Did obtain again: 192/95  JODY Schmidt notified   Patient would benefit from continued skilled OT therapy while in acute setting to address deficits as defined above and maximize (I) with ADLs and functional mobility  Occupational performance areas to address include: grooming, bathing, toileting, UB/LB dressing, functional mobility, household maintenance, and medication management  The patient's raw score on the AM-PAC Daily Activity inpatient short form is 20, standardized score is 42 03, greater than 39 4  Patients at this level are likely to benefit from discharge to home with OP PT  Please refer to the recommendation of the Occupational Therapist for safe discharge planning       OT Discharge Recommendation: Home with outpatient rehabilitation (OP PT)

## 2022-11-15 VITALS
SYSTOLIC BLOOD PRESSURE: 165 MMHG | OXYGEN SATURATION: 96 % | HEIGHT: 65 IN | TEMPERATURE: 98.4 F | RESPIRATION RATE: 22 BRPM | WEIGHT: 98 LBS | HEART RATE: 61 BPM | DIASTOLIC BLOOD PRESSURE: 90 MMHG | BODY MASS INDEX: 16.33 KG/M2

## 2022-11-15 RX ADMIN — POLYETHYLENE GLYCOL 3350 17 G: 17 POWDER, FOR SOLUTION ORAL at 08:54

## 2022-11-15 RX ADMIN — HEPARIN SODIUM 5000 UNITS: 5000 INJECTION INTRAVENOUS; SUBCUTANEOUS at 05:23

## 2022-11-15 RX ADMIN — ATENOLOL 50 MG: 50 TABLET ORAL at 08:54

## 2022-11-15 RX ADMIN — DEXAMETHASONE SODIUM PHOSPHATE 4.44 MG: 4 INJECTION INTRA-ARTICULAR; INTRALESIONAL; INTRAMUSCULAR; INTRAVENOUS; SOFT TISSUE at 05:23

## 2022-11-15 RX ADMIN — REMDESIVIR 100 MG: 100 INJECTION, POWDER, LYOPHILIZED, FOR SOLUTION INTRAVENOUS at 08:55

## 2022-11-15 NOTE — PHYSICAL THERAPY NOTE
PT EVALUATION    Pt  Name: Kp Molina  Pt  Age: [de-identified] y o  MRN: [id number]  LENGTH OF STAY: 2      Admitting Diagnoses:   Shortness of breath [R06 02]  Lactic acidosis [E87 20]  Respiratory distress [R06 03]  Axillary lymphadenopathy [R59 0]  COPD with acute exacerbation (HCC) [J44 1]  COVID-19 [U07 1]    Past Medical History:   Diagnosis Date    Abnormal thyroid function test     non specified /  LA    1/8/13      Chronic kidney disease     COPD (chronic obstructive pulmonary disease) (HCC)     Eczema     LA   11/26/13       Esophagitis     Gout     Hypertension     Pyloric channel ulcer        Past Surgical History:   Procedure Laterality Date    BACK SURGERY      ESOPHAGOGASTRODUODENOSCOPY N/A 12/1/2016    Procedure: ESOPHAGOGASTRODUODENOSCOPY (EGD); Surgeon: León Castro MD;  Location: [facility]; Service:     EYE SURGERY         Imaging Studies:  CTA ED chest PE Study   Final Result by Eric Pink DO ([id number]/10 [id number])      Some images are suboptimal secondary to respiratory motion, this decreases the sensitivity for evaluation of peripheral pulmonary emboli, subject to this, no pulmonary embolism is seen  Mild scattered pulmonary emphysematous changes      Coronary atherosclerosis  Shotty and asymmetrically prominent right axillary lymph nodes, nonspecific  Other findings as above  Workstation performed: [id number]         XR chest 1 view portable   ED Interpretation by Natalie Bell PA-C (11/12 3130)   Hyperinflated  No infiltrate, cardiac silhouette normal, no pleural effusions or pulmonary edema  Final Result by Galindo Dye MD ([id number]/89 [id number])      No acute cardiopulmonary disease  This report is in agreement with the preliminary interpretation                 Workstation performed: [id number]               11/15/22 1145   PT Last Visit   PT Visit Date 11/15/22   Note Type   Note type Evaluation   Pain Assessment   Pain Score No Pain Restrictions/Precautions   Weight Bearing Precautions Per Order No   Other Precautions Contact/isolation; Airborne/isolation; Fall Risk  (masimo)   Home Living   Type of [address] One level;Stairs to enter with rails  (20STE)   Bathroom Shower/Tub Walk-in shower   [address] Walker;Cane   Prior Function   Level of [address] with functional mobility; Independent with ADLs  (w/ RW household amb; SPC for community & stair mgt)   Lives With DREW Scherer 89 in the last 6 months 0   Vocational Retired   Comments Pt's son lives a mile away and picks him up every morning to get breakfast    General   Family/Caregiver Present Yes  (son)   Cognition   Overall Cognitive Status WFL   Arousal/Participation Alert   Attention Within functional limits   Orientation Level Oriented X4   Following Commands Follows all commands and directions without difficulty   Comments pleasant & cooperative   Subjective   Subjective Pt agreeable to PT eval    RUE Assessment   RUE Assessment WFL  (4+/5 grossly)   LUE Assessment   LUE Assessment WFL  (4+/5 grossly)   RLE Assessment   RLE Assessment WFL  (4+/5 grossly)   LLE Assessment   LLE Assessment WFL  (4+/5 grossly)   Coordination   Movements are Fluid and Coordinated 1   Sensation WFL   Bed Mobility   Supine to Sit 6  Modified independent   Additional items HOB elevated   Sit to Supine 6  Modified independent   Additional items HOB elevated   Transfers   Sit to Stand 6  Modified independent   Stand to Sit 6  Modified independent   Ambulation/Elevation   Gait pattern Wide FREDY;WNL   Gait Assistance 6  Modified independent   Assistive Device Rolling walker   Ambulation/Elevation Additional Comments no gross LOB noted; pt notes baseline gait   Balance   Static Sitting Normal   Dynamic Sitting Normal   Static Standing Good   Dynamic Standing Fair +   Ambulatory Fair +   Endurance Deficit   Endurance Deficit No Activity Tolerance   Activity Tolerance Patient tolerated treatment well   Nurse Made Aware yes   Assessment   Prognosis Excellent   Problem List Decreased strength;Decreased endurance; Impaired balance   Assessment Pt [de-identified] y o  male admitted for Acute respiratory failure with hypoxia ([address] ) w/ COVID infection  Pt referred to PT for mobility assessment & D/C planning  Please see flowsheet above re: home set-up, PLOF & objective findings during PT assessment  PTA, pt reports being I w/ RW for household amb & w/ SPC for community distances & stair mgt  On eval, pt demonstrates no significant decline in function to warrant skilled IPPT at this time  Pt modified I w/ overall functional mobility including amb w/ RW  Balance & gait WFL  Pt states being close to his functional baseline and states no concerns about going back home when medically cleared  Pt requesting OPPT for endurance training  The patient's AM-PAC Basic Mobility Inpatient Short Form Raw Score is 23  A Raw score of greater than 16 suggests the patient may benefit from discharge to home  Please also refer to the recommendation of the Physical Therapist for safe discharge planning  From PT standpoint, pt may return home when medically cleared  Pt may benefit from OPPT PRN  Will D/C PT  Nsg notified     Barriers to Discharge None   Goals   Patient Goals to go home   PT Treatment Day 0   Plan   Treatment/Interventions Spoke to nursing;Spoke to case management;Spoke to MD  (PT eval only)   PT Frequency Other (Comment)  (D/C PT)   Recommendation   PT Discharge Recommendation Home with outpatient rehabilitation   Equipment Recommended Walker  (pt has)   [address]   Turning in Bed Without Bedrails 4   Lying on Back to Sitting on Edge of Flat Bed 4   Moving Bed to Chair 4   Standing Up From Chair 4   Walk in Room 4   Climb 3-5 Stairs 3   Basic Mobility Inpatient Raw Score 23   Basic Mobility Standardized Score 50 88   Highest Level Of Mobility JH-HLM Goal 7: Walk 25 feet or more   JH-HLM Achieved 7: Walk 25 feet or more   End of Consult   Patient Position at End of Consult Supine; All needs within reach   End of Consult Comments Pt in stable condition  All needs in reach  Hx/personal factors: co-morbidities, inaccessible home, home alone, advanced age, use of AD, and fall risk  Examination: close to baseline dec balance, dec endurance, assessed body system, balance, endurance, amb, D/C disposition & fall risk  Clinical: unpredictable (ongoing medical status and risk for falls)  Complexity: high      Ron Toscano

## 2022-11-15 NOTE — PLAN OF CARE
Problem: Potential for Falls  Goal: Patient will remain free of falls  Description: INTERVENTIONS:  - Educate patient/family on patient safety including physical limitations  - Instruct patient to call for assistance with activity   - Consult OT/PT to assist with strengthening/mobility   - Keep Call bell within reach  - Keep bed low and locked with side rails adjusted as appropriate  - Keep care items and personal belongings within reach  - Initiate and maintain comfort rounds  - Make Fall Risk Sign visible to staff  - Apply yellow socks and bracelet for high fall risk patients  - Consider moving patient to room near nurses station  Outcome: Progressing     Problem: MOBILITY - ADULT  Goal: Maintain or return to baseline ADL function  Description: INTERVENTIONS:  -  Assess patient's ability to carry out ADLs; assess patient's baseline for ADL function and identify physical deficits which impact ability to perform ADLs (bathing, care of mouth/teeth, toileting, grooming, dressing, etc )  - Assess/evaluate cause of self-care deficits   - Assess range of motion  - Assess patient's mobility; develop plan if impaired  - Assess patient's need for assistive devices and provide as appropriate  - Encourage maximum independence but intervene and supervise when necessary  - Involve family in performance of ADLs  - Assess for home care needs following discharge   - Consider OT consult to assist with ADL evaluation and planning for discharge  - Provide patient education as appropriate  Outcome: Progressing  Goal: Maintains/Returns to pre admission functional level  Description: INTERVENTIONS:  - Perform BMAT or MOVE assessment daily    - Set and communicate daily mobility goal to care team and patient/family/caregiver     - Collaborate with rehabilitation services on mobility goals if consulted  - Out of bed for toileting  - Record patient progress and toleration of activity level   Outcome: Progressing     Problem: RESPIRATORY - ADULT  Goal: Achieves optimal ventilation and oxygenation  Description: INTERVENTIONS:  - Assess for changes in respiratory status  - Assess for changes in mentation and behavior  - Position to facilitate oxygenation and minimize respiratory effort  - Oxygen administered by appropriate delivery if ordered  - Initiate smoking cessation education as indicated  - Encourage broncho-pulmonary hygiene including cough, deep breathe, Incentive Spirometry  - Assess the need for suctioning and aspirate as needed  - Assess and instruct to report SOB or any respiratory difficulty  - Respiratory Therapy support as indicated  Outcome: Progressing     Problem: HEMATOLOGIC - ADULT  Goal: Maintains hematologic stability  Description: INTERVENTIONS  - Assess for signs and symptoms of bleeding or hemorrhage  - Monitor labs  - Administer supportive blood products/factors as ordered and appropriate  Outcome: Progressing     Problem: MUSCULOSKELETAL - ADULT  Goal: Maintain or return mobility to safest level of function  Description: INTERVENTIONS:  - Assess patient's ability to carry out ADLs; assess patient's baseline for ADL function and identify physical deficits which impact ability to perform ADLs (bathing, care of mouth/teeth, toileting, grooming, dressing, etc )  - Assess/evaluate cause of self-care deficits   - Assess range of motion  - Assess patient's mobility  - Assess patient's need for assistive devices and provide as appropriate  - Encourage maximum independence but intervene and supervise when necessary  - Involve family in performance of ADLs  - Assess for home care needs following discharge   - Consider OT consult to assist with ADL evaluation and planning for discharge  - Provide patient education as appropriate  Outcome: Progressing     Problem: PAIN - ADULT  Goal: Verbalizes/displays adequate comfort level or baseline comfort level  Description: Interventions:  - Encourage patient to monitor pain and request assistance  - Assess pain using appropriate pain scale  - Administer analgesics based on type and severity of pain and evaluate response  - Implement non-pharmacological measures as appropriate and evaluate response  - Consider cultural and social influences on pain and pain management  - Notify physician/advanced practitioner if interventions unsuccessful or patient reports new pain  Outcome: Progressing     Problem: INFECTION - ADULT  Goal: Absence or prevention of progression during hospitalization  Description: INTERVENTIONS:  - Assess and monitor for signs and symptoms of infection  - Monitor lab/diagnostic results  - Monitor all insertion sites, i e  indwelling lines, tubes, and drains  - Monitor endotracheal if appropriate and nasal secretions for changes in amount and color  - [place] appropriate cooling/warming therapies per order  - Administer medications as ordered  - Instruct and encourage patient and family to use good hand hygiene technique  - Identify and instruct in appropriate isolation precautions for identified infection/condition  Outcome: Progressing     Problem: SAFETY ADULT  Goal: Patient will remain free of falls  Description: INTERVENTIONS:  - Educate patient/family on patient safety including physical limitations  - Instruct patient to call for assistance with activity   - Consult OT/PT to assist with strengthening/mobility   - Keep Call bell within reach  - Keep bed low and locked with side rails adjusted as appropriate  - Keep care items and personal belongings within reach  - Initiate and maintain comfort rounds  - Make Fall Risk Sign visible to staff  - Apply yellow socks and bracelet for high fall risk patients  - Consider moving patient to room near nurses station  Outcome: Progressing  Goal: Maintain or return to baseline ADL function  Description: INTERVENTIONS:  -  Assess patient's ability to carry out ADLs; assess patient's baseline for ADL function and identify physical deficits which impact ability to perform ADLs (bathing, care of mouth/teeth, toileting, grooming, dressing, etc )  - Assess/evaluate cause of self-care deficits   - Assess range of motion  - Assess patient's mobility; develop plan if impaired  - Assess patient's need for assistive devices and provide as appropriate  - Encourage maximum independence but intervene and supervise when necessary  - Involve family in performance of ADLs  - Assess for home care needs following discharge   - Consider OT consult to assist with ADL evaluation and planning for discharge  - Provide patient education as appropriate  Outcome: Progressing  Goal: Maintains/Returns to pre admission functional level  Description: INTERVENTIONS:  - Perform BMAT or MOVE assessment daily    - Set and communicate daily mobility goal to care team and patient/family/caregiver     - Collaborate with rehabilitation services on mobility goals if consulted  - Out of bed for toileting  - Record patient progress and toleration of activity level   Outcome: Progressing     Problem: DISCHARGE PLANNING  Goal: Discharge to home or other facility with appropriate resources  Description: INTERVENTIONS:  - Identify barriers to discharge w/patient and caregiver  - Arrange for needed discharge resources and transportation as appropriate  - Identify discharge learning needs (meds, wound care, etc )  - Arrange for interpretive services to assist at discharge as needed  - Refer to Case Management Department for coordinating discharge planning if the patient needs post-hospital services based on physician/advanced practitioner order or complex needs related to functional status, cognitive ability, or social support system  Outcome: Progressing     Problem: Knowledge Deficit  Goal: Patient/family/caregiver demonstrates understanding of disease process, treatment plan, medications, and discharge instructions  Description: Complete learning assessment and assess knowledge base   Interventions:  - Provide teaching at level of understanding  - Provide teaching via preferred learning methods  Outcome: Progressing     Problem: Prexisting or High Potential for Compromised Skin Integrity  Goal: Skin integrity is maintained or improved  Description: INTERVENTIONS:  - Identify patients at risk for skin breakdown  - Assess and monitor skin integrity  - Assess and monitor nutrition and hydration status  - Monitor labs   - Assess for incontinence   - Turn and reposition patient  - Assist with mobility/ambulation  - Relieve pressure over bony prominences  - Avoid friction and shearing  - Provide appropriate hygiene as needed including keeping skin clean and dry  - Evaluate need for skin moisturizer/barrier cream  - Collaborate with interdisciplinary team   - Patient/family teaching  - Consider wound care consult   Outcome: Progressing     Problem: Nutrition/Hydration-ADULT  Goal: Nutrient/Hydration intake appropriate for improving, restoring or maintaining nutritional needs  Description: Monitor and assess patient's nutrition/hydration status for malnutrition  Collaborate with interdisciplinary team and initiate plan and interventions as ordered  Monitor patient's weight and dietary intake as ordered or per policy  Utilize nutrition screening tool and intervene as necessary  Determine patient's food preferences and provide high-protein, high-caloric foods as appropriate       INTERVENTIONS:  - Monitor oral intake, urinary output, labs, and treatment plans  - Assess nutrition and hydration status and recommend course of action  - Evaluate amount of meals eaten  - Assist patient with eating if necessary   - Allow adequate time for meals  - Recommend/ encourage appropriate diets, oral nutritional supplements, and vitamin/mineral supplements  - Order, calculate, and assess calorie counts as needed  - Recommend, monitor, and adjust tube feedings and TPN/PPN based on assessed needs  - Assess need for intravenous fluids  - Provide specific nutrition/hydration education as appropriate  - Include patient/family/caregiver in decisions related to nutrition  Outcome: Progressing

## 2022-11-15 NOTE — DISCHARGE INSTR - AVS FIRST PAGE

## 2022-11-15 NOTE — DISCHARGE SUMMARY
[facility]  Discharge- Nicholette Severance 1940, [de-identified] y o  male MRN: [id number]  Unit/Bed#: Loretta Gallo -01 Encounter: [id number]  Primary Care Provider: Benito Leiva DO   Date and time admitted to hospital: 11/12/2022 10:31 PM    * Acute respiratory failure with hypoxia [facility])  Assessment & Plan  covid positive  Initially requiring BIPAP  He has much improved within 24 hours and has been on room air for 12 hours  He does not require any further treatment  I will send him home with covid isolation guidelines      Discharging Physician / Practitioner: Gloria Villalta DO  PCP: Benito Leiva DO  Admission Date:   Admission Orders (From admission, onward)     Ordered        11/13/22 0421  INPATIENT ADMISSION  Once                      Discharge Date: 11/15/22    Medical Problems             Resolved Problems  Date Reviewed: 10/26/2022          Resolved    Stage 3a chronic kidney disease ([facility] [address] ) 11/13/2022     Resolved by  Leopold Number, DO                ·       Reason for Admission:  Fever and shortness of breath      Hospital Course:     Nicholette Severance is a [de-identified] y o  male patient who originally presented to the hospital on 11/12/2022 due to fever and shortness of breath  Found have COVID positive  He required BiPAP initially  Treated with remdesivir and Decadron  Within 24 hours he was weaned to room air oxygen  He is feeling completely better now  Has been off of oxygen for over 12 hours  Okay to discharge home  He does not require any further treatment  Please see above list of diagnoses and related plan for additional information  Condition at Discharge: stable       Discharge Day Visit / Exam:     Subjective:  Feels well   Wants to go home    No SOB  No cough      Vitals: Blood Pressure: 165/90 (11/15/22 1024)  Pulse: 61 (11/15/22 1024)  Temperature: 98 4 °F (36 9 °C) (11/15/22 0818)  Temp Source: Axillary (11/15/22 0818)  Respirations: 22 (11/15/22 6181)  Height: 5' 5" (165 1 cm) (11/14/22 1057)  Weight - Scale: 44 5 kg (98 lb) (11/14/22 1057)  SpO2: 96 % (11/15/22 1024)    Exam:     Physical Exam  Vitals and nursing note reviewed  HENT:      Head: Normocephalic and atraumatic  Eyes:      Pupils: Pupils are equal, round, and reactive to light  Cardiovascular:      Rate and Rhythm: Normal rate and regular rhythm  Heart sounds: No murmur heard  No friction rub  No gallop  Pulmonary:      Effort: Pulmonary effort is normal       Breath sounds: Normal breath sounds  No wheezing or rales  Abdominal:      General: Bowel sounds are normal       Palpations: Abdomen is soft  Tenderness: There is no abdominal tenderness  Musculoskeletal:      Right lower leg: No edema  Left lower leg: No edema            Discharge instructions/Information to patient and family:   See after visit summary for information provided to patient and family  Provisions for Follow-Up Care:  See after visit summary for information related to follow-up care and any pertinent home health orders  Disposition:     Home       Discharge Statement:  I spent 38 minutes discharging the patient  This time was spent on the day of discharge  I had direct contact with the patient on the day of discharge  Greater than 50% of the total time was spent examining patient, answering all patient questions, arranging and discussing plan of care with patient as well as directly providing post-discharge instructions  Additional time then spent on discharge activities  Discharge Medications:  See after visit summary for reconciled discharge medications provided to patient and family        ** Please Note: This note has been constructed using a voice recognition system **

## 2022-11-15 NOTE — NURSING NOTE
AVS reviewed with patient and son  Questions answered  IV and Matthewo removed from patient at this time  Patient discharged via wheelchair by PCA

## 2022-11-15 NOTE — ASSESSMENT & PLAN NOTE
covid positive  Initially requiring BIPAP  He has much improved within 24 hours and has been on room air for 12 hours  He does not require any further treatment    I will send him home with covid isolation guidelines

## 2022-11-16 ENCOUNTER — TRANSITIONAL CARE MANAGEMENT (OUTPATIENT)
Dept: FAMILY MEDICINE CLINIC | Facility: CLINIC | Age: 82
End: 2022-11-16

## 2022-11-18 LAB
BACTERIA BLD CULT: NORMAL
BACTERIA BLD CULT: NORMAL

## 2022-11-22 DIAGNOSIS — J44.9 CHRONIC OBSTRUCTIVE PULMONARY DISEASE, UNSPECIFIED COPD TYPE (HCC): Primary | ICD-10-CM

## 2022-11-22 RX ORDER — ALBUTEROL SULFATE 90 UG/1
2 AEROSOL, METERED RESPIRATORY (INHALATION) EVERY 6 HOURS PRN
Qty: 18 G | Refills: 2 | Status: SHIPPED | OUTPATIENT
Start: 2022-11-22

## 2022-11-22 NOTE — TELEPHONE ENCOUNTER
Pt's son called requesting refills  for 2 inhalers but just 1 listed in his med list, please advise  Thanks

## 2022-11-22 NOTE — TELEPHONE ENCOUNTER
Pt's son called the office back, please disregard as he previous message   He only needs albuterol inhaler

## 2022-11-28 DIAGNOSIS — G47.00 INSOMNIA, UNSPECIFIED TYPE: ICD-10-CM

## 2022-11-28 RX ORDER — ZOLPIDEM TARTRATE 5 MG/1
5 TABLET ORAL
Qty: 90 TABLET | Refills: 0 | Status: SHIPPED | OUTPATIENT
Start: 2022-11-28

## 2023-01-01 ENCOUNTER — HOME CARE VISIT (OUTPATIENT)
Dept: HOME HEALTH SERVICES | Facility: HOME HEALTHCARE | Age: 83
End: 2023-01-01
Payer: MEDICARE

## 2023-01-01 ENCOUNTER — HOME CARE VISIT (OUTPATIENT)
Dept: HOME HOSPICE | Facility: HOSPICE | Age: 83
End: 2023-01-01
Payer: MEDICARE

## 2023-01-01 ENCOUNTER — HOSPICE ADMISSION (OUTPATIENT)
Dept: HOME HOSPICE | Facility: HOSPICE | Age: 83
End: 2023-01-01
Payer: MEDICARE

## 2023-01-01 VITALS — TEMPERATURE: 97.1 F | RESPIRATION RATE: 20 BRPM | OXYGEN SATURATION: 93 % | HEART RATE: 85 BPM

## 2023-01-01 PROCEDURE — 10330064 FOAM, ADH SIL W/BORDER 4"X4" (10/BX 20BX

## 2023-01-01 PROCEDURE — 10330064

## 2023-01-01 PROCEDURE — 10330064 SPONGE, GAUZE 8PLY N/S 4"X4" (200/PK 20P

## 2023-01-01 PROCEDURE — 10330064 WIPE, SKIN GEL PROT DRSNG (50/BX)

## 2023-01-01 PROCEDURE — 10330064 DRESSING, NONADH OUCHLESS TELFA CTN 3"X4

## 2023-01-01 PROCEDURE — G0299 HHS/HOSPICE OF RN EA 15 MIN: HCPCS

## 2023-01-01 PROCEDURE — 10330057 MEDICATION, GENERAL

## 2023-01-01 PROCEDURE — G0156 HHCP-SVS OF AIDE,EA 15 MIN: HCPCS

## 2023-01-01 PROCEDURE — 10330064 FOAM, ADH SIL W/BORDER SACRAL 7"X7" (10/

## 2023-01-01 PROCEDURE — 10330064 DRESSING, CALCIUM ALGINATE SHEET 4"X4.75

## 2023-01-01 PROCEDURE — 10330064 CLEANSER, WND SEA-CLEANS 6OZ  COLPLT

## 2023-01-01 PROCEDURE — 10330064 PAD, ABD 5X9" STR LF (1/PK 20PK/BX) MGM1

## 2023-01-01 PROCEDURE — 10330087 HSPC SERVICE INTENSITY ADD-ON

## 2023-01-01 PROCEDURE — 10330064 PAD, HEEL CUSHION ULTRA (1/PR)SKLCRE

## 2023-01-01 PROCEDURE — 10330064 BANDAGE, GAUZE COTTON  6PLY STR 4"X4YDS

## 2023-01-01 PROCEDURE — 10330064 TAPE, ADHSV TRANSPORE WHT 1" (12RL/BX 10

## 2023-01-01 PROCEDURE — 10330064 ALIGNER, FOAM BODY SM (8/CS)

## 2023-01-09 DIAGNOSIS — I10 HYPERTENSION, UNSPECIFIED TYPE: ICD-10-CM

## 2023-01-09 DIAGNOSIS — I10 ESSENTIAL HYPERTENSION: ICD-10-CM

## 2023-01-09 RX ORDER — ATENOLOL 50 MG/1
50 TABLET ORAL DAILY
Qty: 90 TABLET | Refills: 1 | Status: SHIPPED | OUTPATIENT
Start: 2023-01-09

## 2023-01-09 RX ORDER — CLONIDINE HYDROCHLORIDE 0.1 MG/1
0.1 TABLET ORAL DAILY
Qty: 90 TABLET | Refills: 1 | Status: SHIPPED | OUTPATIENT
Start: 2023-01-09

## 2023-01-14 PROBLEM — A41.9 SEPSIS (HCC): Status: RESOLVED | Noted: 2022-11-13 | Resolved: 2023-01-14

## 2023-01-16 ENCOUNTER — OFFICE VISIT (OUTPATIENT)
Dept: PHYSICAL THERAPY | Facility: REHABILITATION | Age: 83
End: 2023-01-16

## 2023-01-16 DIAGNOSIS — R29.898 WEAKNESS OF BOTH LEGS: Primary | ICD-10-CM

## 2023-01-16 NOTE — PROGRESS NOTES
PT Evaluation     Today's date: 2023  Patient name: Scott Meza  : 1940  MRN: [id number]  Referring provider: No ref  provider found  Dx:   Encounter Diagnosis     ICD-10-CM    1  Weakness of both legs  [id number]           Start Time: 1115  Stop Time: 1155  Total time in clinic (min): 40 minutes    Assessment  Assessment details: Pt is an [de-identified] yo male with c/o LE weakness and the knees giving way along with noting 2 recent falls  He presents with limited knee extension, muscle atrophy and LE weakness  He ambulates with a SPC safely on level surfaces but as soon as he became fatigued walking became unsafe  He would benefit from LE strengthening and balance activities  For financial reasons he requests coming one time a week and he will follow through with his HEP  Prognosis is good with follow through with exercises and PT  Impairments: abnormal gait, abnormal muscle tone, abnormal or restricted ROM, impaired physical strength and weight-bearing intolerance    Symptom irritability: moderateBarriers to therapy: financial  Understanding of Dx/Px/POC: excellent  Goals  STG: in 4 week  Pt performing HEP consistently  Increase strength 1/2 grade  LTG: by d/c  Able to pick something off the floor safely  Pt feels safe ambulating in the home  Increased ease on steps      Plan  Patient would benefit from: skilled physical therapy  Planned therapy interventions: manual therapy, neuromuscular re-education, patient education, strengthening, stretching, therapeutic exercise and home exercise program  Frequency: 1x week  Treatment plan discussed with: patient and family        Subjective Evaluation    History of Present Illness  Mechanism of injury: 5-6 years ago he had back surgery and has felt weak since that time  He notes that his knees give out on him  This occurs when he is tryting to squat  He fell twice at home  Uses a walker when he gets up in the middle of the night            Objective Passive Range of Motion   Left Knee   Flexion: 130 degrees   Extension: -14 degrees     Right Knee   Flexion: 118 degrees   Extension: -25 degrees     Strength/Myotome Testing     Left Hip   Planes of Motion   Flexion: 3+  Extension: 4  Abduction: 3+    Right Hip   Planes of Motion   Flexion: 3+  Extension: 4  Abduction: 3+    Left Knee   Flexion: 4-  Extension: 4-    Right Knee   Flexion: 5  Extension: 4    Tests     Additional Tests Details  Tight hamstrings B/L    General Comments:      Knee Comments  Gait: amb with a SPC with knees slightly flexed  Pt was safe at onset but after 4 min on the bike he felt weak and had difficulty walking          Flowsheet Rows    Flowsheet Row Most Recent Value   PT/OT G-Codes    Current Score 41   Projected Score 55             Precautions: HTN, fall risk   Daily Treatment Diary      Assessment  1/16                     Eval/Reval                       FOTO         **         **   Manuals                                                     Exercise Diary     bike 4'                      QS 5:x10                      SLR x10                      LAQ 3"x10                      squats at bar x10                      standing abd/ext                                                                       Neuro poly                        slow march                        tandem stand                                                                                                                                               Modalities

## 2023-01-19 ENCOUNTER — OFFICE VISIT (OUTPATIENT)
Dept: FAMILY MEDICINE CLINIC | Facility: CLINIC | Age: 83
End: 2023-01-19

## 2023-01-19 VITALS
TEMPERATURE: 97.6 F | OXYGEN SATURATION: 97 % | WEIGHT: 98.6 LBS | SYSTOLIC BLOOD PRESSURE: 116 MMHG | DIASTOLIC BLOOD PRESSURE: 76 MMHG | HEIGHT: 66 IN | BODY MASS INDEX: 15.84 KG/M2 | HEART RATE: 76 BPM | RESPIRATION RATE: 16 BRPM

## 2023-01-19 DIAGNOSIS — G47.00 INSOMNIA, UNSPECIFIED TYPE: ICD-10-CM

## 2023-01-19 DIAGNOSIS — M10.9 GOUT, UNSPECIFIED CAUSE, UNSPECIFIED CHRONICITY, UNSPECIFIED SITE: ICD-10-CM

## 2023-01-19 DIAGNOSIS — I10 ESSENTIAL HYPERTENSION: Primary | ICD-10-CM

## 2023-01-19 DIAGNOSIS — Z12.5 SCREENING FOR PROSTATE CANCER: ICD-10-CM

## 2023-01-19 DIAGNOSIS — J44.9 CHRONIC OBSTRUCTIVE PULMONARY DISEASE, UNSPECIFIED COPD TYPE (HCC): ICD-10-CM

## 2023-01-19 DIAGNOSIS — Z72.0 TOBACCO ABUSE: ICD-10-CM

## 2023-01-19 DIAGNOSIS — Z13.220 SCREENING FOR HYPERLIPIDEMIA: ICD-10-CM

## 2023-01-19 DIAGNOSIS — N18.32 STAGE 3B CHRONIC KIDNEY DISEASE (HCC): ICD-10-CM

## 2023-01-19 NOTE — PROGRESS NOTES
Name: Sharlene Humphrey      : 1940      MRN: [id number]  Encounter Provider: Saskia Grider DO  Encounter Date: 2023   Encounter department: [address] [place] [address]  Chief Complaint   Patient presents with   • Follow-up     6 month check up   Pt declines flu shot      Patient Instructions   Here for recheck and is stable and needs updated labs in 6 months for hx of hyperlipidemia and also CKD stage 3  Take BP med and also insomnia med and rec also to quit tobacco and rec to avoid purine rich foods and avoid nsaids to help with CKD stage 3  Gout stable  Assessment & Plan     1  Essential hypertension  -     Comprehensive metabolic panel; Future; Expected date: 2023  -     CBC and differential; Future; Expected date: 2023    2  Tobacco abuse    3  Stage 3b chronic kidney disease ([facility] )  Comments:  stable, avoid nsaids  Orders:  -     Comprehensive metabolic panel; Future; Expected date: 2023    4  Insomnia, unspecified type    5  Chronic obstructive pulmonary disease, unspecified COPD type ([facility] )  Comments:  uses inhaler prn, stable    6  Screening for prostate cancer  -     PSA, Total Screen; Future; Expected date: 2023    7  Screening for hyperlipidemia  -     Lipid Panel with Direct LDL reflex; Future; Expected date: 2023    8  Gout, unspecified cause, unspecified chronicity, unspecified site           Subjective      Follow-up (6 month check up   Pt declines flu shot ) Gout stable and smokes 3 per day and also takes BP med and also insomnia med and also has inhaler for COPD  Stay well hydrated and avoid NSAIDs  Review of Systems   Constitutional: Negative  HENT: Negative  Eyes: Negative  Respiratory: Negative  Cardiovascular: Negative  Gastrointestinal: Negative  Endocrine: Negative  Genitourinary: Negative  Musculoskeletal: Negative  Skin: Negative  Allergic/Immunologic: Negative  Neurological: Negative  Hematological: Negative  Psychiatric/Behavioral: Negative  Current Outpatient Medications on File Prior to Visit   Medication Sig   • albuterol (PROVENTIL HFA,VENTOLIN HFA) 90 mcg/act inhaler Inhale 2 puffs every 6 (six) hours as needed for wheezing or shortness of breath   • atenolol (TENORMIN) 50 mg tablet Take 1 tablet (50 mg total) by mouth daily   • cholecalciferol (VITAMIN D3) 1,000 units tablet Take 1,000 Units by mouth daily   • cloNIDine (CATAPRES) 0 1 mg tablet Take 1 tablet (0 1 mg total) by mouth daily   • zolpidem (AMBIEN) 5 mg tablet Take 1 tablet (5 mg total) by mouth daily at bedtime as needed for sleep       Objective     /76 (BP Location: Left arm, Patient Position: Sitting, Cuff Size: Adult)   Pulse 76   Temp 97 6 °F (36 4 °C) (Temporal)   Resp 16   Ht 5' 6" (1 676 m)   Wt 44 7 kg (98 lb 9 6 oz)   SpO2 97%   BMI 15 91 kg/m²     Physical Exam  Constitutional:       Appearance: Normal appearance  He is well-developed  HENT:      Head: Normocephalic and atraumatic  Eyes:      Conjunctiva/sclera: Conjunctivae normal       Pupils: Pupils are equal, round, and reactive to light  Cardiovascular:      Rate and Rhythm: Normal rate and regular rhythm  Heart sounds: Normal heart sounds  Pulmonary:      Effort: Pulmonary effort is normal       Breath sounds: Normal breath sounds  Musculoskeletal:         General: Normal range of motion  Cervical back: Normal range of motion and neck supple  Skin:     General: Skin is warm and dry  Neurological:      General: No focal deficit present  Mental Status: He is alert and oriented to person, place, and time  Deep Tendon Reflexes: Reflexes are normal and symmetric  Psychiatric:         Mood and Affect: Mood normal          Behavior: Behavior normal          Thought Content:  Thought content normal          Judgment: Judgment normal        Corbett Nathalie, DO

## 2023-01-19 NOTE — PATIENT INSTRUCTIONS
Here for recheck and is stable and needs updated labs in 6 months for hx of hyperlipidemia and also CKD stage 3  Take BP med and also insomnia med and rec also to quit tobacco and rec to avoid purine rich foods and avoid nsaids to help with CKD stage 3  Gout stable

## 2023-02-18 ENCOUNTER — APPOINTMENT (EMERGENCY)
Dept: RADIOLOGY | Facility: HOSPITAL | Age: 83
End: 2023-02-18

## 2023-02-18 ENCOUNTER — HOSPITAL ENCOUNTER (INPATIENT)
Facility: HOSPITAL | Age: 83
LOS: 2 days | Discharge: HOME | End: 2023-02-22
Attending: EMERGENCY MEDICINE | Admitting: STUDENT IN AN ORGANIZED HEALTH CARE EDUCATION/TRAINING PROGRAM

## 2023-02-18 ENCOUNTER — APPOINTMENT (EMERGENCY)
Dept: CT IMAGING | Facility: HOSPITAL | Age: 83
End: 2023-02-18

## 2023-02-18 DIAGNOSIS — I16.0 HYPERTENSIVE URGENCY: Primary | ICD-10-CM

## 2023-02-18 DIAGNOSIS — I73.9 PERIPHERAL ARTERIAL DISEASE (HCC): ICD-10-CM

## 2023-02-18 DIAGNOSIS — R42 VERTIGO: ICD-10-CM

## 2023-02-18 DIAGNOSIS — R42 LIGHTHEADEDNESS: ICD-10-CM

## 2023-02-18 DIAGNOSIS — I65.29 INTERNAL CAROTID ARTERY STENOSIS: ICD-10-CM

## 2023-02-18 DIAGNOSIS — I10 ESSENTIAL HYPERTENSION: ICD-10-CM

## 2023-02-18 DIAGNOSIS — N18.9 CKD (CHRONIC KIDNEY DISEASE): ICD-10-CM

## 2023-02-18 LAB
2HR DELTA HS TROPONIN: 1 NG/L
4HR DELTA HS TROPONIN: 1 NG/L
ALBUMIN SERPL BCP-MCNC: 4.2 G/DL (ref 3.5–5)
ALP SERPL-CCNC: 98 U/L (ref 34–104)
ALT SERPL W P-5'-P-CCNC: 11 U/L (ref 7–52)
ANION GAP SERPL CALCULATED.3IONS-SCNC: 7 MMOL/L (ref 4–13)
APTT PPP: 27 SECONDS (ref 23–37)
AST SERPL W P-5'-P-CCNC: 18 U/L (ref 13–39)
BASOPHILS # BLD AUTO: 0.05 THOUSANDS/ÂΜL (ref 0–0.1)
BASOPHILS NFR BLD AUTO: 1 % (ref 0–1)
BILIRUB SERPL-MCNC: 0.65 MG/DL (ref 0.2–1)
BNP SERPL-MCNC: 437 PG/ML (ref 0–100)
BUN SERPL-MCNC: 27 MG/DL (ref 5–25)
CALCIUM SERPL-MCNC: 9.6 MG/DL (ref 8.4–10.2)
CARDIAC TROPONIN I PNL SERPL HS: 8 NG/L
CARDIAC TROPONIN I PNL SERPL HS: 9 NG/L
CARDIAC TROPONIN I PNL SERPL HS: 9 NG/L
CHLORIDE SERPL-SCNC: 104 MMOL/L (ref 96–108)
CO2 SERPL-SCNC: 26 MMOL/L (ref 21–32)
CREAT SERPL-MCNC: 1.48 MG/DL (ref 0.6–1.3)
EOSINOPHIL # BLD AUTO: 0.34 THOUSAND/ÂΜL (ref 0–0.61)
EOSINOPHIL NFR BLD AUTO: 3 % (ref 0–6)
ERYTHROCYTE [DISTWIDTH] IN BLOOD BY AUTOMATED COUNT: 12.4 % (ref 11.6–15.1)
GFR SERPL CREATININE-BSD FRML MDRD: 44 ML/MIN/1.73SQ M
GLUCOSE SERPL-MCNC: 110 MG/DL (ref 65–140)
HCT VFR BLD AUTO: 44.2 % (ref 36.5–49.3)
HGB BLD-MCNC: 14.3 G/DL (ref 12–17)
IMM GRANULOCYTES # BLD AUTO: 0.05 THOUSAND/UL (ref 0–0.2)
IMM GRANULOCYTES NFR BLD AUTO: 1 % (ref 0–2)
INR PPP: 0.98 (ref 0.84–1.19)
LYMPHOCYTES # BLD AUTO: 2.08 THOUSANDS/ÂΜL (ref 0.6–4.47)
LYMPHOCYTES NFR BLD AUTO: 19 % (ref 14–44)
MCH RBC QN AUTO: 29.4 PG (ref 26.8–34.3)
MCHC RBC AUTO-ENTMCNC: 32.4 G/DL (ref 31.4–37.4)
MCV RBC AUTO: 91 FL (ref 82–98)
MONOCYTES # BLD AUTO: 0.74 THOUSAND/ÂΜL (ref 0.17–1.22)
MONOCYTES NFR BLD AUTO: 7 % (ref 4–12)
NEUTROPHILS # BLD AUTO: 7.47 THOUSANDS/ÂΜL (ref 1.85–7.62)
NEUTS SEG NFR BLD AUTO: 69 % (ref 43–75)
NRBC BLD AUTO-RTO: 0 /100 WBCS
PLATELET # BLD AUTO: 267 THOUSANDS/UL (ref 149–390)
PMV BLD AUTO: 8.9 FL (ref 8.9–12.7)
POTASSIUM SERPL-SCNC: 4.4 MMOL/L (ref 3.5–5.3)
PROT SERPL-MCNC: 7 G/DL (ref 6.4–8.4)
PROTHROMBIN TIME: 13 SECONDS (ref 11.6–14.5)
RBC # BLD AUTO: 4.86 MILLION/UL (ref 3.88–5.62)
SODIUM SERPL-SCNC: 137 MMOL/L (ref 135–147)
WBC # BLD AUTO: 10.73 THOUSAND/UL (ref 4.31–10.16)

## 2023-02-18 RX ORDER — ASPIRIN 81 MG/1
324 TABLET, CHEWABLE ORAL ONCE
Status: COMPLETED | OUTPATIENT
Start: 2023-02-18 | End: 2023-02-18

## 2023-02-18 RX ORDER — HEPARIN SODIUM 5000 [USP'U]/ML
5000 INJECTION, SOLUTION INTRAVENOUS; SUBCUTANEOUS EVERY 8 HOURS SCHEDULED
Status: DISCONTINUED | OUTPATIENT
Start: 2023-02-18 | End: 2023-02-22 | Stop reason: HOSPADM

## 2023-02-18 RX ORDER — HYDRALAZINE HYDROCHLORIDE 20 MG/ML
10 INJECTION INTRAMUSCULAR; INTRAVENOUS ONCE
Status: COMPLETED | OUTPATIENT
Start: 2023-02-18 | End: 2023-02-18

## 2023-02-18 RX ORDER — ZOLPIDEM TARTRATE 5 MG/1
5 TABLET ORAL
Status: DISCONTINUED | OUTPATIENT
Start: 2023-02-18 | End: 2023-02-22 | Stop reason: HOSPADM

## 2023-02-18 RX ORDER — NICOTINE 21 MG/24HR
1 PATCH, TRANSDERMAL 24 HOURS TRANSDERMAL DAILY
Status: DISCONTINUED | OUTPATIENT
Start: 2023-02-19 | End: 2023-02-22 | Stop reason: HOSPADM

## 2023-02-18 RX ORDER — ASPIRIN 81 MG/1
81 TABLET, CHEWABLE ORAL DAILY
Status: DISCONTINUED | OUTPATIENT
Start: 2023-02-19 | End: 2023-02-19

## 2023-02-18 RX ORDER — HYDRALAZINE HYDROCHLORIDE 20 MG/ML
5 INJECTION INTRAMUSCULAR; INTRAVENOUS EVERY 6 HOURS PRN
Status: DISCONTINUED | OUTPATIENT
Start: 2023-02-18 | End: 2023-02-20

## 2023-02-18 RX ORDER — ONDANSETRON 2 MG/ML
4 INJECTION INTRAMUSCULAR; INTRAVENOUS EVERY 6 HOURS PRN
Status: DISCONTINUED | OUTPATIENT
Start: 2023-02-18 | End: 2023-02-22 | Stop reason: HOSPADM

## 2023-02-18 RX ORDER — ALBUTEROL SULFATE 90 UG/1
2 AEROSOL, METERED RESPIRATORY (INHALATION) EVERY 6 HOURS PRN
Status: DISCONTINUED | OUTPATIENT
Start: 2023-02-18 | End: 2023-02-22 | Stop reason: HOSPADM

## 2023-02-18 RX ORDER — ATORVASTATIN CALCIUM 40 MG/1
40 TABLET, FILM COATED ORAL EVERY EVENING
Status: DISCONTINUED | OUTPATIENT
Start: 2023-02-18 | End: 2023-02-22 | Stop reason: HOSPADM

## 2023-02-18 RX ORDER — ACETAMINOPHEN 325 MG/1
650 TABLET ORAL EVERY 6 HOURS PRN
Status: DISCONTINUED | OUTPATIENT
Start: 2023-02-18 | End: 2023-02-22 | Stop reason: HOSPADM

## 2023-02-18 RX ORDER — HYDRALAZINE HYDROCHLORIDE 20 MG/ML
10 INJECTION INTRAMUSCULAR; INTRAVENOUS ONCE
Status: DISCONTINUED | OUTPATIENT
Start: 2023-02-18 | End: 2023-02-18

## 2023-02-18 RX ORDER — MAGNESIUM HYDROXIDE/ALUMINUM HYDROXICE/SIMETHICONE 120; 1200; 1200 MG/30ML; MG/30ML; MG/30ML
30 SUSPENSION ORAL EVERY 6 HOURS PRN
Status: DISCONTINUED | OUTPATIENT
Start: 2023-02-18 | End: 2023-02-22 | Stop reason: HOSPADM

## 2023-02-18 RX ADMIN — HEPARIN SODIUM 5000 UNITS: 5000 INJECTION INTRAVENOUS; SUBCUTANEOUS at 21:29

## 2023-02-18 RX ADMIN — IOHEXOL 85 ML: 350 INJECTION, SOLUTION INTRAVENOUS at 14:52

## 2023-02-18 RX ADMIN — ASPIRIN 324 MG: 81 TABLET, CHEWABLE ORAL at 16:07

## 2023-02-18 RX ADMIN — HYDRALAZINE HYDROCHLORIDE 10 MG: 20 INJECTION, SOLUTION INTRAMUSCULAR; INTRAVENOUS at 13:34

## 2023-02-18 RX ADMIN — ZOLPIDEM TARTRATE 5 MG: 5 TABLET, COATED ORAL at 21:29

## 2023-02-18 RX ADMIN — ATORVASTATIN CALCIUM 40 MG: 40 TABLET, FILM COATED ORAL at 18:06

## 2023-02-18 NOTE — ED NOTES
Pt's son, Margy Miller, needs to leave but will try to come back in an hour or two  Luke's phone number is correct as documented in pt's contacts  Advised Luke that pt is set to be admitted for additional testing       Sheila Aquino  02/18/23 [id number]

## 2023-02-18 NOTE — ASSESSMENT & PLAN NOTE
Lab Results   Component Value Date    EGFR 44 02/18/2023    EGFR 35 11/14/2022    EGFR 42 11/13/2022    CREATININE 1 48 (H) 02/18/2023    CREATININE 1 75 (H) 11/14/2022    CREATININE 1 53 (H) 11/13/2022

## 2023-02-18 NOTE — ASSESSMENT & PLAN NOTE
Lab Results   Component Value Date    EGFR 44 02/18/2023    EGFR 35 11/14/2022    EGFR 42 11/13/2022    CREATININE 1 48 (H) 02/18/2023    CREATININE 1 75 (H) 11/14/2022    CREATININE 1 53 (H) 11/13/2022     Currently at baseline

## 2023-02-18 NOTE — ASSESSMENT & PLAN NOTE
Malnutrition Findings:                        Appreciate nutrition recommendations         BMI Findings: Body mass index is 16 01 kg/m²

## 2023-02-18 NOTE — H&P
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, moderate to severe plaque stenosis of bilateral vertebral artery origins, left proximal V4 segment moderate to severe plaque stenosis, greater than 95% near occluded plaque stenosis proximal right cervical ICA  Rule out CVA/TIA, versus vertebral basilar insufficiency  Permissive hypertension  Stroke pathway admit  Neurology and vascular consults    Severe protein-calorie malnutrition ([address] )  Assessment & Plan  Malnutrition Findings:                        Appreciate nutrition recommendations         BMI Findings: Body mass index is 16 01 kg/m²         CKD (chronic kidney disease)  Assessment & Plan  Lab Results   Component Value Date    EGFR 44 02/18/2023    EGFR 35 11/14/2022    EGFR 42 11/13/2022    CREATININE 1 48 (H) 02/18/2023    CREATININE 1 75 (H) 11/14/2022    CREATININE 1 53 (H) 11/13/2022       Stage 3b chronic kidney disease [facility])  Assessment & Plan  Lab Results   Component Value Date    EGFR 44 02/18/2023    EGFR 35 11/14/2022    EGFR 42 11/13/2022    CREATININE 1 48 (H) 02/18/2023    CREATININE 1 75 (H) 11/14/2022    CREATININE 1 53 (H) 11/13/2022     Currently at baseline    Tobacco abuse  Assessment & Plan  Nicotine patch    COPD (chronic obstructive pulmonary disease) ([facility])  Assessment & Plan  Albuterol inhaler as needed    Essential hypertension  Assessment & Plan  Patient presented with hypertensive urgency  Allow permissive hypertension    VTE Prophylaxis: Heparin  Code Status: Level 1 full code    Anticipated Length of Stay:  Patient will be admitted on an Observation basis with an anticipated length of stay of less than 2 midnights  Justification for Hospital Stay: Evaluation for lightheadedness, stroke rule out    Total Time for Visit, including Counseling / Coordination of Care: 60 minutes  Greater than 50% of this total time spent on direct patient counseling and coordination of care  Chief Complaint:   Lightheadedness    History of Present Illness:    Merlin Notice is a [de-identified] y o  male With past medical history of hypertension, CKD 3, COPD, tobacco use, gout who presents to the hospital with dizziness lightheadedness  Patient reports that he turned his head in the morning and had sudden onset dizziness discomfort in his neck  This lasted roughly 1 to 2 minutes before resolving  Since then has been persistently headed  Imaging and and labs in the emergency department were unremarkable  Patient found to have hypertensive urgency with blood pressures greater than 200  Review of Systems:    Review of Systems   Constitutional: Negative for chills and fever  HENT: Negative for sore throat and trouble swallowing  Eyes: Negative for photophobia and visual disturbance  Respiratory: Negative for shortness of breath and wheezing  Cardiovascular: Negative for chest pain and palpitations  Gastrointestinal: Negative for constipation, diarrhea, nausea and vomiting  Genitourinary: Negative for difficulty urinating and dysuria  Musculoskeletal: Negative for arthralgias and myalgias  Skin: Negative for rash and wound  Neurological: Positive for dizziness and light-headedness  Negative for headaches  Past Medical and Surgical History:     Past Medical History:   Diagnosis Date   • Abnormal thyroid function test     non specified /  LA    1/8/13     • Chronic kidney disease    • COPD (chronic obstructive pulmonary disease) (HCC)    • Eczema     LA   11/26/13      • Esophagitis • Gout    • Hypertension    • Pyloric channel ulcer        Past Surgical History:   Procedure Laterality Date   • BACK SURGERY     • ESOPHAGOGASTRODUODENOSCOPY N/A 12/1/2016    Procedure: ESOPHAGOGASTRODUODENOSCOPY (EGD); Surgeon: Garth Johnson MD;  Location: [facility]; Service:    • EYE SURGERY         Meds/Allergies:    Prior to Admission medications    Medication Sig Start Date End Date Taking? Authorizing Provider   albuterol (PROVENTIL HFA,VENTOLIN HFA) 90 mcg/act inhaler Inhale 2 puffs every 6 (six) hours as needed for wheezing or shortness of breath 11/22/22  Yes King Carter, DO   atenolol (TENORMIN) 50 mg tablet Take 1 tablet (50 mg total) by mouth daily 1/9/23  Yes King Carter, DO   cholecalciferol (VITAMIN D3) 1,000 units tablet Take 1,000 Units by mouth daily   Yes Historical Provider, MD   cloNIDine (CATAPRES) 0 1 mg tablet Take 1 tablet (0 1 mg total) by mouth daily 1/9/23  Yes King Carter, DO   zolpidem (AMBIEN) 5 mg tablet Take 1 tablet (5 mg total) by mouth daily at bedtime as needed for sleep 11/28/22  Yes King Carter, DO       Allergies:    Allergies   Allergen Reactions   • Norvasc [Amlodipine]        Social History:     Marital Status:    Substance Use History:   Social History     Substance and Sexual Activity   Alcohol Use Not Currently     Social History     Tobacco Use   Smoking Status Every Day   • Packs/day: 0 50   • Years: 60 00   • Pack years: 30 00   • Types: Cigarettes   Smokeless Tobacco Current   Tobacco Comments    current some day smoker per allscript     Social History     Substance and Sexual Activity   Drug Use No       Family History:    Pertinent family history reviewed    Physical Exam:     Vitals:   Blood Pressure: 152/93 (02/18/23 1648)  Pulse: 70 (02/18/23 1648)  Temperature: 97 7 °F (36 5 °C) (02/18/23 1648)  Temp Source: Temporal (02/18/23 1648)  Respirations: 14 (02/18/23 1648)  Height: 5' 6" (167 6 cm) (02/18/23 1318)  Weight - Scale: 45 kg (99 lb 3 3 oz) (02/18/23 1318)  SpO2: 100 % (02/18/23 1648)    Physical Exam  Vitals reviewed  Constitutional:       General: He is not in acute distress  Appearance: He is well-developed  He is not toxic-appearing or diaphoretic  Comments: Thin appearing   HENT:      Head: Normocephalic and atraumatic  Mouth/Throat:      Mouth: Mucous membranes are moist    Eyes:      General: No scleral icterus  Extraocular Movements: Extraocular movements intact  Cardiovascular:      Rate and Rhythm: Normal rate and regular rhythm  Heart sounds: Normal heart sounds  Pulmonary:      Effort: Pulmonary effort is normal  No respiratory distress  Breath sounds: Normal breath sounds  No wheezing or rales  Abdominal:      General: There is no distension  Palpations: Abdomen is soft  Tenderness: There is no abdominal tenderness  There is no guarding or rebound  Musculoskeletal:         General: No swelling, tenderness or deformity  Skin:     General: Skin is warm and dry  Neurological:      General: No focal deficit present  Mental Status: He is alert  Mental status is at baseline  Psychiatric:         Mood and Affect: Mood normal          Behavior: Behavior normal          Thought Content:  Thought content normal          Judgment: Judgment normal           Additional Data:     Lab Results: I have reviewed pertinent results     Results from last 7 days   Lab Units 02/18/23  1334   WBC Thousand/uL 10 73*   HEMOGLOBIN g/dL 14 3   HEMATOCRIT % 44 2   PLATELETS Thousands/uL 267   NEUTROS PCT % 69   LYMPHS PCT % 19   MONOS PCT % 7   EOS PCT % 3     Results from last 7 days   Lab Units 02/18/23  1334   SODIUM mmol/L 137   POTASSIUM mmol/L 4 4   CHLORIDE mmol/L 104   CO2 mmol/L 26   BUN mg/dL 27*   CREATININE mg/dL 1 48*   ANION GAP mmol/L 7   CALCIUM mg/dL 9 6   ALBUMIN g/dL 4 2   TOTAL BILIRUBIN mg/dL 0 65   ALK PHOS U/L 98   ALT U/L 11   AST U/L 18   GLUCOSE RANDOM mg/dL 110 Results from last 7 days   Lab Units 02/18/23  1334   INR  0 98                   Imaging: I have reviewed pertinent imaging     CTA head and neck with and without contrast   Final Result by Delisa Roque MD (02/18 1519)      No evidence of acute intracranial hemorrhage  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  Chronic microangiopathy changes  Greater than 95% near occluded left proximal subclavian plaque stenosis /near occlusion  Moderate to severe plaque stenosis bilateral vertebral artery origins  Left proximal V4 segment moderate to severe plaque stenosis  Greater than 95% near occluded plaque stenosis proximal right cervical ICA, vascular consult recommended  Workstation performed: [id number]         XR chest 1 view portable   ED Interpretation by Fozia Carter MD (02/18 9776)   Primary reviewed; No acute abnormality      MRI Inpatient Order    (Results Pending)       EKG, Pathology, and Other Studies Reviewed on Admission:   · EKG: Reviewed     Allscripts / Epic Records Reviewed    ** Please Note: This note has been constructed using a voice recognition system   **

## 2023-02-18 NOTE — ASSESSMENT & PLAN NOTE
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

## 2023-02-18 NOTE — ED PROVIDER NOTES
History  Chief Complaint   Patient presents with   • Dizziness     Pt reports sudden onset dizziness that started when waking and turning head, described as a room spinning sensation that caused patient to close eyes     82-year-old male presents for evaluation of dizziness and lightheadedness  Patient states that this morning he turned his head and sudden onset of dizziness with discomfort in the lateral aspect of his neck  He says that dizziness neck pain lasted for roughly 1 to 2 minutes and is since resolved  He states he had a dizziness since then but has had persistent lightheaded sensation  He is unable to reproduce the dizziness  He denies headache, neck pain or neck stiffness currently, chest pain, shortness of breath, focal numbness or weakness, back/flank pain, nausea, vomit, fevers, chills  No history of similar symptoms  History provided by:  Patient and relative  Dizziness      Prior to Admission Medications   Prescriptions Last Dose Informant Patient Reported? Taking? albuterol (PROVENTIL HFA,VENTOLIN HFA) 90 mcg/act inhaler   No Yes   Sig: Inhale 2 puffs every 6 (six) hours as needed for wheezing or shortness of breath   atenolol (TENORMIN) 50 mg tablet 2/18/2023  No Yes   Sig: Take 1 tablet (50 mg total) by mouth daily   cholecalciferol (VITAMIN D3) 1,000 units tablet 2/18/2023  Yes Yes   Sig: Take 1,000 Units by mouth daily   cloNIDine (CATAPRES) 0 1 mg tablet 2/18/2023  No Yes   Sig: Take 1 tablet (0 1 mg total) by mouth daily   zolpidem (AMBIEN) 5 mg tablet 2/17/2023  No Yes   Sig: Take 1 tablet (5 mg total) by mouth daily at bedtime as needed for sleep      Facility-Administered Medications: None       Past Medical History:   Diagnosis Date   • Abnormal thyroid function test     non specified /  LA    1/8/13     • Chronic kidney disease    • COPD (chronic obstructive pulmonary disease) (HCC)    • Eczema     LA   11/26/13      • Esophagitis    • Gout    • Hypertension    • Pyloric channel ulcer        Past Surgical History:   Procedure Laterality Date   • BACK SURGERY     • ESOPHAGOGASTRODUODENOSCOPY N/A 12/1/2016    Procedure: ESOPHAGOGASTRODUODENOSCOPY (EGD); Surgeon: Graciela Castañeda MD;  Location: [facility]; Service:    • EYE SURGERY         Family History   Problem Relation Age of Onset   • Other Mother         Dec in her 66's (gangrene foot)   • Heart failure Mother    • Diabetes type II Mother    • Peripheral vascular disease Mother    • Rectal cancer Father    • Other Maternal Aunt    • Other Family         situs inversus / Dec 89 yo     I have reviewed and agree with the history as documented  E-Cigarette/Vaping   • E-Cigarette Use Never User      E-Cigarette/Vaping Substances   • Nicotine No    • THC No    • CBD No    • Flavoring No      Social History     Tobacco Use   • Smoking status: Every Day     Packs/day: 0 50     Years: 60 00     Pack years: 30 00     Types: Cigarettes   • Smokeless tobacco: Current   • Tobacco comments:     current some day smoker per allscript   Vaping Use   • Vaping Use: Never used   Substance Use Topics   • Alcohol use: Not Currently   • Drug use: No       Review of Systems   Neurological: Positive for dizziness and light-headedness  Physical Exam  Physical Exam  Constitutional:       Appearance: He is well-developed  HENT:      Head: Normocephalic and atraumatic  Eyes:      General: No scleral icterus  Conjunctiva/sclera: Conjunctivae normal    Neck:      Vascular: No JVD  Trachea: No tracheal deviation  Pulmonary:      Effort: Pulmonary effort is normal  No respiratory distress  Abdominal:      General: There is no distension  Palpations: There is no mass  Tenderness: There is no abdominal tenderness  Hernia: No hernia is present  Musculoskeletal:         General: No deformity  Normal range of motion  Cervical back: Normal range of motion and neck supple  No rigidity     Lymphadenopathy:      Cervical: No cervical adenopathy  Skin:     Coloration: Skin is not pale  Findings: No erythema or rash  Neurological:      Mental Status: He is alert and oriented to person, place, and time  Comments: Please refer to nih stroke scale   Psychiatric:         Behavior: Behavior normal          Vital Signs  ED Triage Vitals   Temperature Pulse Respirations Blood Pressure SpO2   02/18/23 1322 02/18/23 1321 02/18/23 1321 02/18/23 1321 02/18/23 1321   98 5 °F (36 9 °C) 65 20 (!) 261/122 99 %      Temp Source Heart Rate Source Patient Position - Orthostatic VS BP Location FiO2 (%)   02/18/23 1322 02/18/23 1345 02/18/23 1345 02/18/23 1345 --   Oral Monitor Sitting Left arm       Pain Score       02/18/23 1321       No Pain           Vitals:    02/18/23 1345 02/18/23 1400 02/18/23 1435 02/18/23 1500   BP: (!) 202/88 163/75 145/82 127/64   Pulse: 62 61 80 77   Patient Position - Orthostatic VS: Sitting Sitting Lying Lying         Visual Acuity  Visual Acuity    Flowsheet Row Most Recent Value   L Pupil Size (mm) 3   R Pupil Size (mm) 3          ED Medications  Medications   hydrALAZINE (APRESOLINE) injection 10 mg (0 mg Intravenous Hold 2/18/23 1406)   hydrALAZINE (APRESOLINE) injection 10 mg (10 mg Intravenous Given 2/18/23 1334)   iohexol (OMNIPAQUE) 350 MG/ML injection (SINGLE-DOSE) 85 mL (85 mL Intravenous Given 2/18/23 1452)       Diagnostic Studies  Results Reviewed     Procedure Component Value Units Date/Time    HS Troponin I 2hr [[id number]] Collected: 02/18/23 1538    Lab Status:  In process Specimen: Blood from Arm, Left Updated: 02/18/23 1547    HS Troponin I 4hr [[id number]]     Lab Status: No result Specimen: Blood     B-Type Natriuretic Peptide(BNP) [[id number]]  (Abnormal) Collected: 02/18/23 1334    Lab Status: Final result Specimen: Blood from Arm, Left Updated: 02/18/23 1429      pg/mL     HS Troponin 0hr (reflex protocol) [[id number]]  (Normal) Collected: 02/18/23 1334    Lab Status: Final result Specimen: Blood from Arm, Left Updated: 02/18/23 1407     hs TnI 0hr 8 ng/L     Comprehensive metabolic panel [[id number]]  (Abnormal) Collected: 02/18/23 1334    Lab Status: Final result Specimen: Blood from Arm, Left Updated: 02/18/23 1401     Sodium 137 mmol/L      Potassium 4 4 mmol/L      Chloride 104 mmol/L      CO2 26 mmol/L      ANION GAP 7 mmol/L      BUN 27 mg/dL      Creatinine 1 48 mg/dL      Glucose 110 mg/dL      Calcium 9 6 mg/dL      AST 18 U/L      ALT 11 U/L      Alkaline Phosphatase 98 U/L      Total Protein 7 0 g/dL      Albumin 4 2 g/dL      Total Bilirubin 0 65 mg/dL      eGFR 44 ml/min/1 73sq m     Narrative:      [place] guidelines for Chronic Kidney Disease (CKD):   •  Stage 1 with normal or high GFR (GFR > 90 mL/min/1 73 square meters)  •  Stage 2 Mild CKD (GFR = 60-89 mL/min/1 73 square meters)  •  Stage 3A Moderate CKD (GFR = 45-59 mL/min/1 73 square meters)  •  Stage 3B Moderate CKD (GFR = 30-44 mL/min/1 73 square meters)  •  Stage 4 Severe CKD (GFR = 15-29 mL/min/1 73 square meters)  •  Stage 5 End Stage CKD (GFR <15 mL/min/1 73 square meters)  Note: GFR calculation is accurate only with a steady state creatinine    Protime-INR [[id number]]  (Normal) Collected: 02/18/23 1334    Lab Status: Final result Specimen: Blood from Arm, Left Updated: 02/18/23 1357     Protime 13 0 seconds      INR 0 98    APTT [[id number]]  (Normal) Collected: 02/18/23 1334    Lab Status: Final result Specimen: Blood from Arm, Left Updated: 02/18/23 1357     PTT 27 seconds     CBC and differential [[id number]]  (Abnormal) Collected: 02/18/23 1334    Lab Status: Final result Specimen: Blood from Arm, Left Updated: 02/18/23 1346     WBC 10 73 Thousand/uL      RBC 4 86 Million/uL      Hemoglobin 14 3 g/dL      Hematocrit 44 2 %      MCV 91 fL      MCH 29 4 pg      MCHC 32 4 g/dL      RDW 12 4 %      MPV 8 9 fL      Platelets 733 Thousands/uL      nRBC 0 /100 WBCs      Neutrophils Relative 69 %      Immat GRANS % 1 %      Lymphocytes Relative 19 %      Monocytes Relative 7 %      Eosinophils Relative 3 %      Basophils Relative 1 %      Neutrophils Absolute 7 47 Thousands/µL      Immature Grans Absolute 0 05 Thousand/uL      Lymphocytes Absolute 2 08 Thousands/µL      Monocytes Absolute 0 74 Thousand/µL      Eosinophils Absolute 0 34 Thousand/µL      Basophils Absolute 0 05 Thousands/µL                  CTA head and neck with and without contrast   Final Result by Micha Riggs MD (02/18 1517)      No evidence of acute intracranial hemorrhage  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  Chronic microangiopathy changes  Greater than 95% near occluded left proximal subclavian plaque stenosis /near occlusion  Moderate to severe plaque stenosis bilateral vertebral artery origins  Left proximal V4 segment moderate to severe plaque stenosis  Greater than 95% near occluded plaque stenosis proximal right cervical ICA, vascular consult recommended  Workstation performed: [id number]         XR chest 1 view portable   ED Interpretation by Chet Ornelas MD (02/18 1416)   Primary reviewed;  No acute abnormality                 Procedures  Procedures         ED Course  ED Course as of 02/18/23 1549   Sat Feb 18, 2023   1352 Blood Pressure(!): 202/88  Still elevated, will give additional dose   1413 Creatinine(!): 1 48  chronic                  Stroke Assessment     Row Name 02/18/23 1332             NIH Stroke Scale    Interval Baseline      Level of Consciousness (1a ) 0      LOC Questions (1b ) 0      LOC Commands (1c ) 0      Best Gaze (2 ) 0      Visual (3 ) 0      Facial Palsy (4 ) 0      Motor Arm, Left (5a ) 0      Motor Arm, Right (5b ) 0      Motor Leg, Left (6a ) 0      Motor Leg, Right (6b ) 0      Limb Ataxia (7 ) 0      Sensory (8 ) 0      Best Language (9 ) 0      Dysarthria (10 ) 0      Extinction and Inattention (11 ) (Formerly Neglect) 0      Total 0 Flowsheet Row Most Recent Value   Thrombolytic Decision Options    Thrombolytic Decision Patient not a candidate  SBIRT 22yo+    Flowsheet Row Most Recent Value   SBIRT (23 yo +)    In order to provide better care to our patients, we are screening all of our patients for alcohol and drug use  Would it be okay to ask you these screening questions? No Filed at: 02/18/2023 1423                    Medical Decision Making  Amount and/or Complexity of Data Reviewed  Labs: ordered  Radiology: ordered  Risk  Prescription drug management  Disposition  Final diagnoses:   Hypertensive urgency   Lightheadedness   Vertigo   CKD (chronic kidney disease)   Peripheral arterial disease ([facility] [address] )     Time reflects when diagnosis was documented in both MDM as applicable and the Disposition within this note     Time User Action Codes Description Comment    2/18/2023  3:43 PM Rose Neo Add [I10] Hypertension     2/18/2023  3:43 PM Jacky Olson Remove [I10] Hypertension     2/18/2023  3:44 PM Rose Neo Add [I16 0] Hypertensive urgency     2/18/2023  3:44 PM Rose Neo Add [R42] Lightheadedness     2/18/2023  3:44 PM Rose Neo Add [R42] Vertigo     2/18/2023  3:44 PM Sourav Olson Add [N18 9] CKD (chronic kidney disease)     2/18/2023  3:44 PM Rose Neo Add [I73 9] Peripheral arterial disease [facility])       ED Disposition     ED Disposition   Admit    Condition   Stable    Date/Time   Sat Feb 18, 2023  3:35 PM    Comment   Case was discussed with Dr Rhea Gamez and the patient's admission status was agreed to be Admission Status: inpatient status to the service of Dr Rhea Gamez   Follow-up Information    None         Patient's Medications   Discharge Prescriptions    No medications on file       No discharge procedures on file      PDMP Review       Value Time User    PDMP Reviewed  Yes 11/28/2022  1:35 PM Noel Schmidt DO          ED Provider  Electronically Signed by           Argelia Alberts MD  02/18/23 8909

## 2023-02-19 ENCOUNTER — APPOINTMENT (OUTPATIENT)
Dept: MRI IMAGING | Facility: HOSPITAL | Age: 83
End: 2023-02-19

## 2023-02-19 ENCOUNTER — APPOINTMENT (OUTPATIENT)
Dept: NON INVASIVE DIAGNOSTICS | Facility: HOSPITAL | Age: 83
End: 2023-02-19

## 2023-02-19 LAB
ANION GAP SERPL CALCULATED.3IONS-SCNC: 5 MMOL/L (ref 4–13)
ATRIAL RATE: 60 BPM
ATRIAL RATE: 63 BPM
ATRIAL RATE: 73 BPM
BUN SERPL-MCNC: 31 MG/DL (ref 5–25)
CALCIUM SERPL-MCNC: 9.1 MG/DL (ref 8.4–10.2)
CHLORIDE SERPL-SCNC: 105 MMOL/L (ref 96–108)
CHOLEST SERPL-MCNC: 161 MG/DL
CO2 SERPL-SCNC: 28 MMOL/L (ref 21–32)
CREAT SERPL-MCNC: 1.59 MG/DL (ref 0.6–1.3)
ERYTHROCYTE [DISTWIDTH] IN BLOOD BY AUTOMATED COUNT: 12.8 % (ref 11.6–15.1)
GFR SERPL CREATININE-BSD FRML MDRD: 40 ML/MIN/1.73SQ M
GLUCOSE SERPL-MCNC: 78 MG/DL (ref 65–140)
HCT VFR BLD AUTO: 39.1 % (ref 36.5–49.3)
HDLC SERPL-MCNC: 46 MG/DL
HGB BLD-MCNC: 12.8 G/DL (ref 12–17)
LDLC SERPL CALC-MCNC: 96 MG/DL (ref 0–100)
MCH RBC QN AUTO: 29.4 PG (ref 26.8–34.3)
MCHC RBC AUTO-ENTMCNC: 32.7 G/DL (ref 31.4–37.4)
MCV RBC AUTO: 90 FL (ref 82–98)
P AXIS: 76 DEGREES
P AXIS: 78 DEGREES
P AXIS: 81 DEGREES
PLATELET # BLD AUTO: 252 THOUSANDS/UL (ref 149–390)
PMV BLD AUTO: 9.3 FL (ref 8.9–12.7)
POTASSIUM SERPL-SCNC: 3.9 MMOL/L (ref 3.5–5.3)
PR INTERVAL: 180 MS
PR INTERVAL: 182 MS
PR INTERVAL: 182 MS
PREALB SERPL-MCNC: 21.3 MG/DL (ref 18–40)
QRS AXIS: 65 DEGREES
QRS AXIS: 71 DEGREES
QRS AXIS: 76 DEGREES
QRSD INTERVAL: 102 MS
QRSD INTERVAL: 94 MS
QRSD INTERVAL: 98 MS
QT INTERVAL: 426 MS
QT INTERVAL: 442 MS
QT INTERVAL: 456 MS
QTC INTERVAL: 452 MS
QTC INTERVAL: 456 MS
QTC INTERVAL: 469 MS
RBC # BLD AUTO: 4.36 MILLION/UL (ref 3.88–5.62)
SODIUM SERPL-SCNC: 138 MMOL/L (ref 135–147)
T WAVE AXIS: 54 DEGREES
T WAVE AXIS: 60 DEGREES
T WAVE AXIS: 63 DEGREES
TRIGL SERPL-MCNC: 93 MG/DL
VENTRICULAR RATE: 60 BPM
VENTRICULAR RATE: 63 BPM
VENTRICULAR RATE: 73 BPM
WBC # BLD AUTO: 9.91 THOUSAND/UL (ref 4.31–10.16)

## 2023-02-19 RX ORDER — CARVEDILOL 3.12 MG/1
3.12 TABLET ORAL 2 TIMES DAILY WITH MEALS
Status: DISCONTINUED | OUTPATIENT
Start: 2023-02-19 | End: 2023-02-20

## 2023-02-19 RX ORDER — ISOSORBIDE DINITRATE 10 MG/1
10 TABLET ORAL
Status: DISCONTINUED | OUTPATIENT
Start: 2023-02-19 | End: 2023-02-20

## 2023-02-19 RX ORDER — CLOPIDOGREL BISULFATE 75 MG/1
300 TABLET ORAL ONCE
Status: COMPLETED | OUTPATIENT
Start: 2023-02-19 | End: 2023-02-19

## 2023-02-19 RX ORDER — CLOPIDOGREL BISULFATE 75 MG/1
75 TABLET ORAL DAILY
Status: DISCONTINUED | OUTPATIENT
Start: 2023-02-20 | End: 2023-02-22 | Stop reason: HOSPADM

## 2023-02-19 RX ADMIN — CARVEDILOL 3.12 MG: 3.12 TABLET, FILM COATED ORAL at 17:31

## 2023-02-19 RX ADMIN — ZOLPIDEM TARTRATE 5 MG: 5 TABLET, COATED ORAL at 21:21

## 2023-02-19 RX ADMIN — CARVEDILOL 3.12 MG: 3.12 TABLET, FILM COATED ORAL at 14:38

## 2023-02-19 RX ADMIN — HEPARIN SODIUM 5000 UNITS: 5000 INJECTION INTRAVENOUS; SUBCUTANEOUS at 21:21

## 2023-02-19 RX ADMIN — ISOSORBIDE DINITRATE 10 MG: 10 TABLET ORAL at 17:31

## 2023-02-19 RX ADMIN — HEPARIN SODIUM 5000 UNITS: 5000 INJECTION INTRAVENOUS; SUBCUTANEOUS at 14:38

## 2023-02-19 RX ADMIN — CLOPIDOGREL BISULFATE 300 MG: 75 TABLET ORAL at 14:37

## 2023-02-19 RX ADMIN — ASPIRIN 81 MG: 81 TABLET, CHEWABLE ORAL at 08:45

## 2023-02-19 RX ADMIN — HYDRALAZINE HYDROCHLORIDE 5 MG: 20 INJECTION, SOLUTION INTRAMUSCULAR; INTRAVENOUS at 08:45

## 2023-02-19 RX ADMIN — ATORVASTATIN CALCIUM 40 MG: 40 TABLET, FILM COATED ORAL at 17:31

## 2023-02-19 RX ADMIN — HEPARIN SODIUM 5000 UNITS: 5000 INJECTION INTRAVENOUS; SUBCUTANEOUS at 05:19

## 2023-02-19 NOTE — CONSULTS
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 and >95% stenosis of proximal right cervical ICA, as well as  >95% near occluded left proximal subclavian plaque stenosis  Plan:  - Follow up neurology recs and MRI  - Carotid duplex  - f/u TTE  - Recommend ASA, Statin from a ROSE MARIE, Verterbral stenosis stand point   - Do not use left arm for BP measurements  - Final plan pending attending attestation    HPI:  Carley Andres is a [de-identified] y o  male with a history of HTN, CKD 3, COPD, tobacco use who presents with dizziness and vertigo after he said he turned his neck suddenly this morning  He has had persistent light headed feeling since then  On arrival his SBP>260, now down to 150  CTA was performed and showed no evidence of acute intracranial hemorrhage  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  >95% near occluded left proximal subclavian plaque stenosis  Moderate to severe plaque stenosis bilateral vertebral artery origins  Left proximal V4 segment moderate to severe plaque stenosis  >95% near occluded plaque stenosis proximal right cervical ICA  On exam he is AOx4, CN2-12 intact, motor-sensory intact, NIHSS 0  Physical Exam:  General: No acute distress, alert and oriented, thin  CV: Well perfused, regular rate   Lungs: Normal work of breathing, no increased respiratory effort  Abdomen: Soft, non-tender, non-distended  Extremities: No edema, clubbing or cyanosis  Skin: Warm, dry      Review of Systems   Constitutional: Negative  HENT: Negative  Eyes: Negative  Respiratory: Negative  Cardiovascular: Negative  Gastrointestinal: Negative  Endocrine: Negative  Genitourinary: Negative  Musculoskeletal: Negative      Skin: Negative  Allergic/Immunologic: Negative  Neurological: Positive for dizziness and light-headedness  Hematological: Negative  Psychiatric/Behavioral: Negative  Objective       No intake or output data in the 24 hours ending 02/18/23 2305    First Vitals:   Blood Pressure: (!) 261/122 (02/18/23 1321)  Pulse: 65 (02/18/23 1321)  Temperature: 98 5 °F (36 9 °C) (02/18/23 1322)  Temp Source: Oral (02/18/23 1322)  Respirations: 20 (02/18/23 1321)  Height: 5' 6" (167 6 cm) (02/18/23 1318)  Weight - Scale: 45 kg (99 lb 3 3 oz) (02/18/23 1318)  SpO2: 99 % (02/18/23 1321)    Current Vitals:   Blood Pressure: 144/71 (02/18/23 2200)  Pulse: 69 (02/18/23 2200)  Temperature: 97 8 °F (36 6 °C) (02/18/23 2200)  Temp Source: Temporal (02/18/23 2200)  Respirations: 14 (02/18/23 1700)  Height: 5' 6" (167 6 cm) (02/18/23 1318)  Weight - Scale: 45 kg (99 lb 3 3 oz) (02/18/23 1318)  SpO2: 99 % (02/18/23 2200)    Invasive Devices     Peripheral Intravenous Line  Duration           Peripheral IV 02/18/23 Left Antecubital <1 day                Imaging: I have personally reviewed pertinent reports  CTA head and neck with and without contrast    Result Date: 2/18/2023  Impression: No evidence of acute intracranial hemorrhage  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  Chronic microangiopathy changes  Greater than 95% near occluded left proximal subclavian plaque stenosis /near occlusion  Moderate to severe plaque stenosis bilateral vertebral artery origins  Left proximal V4 segment moderate to severe plaque stenosis  Greater than 95% near occluded plaque stenosis proximal right cervical ICA, vascular consult recommended  Workstation performed: [id number]     XR chest 1 view portable    Result Date: 2/18/2023  Impression: No acute cardiopulmonary disease  Workstation performed: [id number]       EKG, Pathology, and Other Studies: I have personally reviewed pertinent reports          Historical Information   Past Medical History:   Diagnosis Date   • Abnormal thyroid function test     non specified /  LA    1/8/13     • Chronic kidney disease    • COPD (chronic obstructive pulmonary disease) (HCC)    • Eczema     LA   11/26/13      • Esophagitis    • Gout    • Hypertension    • Pyloric channel ulcer      Past Surgical History:   Procedure Laterality Date   • BACK SURGERY     • ESOPHAGOGASTRODUODENOSCOPY N/A 12/1/2016    Procedure: ESOPHAGOGASTRODUODENOSCOPY (EGD); Surgeon: Devante Maciel MD;  Location: [facility];   Service:    • EYE SURGERY       Social History   Social History     Substance and Sexual Activity   Alcohol Use Not Currently     Social History     Substance and Sexual Activity   Drug Use No     Social History     Tobacco Use   Smoking Status Every Day   • Packs/day: 0 50   • Years: 60 00   • Pack years: 30 00   • Types: Cigarettes   Smokeless Tobacco Current   Tobacco Comments    current some day smoker per allscript     Family History   Problem Relation Age of Onset   • Other Mother         Dec in her 66's (gangrene foot)   • Heart failure Mother    • Diabetes type II Mother    • Peripheral vascular disease Mother    • Rectal cancer Father    • Other Maternal Aunt    • Other Family         situs inversus / Dec 92 yo       Meds/Allergies   all current active meds have been reviewed, current meds:   Current Facility-Administered Medications   Medication Dose Route Frequency   • acetaminophen (TYLENOL) tablet 650 mg  650 mg Oral Q6H PRN   • albuterol (PROVENTIL HFA,VENTOLIN HFA) inhaler 2 puff  2 puff Inhalation Q6H PRN   • aluminum-magnesium hydroxide-simethicone (MYLANTA) oral suspension 30 mL  30 mL Oral Q6H PRN   • [START ON 2/19/2023] aspirin chewable tablet 81 mg  81 mg Oral Daily   • atorvastatin (LIPITOR) tablet 40 mg  40 mg Oral QPM   • heparin (porcine) subcutaneous injection 5,000 Units  5,000 Units Subcutaneous Q8H [facility] & assisted   • hydrALAZINE (APRESOLINE) injection 5 mg  5 mg Intravenous Q6H PRN   • [START ON 2/19/2023] nicotine (NICODERM CQ) 14 mg/24hr TD 24 hr patch 1 patch  1 patch Transdermal Daily   • ondansetron (ZOFRAN) injection 4 mg  4 mg Intravenous Q6H PRN   • zolpidem (AMBIEN) tablet 5 mg  5 mg Oral HS    and PTA meds:   Prior to Admission Medications   Prescriptions Last Dose Informant Patient Reported? Taking? albuterol (PROVENTIL HFA,VENTOLIN HFA) 90 mcg/act inhaler Past Month  No Yes   Sig: Inhale 2 puffs every 6 (six) hours as needed for wheezing or shortness of breath   atenolol (TENORMIN) 50 mg tablet 2/18/2023  No Yes   Sig: Take 1 tablet (50 mg total) by mouth daily   cholecalciferol (VITAMIN D3) 1,000 units tablet 2/18/2023  Yes Yes   Sig: Take 1,000 Units by mouth daily   cloNIDine (CATAPRES) 0 1 mg tablet 2/18/2023  No Yes   Sig: Take 1 tablet (0 1 mg total) by mouth daily   zolpidem (AMBIEN) 5 mg tablet 2/17/2023  No Yes   Sig: Take 1 tablet (5 mg total) by mouth daily at bedtime as needed for sleep      Facility-Administered Medications: None     Allergies   Allergen Reactions   • Norvasc [Amlodipine]        Lab Results: I have personally reviewed pertinent lab results  , CBC:   Lab Results   Component Value Date    WBC 10 73 (H) 02/18/2023    HGB 14 3 02/18/2023    HCT 44 2 02/18/2023    MCV 91 02/18/2023     02/18/2023    MCH 29 4 02/18/2023    MCHC 32 4 02/18/2023    RDW 12 4 02/18/2023    MPV 8 9 02/18/2023    NRBC 0 02/18/2023   , CMP:   Lab Results   Component Value Date    SODIUM 137 02/18/2023    K 4 4 02/18/2023     02/18/2023    CO2 26 02/18/2023    BUN 27 (H) 02/18/2023    CREATININE 1 48 (H) 02/18/2023    CALCIUM 9 6 02/18/2023    AST 18 02/18/2023    ALT 11 02/18/2023    ALKPHOS 98 02/18/2023    EGFR 44 02/18/2023       Counseling / Coordination of Care  Total floor / unit time spent today 25 minutes  Greater than 50% of total time was spent with the patient and / or family counseling and / or coordination of care      Inpatient consult to Vascular Surgery  Consult performed by: David Senior MD  Consult ordered by: Bandar Stone MD  2/18/2023 11:05 PM

## 2023-02-19 NOTE — ASSESSMENT & PLAN NOTE
· Elevated BP on admission  · Slightly improved today although remains elevated at 165/71  · Likely cause of elevated BNP on admission at 437  · CXR without cardiopulmonary disease, patient euvolemic on exam, echocardiogram pending  · Telemetry NSR without events overnight

## 2023-02-19 NOTE — PLAN OF CARE
Problem: PHYSICAL THERAPY ADULT  Goal: Performs mobility at highest level of function for planned discharge setting  See evaluation for individualized goals  Description: Treatment/Interventions: Functional transfer training, LE strengthening/ROM, Elevations, Therapeutic exercise, Endurance training, Patient/family training, Equipment eval/education, Bed mobility, Gait training, Spoke to nursing  Equipment Recommended: Carrington Griffin (pt owns RW and [facility] at home for use)       See flowsheet documentation for full assessment, interventions and recommendations  Note: Prognosis: Fair  Problem List: Decreased strength, Decreased endurance, Impaired balance, Decreased mobility, Impaired hearing, Decreased skin integrity, Pain  Assessment: Pt is a [de-identified] yo male admitted to [facility] 2* reports of dizziness, lightheadedness and internal carotid artery stenosis  Pt currently reports all dizziness and lightheadedness symptoms have resolved, pt reports "they stopped last night"  Pt lives alone in 2nd floor above a garage apt with 17 FAN with HR  Pt reports use of personal DME (RW vs SPC) for mobility interchangeably  Pt reports no falls and independence with ADLs, IADLs and mobility with use of DME  Pt reports local family A as needed  Pt currently is not at functional mobility baseline with reports of fatigue and BLE and generalized pain, multiple lines, use of RW for mobility, ataxic and unsteady gait and movement patterns  Pt demonstrates limited mobility and gait 2* dec endurance, dec balance, dec BLE strength, inc BLE and generalized pain, ataxic and unsteady gait and movement patterns and needs mod (I) for bed mobility, S for GT with use of RW and TT   Pt would cont to benefit from skilled inpt PT services to maximize functional independence and to dec caregiver burden upon being DC from hospital   Barriers to Discharge: Decreased caregiver support, Inaccessible home environment (livs alone and [address] FAN)     PT Discharge Recommendation: No rehabilitation needs (cont A from family as needed, cont use of DME for mobility)    See flowsheet documentation for full assessment

## 2023-02-19 NOTE — PROGRESS NOTES
[facility]  Progress Note Tam De Guzman 1940, [de-identified] y o  male MRN: [id number]  Unit/Bed#: E4 -01 Encounter: [id number]  Primary Care Provider: Inessa Schwab DO   Date and time admitted to hospital: 2/18/2023  1:15 PM    * Vertigo  Assessment & Plan  · Patient presents to the hospital with episode of vertigo that occurred while turning his head then persistent lightheadedness following  · Patient is w/o focal deficits on exam today, has improvement in dizziness/lightheadedness since last night with rest and ambulation    · 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, moderate to severe plaque stenosis of bilateral vertebral artery origins, left proximal V4 segment moderate to severe plaque stenosis, greater than 95% near occluded plaque stenosis proximal right cervical ICA  · Rule out CVA/TIA vs vertebral basilar insufficiency vs BPPV  · Permissive hypertension  · Continue Lipitor 40 mg and aspirin 81 mg daily  · Stroke pathway admit  · Neuro checks  · F/u echo and MRI  · Telemetry monitoring  · Neurology and vascular consults appreciated    Hypertensive urgency  Assessment & Plan  · Elevated BP on admission  · Allow permissive hypertension at this time  · Likely cause of elevated BNP on admission at 437  · CXR without cardiopulmonary disease, patient euvolemic on exam, pending echo  · Telemetry NSR without events overnight    Essential hypertension  Assessment & Plan  · Patient presented with hypertensive urgency  · Allow permissive hypertension  · Holding home atenolol and clonidine    Stage 3b chronic kidney disease [facility])  Assessment & Plan  Lab Results   Component Value Date    EGFR 40 02/19/2023    EGFR 44 02/18/2023    EGFR 35 11/14/2022    CREATININE 1 59 (H) 02/19/2023    CREATININE 1 48 (H) 02/18/2023    CREATININE 1 75 (H) 11/14/2022     · Currently at baseline    COPD (chronic obstructive pulmonary disease) ([facility])  Assessment & Plan  · Albuterol inhaler as needed    Tobacco abuse  Assessment & Plan  · NRT  · Nicotine cessation education on discharge    Severe protein-calorie malnutrition ([facility] [address] )  Assessment & Plan  Malnutrition Findings:     BMI Findings: Body mass index is 16 01 kg/m²  · Encourage three meals daily  · Appreciate nutrition recommendations      VTE Pharmacologic Prophylaxis: VTE Score: 3 Moderate Risk (Score 3-4) - Pharmacological DVT Prophylaxis Ordered: heparin  Patient Centered Rounds: I performed bedside rounds with nursing staff today  Discussions with Specialists or Other Care Team Provider: Vascular, neurology    Education and Discussions with Family / Patient: Updated  (son) at bedside  Total Time Spent on Date of Encounter in care of patient: 35 minutes This time was spent on one or more of the following: performing physical exam; counseling and coordination of care; obtaining or reviewing history; documenting in the medical record; reviewing/ordering tests, medications or procedures; communicating with other healthcare professionals and discussing with patient's family/caregivers  Current Length of Stay: 0 day(s)  Current Patient Status: Observation   Certification Statement: The patient will continue to require additional inpatient hospital stay due to stroke work up  Discharge Plan: pending work up findings and specialists recommendations    Code Status: Level 1 - Full Code    Subjective:   Patient seen and examined  Reports he has been without dizziness and lightheadedness since last night  He walked around the room and in the halls today without difficulty  Denies any new onset of weakness, numbness/tingling, facial droop, difficulty talking, or loss of consciousness  Son is bedside and reports that during the event yesterday he did not note any one-sided body weakness, slurred speech or facial droop  Patient denies stroke history or heart attack history   Does share that he used to be on cholesterol medications but has not been on them in a long time  He denies any history of heart arrhythmias  Denies chest pain, shortness of breath, heart palpitations, or abdominal pain  He lives alone in a 2nd floor apartment complex  He is doing well without side effects on the Lipitor  Objective:     Vitals:   Temp (24hrs), Av 8 °F (36 6 °C), Min:97 5 °F (36 4 °C), Max:98 2 °F (36 8 °C)    Temp:  [97 5 °F (36 4 °C)-98 2 °F (36 8 °C)] 98 °F (36 7 °C)  HR:  [65-94] 71  Resp:  [14-20] 18  BP: (124-227)/() 179/72  SpO2:  [96 %-100 %] 100 %  Body mass index is 16 01 kg/m²  Input and Output Summary (last 24 hours): Intake/Output Summary (Last 24 hours) at 2023 1439  Last data filed at 2023 0519  Gross per 24 hour   Intake 240 ml   Output 225 ml   Net 15 ml       Physical Exam:   Physical Exam  Vitals and nursing note reviewed  Constitutional:       General: He is not in acute distress  Appearance: He is well-developed  He is not ill-appearing  Comments: Frail appearing, lying comfortably in bed   HENT:      Head: Normocephalic and atraumatic  Eyes:      Extraocular Movements: Extraocular movements intact  Conjunctiva/sclera: Conjunctivae normal    Cardiovascular:      Rate and Rhythm: Normal rate and regular rhythm  Heart sounds: Normal heart sounds  No murmur heard  Pulmonary:      Effort: Pulmonary effort is normal  No respiratory distress  Breath sounds: Normal breath sounds  No wheezing, rhonchi or rales  Abdominal:      General: Bowel sounds are normal  There is no distension  Palpations: Abdomen is soft  Tenderness: There is no abdominal tenderness  There is no guarding or rebound  Musculoskeletal:      Right lower leg: No edema  Left lower leg: No edema  Skin:     General: Skin is warm and dry  Coloration: Skin is pale  Neurological:      General: No focal deficit present  Mental Status: He is alert and oriented to person, place, and time        Comments: Sensation to light touch intact in upper and lower extremities b/l  Strength noted 4/5 on bilateral upper and lower extremities   Psychiatric:         Mood and Affect: Mood normal         Additional Data:     Labs:  Results from last 7 days   Lab Units 02/19/23  0526 02/18/23  1334   WBC Thousand/uL 9 91 10 73*   HEMOGLOBIN g/dL 12 8 14 3   HEMATOCRIT % 39 1 44 2   PLATELETS Thousands/uL 252 267   NEUTROS PCT %  --  69   LYMPHS PCT %  --  19   MONOS PCT %  --  7   EOS PCT %  --  3     Results from last 7 days   Lab Units 02/19/23  0526 02/18/23  1334   SODIUM mmol/L 138 137   POTASSIUM mmol/L 3 9 4 4   CHLORIDE mmol/L 105 104   CO2 mmol/L 28 26   BUN mg/dL 31* 27*   CREATININE mg/dL 1 59* 1 48*   ANION GAP mmol/L 5 7   CALCIUM mg/dL 9 1 9 6   ALBUMIN g/dL  --  4 2   TOTAL BILIRUBIN mg/dL  --  0 65   ALK PHOS U/L  --  98   ALT U/L  --  11   AST U/L  --  18   GLUCOSE RANDOM mg/dL 78 110     Results from last 7 days   Lab Units 02/18/23  1334   INR  0 98     Lines/Drains:  Invasive Devices     Peripheral Intravenous Line  Duration           Peripheral IV 02/18/23 Left Antecubital 1 day                Telemetry:  Telemetry Orders (From admission, onward)             48 Hour Telemetry Monitoring  (ED Bridging Orders Panel)  Continuous x 48 hours        References:    Telemetry Guidelines   Question:  Reason for 48 Hour Telemetry  Answer:  Acute CVA (<24 hrs old, hemispheric strokes, selected brainstem strokes, cardiac arrhythmias)                  Telemetry Reviewed: Normal Sinus Rhythm heart rate averaging 66-69 bpm  Indication for Continued Telemetry Use: Acute CVA    Imaging: Reviewed radiology reports from this admission including: chest xray, CT head and CTA head/neck    Recent Cultures (last 7 days):         Last 24 Hours Medication List:   Current Facility-Administered Medications   Medication Dose Route Frequency Provider Last Rate   • acetaminophen  650 mg Oral Q6H PRN Kendy Estrada, DO     • albuterol  2 puff Inhalation Q6H PRN Yue Valle, DO     • aluminum-magnesium hydroxide-simethicone  30 mL Oral Q6H PRN Yue Valle DO     • atorvastatin  40 mg Oral QPM Yue Valle DO     • carvedilol  3 125 mg Oral BID With Meals Tamica Laguerre MD     • [START ON 2/20/2023] clopidogrel  75 mg Oral Daily Myke Camara, DO     • heparin (porcine)  5,000 Units Subcutaneous [facility] Yue Valle DO     • hydrALAZINE  5 mg Intravenous Q6H PRN Yue Valle DO     • isosorbide dinitrate  10 mg Oral TID after meals Tamica Laguerre MD     • nicotine  1 patch Transdermal Daily Yue Valle DO     • ondansetron  4 mg Intravenous Q6H PRN Yue Valle DO     • zolpidem  5 mg Oral HS Yue Valle DO          Today, Patient Was Seen By: Ford Champagne PA-C    **Please Note: This note may have been constructed using a voice recognition system  **

## 2023-02-19 NOTE — QUICK NOTE
MRI brain wo contrast back without acute intracranial pathology  Chronic microangiopathy  Will now aim for SBP goal of 130-160  Started on Coreg 3 125 mg BID and isosorbide dinitrate 10 mg TID  Aspirin switched to Plavix  Pending echo  Discussed with patient and his family bedside, all questions answered at the time

## 2023-02-19 NOTE — UTILIZATION REVIEW
Initial Clinical Review    Admission: Date/Time/Statement:   Admission Orders (From admission, onward)     Ordered        02/18/23 1553  Place in Observation  Once                      Orders Placed This Encounter   Procedures   • Place in Observation     Standing Status:   Standing     Number of Occurrences:   1     Order Specific Question:   Level of Care     Answer:   Med Surg [16]     ED Arrival Information     Expected   -    Arrival   2/18/2023 13:11    Acuity   Emergent            Means of arrival   Walk-In    Escorted by   Family Member    Service   Hospitalist    Admission type   Emergency            Arrival complaint   dizziness           Chief Complaint   Patient presents with   • Dizziness     Pt reports sudden onset dizziness that started when waking and turning head, described as a room spinning sensation that caused patient to close eyes       Initial Presentation: [de-identified] y o  male who presented self from home [facility]  Admitted in observation status for evaluation and treatment of vertigo  PMHx: CKD S3, COPD, gout, HTN  Presented w/ dizziness and lightheadedness  On exam, unremarkable physically  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, moderate to severe plaque stenosis  Plan: permissive HTN, telemetry, Trend labs, replete electrolytes as needed; supportive care  Vascular Surgery consulted  02/19/23: Pt without dizziness/lightheadedness overnight  On exam, pallor, strength 4/5 in all extremities  Plan: continue ASA/statin, neuro checks, telemetry, hold home anti-HTN, supportive care  Trend labs, replete electrolytes as needed  Vascular Surgery: Pt w/ severe stenosis of his b/l vertebral arteries and >95% stenosis of proximal right cervical ICA, as well as  >95% near occluded left proximal subclavian plaque stenosis  MRI, carotid duplex, TTE, ASA/statin, do not use L arm for BP  Neurology: Pt w/ transient vertigo  Avoid hypotension  -160  Recommend plavix and statin       Cardiology: Start carvedilol, add isosorbide  Wean off clonidine   Recommend nuclear stress test      ED Triage Vitals   Temperature Pulse Respirations Blood Pressure SpO2   02/18/23 1322 02/18/23 1321 02/18/23 1321 02/18/23 1321 02/18/23 1321   98 5 °F (36 9 °C) 65 20 (!) 261/122 99 %      Temp Source Heart Rate Source Patient Position - Orthostatic VS BP Location FiO2 (%)   02/18/23 1322 02/18/23 1345 02/18/23 1345 02/18/23 1345 --   Oral Monitor Sitting Left arm       Pain Score       02/18/23 1321       No Pain          Wt Readings from Last 1 Encounters:   02/18/23 45 kg (99 lb 3 3 oz)     Additional Vital Signs:   Date/Time Temp Pulse Resp BP MAP (mmHg) SpO2 O2 Device Patient Position - Orthostatic VS   02/19/23 0747 98 2 °F (36 8 °C) 69 18 227/91 Abnormal  131 100 % None (Room air) Lying   02/19/23 0400 98 °F (36 7 °C) 65 18 168/71 90 97 % None (Room air) Lying   02/19/23 0200 -- 67 18 155/68 98 96 % None (Room air) Lying   02/19/23 0000 -- 72 18 148/80 92 97 % None (Room air) Lying   02/18/23 2300 98 °F (36 7 °C) 74 -- 147/67 85 98 % None (Room air) Lying   02/18/23 2200 97 8 °F (36 6 °C) 69 -- 144/71 86 99 % None (Room air) Lying   02/18/23 2000 97 6 °F (36 4 °C) 76 -- 150/64 82 99 % None (Room air) Lying   02/18/23 1900 97 8 °F (36 6 °C) 79 -- 154/67 81 98 % None (Room air) Lying   02/18/23 1800 97 5 °F (36 4 °C) 94 -- 124/77 94 98 % None (Room air) Sitting   02/18/23 1700 97 7 °F (36 5 °C) 70 14 152/93 105 100 % -- Lying   02/18/23 1630 -- 72 -- 150/114 Abnormal  -- 100 % None (Room air) Sitting   02/18/23 1600 -- 71 20 154/81 110 100 % None (Room air) Lying   02/18/23 1500 -- 77 20 127/64 89 99 % None (Room air) Lying   02/18/23 1435 -- 80 16 145/82 -- 100 % None (Room air) Lying   02/18/23 1400 -- 61 20 163/75 108 100 % None (Room air) Sitting   02/18/23 1345 -- 62 20 202/88 Abnormal  127 100 % None (Room air) Sitting   02/18/23 1340 -- -- -- -- -- -- None (Room air) --   02/18/23 1334 -- -- -- 261/122 Abnormal  -- -- --      Pertinent Labs/Diagnostic Test Results:   MRI brain wo contrast   Final Result by Marcia Tejeda MD (02/19 1210)      No acute intracranial pathology  Chronic microangiopathy  Workstation performed: [id number]         CTA head and neck with and without contrast   Final Result by Angel Ariza MD (02/18 1519)      No evidence of acute intracranial hemorrhage  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  Chronic microangiopathy changes  Greater than 95% near occluded left proximal subclavian plaque stenosis /near occlusion  Moderate to severe plaque stenosis bilateral vertebral artery origins  Left proximal V4 segment moderate to severe plaque stenosis  Greater than 95% near occluded plaque stenosis proximal right cervical ICA, vascular consult recommended  Workstation performed: [id number]         XR chest 1 view portable   ED Interpretation by David Finn MD (02/18 1416)   Primary reviewed; No acute abnormality      Final Result by Belinda Alvarez MD (02/18 2207)      No acute cardiopulmonary disease                    Workstation performed: [id number]         VAS carotid complete study    (Results Pending)         Results from last 7 days   Lab Units 02/19/23  0526 02/18/23  1334   WBC Thousand/uL 9 91 10 73*   HEMOGLOBIN g/dL 12 8 14 3   HEMATOCRIT % 39 1 44 2   PLATELETS Thousands/uL 252 267   NEUTROS ABS Thousands/µL  --  7 47         Results from last 7 days   Lab Units 02/19/23  0526 02/18/23  1334   SODIUM mmol/L 138 137   POTASSIUM mmol/L 3 9 4 4   CHLORIDE mmol/L 105 104   CO2 mmol/L 28 26   ANION GAP mmol/L 5 7   BUN mg/dL 31* 27*   CREATININE mg/dL 1 59* 1 48*   EGFR ml/min/1 73sq m 40 44   CALCIUM mg/dL 9 1 9 6     Results from last 7 days   Lab Units 02/18/23  1334   AST U/L 18   ALT U/L 11   ALK PHOS U/L 98   TOTAL PROTEIN g/dL 7 0   ALBUMIN g/dL 4 2   TOTAL BILIRUBIN mg/dL 0 65         Results from last 7 days   Lab Units 02/19/23  0526 02/18/23  1334   GLUCOSE RANDOM mg/dL 78 110                             Results from last 7 days   Lab Units 02/18/23  1753 02/18/23  1538 02/18/23  1334   HS TNI 0HR ng/L  --   --  8   HS TNI 2HR ng/L  --  9  --    HSTNI D2 ng/L  --  1  --    HS TNI 4HR ng/L 9  --   --    HSTNI D4 ng/L 1  --   --          Results from last 7 days   Lab Units 02/18/23  1334   PROTIME seconds 13 0   INR  0 98   PTT seconds 27                         Results from last 7 days   Lab Units 02/18/23  1334   BNP pg/mL 437*                                                                           ED Treatment:   Medication Administration from 02/18/2023 1311 to 02/18/2023 1640       Date/Time Order Dose Route Action Comments     02/18/2023 1334 EST hydrALAZINE (APRESOLINE) injection 10 mg 10 mg Intravenous Given --     02/18/2023 1452 EST iohexol (OMNIPAQUE) 350 MG/ML injection (SINGLE-DOSE) 85 mL 85 mL Intravenous Given --     02/18/2023 1607 EST aspirin chewable tablet 324 mg 324 mg Oral Given --        Past Medical History:   Diagnosis Date   • Abnormal thyroid function test     non specified /  LA    1/8/13     • Chronic kidney disease    • COPD (chronic obstructive pulmonary disease) (HCC)    • Eczema     LA   11/26/13      • Esophagitis    • Gout    • Hypertension    • Pyloric channel ulcer      Present on Admission:  • Essential hypertension  • COPD (chronic obstructive pulmonary disease) ([facility])  • Tobacco abuse  • Stage 3b chronic kidney disease (HCC)  • Severe protein-calorie malnutrition (HCC)      Admitting Diagnosis: Dizziness [R42]  Lightheadedness [R42]  Vertigo [R42]  Internal carotid artery stenosis [I65 29]  CKD (chronic kidney disease) [N18 9]  Peripheral arterial disease (HCC) [I73 9]  Hypertensive urgency [I16 0]  Age/Sex: [de-identified] y o  male  Admission Orders:  Regular Diet  Baseline NIH stroke scale on admission, reassess Q24H x 2 D  Nursing dysphagia assessment prior to staring diet    Neuro checks, q1h x4h, q2h x8h, q4h x72h  Telemetry  Scheduled Medications:  aspirin, 81 mg, Oral, Daily  atorvastatin, 40 mg, Oral, QPM  carvedilol, 3 125 mg, Oral, BID With Meals  heparin (porcine), 5,000 Units, Subcutaneous, Q8H PHYLLIS  isosorbide dinitrate, 10 mg, Oral, TID after meals  nicotine, 1 patch, Transdermal, Daily  zolpidem, 5 mg, Oral, HS      Continuous IV Infusions:     PRN Meds:  acetaminophen, 650 mg, Oral, Q6H PRN  albuterol, 2 puff, Inhalation, Q6H PRN  aluminum-magnesium hydroxide-simethicone, 30 mL, Oral, Q6H PRN  hydrALAZINE, 5 mg, Intravenous, Q6H PRN  ondansetron, 4 mg, Intravenous, Q6H PRN        IP CONSULT TO NEUROLOGY  IP CONSULT TO VASCULAR SURGERY  IP CONSULT TO PHYSICAL MEDICINE REHAB  IP CONSULT TO CASE MANAGEMENT  IP CONSULT TO NUTRITION SERVICES  IP CONSULT TO CARDIOLOGY    Network Utilization Review Department  ATTENTION: Please call with any questions or concerns to [phone] and carefully listen to the prompts so that you are directed to the right person  All voicemails are confidential   Laxmi Plummer all requests for admission clinical reviews, approved or denied determinations and any other requests to dedicated fax number below belonging to the campus where the patient is receiving treatment   List of dedicated fax numbers for the Facilities:  [address] DENIALS (Administrative/Medical Necessity) [phone]   [address] (Maternity/NICU/Pediatrics) [phone]   [address] [phone]   [facility] [phone]   [facility] [phone]   [address] [place] [address] [address] [place] [address] [address] - [address] [phone]

## 2023-02-19 NOTE — ASSESSMENT & PLAN NOTE
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, moderate to severe plaque stenosis of bilateral vertebral artery origins, left proximal V4 segment moderate to severe plaque stenosis, greater than 95% near occluded plaque stenosis proximal right cervical ICA  · MRI without evidence of acute CVA  · Aim for normotension  · Continue Plavix and statin  · Vascular surgery following for carotid stenosis-plan for eventual CEA-scheduled for March 22  · Inpatient cardiology evaluation for preoperative optimization  · Echo pending

## 2023-02-19 NOTE — ASSESSMENT & PLAN NOTE
· Elevated BP on admission  · Allow permissive hypertension at this time  · Likely cause of elevated BNP on admission at 437  · CXR without cardiopulmonary disease, patient euvolemic on exam, pending echo  · Telemetry NSR without events overnight

## 2023-02-19 NOTE — PLAN OF CARE
Problem: Potential for Falls  Goal: Patient will remain free of falls  Description: INTERVENTIONS:  - Educate patient/family on patient safety including physical limitations  - Instruct patient to call for assistance with activity   - Consult OT/PT to assist with strengthening/mobility   - Keep Call bell within reach  - Keep bed low and locked with side rails adjusted as appropriate  - Keep care items and personal belongings within reach  - Initiate and maintain comfort rounds  - Make Fall Risk Sign visible to staff  - Offer Toileting every 2 Hours, in advance of need  - Initiate/Maintain bed alarm  - Obtain necessary fall risk management equipment  - Apply yellow socks and bracelet for high fall risk patients  - Consider moving patient to room near nurses station  Outcome: Progressing     Problem: MOBILITY - ADULT  Goal: Maintain or return to baseline ADL function  Description: INTERVENTIONS:  -  Assess patient's ability to carry out ADLs; assess patient's baseline for ADL function and identify physical deficits which impact ability to perform ADLs (bathing, care of mouth/teeth, toileting, grooming, dressing, etc )  - Assess/evaluate cause of self-care deficits   - Assess range of motion  - Assess patient's mobility; develop plan if impaired  - Assess patient's need for assistive devices and provide as appropriate  - Encourage maximum independence but intervene and supervise when necessary  - Involve family in performance of ADLs  - Assess for home care needs following discharge   - Consider OT consult to assist with ADL evaluation and planning for discharge  - Provide patient education as appropriate  Outcome: Progressing  Goal: Maintains/Returns to pre admission functional level  Description: INTERVENTIONS:  - Perform BMAT or MOVE assessment daily    - Set and communicate daily mobility goal to care team and patient/family/caregiver     - Collaborate with rehabilitation services on mobility goals if consulted  - Perform Range of Motion 3 times a day  - Reposition patient every 2 hours  - Dangle patient 3 times a day  - Stand patient 3 times a day  - Ambulate patient 3 times a day  - Out of bed to chair 3 times a day   - Out of bed for meals 3 times a day  - Out of bed for toileting  - Record patient progress and toleration of activity level   Outcome: Progressing     Problem: Activity Intolerance/Impaired Mobility  Goal: Mobility/activity is maintained at optimum level for patient  Description: Interventions:  - Assess and monitor patient  barriers to mobility and need for assistive/adaptive devices  - Assess patient's emotional response to limitations  - Collaborate with interdisciplinary team and initiate plans and interventions as ordered  - Encourage independent activity per ability   - Maintain proper body alignment  - Perform active/passive rom as tolerated/ordered  - Plan activities to conserve energy   - Turn patient as appropriate  Outcome: Progressing     Problem: DISCHARGE PLANNING  Goal: Discharge to home or other facility with appropriate resources  Description: INTERVENTIONS:  - Identify barriers to discharge w/patient and caregiver  - Arrange for needed discharge resources and transportation as appropriate  - Identify discharge learning needs (meds, wound care, etc )  - Arrange for interpretive services to assist at discharge as needed  - Refer to Case Management Department for coordinating discharge planning if the patient needs post-hospital services based on physician/advanced practitioner order or complex needs related to functional status, cognitive ability, or social support system  Outcome: Progressing     Problem: Knowledge Deficit  Goal: Patient/family/caregiver demonstrates understanding of disease process, treatment plan, medications, and discharge instructions  Description: Complete learning assessment and assess knowledge base    Interventions:  - Provide teaching at level of understanding  - Provide teaching via preferred learning methods  Outcome: Progressing

## 2023-02-19 NOTE — PHYSICAL THERAPY NOTE
Physical Therapy Evaluation:    2 forms of pt ID verified:name,birthdate and pt ID gaby    Patient's Name: Rhett Hilliard    Admitting Diagnosis  Dizziness [R42]  Lightheadedness [R42]  Vertigo [R42]  Internal carotid artery stenosis [I65 29]  CKD (chronic kidney disease) [N18 9]  Peripheral arterial disease ([facility] ) [I73 9]  Hypertensive urgency [I16 0]    Problem List  Patient Active Problem List   Diagnosis    Essential hypertension    Gout    COPD (chronic obstructive pulmonary disease) ([facility] )    Esophagitis    Tobacco abuse    Insomnia    Stage 3b chronic kidney disease ([facility] )    Secondary hyperparathyroidism of renal origin ([facility] )    Hyperuricemia    CKD (chronic kidney disease)    Acute respiratory failure with hypoxia (HCC)    COVID    Severe protein-calorie malnutrition (HCC)    Vertigo    Hypertensive urgency       Past Medical History  Past Medical History:   Diagnosis Date    Abnormal thyroid function test     non specified /  LA    1/8/13      Chronic kidney disease     COPD (chronic obstructive pulmonary disease) (HCC)     Eczema     LA   11/26/13       Esophagitis     Gout     Hypertension     Pyloric channel ulcer        Past Surgical History  Past Surgical History:   Procedure Laterality Date    BACK SURGERY      ESOPHAGOGASTRODUODENOSCOPY N/A 12/1/2016    Procedure: ESOPHAGOGASTRODUODENOSCOPY (EGD); Surgeon: Sly Pepe MD;  Location: [facility]; Service:     EYE SURGERY        02/19/23 0835   PT Last Visit   PT Visit Date 02/19/23   Note Type   Note type Evaluation   Pain Assessment   Pain Assessment Tool 0-10   Pain Score 6   Pain Location/Orientation Orientation: Bilateral;Location: Generalized; Location: Leg   Restrictions/Precautions   Other Precautions Chair Alarm; Bed Alarm;Multiple lines; Fall Risk;Hard of hearing   Home Living   Type of Home Apartment  (above garage apartment)   Home Layout One level;Stairs to enter with rails; Performs ADLs on one level; Able to live on main level with bedroom/bathroom  (17 FAN with HR)   Home Equipment Cane;Walker  (RW vs SPC used interchangeably)   Additional Comments pt lives alone in above garage apartment, (+)FAN, use of personal DME for mobility PTA, pt reports no recent falls, local family A as needed,(+)drive   Prior Function   Level of [place] Independent with ADLs; Independent with functional mobility; Independent with IADLS   Lives With Alone   Receives Help From Family  (local family as needed)   IADLs Independent with medication management; Independent with driving; Independent with meal prep   Falls in the last 6 months 0   General   Additional Pertinent History dizziness,reports of lightheadedness, internal carotid artery stenosis, MRI head PENDING, CT head(-) acute ICH, CXR (-) acute cardiopulmonary disease   Family/Caregiver Present No   Cognition   Overall Cognitive Status WFL   Arousal/Participation Cooperative   Orientation Level Oriented X4   Following Commands Follows one step commands without difficulty   Subjective   Subjective Pt supine in bed resting comfortably;pt willing and agreeable to work with PT and to participate in therapy intervention; "I just want to move cause I am feeling much better"  Pt reports dizziness and lightheadedness have resolved   RLE Assessment   RLE Assessment   (4/5 grossly throughout at least)   LLE Assessment   LLE Assessment   (4/5 grossly throughout at least)   Coordination   Movements are Fluid and Coordinated 0   Coordination and Movement Description ataxic and unsteady, limited ambulation distance 2* fatigue, inc pain BLE and generalized 2* pt reports of gout and arthritis   Sensation WFL   Light Touch   RLE Light Touch Grossly intact   LLE Light Touch Grossly intact   Bed Mobility   Supine to Sit 6  Modified independent   Additional items HOB elevated; Bedrails   Transfers   Sit to Stand 5  Supervision   Additional items Assist x 1;Bedrails   Stand to Sit 5  Supervision   Additional items Assist x 1;Armrests   Ambulation/Elevation   Gait pattern Antalgic;Narrow FREDY; Forward Flexion; Inconsistent elisabeth; Foward flexed; Short stride; Ataxia   Gait Assistance 5  Supervision   Additional items Assist x 1;Verbal cues   Assistive Device Rolling walker   Distance 40 feet x2 with use of RW on tile and hardwood isabel; no LOB noted and/or observed; limited ambulation distance 2* pt reports fatigue and pain BLE  Return to room per pt request   Stair Management Assistance Not tested   Ambulation/Elevation Additional Comments pt reports use of RW for ambulation and use of SPC for steps   Balance   Static Sitting Good  (chair alarm intact prior to leaving pts room)   Dynamic Sitting Fair   Static Standing Fair   Dynamic Standing Fair   Ambulatory Fair   Endurance Deficit   Endurance Deficit Yes   Endurance Deficit Description pain, pt reports fatigue   Activity Tolerance   Activity Tolerance Patient limited by fatigue;Patient limited by pain  (fair)   Nurse Made Aware yes   Assessment   Prognosis Fair   Problem List Decreased strength;Decreased endurance; Impaired balance;Decreased mobility; Impaired hearing;Decreased skin integrity;Pain   Assessment Pt is a [de-identified] yo male admitted to [facility] 2* reports of dizziness, lightheadedness and internal carotid artery stenosis  Pt currently reports all dizziness and lightheadedness symptoms have resolved, pt reports "they stopped last night"  Pt lives alone in 2nd floor above a garage apt with 17 FAN with HR  Pt reports use of personal DME (RW vs SPC) for mobility interchangeably  Pt reports no falls and independence with ADLs, IADLs and mobility with use of DME  Pt reports local family A as needed  Pt currently is not at functional mobility baseline with reports of fatigue and BLE and generalized pain, multiple lines, use of RW for mobility, ataxic and unsteady gait and movement patterns   Pt demonstrates limited mobility and gait 2* dec endurance, dec balance, dec BLE strength, inc BLE and generalized pain, ataxic and unsteady gait and movement patterns and needs mod (I) for bed mobility, S for GT with use of RW and TT  Pt would cont to benefit from skilled inpt PT services to maximize functional independence and to dec caregiver burden upon being DC from hospital    Barriers to Discharge Decreased caregiver support; Inaccessible home environment  (livs alone and 17 FAN)   Goals   Patient Goals to return home   STG Expiration Date 03/01/23   Short Term Goal #1 in 7-10 days: (1) Pt will be able to ambulate greater than 150 feet  with use of RW on various surfaces needing mod (I) level of A and no LOB, no rest breaks in order to A pt to return to PLOF, (2) activity tolerance:45 mins/45mins, (3) pt will be able to perform sit to stand transfers needing mod (I) level of A to and from various surfaces consistently in order to return to PLOF, (4) pt will be able to perform BM completely (I) to A pt to return to PLOF, (5) (I) with BLE therapeutic ex HEP in various positions to A pt to inc balance,strength,mobility,endurance and to A to dec pain, (6) inc balance 1/2 grade in order to dec fall risk, (7) pt will be able to go up and down 17 steps with use of HR and SPC needing S level of A in order to navigate FAN as able and as needed prior to D/C, (8) cont to provide pt and pt family education for safe D/C planning, (9) inc BLE strength 1/2 to 1 full grade in order to A pt to inc balance,strength,mobility,endurance and to A to dec pain   PT Treatment Day 0   Plan   Treatment/Interventions Functional transfer training;LE strengthening/ROM; Elevations; Therapeutic exercise; Endurance training;Patient/family training;Equipment eval/education; Bed mobility;Gait training;Spoke to nursing   PT Frequency 3-5x/wk  (restorative therapy aide as well)   Recommendation   PT Discharge Recommendation No rehabilitation needs  (cont A from family as needed, cont use of DME for mobility)   Equipment Recommended Walker;Cane  (pt owns RW and SPC at home for use)   AM-PAC Basic Mobility Inpatient   Turning in Flat Bed Without Bedrails 4   Lying on Back to Sitting on Edge of Flat Bed Without Bedrails 4   Moving Bed to Chair 4   Standing Up From Chair Using Arms 4   Walk in Room 3   Climb 3-5 Stairs With Railing 3   Basic Mobility Inpatient Raw Score 22   Basic Mobility Standardized Score 47 4   Highest Level Of Mobility   JH-HLM Goal 7: Walk 25 feet or more   JH-HLM Achieved 7: Walk 25 feet or more             @Nina Lima, PT, DPT@

## 2023-02-19 NOTE — ASSESSMENT & PLAN NOTE
Malnutrition Findings:     BMI Findings: Body mass index is 16 01 kg/m²       · Encourage three meals daily  · Appreciate nutrition recommendations

## 2023-02-19 NOTE — ASSESSMENT & PLAN NOTE
· Patient presented with hypertensive urgency  · Initially placed on stroke pathway and allowed permissive hypertension, now goal 130-160  · Started on Coreg 3 125 twice daily, will increase to 6 25 twice daily today  · Started on isosorbide dinitrate 10 mg 3 times daily  · Home Catapres and atenolol discontinued

## 2023-02-19 NOTE — CONSULTS
Consultation - Neurology   Ksenia Dang [de-identified] y o  male MRN: [id number]  Unit/Bed#: E4 -01 Encounter: [id number]      Assessment/Plan   Assessment:  1  Transient vertigo  By history seems provoked by head positioning/turning, now resolved  This might argue for a more peripheral cause such as BPPV, although cannot entirely exclude posterior circulation TIA or subclavian steal   Alternately this could also be resultant from hypertensive urgency  2  Multivessel severe atherosclerotic disease  His carotid disease appears to be asymptomatic  Cannot exclude possible posterior circulation atheroembolic TIA relating to vertebral artery disease  3  Severe hypertension  Plan:  1  Although I would try to avoid hypotension, at the moment there is no apparent neurologic indication for permissive hypertension  Given the identified vascular disease, would suggest trying to keep -160, to avoid watershed ischemia  2  Although he has been told in the past to avoid aspirin relative to his CKD, I would advocate for Plavix and atorvastatin  3  Agree with vascular surgery consult  4  Advised smoking cessation    Physician Requesting Consult: Tracey Myles MD  Reason for Consult / Principal Problem: Dizziness      HPI: Ksenia Dang is a [de-identified]y o  year old male who presents with a chief complaint of dizziness and lightheadedness  Patient describes he was lying in bed, turned his head toward the left, and developed immediate sensitives spinning dizziness  This improved somewhat with closing his allergies, and abated after about 30 seconds  However it recurred again transiently when he got out of bed  Once again the episode was fairly transient and lasting less than a minute  He did not fall  He was nauseous but did not vomit  Subsequently, his son came to take him out for a drive, as is their routine    He noted feeling somewhat off during the ride described as a floating/rocking/"Jell-O-head" sensation, though without carolyn spinning  At no time during during these symptoms did he endorse diplopia, difficulty with speech, lateralizing weakness or numbness  He has some baseline gait unsteadiness for which he uses either a cane or walker  He contacted his daughter who advised him to go to the emergency room  On arrival he was severely hypertensive 261/122, improved with meds  He has a history of hypertension but it has never been that high that he is aware of  Imaging studies through the emergency department included CT/CTA, remarkable for severe vascular sclerotic changes; left greater than right vertebral artery origin stenosis and left V4 stenosis, bilateral right greater than left ICA origin stenosis, severe left subclavian stenosis  He reports perhaps 1 prior episode of transient vertigo several months ago  No known history of stroke or TIA  This morning he reports feeling well  He has been up out of bed without any recurrent dizziness or vertigo  Inpatient consult to Neurology  Consult performed by: Gabby Valdovinos DO  Consult ordered by: Gustabo Liao MD          Review of Systems   Constitutional: Negative  HENT: Negative  Eyes: Negative  Respiratory: Negative  Cardiovascular: Negative  Gastrointestinal: Negative  Genitourinary: Negative  Musculoskeletal: Positive for arthralgias and gait problem  Skin: Negative  Neurological: Positive for dizziness and light-headedness  Psychiatric/Behavioral: Negative  Historical Information   Past Medical History:   Diagnosis Date   • Abnormal thyroid function test     non specified /  [place]    1/8/13     • Chronic kidney disease    • COPD (chronic obstructive pulmonary disease) (HCC)    • Eczema     [place]   11/26/13      • Esophagitis    • Gout    • Hypertension    • Pyloric channel ulcer      Past Surgical History:   Procedure Laterality Date   • BACK SURGERY     • ESOPHAGOGASTRODUODENOSCOPY N/A 12/1/2016    Procedure: ESOPHAGOGASTRODUODENOSCOPY (EGD); Surgeon: Abi Barger MD;  Location: [facility]; Service:    • EYE SURGERY       Social History   Social History     Substance and Sexual Activity   Alcohol Use Not Currently     Social History     Substance and Sexual Activity   Drug Use No     Social History     Tobacco Use   Smoking Status Every Day   • Packs/day: 0 50   • Years: 60 00   • Pack years: 30 00   • Types: Cigarettes   Smokeless Tobacco Current   Tobacco Comments    current some day smoker per [employer]script     Family History: non-contributory    Review of previous medical records was  completed  Meds/Allergies   all current active meds have been reviewed, current meds:   Current Facility-Administered Medications   Medication Dose Route Frequency   • acetaminophen (TYLENOL) tablet 650 mg  650 mg Oral Q6H PRN   • albuterol (PROVENTIL HFA,VENTOLIN HFA) inhaler 2 puff  2 puff Inhalation Q6H PRN   • aluminum-magnesium hydroxide-simethicone (MYLANTA) oral suspension 30 mL  30 mL Oral Q6H PRN   • aspirin chewable tablet 81 mg  81 mg Oral Daily   • atorvastatin (LIPITOR) tablet 40 mg  40 mg Oral QPM   • heparin (porcine) subcutaneous injection 5,000 Units  5,000 Units Subcutaneous Q8H [address]   • hydrALAZINE (APRESOLINE) injection 5 mg  5 mg Intravenous Q6H PRN   • nicotine (NICODERM CQ) 14 mg/24hr TD 24 hr patch 1 patch  1 patch Transdermal Daily   • ondansetron (ZOFRAN) injection 4 mg  4 mg Intravenous Q6H PRN   • zolpidem (AMBIEN) tablet 5 mg  5 mg Oral HS    and PTA meds:   Prior to Admission Medications   Prescriptions Last Dose Informant Patient Reported? Taking?    albuterol (PROVENTIL HFA,VENTOLIN HFA) 90 mcg/act inhaler Past Month  No Yes   Sig: Inhale 2 puffs every 6 (six) hours as needed for wheezing or shortness of breath   atenolol (TENORMIN) 50 mg tablet 2/18/2023  No Yes   Sig: Take 1 tablet (50 mg total) by mouth daily   cholecalciferol (VITAMIN D3) 1,000 units tablet 2/18/2023  Yes Yes   Sig: Take 1,000 Units by mouth daily   cloNIDine (CATAPRES) 0 1 mg tablet 2/18/2023  No Yes   Sig: Take 1 tablet (0 1 mg total) by mouth daily   zolpidem (AMBIEN) 5 mg tablet 2/17/2023  No Yes   Sig: Take 1 tablet (5 mg total) by mouth daily at bedtime as needed for sleep      Facility-Administered Medications: None       Allergies   Allergen Reactions   • Norvasc [Amlodipine]        Objective   Vitals:Blood pressure (!) 179/72, pulse 71, temperature 98 °F (36 7 °C), temperature source Temporal, resp  rate 18, height 5' 6" (1 676 m), weight 45 kg (99 lb 3 3 oz), SpO2 100 %  ,Body mass index is 16 01 kg/m²  Intake/Output Summary (Last 24 hours) at 2/19/2023 1101  Last data filed at 2/19/2023 0519  Gross per 24 hour   Intake 240 ml   Output 225 ml   Net 15 ml       Invasive Devices: Invasive Devices     Peripheral Intravenous Line  Duration           Peripheral IV 02/18/23 Left Antecubital <1 day                Physical Exam  Vitals reviewed  Constitutional:       General: He is not in acute distress  Appearance: He is not ill-appearing  Comments: Appears somewhat gaunt   HENT:      Head: Normocephalic and atraumatic  Nose: Nose normal       Mouth/Throat:      Mouth: Mucous membranes are moist       Pharynx: Oropharynx is clear  Eyes:      General: No scleral icterus  Conjunctiva/sclera: Conjunctivae normal    Cardiovascular:      Rate and Rhythm: Normal rate and regular rhythm  Pulmonary:      Effort: Pulmonary effort is normal  No respiratory distress  Musculoskeletal:         General: No swelling  Cervical back: Normal range of motion and neck supple  Right lower leg: No edema  Left lower leg: No edema  Skin:     General: Skin is warm  Neurological:      Mental Status: He is alert  Cranial Nerves: Cranial nerves 2-12 are intact  Neurologic Exam     Mental Status   Patient is awake and alert   There is no evident difficulty with language, memory, orientation, or higher cognitive function  Cranial Nerves   Cranial nerves II through XII intact  Motor Exam     Strength   Strength 5/5 except as noted  Right interossei: 4/5  Left interossei: 4/5    Sensory Exam   Mild decrease in vibratory perception distal left lower extremity to ankle  Intact at the DIPs bilaterally and right toes  Pinprick diminished to midfoot bilaterally  No lateralizing asymmetry     Gait, Coordination, and Reflexes Finger-nose testing normal bilaterally  Heel-to-shin mildly impaired on the right which he attributes to knee pain  Gait was not assessed  Deep tendon reflexes were present, likely somewhat brisk for his age  Plantar responses were subtly upgoing bilaterally       Lab Results: I have personally reviewed pertinent reports  Imaging Studies: I have personally reviewed pertinent films in PACS  MRI does not show any acute ischemic change (chronic microvascular ischemic disease)  CTA shows severe multivessel atherosclerotic changes with near occlusive disease of the left subclavian and right ICA origin  Additionally moderate stenosis left ICA origin, right vertebral artery, and severe left vertebral artery origin stenosis and distal segment left V4  CT head showed focal chronic left external capsule lacune  EKG, Pathology, and Other Studies: I have personally reviewed pertinent reports          Code Status: Level 1 - Full Code  Advance Directive and Living Will:      Power of :    POLST:

## 2023-02-19 NOTE — ASSESSMENT & PLAN NOTE
Lab Results   Component Value Date    EGFR 40 02/19/2023    EGFR 44 02/18/2023    EGFR 35 11/14/2022    CREATININE 1 59 (H) 02/19/2023    CREATININE 1 48 (H) 02/18/2023    CREATININE 1 75 (H) 11/14/2022     · Currently at baseline

## 2023-02-19 NOTE — ASSESSMENT & PLAN NOTE
· Patient presented with hypertensive urgency  · Allow permissive hypertension  · Holding home atenolol and clonidine

## 2023-02-19 NOTE — CONSULTS
ENCOUNTER DATE: 02/19/23 1:29 PM  PATIENT NAME: Carley Andres   1940    [id number]  Age: [de-identified] y o  Sex: male  [address]: Tamica Laguerre MD  INPATIENT ATTENDING PHYSICIAN: Ciro Berg, *; PRIMARYCARE PHYSICIAN: Maria Dolores Herrera DO  DATE OF ADMISSION: 2/18/2023  1:15 PM; LENGTH OF STAY: 0 days  *-*-*-*-*-*-*-*-*-*-*-*-*-*-*-*-*-*-*-*-*-*-*-*-*-*-*-*-*-*-*-*-*-*-*-*-*-*-*-*-*-*-*-*-*-*-*-*-*-*-*-*-*-*-   REASON FOR CONSULTATION:   Uncontrolled hypertension    *-*-*-*-*-*-*-*-*-*-*-*-*-*-*-*-*-*-*-*-*-*-*-*-*-*-*-*-*-*-*-*-*-*-*-*-*-*-*-*-*-*-*-*-*-*-*-*-*-*-*-*-*-*-  CARDIAC ASSESSMENT:     1  Benign positional vertigo  2  Hypertensive urgency, uncontrolled hypertension, possible rebound hypertension  3  Severe asymptomatic left greater than right internal carotid artery stenosis  4  Vertebrobasilar insufficiency  5  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  6  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  7  Stage IIIb chronic kidney disease  8  Chronic tobacco dependence  9  Protein calorie malnutrition       Patient Active Problem List   Diagnosis   • Essential hypertension   • Gout   • COPD (chronic obstructive pulmonary disease) ([facility])   • Esophagitis   • Tobacco abuse   • Insomnia   • Stage 3b chronic kidney disease ([facility] [address] )   • Secondary hyperparathyroidism of renal origin ([facility] [address] )   • Hyperuricemia   • CKD (chronic kidney disease)   • Acute respiratory failure with hypoxia ([facility])   • COVID   • Severe protein-calorie malnutrition (HCC)   • Vertigo   • Hypertensive urgency        Patient's presentation symptoms are consistent with benign positional vertigo however he is found to have several new diagnoses including critical stenosis in his carotid and subclavian disease, vertebrobasilar insufficiency and possible prior CVAs  Blood pressure is markedly elevated, possibly secondary to rebound hypertension from not receiving clonidine  He is asymptomatic  Physical examination does not suggest signs of heart failure or significant valvular heart disease  His lipids are not severely abnormal   History last echocardiogram in November 2022 demonstrated normal left ventricular function  He has known history of allergy with rash to amlodipine  His ECG is suggestive of prior anteroseptal infarction  His revised cardiac risk index is 2 putting him in a class III category with 6 6% 30-day  risk of cardiac complication  CARDIAC PLAN:     Our initial goal will be to optimize blood pressure control  --We are beginning patient on carvedilol at 3 125 mg twice daily and adding isosorbide dinitrate 10 mg 3 times daily  -- Would like to wean him off of clonidine if possible  -- Carvedilol dose can be increased to 6 25 mg twice daily as long as there is no bradycardia with heart rate less than 60 bpm   -- We will get follow-up echocardiogram to reassess cardiac structure and function  -- Given his risk factors, limited exercise tolerance and comorbidities recommend regadenoson nuclear stress test to guide perioperative management unless surgery is urgent or emergent  *-*-*-*-*-*-*-*-*-*-*-*-*-*-*-*-*-*-*-*-*-*-*-*-*-*-*-*-*-*-*-*-*-*-*-*-*-*-*-*-*-*-*-*-*-*-*-*-*-*-*-*-*-*-  HISTORY OF PRESENT ILLNESS     Patient is a 53-year-old gentleman with medical history significant for:  1  Primary hypertension  2  Dyslipidemia  3  Stage III chronic kidney disease  4  COPD  5  Chronic tobacco dependence  6  Gout  7  History of esophagitis and pyloric ulcer  8  History of eczema    He presented to emergency room yesterday with relatively sudden onset dizziness and vertigo-like symptoms which began the above yesterday morning when he was lying in bed and happened to turn his neck suddenly  Symptom felt as spinning of surroundings and dizziness which was severe enough that he had to lie down with his eyes closed    Symptoms seem to ease off after some time but came back when he walked a little  Later that day when he went out with his son in the car he reported some uneasiness in the head and this prompted them to come to the emergency room  In the emergency room he was noted to have markedly elevated blood pressure of 261/122  His heart rate and oxygenation were normal   His laboratory evaluation was significant for an elevated BUN and creatinine, elevated BNP level  He is CT of the head did not identify acute pathology  There was evidence of old left external capsule and basal ganglia lacunar infarctions along with microangiopathy  He did have near total occlusion in the left proximal subclavian vein along with moderate to severe stenosis in the vertebral arteries  He has been started on medications for treatment of his vertigo and is feeling better  Cardiology is consulted because of elevated blood pressure  Vascular surgery has been consulted and patient will be eventually considered for carotid endarterectomy surgery  Cardiac screening and restratification have also been requested  Patient has no prior history of coronary artery disease or congestive heart failure or arrhythmias or TIA/CVA in the past   He has long history of hypertension and has been on atenolol 50 mg daily and clonidine 0 1 mg daily  He is currently not receiving any antihypertensive medications  Patient reports that prior to yesterday symptom onset he has had no recent unusual chest pain or shortness of breath or dizziness or lightheadedness or palpitations or passing out or near passing out  He reports occasional falls with last event about 6 months back  Though he is physically not very active he is does live independently with his children checking on him  Denies any family history of premature coronary artery disease or sudden cardiac death  Does report that his brother had some unspecified heart disease      He reports being a chronic smoker smoking about half a pack of cigarettes a day   Denies significant alcohol use  He retired as a  working various jobs as a , house improvement etc     His last cardiac evaluation was in November 2022 with an echocardiogram which showed mild left ventricular hypertrophy, normal left ventricular systolic function with EF 61%, grade 1 diastolic dysfunction, sigmoid shaped interventricular septum, normal RV size and function, normal left and right atrial size, normal aortic valve with mild aortic valve regurgitation, normal mitral and tricuspid valve without significant stenosis or regurgitation, no obvious pulmonary hypertension, no pericardial effusion, normal aortic root size  CTA of the neck is significant for mild plaque stenosis of aortic arch with greater than 95% stenosis in left proximal subclavian vessel, moderate stenosis in right vertebral artery at its origin, severe vertebral artery stenosis, greater than 95% plaque stenosis in right cervical internal carotid artery and moderate sclerosis of the bifurcation of left extracranial carotid artery, normal flow in basilar artery, normal PCA and MCA circulation, normal anterior circulation  *-*-*-*-*-*-*-*-*-*-*-*-*-*-*-*-*-*-*-*-*-*-*-*-*-*-*-*-*-*-*-*-*-*-*-*-*-*-*-*-*-*-*-*-*-*-*-*-*-*-*-*-*-*  PAST MEDICAL HISTORY:     Past Medical History:   Diagnosis Date   • Abnormal thyroid function test     non specified /  LA    1/8/13     • Chronic kidney disease    • COPD (chronic obstructive pulmonary disease) (HCC)    • Eczema     LA   11/26/13      • Esophagitis    • Gout    • Hypertension    • Pyloric channel ulcer     PAST SURGICAL HISTORY     Past Surgical History:   Procedure Laterality Date   • BACK SURGERY     • ESOPHAGOGASTRODUODENOSCOPY N/A 12/1/2016    Procedure: ESOPHAGOGASTRODUODENOSCOPY (EGD); Surgeon: Ethel Rios MD;  Location: [facility];   Service:    • EYE SURGERY            FAMILY HISTORY     Family History   Problem Relation Age of Onset   • Other Mother         Dec in her 66's (gangrene foot)   • Heart failure Mother    • Diabetes type II Mother    • Peripheral vascular disease Mother    • Rectal cancer Father    • Other Maternal Aunt    • Other Family         situs inversus / Dec 89 yo     SOCIAL HISTORY     Social History     Tobacco Use   Smoking Status Every Day   • Packs/day: 0 50   • Years: 60 00   • Pack years: 30 00   • Types: Cigarettes   Smokeless Tobacco Current   Tobacco Comments    current some day smoker per allscript      Social History     Substance and Sexual Activity   Alcohol Use Not Currently     Social History     Substance and Sexual Activity   Drug Use No    [unfilled]     *-*-*-*-*-*-*-*-*-*-*-*-*-*-*-*-*-*-*-*-*-*-*-*-*-*-*-*-*-*-*-*-*-*-*-*-*-*-*-*-*-*-*-*-*-*-*-*-*-*-*-*-*-*  ALLERGIES     Allergies   Allergen Reactions   • Norvasc [Amlodipine]      CURRENT SCHEDULED MEDICATIONS       Current Facility-Administered Medications:   •  acetaminophen (TYLENOL) tablet 650 mg, 650 mg, Oral, Q6H PRN, Arzella Cage, DO  •  albuterol (PROVENTIL HFA,VENTOLIN HFA) inhaler 2 puff, 2 puff, Inhalation, Q6H PRN, Arzella Cage, DO  •  aluminum-magnesium hydroxide-simethicone (MYLANTA) oral suspension 30 mL, 30 mL, Oral, Q6H PRN, Arzella Cage, DO  •  aspirin chewable tablet 81 mg, 81 mg, Oral, Daily, Arzella Cage, DO, 81 mg at 02/19/23 0845  •  atorvastatin (LIPITOR) tablet 40 mg, 40 mg, Oral, QPM, Arzella Cage, DO, 40 mg at 02/18/23 1806  •  heparin (porcine) subcutaneous injection 5,000 Units, 5,000 Units, Subcutaneous, Q8H [address], Arzella Cage, DO, 5,000 Units at 02/19/23 0328  •  hydrALAZINE (APRESOLINE) injection 5 mg, 5 mg, Intravenous, Q6H PRN, Arzella Cage, DO, 5 mg at 02/19/23 0845  •  nicotine (NICODERM CQ) 14 mg/24hr TD 24 hr patch 1 patch, 1 patch, Transdermal, Daily, Sharlene Cage, DO  •  ondansetron (ZOFRAN) injection 4 mg, 4 mg, Intravenous, Q6H PRN, Sharlene Pena, DO  •  zolpidem (AMBIEN) tablet 5 mg, 5 mg, Oral, HS, Oseas Fam, DO, 5 mg at 23     *-*-*-*-*-*-*-*-*-*-*-*-*-*-*-*-*-*-*-*-*-*-*-*-*-*-*-*-*-*-*-*-*-*-*-*-*-*-*-*-*-*-*-*-*-*-*-*-*-*-*-*-*-*   REVIEW OF SYSTEMS     Positive for: As noted above in HPI  Negative for: All remaining as reviewed below and in HPI  SYSTEM SYMPTOMS REVIEWED:  General--weight change, fever, night sweats  Respiratoryl-- Wheezing, shortness of breath, cough, URI symptoms, sputum, blood  Cardiovascular--chest pain, syncope, dyspnea on exertion, edema, decline in exercise tolerance, claudication   Gastrointestinal--persistent vomiting, diarrhea, abdominal distention, blood in stool   Muscular or skeletal--joint pain or swelling   Neurologic--headaches, syncope, abnormal movement  Hematologic--history of easy bruising and bleeding   Endocrine--thyroid enlargement, heat or cold intolerance, polyuria   Psychiatric--anxiety, depression      *-*-*-*-*-*-*-*-*-*-*-*-*-*-*-*-*-*-*-*-*-*-*-*-*-*-*-*-*-*-*-*-*-*-*-*-*-*-*-*-*-*-*-*-*-*-*-*-*-*-*-*-*-*-   VITAL SIGNS       Vitals:    23 0200 23 0400 23 0747 23 1045   BP: 155/68 168/71 (!) 227/91 (!) 179/72   BP Location: Right arm Right arm Right arm Right arm   Pulse: 67 65 69 71   Resp:    Temp:  98 °F (36 7 °C) 98 2 °F (36 8 °C) 98 °F (36 7 °C)   TempSrc:  Temporal Temporal Temporal   SpO2: 96% 97% 100% 100%   Weight:       Height:         TEMPERATURE HISTORY 24H: Temp (24hrs), Av 8 °F (36 6 °C), Min:97 5 °F (36 4 °C), Max:98 2 °F (36 8 °C)     BLOOD PRESSURE HISTORY: Systolic (27FUX), IED:599 , Min:124 , MXN:308    Diastolic (72NTF), MJV:54, Min:64, Max:122      Weight    23 1318   Weight: 45 kg (99 lb 3 3 oz)      Body mass index is 16 01 kg/m²   Wt Readings from Last 10 Encounters:   23 45 kg (99 lb 3 3 oz)   23 44 7 kg (98 lb 9 6 oz)   22 44 5 kg (98 lb)   10/26/22 44 5 kg (98 lb)   22 42 9 kg (94 lb 9 6 oz)   22 44 5 kg (98 lb)   03/15/22 44 1 kg (97 lb 3 2 oz)   11/10/21 44 9 kg (99 lb)   06/25/21 43 7 kg (96 lb 6 4 oz)   02/25/21 45 kg (99 lb 3 2 oz)      Intake/Output Summary (Last 24 hours) at 2/19/2023 1320  Last data filed at 2/19/2023 9297  Gross per 24 hour   Intake 240 ml   Output 225 ml   Net 15 ml        *-*-*-*-*-*-*-*-*-*-*-*-*-*-*-*-*-*-*-*-*-*-*-*-*-*-*-*-*-*-*-*-*-*-*-*-*-*-*-*-*-*-*-*-*-*-*-*-*-*-*-*-*-*-   PHYSICAL EXAMINATION:     General Appearance:    Alert, cooperative, no distress, appears stated age, thinly built   Head, Eyes, ENT:     Some degree of protein calorie malnutrition, moist mucous mebranes  Neck:   Supple, prominent left greater than right carotid bruit  Back:     Symmetric, no curvature  Lungs:     Respirations unlabored  Clear to auscultation bilaterally,    Chest wall:    No tenderness or deformity   Heart:    Regular rate and rhythm, S1 and S2 normal, no murmur, rub  or gallop  Abdomen:     Soft, non-tender,    Extremities:   Extremities warm, no cyanosis or edema  Symmetrical pulses are noted in upper extremities  Skin:   Skin color, texture, turgor normal, no rashes or lesions     *-*-*-*-*-*-*-*-*-*-*-*-*-*-*-*-*-*-*-*-*-*-*-*-*-*-*-*-*-*-*-*-*-*-*-*-*-*-*-*-*-*-*-*-*-*-*-*-*-*-*-*-*-*-   LABORATORY DATA:   I have personally reviewed pertinent labs      CMP:  Results from last 7 days   Lab Units 02/19/23  0526 02/18/23  1334   SODIUM mmol/L 138 137   POTASSIUM mmol/L 3 9 4 4   CHLORIDE mmol/L 105 104   CO2 mmol/L 28 26   BUN mg/dL 31* 27*   CREATININE mg/dL 1 59* 1 48*   CALCIUM mg/dL 9 1 9 6   ALK PHOS U/L  --  98   ALT U/L  --  11   AST U/L  --  18               Cardiac Profile:   Results from last 7 days   Lab Units 02/18/23  1753 02/18/23  1538 02/18/23  1334   HS TNI 0HR ng/L  --   --  8   HS TNI 2HR ng/L  --  9  --    HS TNI 4HR ng/L 9  --   --          Results from last 7 days   Lab Units 02/18/23  1334   CHOLESTEROL mg/dL 161   TRIGLYCERIDES mg/dL 93   HDL mg/dL 46   LDL CALC mg/dL 96            Blood Gas Analysis:             CBC:   Results from last 7 days   Lab Units 23  0526 23  1334   WBC Thousand/uL 9 91 10 73*   HEMOGLOBIN g/dL 12 8 14 3   HEMATOCRIT % 39 1 44 2   PLATELETS Thousands/uL 252 267     PT/INR:   Lab Results   Component Value Date    INR 0 98 2023   , Microbiology:            *-*-*-*-*-*-*-*-*-*-*-*-*-*-*-*-*-*-*-*-*-*-*-*-*-*-*-*-*-*-*-*-*-*-*-*-*-*-*-*-*-*-*-*-*-*-*-*-*-*-*-*-*-*-  TELEMETRY, LAST ECG:  Telemetry reviewed    Sinus rhythm with heart rate in 70s  No significant ventricular ectopy  No significant bradycardia arrhythmia event  Results for orders placed or performed during the hospital encounter of 23   ECG 12 lead   Result Value    Ventricular Rate 73    Atrial Rate 73    MS Interval 180    QRSD Interval 94    QT Interval 426    QTC Interval 469    P Axis 76    QRS Axis 65    T Wave Axis 54    Narrative    Normal sinus rhythm  Anteroseptal infarct (cited on or before 2022)  Abnormal ECG  When compared with ECG of 2023 13:31, (unconfirmed)  No significant change was found  Confirmed by Tamica Laguerre ([id number]) on 2023 2:08:59 AM        *-*-*-*-*-*-*-*-*-*-*-*-*-*-*-*-*-*-*-*-*-*-*-*-*-*-*-*-*-*-*-*-*-*-*-*-*-*-*-*-*-*-*-*-*-*-*-*-*-*-*-*-*-*-  IMAGING STUDIES REPORTS: Imaging studies results reviewed    No valid procedures specified  No Chest XR results available for this patient  *-*-*-*-*-*-*-*-*-*-*-*-*-*-*-*-*-*-*-*-*-*-*-*-*-*-*-*-*-*-*-*-*-*-*-*-*-*-*-*-*-*-*-*-*-*-*-*-*-*-*-*-*-*-  AVAILABLE OLD CARDIAC TESTS REPORTS:   Results for orders placed during the hospital encounter of 17    Echo complete with contrast if indicated    Narrative  [address] [place] [address], [address] [facility]  [phone]    Transthoracic Echocardiogram  2D, M-mode, Doppler, and Color Doppler    Study date:  2017    Patient: Jorge Alberto Patino  MR number: [id number]  Account number: [de-identified]  : 1940  Age: 78 years  Gender: Male  Status: Inpatient  Location: Bedside  Height: 67 in  Weight: 110 lb  BP: 162/ 79 mmHg    Indications: Syncope  Diagnoses: R55  - Syncope and collapse    Sonographer:  Shaunna Lefort BS, RCS  Primary Physician:  Lance Shirley DO  Referring Physician:  Dustin Bower MD  Group:  [facility]  Interpreting Physician:  Milton Stahl MD PhD    SUMMARY    LEFT VENTRICLE:  Systolic function was normal  Ejection fraction was estimated in the range of 55 %  Although no diagnostic regional wall motion abnormality was identified, this possibility cannot be completely excluded on the basis of this study  Doppler parameters were consistent with abnormal left ventricular relaxation (grade 1 diastolic dysfunction)  HISTORY: PRIOR HISTORY: COPD  Risk factors: hypertension  PROCEDURE: The procedure was performed at the bedside  This was a routine study  The transthoracic approach was used  The study included complete 2D imaging, M-mode, complete spectral Doppler, and color Doppler  Images were obtained from  the parasternal, apical, subcostal, and suprasternal notch acoustic windows  Image quality was adequate  LEFT VENTRICLE: Size was normal  Systolic function was normal  Ejection fraction was estimated in the range of 55 %  Although no diagnostic regional wall motion abnormality was identified, this possibility cannot be completely excluded on  the basis of this study  DOPPLER: Doppler parameters were consistent with abnormal left ventricular relaxation (grade 1 diastolic dysfunction)  RIGHT VENTRICLE: The size was normal  Systolic function was normal     LEFT ATRIUM: Size was normal     RIGHT ATRIUM: Size was normal     MITRAL VALVE: DOPPLER: There was no significant regurgitation  AORTIC VALVE: The valve was probably trileaflet  DOPPLER: There was no evidence for stenosis  There was no regurgitation      TRICUSPID VALVE: DOPPLER: There was no evidence for stenosis  There was no regurgitation  PULMONIC VALVE: DOPPLER: The transpulmonic velocity was within the normal range  There was no regurgitation  PERICARDIUM: There was no pericardial effusion  AORTA: The root exhibited normal size  SYSTEM MEASUREMENT TABLES    2D  %FS: 27 9 %  Ao Diam: 3 6 cm  EDV(Teich): 103 4 ml  EF(Teich): 54 %  ESV(Teich): 47 6 ml  IVSd: 1 cm  LA Area: 9 4 cm2  LA Diam: 3 2 cm  LVEDV MOD A4C: 39 5 ml  LVEF MOD A4C: 66 9 %  LVESV MOD A4C: 13 1 ml  LVIDd: 4 7 cm  LVIDs: 3 4 cm  LVLd A4C: 7 cm  LVLs A4C: 6 5 cm  LVPWd: 0 8 cm  RA Area: 7 2 cm2  RVIDd: 3 5 cm  SV MOD A4C: 26 5 ml  SV(Teich): 55 9 ml    MM  TAPSE: 1 9 cm    PW  E': 0 1 m/s  E/E': 8 3  MV A [place]: 0 9 m/s  MV Dec [place]: 2 4 m/s2  MV DecT: 238 7 ms  MV E [place]: 0 6 m/s  MV E/A Ratio: 0 6  MV PHT: 69 2 ms  MVA By PHT: 3 2 cm2    [facility] Accredited Echocardiography Laboratory    Prepared and electronically signed by    Carola Pope MD PhD  Signed 27-Jul-2017 17:05:48    No results found for this or any previous visit  No results found for this or any previous visit  No results found for this or any previous visit  *-*-*-*-*-*-*-*-*-*-*-*-*-*-*-*-*-*-*-*-*-*-*-*-*-*-*-*-*-*-*-*-*-*-*-*-*-*-*-*-*-*-*-*-*-*-*-*-*-*-*-*-*-*-        *-*-*-*-*-*-*-*-*-*-*-*-*-*-*-*-*-*-*-*-*-*-*-*-*-*-*-*-*-*-*-*-*-*-*-*-*-*-*-*-*-*-*-*-*-*-*-*-*-*-*-*-*-*-  SIGNATURES:   @SUE@   Rula Callahan MD     CC:   Tay Harris,    No ref   provider found

## 2023-02-19 NOTE — ASSESSMENT & PLAN NOTE
· Patient presents to the hospital with episode of vertigo that occurred while turning his head then persistent lightheadedness following  · Patient is w/o focal deficits on exam today, has improvement in dizziness/lightheadedness since last night with rest and ambulation    · 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, moderate to severe plaque stenosis of bilateral vertebral artery origins, left proximal V4 segment moderate to severe plaque stenosis, greater than 95% near occluded plaque stenosis proximal right cervical ICA  · Rule out CVA/TIA versus vertebral basilar insufficiency  · Permissive hypertension  · Continue Lipitor 40 mg and aspirin 81 mg daily  · Stroke pathway admit  · Neuro checks  · F/u echo and MRI  · Telemetry monitoring  · Neurology and vascular consults appreciated

## 2023-02-20 ENCOUNTER — APPOINTMENT (OUTPATIENT)
Dept: NON INVASIVE DIAGNOSTICS | Facility: HOSPITAL | Age: 83
End: 2023-02-20

## 2023-02-20 ENCOUNTER — TELEPHONE (OUTPATIENT)
Dept: VASCULAR SURGERY | Facility: CLINIC | Age: 83
End: 2023-02-20

## 2023-02-20 LAB
AORTIC ROOT: 3.3 CM
APICAL FOUR CHAMBER EJECTION FRACTION: 71 %
DOP CALC LVOT AREA: 2.54 CM2
DOP CALC LVOT DIAMETER: 1.8 CM
E WAVE DECELERATION TIME: 138 MS
FRACTIONAL SHORTENING: 27 (ref 28–44)
INTERVENTRICULAR SEPTUM IN DIASTOLE (PARASTERNAL SHORT AXIS VIEW): 1.2 CM
INTERVENTRICULAR SEPTUM: 1.2 CM (ref 0.6–1.1)
LAAS-AP4: 13.8 CM2
LEFT ATRIUM AREA SYSTOLE SINGLE PLANE A4C: 13.6 CM2
LEFT INTERNAL DIMENSION IN SYSTOLE: 2.4 CM (ref 2.1–4)
LEFT VENTRICULAR INTERNAL DIMENSION IN DIASTOLE: 3.3 CM (ref 3.5–6)
LEFT VENTRICULAR POSTERIOR WALL IN END DIASTOLE: 1.2 CM
LEFT VENTRICULAR STROKE VOLUME: 23 ML
LVSV (TEICH): 23 ML
MV E'TISSUE VEL-LAT: 6 CM/S
MV E'TISSUE VEL-SEP: 9 CM/S
MV PEAK A VEL: 0.94 M/S
MV PEAK E VEL: 48 CM/S
MV STENOSIS PRESSURE HALF TIME: 40 MS
MV VALVE AREA P 1/2 METHOD: 5.5
RIGHT VENTRICLE ID DIMENSION: 3.1 CM
SL CV PED ECHO LEFT VENTRICLE DIASTOLIC VOLUME (MOD BIPLANE) 2D: 44 ML
SL CV PED ECHO LEFT VENTRICLE SYSTOLIC VOLUME (MOD BIPLANE) 2D: 20 ML

## 2023-02-20 RX ORDER — HYDRALAZINE HYDROCHLORIDE 10 MG/1
10 TABLET, FILM COATED ORAL EVERY 8 HOURS SCHEDULED
Status: DISCONTINUED | OUTPATIENT
Start: 2023-02-20 | End: 2023-02-22 | Stop reason: HOSPADM

## 2023-02-20 RX ORDER — HYDRALAZINE HYDROCHLORIDE 20 MG/ML
10 INJECTION INTRAMUSCULAR; INTRAVENOUS EVERY 6 HOURS PRN
Status: DISCONTINUED | OUTPATIENT
Start: 2023-02-20 | End: 2023-02-22 | Stop reason: HOSPADM

## 2023-02-20 RX ORDER — CARVEDILOL 6.25 MG/1
6.25 TABLET ORAL 2 TIMES DAILY WITH MEALS
Status: DISCONTINUED | OUTPATIENT
Start: 2023-02-20 | End: 2023-02-22 | Stop reason: HOSPADM

## 2023-02-20 RX ADMIN — HEPARIN SODIUM 5000 UNITS: 5000 INJECTION INTRAVENOUS; SUBCUTANEOUS at 05:24

## 2023-02-20 RX ADMIN — ZOLPIDEM TARTRATE 5 MG: 5 TABLET, COATED ORAL at 21:39

## 2023-02-20 RX ADMIN — ISOSORBIDE DINITRATE 10 MG: 10 TABLET ORAL at 12:23

## 2023-02-20 RX ADMIN — HYDRALAZINE HYDROCHLORIDE 10 MG: 10 TABLET, FILM COATED ORAL at 16:15

## 2023-02-20 RX ADMIN — HEPARIN SODIUM 5000 UNITS: 5000 INJECTION INTRAVENOUS; SUBCUTANEOUS at 21:39

## 2023-02-20 RX ADMIN — HYDRALAZINE HYDROCHLORIDE 5 MG: 20 INJECTION, SOLUTION INTRAMUSCULAR; INTRAVENOUS at 02:39

## 2023-02-20 RX ADMIN — HEPARIN SODIUM 5000 UNITS: 5000 INJECTION INTRAVENOUS; SUBCUTANEOUS at 14:58

## 2023-02-20 RX ADMIN — CLOPIDOGREL BISULFATE 75 MG: 75 TABLET ORAL at 08:32

## 2023-02-20 RX ADMIN — CARVEDILOL 6.25 MG: 6.25 TABLET, FILM COATED ORAL at 16:15

## 2023-02-20 RX ADMIN — ATORVASTATIN CALCIUM 40 MG: 40 TABLET, FILM COATED ORAL at 17:23

## 2023-02-20 RX ADMIN — ISOSORBIDE DINITRATE 10 MG: 10 TABLET ORAL at 08:32

## 2023-02-20 RX ADMIN — HYDRALAZINE HYDROCHLORIDE 10 MG: 20 INJECTION, SOLUTION INTRAMUSCULAR; INTRAVENOUS at 22:56

## 2023-02-20 RX ADMIN — PERFLUTREN 1 ML/MIN: 6.52 INJECTION, SUSPENSION INTRAVENOUS at 10:54

## 2023-02-20 RX ADMIN — HYDRALAZINE HYDROCHLORIDE 10 MG: 10 TABLET, FILM COATED ORAL at 21:39

## 2023-02-20 RX ADMIN — CARVEDILOL 3.12 MG: 3.12 TABLET, FILM COATED ORAL at 08:32

## 2023-02-20 NOTE — PLAN OF CARE
Problem: OCCUPATIONAL THERAPY ADULT  Goal: Performs self-care activities at highest level of function for planned discharge setting  See evaluation for individualized goals  Description: Treatment Interventions: ADL retraining, Functional transfer training, UE strengthening/ROM, Endurance training, Patient/family training, Equipment evaluation/education, Compensatory technique education, Activityengagement, Energy conservation          See flowsheet documentation for full assessment, interventions and recommendations  Note: Limitation: Decreased ADL status, Decreased UE strength, Decreased endurance, Decreased self-care trans, Decreased high-level ADLs  Prognosis: Good  Assessment: Pt is a [de-identified] y o  male seen for OT evaluation s/p adm to [address] on 2/18/2023 w/ Vertigo and Hypertensive urgency  MRI brain: No acute intracranial pathology  Comorbidities affecting pt’s functional performance include a significant PMH of Gout, COPD, CKD, COVID, Vertigo  Pt with active OT orders and activity orders for Activity as tolerated  Pt lives alone in a one level apt with 18-19 FAN  Pt reports local son and dtr who visit him frequently and are able to assist as needed  At baseline, pt was I w/ ADLs and functional transfers/mobility w/ use of SPC during the day and in the community and use of RW at night  Pt's son and dtr assist w/ IADLs  (+)   Denies falls PTA  Upon evaluation, pt currently requires Supervision for UB ADLs, Supervision for LB ADLs, Supervision for toileting, Mod I for bed mobility, and Supervision for functional mobility/transfers 2* the following deficits impacting occupational performance: decreased strength, decreased balance and decreased tolerance  These impairments, as well at pt’s fall risk, steps to enter environment, difficulty performing ADLS and difficulty performing IADLS  limit pt’s ability to safely engage in all baseline areas of occupation   Based on the aforementioned OT evaluation, functional performance deficits, and assessments, pt has been identified as a Moderate complexity evaluation  Pt to continue to benefit from continued acute OT services during hospital stay to address defined deficits and to maximize level of functional independence in the following Occupational Performance areas: bathing/shower, toilet hygiene, dressing, medication management, health maintenance, functional mobility, community mobility, clothing management, meal prep and household maintenance  From OT standpoint, recommend Home w/ social support upon D/C   OT will continue to follow pt 2-3x/wk to address the following goals to  w/in 10-14 days:     OT Discharge Recommendation: No rehabilitation needs

## 2023-02-20 NOTE — QUICK NOTE
Reviewed updated neurodiagnostics since consult yesterday: Given MRI brain negative, suspect peripheral origin of patient's dizzy spells  Fortunately has been without further episodes and ambulating without difficulty during admission (per review of notes)  -MRI brain was performed and negative for any acute intracranial pathology nor posterior circulation infarct   -As mentioned previously, CTA head/neck revealed multivessel significant atherosclerotic disease (bilateral vertebral origins, severe L subclavian and critical R ICA)  -Continue on Plavix and statin  -Vascular surgery following, planning carotid revascularization for critical right ICA stenosis  - no clear neurologic need for permissive hypertension, but would avoid hypotension moving forward to ensure adequate cerebral perfusion and prevent watershed ischemia  Will arrange for outpatient neurologic/neurovascular follow-up in 6 to 8 weeks (for scheduling team, will attending), no further inpatient neurologic testing is anticipated  Would be okay for discharge when okay from primary team/cardiology/vascular/therapy standpoint  Please call with questions

## 2023-02-20 NOTE — OCCUPATIONAL THERAPY NOTE
Occupational Therapy Evaluation     Patient Name: Clari YORKY Date: 2/20/2023  Problem List  Principal Problem:    Episode of dizziness  Active Problems:    Essential hypertension    COPD (chronic obstructive pulmonary disease) (formerly [facility])    Tobacco abuse    Stage 3b chronic kidney disease ([facility] [address] )    Severe protein-calorie malnutrition ([facility] [address] )    Hypertensive urgency    Past Medical History  Past Medical History:   Diagnosis Date    Abnormal thyroid function test     non specified /  LA    1/8/13      Chronic kidney disease     COPD (chronic obstructive pulmonary disease) (HCC)     Eczema     LA   11/26/13       Esophagitis     Gout     Hypertension     Pyloric channel ulcer      Past Surgical History  Past Surgical History:   Procedure Laterality Date    BACK SURGERY      ESOPHAGOGASTRODUODENOSCOPY N/A 12/1/2016    Procedure: ESOPHAGOGASTRODUODENOSCOPY (EGD); Surgeon: Abdi Mccoy MD;  Location: [facility]; Service:     EYE SURGERY             02/20/23 0825   OT Last Visit   OT Visit Date 02/20/23   Note Type   Note type Evaluation   Pain Assessment   Pain Assessment Tool 0-10   Pain Score No Pain   Restrictions/Precautions   Weight Bearing Precautions Per Order No   Other Precautions Chair Alarm; Bed Alarm;Multiple lines; Fall Risk;Hard of hearing   Home Living   Type of Home Apartment  (2nd floor- above garage)   [address] level;Stairs to enter with rails  (18-19 FAN per pt report)   Bathroom Shower/Tub Walk-in shower   Bathroom Toilet Standard   Bathroom Equipment   (Denies DME)   [address] Walker;Cane   Additional Comments Pt lives alone in a one level apt with 18-19 FAN  Pt reports local son and dtr who visit him frequently and are able to assist as needed  Prior Function   Level of [place] Independent with ADLs; Independent with functional mobility; Needs assistance with IADLS   Lives With Alone   Receives Help From Family  (local son and dtr)   IADLs Independent with driving; Independent with medication management; Independent with meal prep  (Pt reports mostly eating frozen/microwave meals  Son and dtr assist w/ cleaning and laundry)   Falls in the last 6 months 0   Vocational Retired   Comments At baseline, pt was I w/ ADLs and functional transfers/mobility w/ use of SPC during the day and in the community and use of RW at night  Pt's son and dtr assist w/ IADLs  (+)   Denies falls PTA  Lifestyle   Autonomy At baseline, pt was I w/ ADLs and functional transfers/mobility w/ use of SPC during the day and in the community and use of RW at night  Pt's son and dtr assist w/ IADLs  (+)   Denies falls PTA  Reciprocal Relationships Son, dtr   Service to Others Retired- clothing factory   Intrinsic Gratification Spending time with family, watching TV   ADL   Where Assessed Edge of bed   Eating Assistance 7  Independent   Grooming Assistance 7  [address] Amanda Akers  66  5  Supervision/Setup    [place] [address]  [address]  5  [address] [facility] 5  Supervision/Setup   Bed Mobility   Supine to Sit 6  Modified independent   Additional items HOB elevated; Bedrails; Increased time required   Sit to Supine 6  Modified independent   Additional items HOB elevated; Increased time required   Additional Comments Pt lying supine with bed alarm activated at end of session  Call bell and phone within reach  All needs met and pt reports no further questions for OT at this time  Transfers   Sit to Stand 5  Supervision   Additional items Bedrails; Increased time required;Verbal cues   Stand to Sit 5  Supervision   Additional items Increased time required;Verbal cues   Additional Comments Cues for safe technique and hand placement   Functional Mobility   Functional Mobility 5  Supervision   Additional Comments CGA w/ use of SPC, progressing to Supervision w/ use of RW   Additional items Rolling walker;SPC   Balance   Static Sitting Good   Dynamic Sitting Fair +   Static Standing Fair   Dynamic Standing Christian Cowart [phone] -   Activity Tolerance   Activity Tolerance Patient limited by fatigue;Patient tolerated treatment well   Medical Staff Made Aware Charito Aguilar, RN   Nurse Made Aware yes   RUE Assessment   RUE Assessment WFL  (4-/5 throughout)   LUE Assessment   LUE Assessment WFL  (4-/5 throughout)   Hand Function   Gross Motor Coordination Functional   Fine Motor Coordination Functional   Sensation   Light Touch No apparent deficits   Proprioception   Proprioception No apparent deficits   Vision-Basic Assessment   Current Vision Wears glasses only for reading   Vision - Complex Assessment   Ocular Range of Motion Intact   Acuity Able to read clock/calendar on wall without difficulty; Able to read employee name badge without difficulty   Psychosocial   Psychosocial (WDL) WDL   Perception   Inattention/Neglect Appears intact   Cognition   Overall Cognitive Status WFL   Arousal/Participation Alert; Cooperative   Attention Within functional limits   Orientation Level Oriented X4   Memory Within functional limits   Following Commands Follows one step commands without difficulty   Assessment   Limitation Decreased ADL status; Decreased UE strength;Decreased endurance;Decreased self-care trans;Decreased high-level ADLs   Prognosis Good   Assessment Pt is a [de-identified] y o  male seen for OT evaluation s/p adm to [facility] on 2/18/2023 w/ Vertigo and Hypertensive urgency  MRI brain: No acute intracranial pathology  Comorbidities affecting pt’s functional performance include a significant PMH of Gout, COPD, CKD, COVID, Vertigo  Pt with active OT orders and activity orders for Activity as tolerated  Pt lives alone in a one level apt with 18-19 FAN  Pt reports local son and dtr who visit him frequently and are able to assist as needed   At baseline, pt was I w/ ADLs and functional transfers/mobility w/ use of SPC during the day and in the community and use of RW at night  Pt's son and dtr assist w/ IADLs  (+)   Denies falls PTA  Upon evaluation, pt currently requires Supervision for UB ADLs, Supervision for LB ADLs, Supervision for toileting, Mod I for bed mobility, and Supervision for functional mobility/transfers 2* the following deficits impacting occupational performance: decreased strength, decreased balance and decreased tolerance  These impairments, as well at pt’s fall risk, steps to enter environment, difficulty performing ADLS and difficulty performing IADLS  limit pt’s ability to safely engage in all baseline areas of occupation  Based on the aforementioned OT evaluation, functional performance deficits, and assessments, pt has been identified as a Moderate complexity evaluation  Pt to continue to benefit from continued acute OT services during hospital stay to address defined deficits and to maximize level of functional independence in the following Occupational Performance areas: bathing/shower, toilet hygiene, dressing, medication management, health maintenance, functional mobility, community mobility, clothing management, meal prep and household maintenance  From OT standpoint, recommend Home w/ social support upon D/C  OT will continue to follow pt 2-3x/wk to address the following goals to  w/in 10-14 days:   Goals   Patient Goals To go home today   LTG Time Frame 10-14   Long Term Goal Please refer to LTGs listed below   Plan   Treatment Interventions ADL retraining;Functional transfer training;UE strengthening/ROM; Endurance training;Patient/family training;Equipment evaluation/education; Compensatory technique education; Activityengagement; Energy conservation   Goal Expiration Date 23   OT Treatment Day 0   OT Frequency 2-3x/wk   Recommendation   OT Discharge Recommendation No rehabilitation needs   Additional Comments  The patient's raw score on the AM-PAC Daily Activity Inpatient Short Form is 20  A raw score of greater than or equal to 19 suggests the patient may benefit from discharge to home  Please refer to the recommendation of the Occupational Therapist for safe discharge planning     AM-PAC Daily Activity Inpatient   Lower Body Dressing 3   Bathing 3   Toileting 3   Upper Body Dressing 3   Grooming 4   Eating 4   Daily Activity Raw Score 20   Daily Activity Standardized Score (Calc for Raw Score >=11) 42 03   AM-PAC Applied Cognition Inpatient   Following a Speech/Presentation 4   Understanding Ordinary Conversation 4   Taking Medications 4   Remembering Where Things Are Placed or Put Away 4   Remembering List of 4-5 Errands 4   Taking Care of Complicated Tasks 3   Applied Cognition Raw Score 23   Applied Cognition Standardized Score 53 08       GOALS    Pt will improve activity tolerance to G for min 30 min txment sessions for increase engagement in functional tasks    Pt will complete bed mobility at a Mod I level w/ G balance/safety demonstrated to decrease caregiver assistance required     Pt will complete UB dressing/self care w/ mod I using adaptive device and DME as needed     Pt will complete LB dressing/self care w/ mod I using adaptive device and DME as needed    Pt will complete toileting w/ mod I w/ G hygiene/thoroughness using DME as needed    Pt will improve functional transfers to Mod I on/off all surfaces using DME as needed w/ G balance/safety     Pt will improve functional mobility during ADL/IADL/leisure tasks to Mod I using DME as needed w/ G balance/safety     Pt will be attentive 100% of the time during ongoing cognitive assessment w/ G participation to assist w/ safe d/c planning/recommendations    Pt will demonstrate G carryover of pt/caregiver education and training as appropriate w/o cues w/ good tolerance to increase safety during functional tasks    Pt will increase BUE strength by 1MM grade via AROM exercises to increase independence in ADLs and transfers    Pt will verbalize 3 potential fall hazards and identify appropriate compensatory techniques to decrease fall risk in home environment     Pt will increase standing tolerance to 10-15 mins with Fair+ dynamic standing balance to increase safety during participation in ADLs       Melanie Garcia, OTR/L

## 2023-02-20 NOTE — UTILIZATION REVIEW
Continued Stay Review    OBSERVATION 2/18 CHANGED TO INPATIENT ON 2/20 - PENDING MRI BRAIN, ECHO, ELEVATED BP, NEW ANTIHYPERTENSIVE REGIMEN  Inpatient Admission Orders (From admission, onward)     Ordered        02/20/23 1131  Inpatient Admission  Once                      Orders Placed This Encounter   Procedures   • Inpatient Admission     Standing Status:   Standing     Number of Occurrences:   1     Order Specific Question:   Level of Care     Answer:   Med Surg [16]     Order Specific Question:   Estimated length of stay     Answer:   More than 2 Midnights     Order Specific Question:   Certification     Answer:   I certify that inpatient services are medically necessary for this patient for a duration of greater than two midnights  See H&P and MD Progress Notes for additional information about the patient's course of treatment  Date: 2/20/22   CHANGED TO INPATIENT STATUS TODAY  Current Patient Class: ip  Current Level of Care: MS    HPI:82 y o  male initially admitted on 2/18 with vertigo, dizziness, lightheadedness  Rule out CVA/TIA vs vertebral basilar insufficiency vs BPPV  Assessment/Plan:   MRI shows no acute disease  BP elevated, starting Coreg 3 125 BID and Isosorbide 10 mg TID  Off ASA and on Plavix  Echo pending  Per Vascular surgery will get risk stratification for CEA planning asymptomatic ICA stenosis - plan for TTE and nuclear stress test -- time will allow as this is not urgent revascularization tentative 3/22  Goal - normotension  Avoid hypotension moving forward  Some improvement in BP today  No events on Tele  Has been symptom free  On exam pt notes improvement  No vertigo or other lightheadedness  Will f/u with Neuro OP  Pt will have stress test 2/21  Increasing Coreg to 6 14 BID, hold Isosorbide, add TID Hydralazine  Avoid Clonidine        Date: 2/21  Day 2 IP:       Vital Signs:   02/20/23 1030 -- 85 -- 165/71 -- -- -- --   02/20/23 0900 99 1 °F (37 3 °C) 75 18 165/71 104 97 % None (Room air) Lying   02/20/23 0730 98 3 °F (36 8 °C) 70 17 186/72 Abnormal  95 96 % None (Room air) Lying   02/20/23 0333 -- 74 18 180/77 Abnormal  110 -- -- Lying   02/20/23 0232 99 °F (37 2 °C) 89 18 211/73 Abnormal  129 98 % None (Room air) Lying   02/19/23 2243 -- -- -- 170/70 -- -- -- Lying   02/19/23 2231 97 9 °F (36 6 °C) 67 18 183/76 Abnormal  103 98 % None (Room air) Lying   02/19/23 2000 -- -- -- -- -- -- None (Room air) --   02/19/23 1854 98 4 °F (36 9 °C) 67 18 148/63 87 97 % None (Room air) Lying   02/19/23 1541 98 3 °F (36 8 °C) 64 18 185/57 Abnormal  81 99 % None (Room air) Lying   02/19/23 1045 98 °F (36 7 °C) 71 18 179/72 Abnormal  104 100 % None (Room air) Lying   02/19/23 0747 98 2 °F (36 8 °C) 69 18 227/91 Abnormal  131 100 % None (Room air) Lying   02/19/23 0400 98 °F (36 7 °C) 65 18 168/71 90 97 % None (Room air) Lying   02/19/23 0200 -- 67 18 155/68 98 96 % None (Room air) Lying   02/19/23 0000 -- 72 18 148/80 92 97 % None (Room air) Lying   02/18/23 2300 98 °F (36 7 °C) 74 -- 147/67 85 98 % None (Room air) Lying   02/18/23 2200 97 8 °F (36 6 °C) 69 -- 144/71 86 99 % None (Room air) Lying   02/18/23 2000 97 6 °F (36 4 °C) 76 -- 150/64 82 99 % None (Room air) Lying   02/18/23 1900 97 8 °F (36 6 °C) 79 -- 154/67 81 98 % None (Room air) Lying   02/18/23 1800 97 5 °F (36 4 °C) 94 -- 124/77 94 98 % None (Room air) Sitting   02/18/23 1700 97 7 °F (36 5 °C) 70 14 152/93 105 100 % -- Lying   02/18/23 1630 -- 72 -- 150/114 Abnormal  -- 100 % None (Room air) Sitting   02/18/23 1600 -- 71 20 154/81 110 100 % None (Room air) Lying   02/18/23 1500 -- 77 20 127/64 89 99 % None (Room air) Lying   02/18/23 1435 -- 80 16 145/82 -- 100 % None (Room air) Lying   02/18/23 1400 -- 61 20 163/75 108 100 % None (Room air) Sitting   02/18/23 1345 -- 62 20 202/88 Abnormal  127 100 % None (Room air) Sitting   02/18/23 1340 -- -- -- -- -- -- None (Room air) --   02/18/23 1334 -- -- -- 261/122 Abnormal  -- -- -- --   02/18/23 1325 -- -- -- --  -- -- -- --   02/18/23 1322 98 5 °F (36 9 °C) -- -- -- -- -- -- --   02/18/23 1321 -- 65 20 261/122 Abnormal  175 99 % None (Room air) --     Pertinent Labs/Diagnostic Results:     2/20 Echo - Technically difficult transthoracic echocardiogram study due to lung interference and poor acoustic windows  Endocardial and valvular visualization was suboptimal despite use of Definity contrast      1  Mild to moderate concentric left ventricular hypertrophy with prominent basal interventricular septum, normal left ventricle systolic function, grade 1 diastolic dysfunction  Left ventricle ejection fraction is estimated as around 65%  2  Normal right ventricle size and systolic function  3  Normal left and right atrial cavity size  4  Aortic valve sclerosis, no aortic valve stenosis  Suspected mild to moderate aortic valve regurgitation  5  Mitral valve leaflet thickening and sclerosis  No definite mitral valve regurgitation noted on limited visualization  6  Trace tricuspid valve regurgitation  7  No obvious pulmonary hypertension  8  No pericardial effusion  2/19 MRI Brain - No acute intracranial pathology  Chronic microangiopathy      2/18 Carotid Duplex - pending        Results from last 7 days   Lab Units 02/19/23  0526 02/18/23  1334   WBC Thousand/uL 9 91 10 73*   HEMOGLOBIN g/dL 12 8 14 3   HEMATOCRIT % 39 1 44 2   PLATELETS Thousands/uL 252 267   NEUTROS ABS Thousands/µL  --  7 47         Results from last 7 days   Lab Units 02/19/23  0526 02/18/23  1334   SODIUM mmol/L 138 137   POTASSIUM mmol/L 3 9 4 4   CHLORIDE mmol/L 105 104   CO2 mmol/L 28 26   ANION GAP mmol/L 5 7   BUN mg/dL 31* 27*   CREATININE mg/dL 1 59* 1 48*   EGFR ml/min/1 73sq m 40 44   CALCIUM mg/dL 9 1 9 6     Results from last 7 days   Lab Units 02/18/23  1334   AST U/L 18   ALT U/L 11   ALK PHOS U/L 98   TOTAL PROTEIN g/dL 7 0   ALBUMIN g/dL 4 2   TOTAL BILIRUBIN mg/dL 0 65 Results from last 7 days   Lab Units 02/19/23  0526 02/18/23  1334   GLUCOSE RANDOM mg/dL 78 110     Results from last 7 days   Lab Units 02/18/23  1753 02/18/23  1538 02/18/23  1334   HS TNI 0HR ng/L  --   --  8   HS TNI 2HR ng/L  --  9  --    HSTNI D2 ng/L  --  1  --    HS TNI 4HR ng/L 9  --   --    HSTNI D4 ng/L 1  --   --          Results from last 7 days   Lab Units 02/18/23  1334   PROTIME seconds 13 0   INR  0 98   PTT seconds 27     Results from last 7 days   Lab Units 02/18/23  1334   BNP pg/mL 437*     Medications:   Scheduled Medications:  atorvastatin, 40 mg, Oral, QPM  carvedilol, 6 25 mg, Oral, BID With Meals  clopidogrel, 75 mg, Oral, Daily  heparin (porcine), 5,000 Units, Subcutaneous, Q8H PHYLLIS  isosorbide dinitrate, 10 mg, Oral, TID after meals  nicotine, 1 patch, Transdermal, Daily  zolpidem, 5 mg, Oral, HS      Continuous IV Infusions:     PRN Meds:  acetaminophen, 650 mg, Oral, Q6H PRN  albuterol, 2 puff, Inhalation, Q6H PRN  aluminum-magnesium hydroxide-simethicone, 30 mL, Oral, Q6H PRN  hydrALAZINE, 5 mg, Intravenous, Q6H PRN - x 1 2/19, 2/20  ondansetron, 4 mg, Intravenous, Q6H PRN    Discharge Plan: home with family support    Network Utilization Review Department  ATTENTION: Please call with any questions or concerns to [phone] and carefully listen to the prompts so that you are directed to the right person  All voicemails are confidential   [facility] all requests for admission clinical reviews, approved or denied determinations and any other requests to dedicated fax number below belonging to the campus where the patient is receiving treatment   List of dedicated fax numbers for the Facilities:  [address] DENIALS (Administrative/Medical Necessity) [phone]   [address]  (Maternity/NICU/Pediatrics) [phone]   100 [facility] [address] - [place] [facility] [phone]   [address] [place] [address] [facility] [address] [facility] 134 760 [facility] [phone]

## 2023-02-20 NOTE — PROGRESS NOTES
Progress Note - Cardiology   Goyo Smith [de-identified] y o  male MRN: [id number]  Unit/Bed#: E4 -01 Encounter: [id number]          Assessment / Plan  Dizziness and vertigo POA    --Possibly vertigo with neurology evaluation less suspicious of VBI, or relation to old stroke (no new stroke), or vascular stenoses of the head and neck   --MRI of the brain, 2/19/2023, chronic microangiopathy with no acute intracranial pathology  Hx previous basal ganglia lacunar stroke  Asymptomatic severe internal carotid & cervical artery stenosis   --CTA 2/18/2023:     * Moderate - severe stenosis bilateral vertebral artery origins    * >95% near occluded plaque stenosis proximal right cervical ICA,  Asymptomatic severe left subclavian artery stenosis   --CTA 2/18/2023: Near total occlusion (>95%) proximal subclavian stenosis  Hypertension with hypertensive urgency   --Uncontrolled hypertension POA-possibly with rebound after withholding clonidine  --Prehospital Rx: Atenolol 50 mg daily, clonidine 0 1 mg daily  ECG with signs of prior age-indeterminate anteroseptal infarct    Amlodipine allergy-rash  CKD 3  Active tobacco abuse prior to admission, COPD without exacerbation   Previous pack per day smoker has for about the last 3 years been smoking approximately 3 cigarettes/day  Protein calorie malnutrition: Current BMI 15 9    · Some spurious accelerations and blood pressure during the last 24 hours with most recent values showing improvement  · Coreg advanced to 6 25 mg twice daily  · We will hold isosorbide for oncologic nuclear stress test with plan to continue current dose of isosorbide -10 mg 3 times daily following test completion  · We will add routine hydralazine thrice daily hopeful for continued betterment of blood pressure over the next 24-48 hours  · Once renal function stabilized can consider exchanging isosorbide + hydralazine combination with ACE or ARB  · Continue to avoid clonidine (was taking prior to admission)  · Echocardiogram completed today with review forthcoming  · Barring any notable reduction in EF or wall motion abnormalities on echocardiogram we will plan for stress test tomorrow  · Vascular surgery following with plan for nonurgent ICA surgery -ongoing work-up with timing yet to be clarified ~~> will provide additional comment regarding cardiac risk following review of echocardiogram and stress test  · Encouraged to pursue full tobacco cessation      Subjective:  No new complaint  No vertigo or other lightheadedness    He denies any chest pain, dyspnea or perceived cardiac ectopy    Vitals:  Vitals:    02/18/23 1318 02/20/23 1030   Weight: 45 kg (99 lb 3 3 oz) 44 9 kg (99 lb)   ,  Vitals:    02/20/23 0333 02/20/23 0730 02/20/23 0900 02/20/23 1030   BP: (!) 180/77 (!) 186/72 165/71 165/71   BP Location: Right arm Right arm Right arm    Pulse: 74 70 75 85   Resp: 18 17 18    Temp:  98 3 °F (36 8 °C) 99 1 °F (37 3 °C)    TempSrc:  Temporal Temporal    SpO2:  96% 97%    Weight:    44 9 kg (99 lb)   Height:    5' 6" (1 676 m)       Exam:  General:  Alert normally conversan quite pleasant and comfortable appearing though visually he looks a bit frail  Heart:  Regular with controlled rate with CYN at basilar left sternal border   lungs: Diminished excursion air exchange though currently clear throughout  Lower Limbs:  No edema           Telemetry:       Sinus rhythm with ventricular rate trend in the 80s    Medications:    Current Facility-Administered Medications:   •  acetaminophen (TYLENOL) tablet 650 mg, 650 mg, Oral, Q6H PRN, Urena Iba, DO  •  albuterol (PROVENTIL HFA,VENTOLIN HFA) inhaler 2 puff, 2 puff, Inhalation, Q6H PRN, Urena Iba, DO  •  aluminum-magnesium hydroxide-simethicone (MYLANTA) oral suspension 30 mL, 30 mL, Oral, Q6H PRN, Urena Iba, DO  •  atorvastatin (LIPITOR) tablet 40 mg, 40 mg, Oral, QPM, Urena Iba, DO, 40 mg at 02/19/23 1731  •  carvedilol (COREG) tablet 6 25 mg, 6 25 mg, Oral, BID With Meals, Nakul Rojas PA-C  •  clopidogrel (PLAVIX) tablet 75 mg, 75 mg, Oral, Daily, Myke Camara DO, 75 mg at 02/20/23 5887  •  heparin (porcine) subcutaneous injection 5,000 Units, 5,000 Units, Subcutaneous, Q8H [address], Yue Valle DO, 5,000 Units at 02/20/23 8368  •  hydrALAZINE (APRESOLINE) injection 5 mg, 5 mg, Intravenous, Q6H PRN, Yue Valle DO, 5 mg at 02/20/23 3684  •  isosorbide dinitrate (ISORDIL) tablet 10 mg, 10 mg, Oral, TID after meals, Tamica Laguerre MD, 10 mg at 02/20/23 1223  •  nicotine (NICODERM CQ) 14 mg/24hr TD 24 hr patch 1 patch, 1 patch, Transdermal, Daily, Yue Valle DO  •  ondansetron (ZOFRAN) injection 4 mg, 4 mg, Intravenous, Q6H PRN, Yue Valle DO  •  zolpidem (AMBIEN) tablet 5 mg, 5 mg, Oral, HS, Yue Valle DO, 5 mg at 02/19/23 2121      Labs/Data:        Results from last 7 days   Lab Units 02/19/23  0526 02/18/23  1334   WBC Thousand/uL 9 91 10 73*   HEMOGLOBIN g/dL 12 8 14 3   HEMATOCRIT % 39 1 44 2   PLATELETS Thousands/uL 252 267     Results from last 7 days   Lab Units 02/19/23  0526 02/18/23  1334   POTASSIUM mmol/L 3 9 4 4   CHLORIDE mmol/L 105 104   CO2 mmol/L 28 26   BUN mg/dL 31* 27*   CREATININE mg/dL 1 59* 1 48*

## 2023-02-20 NOTE — SPEECH THERAPY NOTE
Speech Language/Pathology  Speech/Language Pathology  Assessment    Patient Name: Jazmyne PFEIFFERZ Date: 2/19/2023     Problem List  Principal Problem:    Vertigo  Active Problems:    Essential hypertension    COPD (chronic obstructive pulmonary disease) ([facility])    Tobacco abuse    Stage 3b chronic kidney disease ([facility] [address] )    Severe protein[facility]calorie malnutrition ([facility]    Hypertensive urgency    Past Medical History  Past Medical History:   Diagnosis Date   • Abnormal thyroid function test     non specified /  LA    1/8/13     • Chronic kidney disease    • COPD (chronic obstructive pulmonary disease) (HCC)    • Eczema     LA   11/26/13      • Esophagitis    • Gout    • Hypertension    • Pyloric channel ulcer      Past Surgical History  Past Surgical History:   Procedure Laterality Date   • BACK SURGERY     • ESOPHAGOGASTRODUODENOSCOPY N/A 12/1/2016    Procedure: ESOPHAGOGASTRODUODENOSCOPY (EGD); Surgeon: Abi aBrger MD;  Location: [facility]; Service:    • EYE SURGERY          Bedside Swallow Evaluation:    Summary:  Pt presented known to me from previous admit when he had covid  Appears much improved  Remains very thin but son states he is a good eater  Pt agreed  Seated on edge of bed feeding self burger for lunch  Mastication and transfer wnl  Swallows prompt  No cough or wet vocal quality w/ food or liquid  Stated pt has been maintaining weight  Drinks ensure at home  No facial asymmetry  Has upper plate, lower partial dentition,   Oral/pharyngeal stages wnl    egd 12/1/16    Recommendations:  Diet: Regular as tolerated  Liquid: thin  Meds: as tolerated/desired  Supervision: none  Positioning:Upright  Strategies: drinks after every few bites  Upright after meals  Pt to take PO/Meds only when fully alert and upright  Oral care  Reflux precautions  Eval only, No f/u tx indicated       Consider consult w/:  Nutrition    H&P/Admit info/ pertinent provider notes: (PMH noted above)  Per neuro 2/19/23:  Assessment:  1  Transient vertigo  By history seems provoked by head positioning/turning, now resolved  This might argue for a more peripheral cause such as BPPV, although cannot entirely exclude posterior circulation TIA or subclavian steal   Alternately this could also be resultant from hypertensive urgency  2  Multivessel severe atherosclerotic disease  His carotid disease appears to be asymptomatic  Cannot exclude possible posterior circulation atheroembolic TIA relating to vertebral artery disease  3  Severe hypertension  Plan:  1  Although I would try to avoid hypotension, at the moment there is no apparent neurologic indication for permissive hypertension  Given the identified vascular disease, would suggest trying to keep -160, to avoid watershed ischemia  2  Although he has been told in the past to avoid aspirin relative to his CKD, I would advocate for Plavix and atorvastatin  3  Agree with vascular surgery consult  4  Advised smoking cessation     Physician Requesting Consult: Michelle Lerma MD  Reason for Consult / Principal Problem: Dizziness     HPI: La Vann is a [de-identified]y o  year old male who presents with a chief complaint of dizziness and lightheadedness  Patient describes he was lying in bed, turned his head toward the left, and developed immediate sensitives spinning dizziness  This improved somewhat with closing his allergies, and abated after about 30 seconds  However it recurred again transiently when he got out of bed  Once again the episode was fairly transient and lasting less than a minute  He did not fall  He was nauseous but did not vomit  Subsequently, his son came to take him out for a drive, as is their routine  He noted feeling somewhat off during the ride described as a floating/rocking/"Jell-O-head" sensation, though without carolyn spinning    At no time during during these symptoms did he endorse diplopia, difficulty with speech, lateralizing weakness or numbness  He has some baseline gait unsteadiness for which he uses either a cane or walker  He contacted his daughter who advised him to go to the emergency room  On arrival he was severely hypertensive 261/122, improved with meds  He has a history of hypertension but it has never been that high that he is aware of  Imaging studies through the emergency department included CT/CTA, remarkable for severe vascular sclerotic changes; left greater than right vertebral artery origin stenosis and left V4 stenosis, bilateral right greater than left ICA origin stenosis, severe left subclavian stenosis  He reports perhaps 1 prior episode of transient vertigo several months ago  No known history of stroke or TIA  This morning he reports feeling well  He has been up out of bed without any recurrent dizziness or vertigo  Special Studies:  2/19/23 MRI  No acute intracranial pathology  Chronic microangiopathy  2/18/23 CTA head and neck  No evidence of acute intracranial hemorrhage  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  Chronic microangiopathy changes  Greater than 95% near occluded left proximal subclavian plaque stenosis /near occlusion  Moderate to severe plaque stenosis bilateral vertebral artery origins  Left proximal V4 segment moderate to severe plaque stenosis  Greater than 95% near occluded plaque stenosis proximal right cervical ICA, vascular consult recommended  Previous MBS:  No vbs: previously seen by me bedside 11/13/22: + covid at that time  Summary:  Pt presented alert and pleasant  Son visiting  Both report pt has no history of swallowing difficulty  H/o egd 2016, a small food bolus was pushed thru the ge junction, etc  (see below)  Denied any current issues  Took thin liquids by straw wnl  No cough or wet vocal quality  Ate all of the beef w/ adequate mastication and transfer  "full" after eating beef  I asked how much he ate at home   Son reported he would maybe eat a hamburger and fries happy meal and then just snack the rest of the day  He was drinking  approx 2 bottles of  Ensure at home  Pt stated he was coughing while laying flat the previous night  ? If he is refluxing Given his history  Advised to sleep elevated and gave rationale  Pt then pointed to his stomach and said "yeah, sometimes I feel things right here"  Currently satting at 95/96 on RA  Oral/pharyngeal stages wnl  Can not r/o esophageal component     Recommendations:  Diet: regular  Liquid: thin  Meds: as tolerated/desired  Supervision: none  Positioning:Upright  Strategies: sips after every few bites  Stop eating if full  Remain upright after meals  Pt to take PO/Meds only when fully alert and upright  Oral care  Reflux precautions  Eval only, No f/u tx indicated  Patient's goal: none stated    Did the pt report pain? no  If yes, was nursing notified/was it addressed? na    Reason for consult:  R/o aspiration  Determine safest and least restrictive diet  Change in mental status  New neuro event  Stroke protocol    Precautions:  Fall    Food Allergies:  none   Current Diet:  regular   Premorbid diet:  regular   O2 requirement:  none   Social/Prior living  home alone   Son visits often   Voice/Speech: minimal speech wnl   Follows commands:  basic   Cognitive status: Alert and pleasant     Oral mech exam:  Dentition: upper plate, lower partial dentition  Lips (VII):+  Tongue (XII):+  Secretion management:wnl    Esophageal stage:  egd 12/1/16    Aspiration precautions posted    Results d/w:  Pt, nursing, family

## 2023-02-20 NOTE — TELEPHONE ENCOUNTER
Message  Received: Ashley Crandall MD  P [facility] Surgery Coordinator; [address],     Could we please have this patient see me in [facility] office in the next 2 weeks or so and Mohinder Reasons, could we please pencil him in for CEA to follow his office visit sometime in the next month or so       Getting cards clearance in house     Thanks,     steve

## 2023-02-20 NOTE — PROGRESS NOTES
Progress Note - Vascular Surgery   [facility] [de-identified] y o  male MRN: [id number]  Unit/Bed#: E4 -01 Encounter: [id number]      Assessment:  82M with HTN, COPD, CKD, malnutrition, gout, OA, active smoker, presented with vertigo  CTA head and neck - reviewed with dense calcifications and high grade stenosis of bilateral ICA, significant stenosis of L SCA at origin and stenosis of origin of bilateral verts    MRI brain - no acute intracranial pathology    Plan:  Continue neurovascular checks  Follow up carotid duplex  Cardiology for risk stratification for CEA planning asymptomatic ICA stenosis - plan for TTE and nuclear stress test -- time will allow as this is not urgent revascularization  Continue Plavix, Lipitor    Moustapha Prakash MD  2/20/2023    Subjective:  No acute events overnight  Has not had episode of vertigo yesterday/overnight  No aphasia, parasthesias, weakness  Vitals:  BP (!) 186/72 (BP Location: Right arm)   Pulse 70   Temp 98 3 °F (36 8 °C) (Temporal)   Resp 17   Ht 5' 6" (1 676 m)   Wt 45 kg (99 lb 3 3 oz)   SpO2 96%   BMI 16 01 kg/m²     I/Os:  I/O last 3 completed shifts: In: 480 [P O :480]  Out: 375 [Urine:375]  No intake/output data recorded      Lab Results and Cultures:   CBC with diff:   Lab Results   Component Value Date    WBC 9 91 02/19/2023    HGB 12 8 02/19/2023    HCT 39 1 02/19/2023    MCV 90 02/19/2023     02/19/2023    MCH 29 4 02/19/2023    MCHC 32 7 02/19/2023    RDW 12 8 02/19/2023    MPV 9 3 02/19/2023    NRBC 0 02/18/2023   ,   BMP/CMP:  Lab Results   Component Value Date    SODIUM 138 02/19/2023    K 3 9 02/19/2023     02/19/2023    CO2 28 02/19/2023    BUN 31 (H) 02/19/2023    CREATININE 1 59 (H) 02/19/2023    CALCIUM 9 1 02/19/2023    AST 18 02/18/2023    ALT 11 02/18/2023    ALKPHOS 98 02/18/2023    EGFR 40 02/19/2023   ,   Lipid Panel: No results found for: CHOL,   Coags:   Lab Results   Component Value Date    PTT 27 02/18/2023    INR 0 98 02/18/2023   ,     Blood Culture:   Lab Results   Component Value Date    BLOODCX No Growth After 5 Days  11/13/2022    BLOODCX No Growth After 5 Days   11/13/2022   ,   Urinalysis:   Lab Results   Component Value Date    COLORU Yellow 11/13/2022    CLARITYU Clear 11/13/2022    SPECGRAV 1 010 11/13/2022    PHUR 6 0 11/13/2022    LEUKOCYTESUR Negative 11/13/2022    NITRITE Negative 11/13/2022    GLUCOSEU Negative 11/13/2022    KETONESU Negative 11/13/2022    BILIRUBINUR Negative 11/13/2022    BLOODU Negative 11/13/2022   ,   Urine Culture: No results found for: URINECX,   Wound Culure: No results found for: WOUNDCULT    Medications:  Current Facility-Administered Medications   Medication Dose Route Frequency   • acetaminophen (TYLENOL) tablet 650 mg  650 mg Oral Q6H PRN   • albuterol (PROVENTIL HFA,VENTOLIN HFA) inhaler 2 puff  2 puff Inhalation Q6H PRN   • aluminum-magnesium hydroxide-simethicone (MYLANTA) oral suspension 30 mL  30 mL Oral Q6H PRN   • atorvastatin (LIPITOR) tablet 40 mg  40 mg Oral QPM   • carvedilol (COREG) tablet 3 125 mg  3 125 mg Oral BID With Meals   • clopidogrel (PLAVIX) tablet 75 mg  75 mg Oral Daily   • heparin (porcine) subcutaneous injection 5,000 Units  5,000 Units Subcutaneous Q8H [facility]   • hydrALAZINE (APRESOLINE) injection 5 mg  5 mg Intravenous Q6H PRN   • isosorbide dinitrate (ISORDIL) tablet 10 mg  10 mg Oral TID after meals   • nicotine (NICODERM CQ) 14 mg/24hr TD 24 hr patch 1 patch  1 patch Transdermal Daily   • ondansetron (ZOFRAN) injection 4 mg  4 mg Intravenous Q6H PRN   • zolpidem (AMBIEN) tablet 5 mg  5 mg Oral HS       Physical Exam:    General: No acute distress  CV: Normal rate  Respiratory: Non-labored breathing  Abdominal: Soft  Extremities: Warm  Neurologic: Alert, symmetric smile, no tongue deviation, no focal deficits

## 2023-02-20 NOTE — ASSESSMENT & PLAN NOTE
72-year-old male with history of hypertension, CKD, COPD  Presented on 2/18 with positionally induced room spinning dizziness; initial episode was brief with resolution  However he had transient recurrences when getting out of bed as well as driving with his son  MRI brain negative for posterior circulation infarct  Positionally induced symptoms argues for peripheral cause, however his CTA head/neck was significant for multifocal atherosclerotic disease including moderate to severe vertebral origin stenosis and moderate to severe left V4 stenosis      Plan:  -Stroke work-up was initiated:  -CTA head/neck with no acute intracranial pathology (old subcortical strokes noted); from vascular standpoint with notable abnormalities   -95% near occlusion of the left proximal subclavian   -Moderate to severe bilateral vertebral artery origin stenosis   -Moderate to severe left V4 segment stenosis   -Greater than 95 right cervical ICA stenosis  -Vascular surgery following for severe/critical right ICA stenosis (asymptomatic);   -Carotid Doppler pending   -Revascularization timing planning  -Continuing Plavix/statin  -No clear need for permissive hypertension, but would avoid hypotension to ensure adequate cerebral perfusion through critically stenosed vessels  -Hemoglobin A1c pending, lipid panel with LDL of 96  -neuro checks  -telemetry monitoring  -stroke education provided  -therapy evaluations ongoing

## 2023-02-20 NOTE — PROGRESS NOTES
[facility] 50  Progress Note [place]ty 1940, [de-identified] y o  male MRN: [id number]  Unit/Bed#: E4 -01 Encounter: [id number]  Primary Care Provider: Liseth Esposito DO   Date and time admitted to hospital: 2/18/2023  1:15 PM    * Episode of dizziness  Assessment & Plan  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, moderate to severe plaque stenosis of bilateral vertebral artery origins, left proximal V4 segment moderate to severe plaque stenosis, greater than 95% near occluded plaque stenosis proximal right cervical ICA  · MRI without evidence of acute CVA  · Aim for normotension  · Continue Plavix and statin  · Vascular surgery following for carotid stenosis-plan for eventual CEA-scheduled for March 22  · Inpatient cardiology evaluation for preoperative optimization  · Echo pending    Hypertensive urgency  Assessment & Plan  · Elevated BP on admission  · Slightly improved today although remains elevated at 165/71  · Likely cause of elevated BNP on admission at 437  · CXR without cardiopulmonary disease, patient euvolemic on exam, echocardiogram pending  · Telemetry NSR without events overnight    Severe protein-calorie malnutrition (HCC)  Assessment & Plan  Malnutrition Findings:     BMI Findings: Body mass index is 16 01 kg/m²       · Encourage three meals daily  · Appreciate nutrition recommendations    Stage 3b chronic kidney disease [facility])  Assessment & Plan  Lab Results   Component Value Date    EGFR 40 02/19/2023    EGFR 44 02/18/2023    EGFR 35 11/14/2022    CREATININE 1 59 (H) 02/19/2023    CREATININE 1 48 (H) 02/18/2023    CREATININE 1 75 (H) 11/14/2022     · Currently at baseline    Tobacco abuse  Assessment & Plan  · NRT  · Nicotine cessation education on discharge    [facility]  Assessment & Plan  · Albuterol inhaler as needed    Essential hypertension  Assessment & Plan  · Patient presented with hypertensive urgency  · Initially placed on stroke pathway and allowed permissive hypertension, now goal 130-160  · Started on Coreg 3 125 twice daily, will increase to 6 25 twice daily today  · Started on isosorbide dinitrate 10 mg 3 times daily  · Home Catapres and atenolol discontinued        VTE Pharmacologic Prophylaxis: VTE Score: 3 Moderate Risk (Score 3-4) - Pharmacological DVT Prophylaxis Ordered: heparin  Patient Centered Rounds: I performed bedside rounds with nursing staff today  Discussions with Specialists or Other Care Team Provider: Vascular surgery    Education and Discussions with Family / Patient: Updated  (son) at bedside  Total Time Spent on Date of Encounter in care of patient: 35 minutes This time was spent on one or more of the following: performing physical exam; counseling and coordination of care; obtaining or reviewing history; documenting in the medical record; reviewing/ordering tests, medications or procedures; communicating with other healthcare professionals and discussing with patient's family/caregivers  Current Length of Stay: 0 day(s)  Current Patient Status: Inpatient   Certification Statement: The patient will continue to require additional inpatient hospital stay due to Dizziness, work-up for carotid stenosis  Discharge Plan: Anticipate discharge in 24-48 hrs to home  Code Status: Level 1 - Full Code    Subjective:   Patient reports he is feeling well today  Denies any further episodes of dizziness or lightheadedness  Denies any chest pain or shortness of breath      Objective:     Vitals:   Temp (24hrs), Av 5 °F (36 9 °C), Min:97 9 °F (36 6 °C), Max:99 1 °F (37 3 °C)    Temp:  [97 9 °F (36 6 °C)-99 1 °F (37 3 °C)] 99 1 °F (37 3 °C)  HR:  [64-89] 85  Resp:  [17-18] 18  BP: (148-211)/(57-77) 165/71  SpO2:  [96 %-99 %] 97 %  Body mass index is 15 98 kg/m²  Input and Output Summary (last 24 hours): Intake/Output Summary (Last 24 hours) at 2/20/2023 1217  Last data filed at 2/20/2023 0526  Gross per 24 hour   Intake 240 ml   Output 150 ml   Net 90 ml       Physical Exam:   Physical Exam  Vitals reviewed  Constitutional:       General: He is not in acute distress  Appearance: He is underweight  HENT:      Head: Normocephalic and atraumatic  Eyes:      General: No scleral icterus  Conjunctiva/sclera: Conjunctivae normal    Cardiovascular:      Rate and Rhythm: Normal rate and regular rhythm  Pulmonary:      Effort: Pulmonary effort is normal  No respiratory distress  Breath sounds: Normal breath sounds  Abdominal:      General: Bowel sounds are normal  There is no distension  Palpations: Abdomen is soft  Tenderness: There is no abdominal tenderness  Musculoskeletal:      Cervical back: Neck supple  Right lower leg: No edema  Left lower leg: No edema  Skin:     General: Skin is warm and dry  Neurological:      Mental Status: He is alert and oriented to person, place, and time     Psychiatric:         Mood and Affect: Mood normal          Behavior: Behavior normal           Additional Data:     Labs:  Results from last 7 days   Lab Units 02/19/23  0526 02/18/23  1334   WBC Thousand/uL 9 91 10 73*   HEMOGLOBIN g/dL 12 8 14 3   HEMATOCRIT % 39 1 44 2   PLATELETS Thousands/uL 252 267   NEUTROS PCT %  --  69   LYMPHS PCT %  --  19   MONOS PCT %  --  7   EOS PCT %  --  3     Results from last 7 days   Lab Units 02/19/23  0526 02/18/23  1334   SODIUM mmol/L 138 137   POTASSIUM mmol/L 3 9 4 4   CHLORIDE mmol/L 105 104   CO2 mmol/L 28 26   BUN mg/dL 31* 27*   CREATININE mg/dL 1 59* 1 48*   ANION GAP mmol/L 5 7   CALCIUM mg/dL 9 1 9 6   ALBUMIN g/dL  --  4 2   TOTAL BILIRUBIN mg/dL  --  0 65   ALK PHOS U/L  --  98   ALT U/L  --  11   AST U/L  --  18   GLUCOSE RANDOM mg/dL 78 110 Results from last 7 days   Lab Units 02/18/23  1334   INR  0 98                   Lines/Drains:  Invasive Devices     Peripheral Intravenous Line  Duration           Peripheral IV 02/18/23 Left Antecubital 1 day                  Telemetry:  Telemetry Orders (From admission, onward)             48 Hour Telemetry Monitoring  (ED Bridging Orders Panel)  Continuous x 48 hours        Expiring   References:    Telemetry Guidelines   Question:  Reason for 48 Hour Telemetry  Answer:  Acute CVA (<24 hrs old, hemispheric strokes, selected brainstem strokes, cardiac arrhythmias)                 Telemetry Reviewed: Normal Sinus Rhythm  Indication for Continued Telemetry Use: No indication for continued use  Will discontinue  Imaging: No pertinent imaging reviewed  Recent Cultures (last 7 days):         Last 24 Hours Medication List:   Current Facility-Administered Medications   Medication Dose Route Frequency Provider Last Rate   • acetaminophen  650 mg Oral Q6H PRN Johanna Greenwood, DO     • albuterol  2 puff Inhalation Q6H PRN Johanna Greenwood, DO     • aluminum-magnesium hydroxide-simethicone  30 mL Oral Q6H PRN Johanna Greenwood, DO     • atorvastatin  40 mg Oral QPM Johanna Gerenwood DO     • carvedilol  3 125 mg Oral BID With Meals Tamica Laguerre MD     • clopidogrel  75 mg Oral Daily Myke Camara DO     • heparin (porcine)  5,000 Units Subcutaneous [facility] Johanna Greenwood, DO     • hydrALAZINE  5 mg Intravenous Q6H PRN Johanna Greenwood DO     • isosorbide dinitrate  10 mg Oral TID after meals Tamica Laguerre MD     • nicotine  1 patch Transdermal Daily Johanna Greenwood, DO     • ondansetron  4 mg Intravenous Q6H PRN Johanna Greenwood DO     • zolpidem  5 mg Oral HS Johanna Greenwood DO          Today, Patient Was Seen By: Paige Watson PA-C    **Please Note: This note may have been constructed using a voice recognition system  **

## 2023-02-21 ENCOUNTER — APPOINTMENT (OUTPATIENT)
Dept: NUCLEAR MEDICINE | Facility: HOSPITAL | Age: 83
End: 2023-02-21

## 2023-02-21 ENCOUNTER — APPOINTMENT (INPATIENT)
Dept: NON INVASIVE DIAGNOSTICS | Facility: HOSPITAL | Age: 83
End: 2023-02-21

## 2023-02-21 LAB
MAX HR PERCENT: 83 %
MAX HR: 117 BPM
NUC STRESS EJECTION FRACTION: 57 %
RATE PRESSURE PRODUCT: NORMAL
SL CV REST NUCLEAR ISOTOPE DOSE: 10.3 MCI
SL CV STRESS NUCLEAR ISOTOPE DOSE: 32.6 MCI
SL CV STRESS RECOVERY BP: NORMAL MMHG
SL CV STRESS RECOVERY HR: 106 BPM
SL CV STRESS RECOVERY O2 SAT: 97 %
STRESS ANGINA INDEX: 0
STRESS BASELINE BP: NORMAL MMHG
STRESS BASELINE HR: 89 BPM
STRESS O2 SAT REST: 97 %
STRESS PEAK HR: 116 BPM
STRESS POST O2 SAT PEAK: 98 %
STRESS POST PEAK BP: 196 MMHG
STRESS/REST PERFUSION RATIO: 0.95

## 2023-02-21 RX ORDER — ISOSORBIDE DINITRATE 10 MG/1
10 TABLET ORAL 3 TIMES DAILY
Status: DISCONTINUED | OUTPATIENT
Start: 2023-02-21 | End: 2023-02-22 | Stop reason: HOSPADM

## 2023-02-21 RX ADMIN — HYDRALAZINE HYDROCHLORIDE 10 MG: 10 TABLET, FILM COATED ORAL at 21:10

## 2023-02-21 RX ADMIN — HEPARIN SODIUM 5000 UNITS: 5000 INJECTION INTRAVENOUS; SUBCUTANEOUS at 05:37

## 2023-02-21 RX ADMIN — HEPARIN SODIUM 5000 UNITS: 5000 INJECTION INTRAVENOUS; SUBCUTANEOUS at 15:28

## 2023-02-21 RX ADMIN — CARVEDILOL 6.25 MG: 6.25 TABLET, FILM COATED ORAL at 11:46

## 2023-02-21 RX ADMIN — REGADENOSON 0.4 MG: 0.08 INJECTION, SOLUTION INTRAVENOUS at 09:53

## 2023-02-21 RX ADMIN — HYDRALAZINE HYDROCHLORIDE 10 MG: 10 TABLET, FILM COATED ORAL at 05:37

## 2023-02-21 RX ADMIN — ATORVASTATIN CALCIUM 40 MG: 40 TABLET, FILM COATED ORAL at 18:13

## 2023-02-21 RX ADMIN — CLOPIDOGREL BISULFATE 75 MG: 75 TABLET ORAL at 11:46

## 2023-02-21 RX ADMIN — CARVEDILOL 6.25 MG: 6.25 TABLET, FILM COATED ORAL at 15:30

## 2023-02-21 RX ADMIN — ISOSORBIDE DINITRATE 10 MG: 10 TABLET ORAL at 15:26

## 2023-02-21 RX ADMIN — HYDRALAZINE HYDROCHLORIDE 10 MG: 10 TABLET, FILM COATED ORAL at 15:27

## 2023-02-21 RX ADMIN — ZOLPIDEM TARTRATE 5 MG: 5 TABLET, COATED ORAL at 21:11

## 2023-02-21 RX ADMIN — ISOSORBIDE DINITRATE 10 MG: 10 TABLET ORAL at 21:11

## 2023-02-21 RX ADMIN — HEPARIN SODIUM 5000 UNITS: 5000 INJECTION INTRAVENOUS; SUBCUTANEOUS at 21:11

## 2023-02-21 NOTE — ASSESSMENT & PLAN NOTE
Malnutrition Findings:     BMI Findings: Body mass index is 15 98 kg/m²       · Encourage three meals daily  · Appreciate nutrition recommendations

## 2023-02-21 NOTE — PROGRESS NOTES
Progress Note - Vascular Surgery   Romeo Almanzar [de-identified] y o  male MRN: [id number]  Unit/Bed#: E4 -01 Encounter: [id number]      Assessment:  82M with HTN, COPD, CKD, malnutrition, gout, OA, active smoker, presented with vertigo  CTA head and neck - reviewed with dense calcifications and high grade stenosis of bilateral ICA, significant stenosis of L SCA at origin and stenosis of origin of bilateral verts    MRI brain - no acute intracranial pathology    Carotid duplex    R  EDV 61 Ratio 5 91   L  EDV 43 Ratio 3 58    Plan:  Continue neurovascular checks  Cardiology for risk stratification for CEA planning asymptomatic ICA stenosis - nuclear stress test  Continue Plavix, Lipitor  Stable from vascular standpoint for discharge with outpatient follow up to further discuss CEA in a staged fashion (right then left)    Rashida Gomez MD  02/21/23      Subjective:  No acute events overnight  Ambulated around the room yesterday, no episodes of vertigo  Vitals:  BP (!) 193/88   Pulse 90   Temp 98 4 °F (36 9 °C) (Temporal)   Resp 18   Ht 5' 6" (1 676 m)   Wt 44 9 kg (99 lb)   SpO2 96%   BMI 15 98 kg/m²     I/Os:  I/O last 3 completed shifts:   In: 240 [P O :240]  Out: 150 [Urine:150]  I/O this shift:  In: 60 [P O :60]  Out: 525 [Urine:525]    Lab Results and Cultures:   CBC with diff:   Lab Results   Component Value Date    WBC 9 91 02/19/2023    HGB 12 8 02/19/2023    HCT 39 1 02/19/2023    MCV 90 02/19/2023     02/19/2023    MCH 29 4 02/19/2023    MCHC 32 7 02/19/2023    RDW 12 8 02/19/2023    MPV 9 3 02/19/2023    NRBC 0 02/18/2023   ,   BMP/CMP:  Lab Results   Component Value Date    SODIUM 138 02/19/2023    K 3 9 02/19/2023     02/19/2023    CO2 28 02/19/2023    BUN 31 (H) 02/19/2023    CREATININE 1 59 (H) 02/19/2023    CALCIUM 9 1 02/19/2023    AST 18 02/18/2023    ALT 11 02/18/2023    ALKPHOS 98 02/18/2023    EGFR 40 02/19/2023   ,   Lipid Panel: No results found for: CHOL, Coags:   Lab Results   Component Value Date    PTT 27 02/18/2023    INR 0 98 02/18/2023   ,     Blood Culture:   Lab Results   Component Value Date    BLOODCX No Growth After 5 Days  11/13/2022    BLOODCX No Growth After 5 Days   11/13/2022   ,   Urinalysis:   Lab Results   Component Value Date    COLORU Yellow 11/13/2022    CLARITYU Clear 11/13/2022    SPECGRAV 1 010 11/13/2022    PHUR 6 0 11/13/2022    LEUKOCYTESUR Negative 11/13/2022    NITRITE Negative 11/13/2022    GLUCOSEU Negative 11/13/2022    KETONESU Negative 11/13/2022    BILIRUBINUR Negative 11/13/2022    BLOODU Negative 11/13/2022   ,   Urine Culture: No results found for: URINECX,   Wound Culure: No results found for: WOUNDCULT    Medications:  Current Facility-Administered Medications   Medication Dose Route Frequency   • acetaminophen (TYLENOL) tablet 650 mg  650 mg Oral Q6H PRN   • albuterol (PROVENTIL HFA,VENTOLIN HFA) inhaler 2 puff  2 puff Inhalation Q6H PRN   • aluminum-magnesium hydroxide-simethicone (MYLANTA) oral suspension 30 mL  30 mL Oral Q6H PRN   • atorvastatin (LIPITOR) tablet 40 mg  40 mg Oral QPM   • carvedilol (COREG) tablet 6 25 mg  6 25 mg Oral BID With Meals   • clopidogrel (PLAVIX) tablet 75 mg  75 mg Oral Daily   • heparin (porcine) subcutaneous injection 5,000 Units  5,000 Units Subcutaneous Q8H [facility]   • hydrALAZINE (APRESOLINE) injection 10 mg  10 mg Intravenous Q6H PRN   • hydrALAZINE (APRESOLINE) tablet 10 mg  10 mg Oral Q8H [facility]   • nicotine (NICODERM CQ) 14 mg/24hr TD 24 hr patch 1 patch  1 patch Transdermal Daily   • ondansetron (ZOFRAN) injection 4 mg  4 mg Intravenous Q6H PRN   • zolpidem (AMBIEN) tablet 5 mg  5 mg Oral HS       Physical Exam:    General: No acute distress  CV: Normal rate  Respiratory: Non-labored breathing  Abdominal: Soft  Extremities: Warm  Neurologic: Alert, symmetric smile, no tongue deviation, no focal deficits

## 2023-02-21 NOTE — ASSESSMENT & PLAN NOTE
· Patient presented with hypertensive urgency, BP remains uncontrolled  · Initially placed on stroke pathway and allowed permissive hypertension, now goal 130-160  · Coreg was started and increased to 6 25 yesterday  · Started on isosorbide dinitrate 10 mg 3 times daily  · Hydralazine initiated yesterday  · Home Catapres and atenolol discontinued  · Continue additional monitoring overnight with plan for discharge if BP better controlled tomorrow

## 2023-02-21 NOTE — PLAN OF CARE
Problem: Potential for Falls  Goal: Patient will remain free of falls  Description: INTERVENTIONS:  - Educate patient/family on patient safety including physical limitations  - Instruct patient to call for assistance with activity   - Consult OT/PT to assist with strengthening/mobility   - Keep Call bell within reach  - Keep bed low and locked with side rails adjusted as appropriate  - Keep care items and personal belongings within reach  - Initiate and maintain comfort rounds  - Make Fall Risk Sign visible to staff  - Offer Toileting every 2 Hours, in advance of need  - Initiate/Maintain bed alarm  - Obtain necessary fall risk management equipment  - Apply yellow socks and bracelet for high fall risk patients  - Consider moving patient to room near nurses station  Outcome: Progressing     Problem: MOBILITY - ADULT  Goal: Maintain or return to baseline ADL function  Description: INTERVENTIONS:  -  Assess patient's ability to carry out ADLs; assess patient's baseline for ADL function and identify physical deficits which impact ability to perform ADLs (bathing, care of mouth/teeth, toileting, grooming, dressing, etc )  - Assess/evaluate cause of self-care deficits   - Assess range of motion  - Assess patient's mobility; develop plan if impaired  - Assess patient's need for assistive devices and provide as appropriate  - Encourage maximum independence but intervene and supervise when necessary  - Involve family in performance of ADLs  - Assess for home care needs following discharge   - Consider OT consult to assist with ADL evaluation and planning for discharge  - Provide patient education as appropriate  Outcome: Progressing  Goal: Maintains/Returns to pre admission functional level  Description: INTERVENTIONS:  - Perform BMAT or MOVE assessment daily    - Set and communicate daily mobility goal to care team and patient/family/caregiver     - Collaborate with rehabilitation services on mobility goals if consulted  - Perform Range of Motion 3 times a day  - Reposition patient every 2 hours  - Dangle patient 3 times a day  - Stand patient 3 times a day  - Ambulate patient 3 times a day  - Out of bed to chair 3 times a day   - Out of bed for meals 3 times a day  - Out of bed for toileting  - Record patient progress and toleration of activity level   Outcome: Progressing     Problem: Activity Intolerance/Impaired Mobility  Goal: Mobility/activity is maintained at optimum level for patient  Description: Interventions:  - Assess and monitor patient  barriers to mobility and need for assistive/adaptive devices  - Assess patient's emotional response to limitations  - Collaborate with interdisciplinary team and initiate plans and interventions as ordered  - Encourage independent activity per ability   - Maintain proper body alignment  - Perform active/passive rom as tolerated/ordered  - Plan activities to conserve energy   - Turn patient as appropriate  Outcome: Progressing     Problem: DISCHARGE PLANNING  Goal: Discharge to home or other facility with appropriate resources  Description: INTERVENTIONS:  - Identify barriers to discharge w/patient and caregiver  - Arrange for needed discharge resources and transportation as appropriate  - Identify discharge learning needs (meds, wound care, etc )  - Arrange for interpretive services to assist at discharge as needed  - Refer to Case Management Department for coordinating discharge planning if the patient needs post-hospital services based on physician/advanced practitioner order or complex needs related to functional status, cognitive ability, or social support system  Outcome: Progressing     Problem: Knowledge Deficit  Goal: Patient/family/caregiver demonstrates understanding of disease process, treatment plan, medications, and discharge instructions  Description: Complete learning assessment and assess knowledge base    Interventions:  - Provide teaching at level of understanding  - Provide teaching via preferred learning methods  Outcome: Progressing     Problem: Nutrition/Hydration-ADULT  Goal: Nutrient/Hydration intake appropriate for improving, restoring or maintaining nutritional needs  Description: Monitor and assess patient's nutrition/hydration status for malnutrition  Collaborate with interdisciplinary team and initiate plan and interventions as ordered  Monitor patient's weight and dietary intake as ordered or per policy  Utilize nutrition screening tool and intervene as necessary  Determine patient's food preferences and provide high-protein, high-caloric foods as appropriate       INTERVENTIONS:  - Monitor oral intake, urinary output, labs, and treatment plans  - Assess nutrition and hydration status and recommend course of action  - Evaluate amount of meals eaten  - Assist patient with eating if necessary   - Allow adequate time for meals  - Recommend/ encourage appropriate diets, oral nutritional supplements, and vitamin/mineral supplements  - Order, calculate, and assess calorie counts as needed  - Recommend, monitor, and adjust tube feedings and TPN/PPN based on assessed needs  - Assess need for intravenous fluids  - Provide specific nutrition/hydration education as appropriate  - Include patient/family/caregiver in decisions related to nutrition  Outcome: Progressing

## 2023-02-21 NOTE — PROGRESS NOTES
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, moderate to severe plaque stenosis of bilateral vertebral artery origins, left proximal V4 segment moderate to severe plaque stenosis, greater than 95% near occluded plaque stenosis proximal right cervical ICA  · MRI without evidence of acute CVA  · Aim for normotension  · Continue Plavix and statin  · Vascular surgery following for carotid stenosis-plan for eventual CEA-scheduled for March 22  · Inpatient cardiology evaluation for preoperative optimization  · Echocardiogram completed yesterday showing EF of 65% and grade 1 diastolic dysfunction  · Nuclear medicine stress test completed today with small fixed infarct without ischemia  · Plan for discharge tomorrow if BP better controlled    Severe protein-calorie malnutrition ([address] )  Assessment & Plan  Malnutrition Findings:     BMI Findings: Body mass index is 15 98 kg/m²       · Encourage three meals daily  · Appreciate nutrition recommendations    Stage 3b chronic kidney disease [facility])  Assessment & Plan  Lab Results   Component Value Date    EGFR 40 02/19/2023    EGFR 44 02/18/2023    EGFR 35 11/14/2022    CREATININE 1 59 (H) 02/19/2023    CREATININE 1 48 (H) 02/18/2023    CREATININE 1 75 (H) 11/14/2022     · Currently at baseline    Tobacco abuse  Assessment & Plan  · NRT  · Nicotine cessation education on discharge    COPD (chronic obstructive pulmonary disease) ([facility])  Assessment & Plan  · Albuterol inhaler as needed    Essential hypertension  Assessment & Plan  · Patient presented with hypertensive urgency, BP remains uncontrolled  · Initially placed on stroke pathway and allowed permissive hypertension, now goal 130-160  · Coreg was started and increased to 6 25 yesterday  · Started on isosorbide dinitrate 10 mg 3 times daily  · Hydralazine initiated yesterday  · Home Catapres and atenolol discontinued  · Continue additional monitoring overnight with plan for discharge if BP better controlled tomorrow          VTE Pharmacologic Prophylaxis: VTE Score: 3 Moderate Risk (Score 3-4) - Pharmacological DVT Prophylaxis Ordered: heparin  Patient Centered Rounds: I performed bedside rounds with nursing staff today  Discussions with Specialists or Other Care Team Provider: Cardiology    Education and Discussions with Family / Patient: Updated  (son) at bedside  Total Time Spent on Date of Encounter in care of patient: 35 minutes This time was spent on one or more of the following: performing physical exam; counseling and coordination of care; obtaining or reviewing history; documenting in the medical record; reviewing/ordering tests, medications or procedures; communicating with other healthcare professionals and discussing with patient's family/caregivers  Current Length of Stay: 1 day(s)  Current Patient Status: Inpatient   Certification Statement: The patient will continue to require additional inpatient hospital stay due to Hypertensive urgency requiring close BP monitoring and medication titration  Discharge Plan: Anticipate discharge tomorrow to home  Code Status: Level 1 - Full Code    Subjective:   Patient seen and examined at bedside  Notes he is feeling well  He is concerned about his elevated blood pressure but denies any lightheadedness, dizziness, headaches      Objective:     Vitals:   Temp (24hrs), Av 6 °F (37 °C), Min:98 4 °F (36 9 °C), Max:98 8 °F (37 1 °C)    Temp:  [98 4 °F (36 9 °C)-98 8 °F (37 1 °C)] 98 8 °F (37 1 °C)  HR:  [73-90] 76  Resp:  [18] 18  BP: (154-209)/(71-93) 161/93  SpO2:  [93 %-98 %] 93 %  Body mass index is 15 98 kg/m²  Input and Output Summary (last 24 hours): Intake/Output Summary (Last 24 hours) at 2/21/2023 1335  Last data filed at 2/21/2023 0539  Gross per 24 hour   Intake 60 ml   Output 525 ml   Net -465 ml       Physical Exam:   Physical Exam  Vitals reviewed  Constitutional:       General: He is not in acute distress  Appearance: He is underweight  HENT:      Head: Normocephalic and atraumatic  Eyes:      General: No scleral icterus  Conjunctiva/sclera: Conjunctivae normal    Cardiovascular:      Rate and Rhythm: Normal rate and regular rhythm  Heart sounds: No murmur heard  Pulmonary:      Effort: Pulmonary effort is normal  No respiratory distress  Breath sounds: Normal breath sounds  Abdominal:      General: Bowel sounds are normal  There is no distension  Palpations: Abdomen is soft  Tenderness: There is no abdominal tenderness  Musculoskeletal:      Cervical back: Neck supple  Right lower leg: No edema  Left lower leg: No edema  Skin:     General: Skin is warm and dry  Neurological:      Mental Status: He is alert and oriented to person, place, and time     Psychiatric:         Mood and Affect: Mood normal          Behavior: Behavior normal           Additional Data:     Labs:  Results from last 7 days   Lab Units 02/19/23  0526 02/18/23  1334   WBC Thousand/uL 9 91 10 73*   HEMOGLOBIN g/dL 12 8 14 3   HEMATOCRIT % 39 1 44 2   PLATELETS Thousands/uL 252 267   NEUTROS PCT %  --  69   LYMPHS PCT %  --  19   MONOS PCT %  --  7   EOS PCT %  --  3     Results from last 7 days   Lab Units 02/19/23  0526 02/18/23  1334   SODIUM mmol/L 138 137   POTASSIUM mmol/L 3 9 4 4   CHLORIDE mmol/L 105 104   CO2 mmol/L 28 26   BUN mg/dL 31* 27*   CREATININE mg/dL 1 59* 1 48*   ANION GAP mmol/L 5 7   CALCIUM mg/dL 9 1 9 6   ALBUMIN g/dL --  4 2   TOTAL BILIRUBIN mg/dL  --  0 65   ALK PHOS U/L  --  98   ALT U/L  --  11   AST U/L  --  18   GLUCOSE RANDOM mg/dL 78 110     Results from last 7 days   Lab Units 02/18/23  1334   INR  0 98                   Lines/Drains:  Invasive Devices     Peripheral Intravenous Line  Duration           Peripheral IV 02/18/23 Left Antecubital 3 days                      Imaging: No pertinent imaging reviewed  Recent Cultures (last 7 days):         Last 24 Hours Medication List:   Current Facility-Administered Medications   Medication Dose Route Frequency Provider Last Rate   • acetaminophen  650 mg Oral Q6H PRN Davion Pulling, DO     • albuterol  2 puff Inhalation Q6H PRN Davion Pulling, DO     • aluminum-magnesium hydroxide-simethicone  30 mL Oral Q6H PRN Davion Pulling, DO     • atorvastatin  40 mg Oral QPM Davion Pulling, DO     • carvedilol  6 25 mg Oral BID With Meals Mimi Huertas PA-C     • clopidogrel  75 mg Oral Daily Myke Camara, DO     • heparin (porcine)  5,000 Units Subcutaneous [facility] Davion Pulling, DO     • hydrALAZINE  10 mg Intravenous Q6H PRN Oneida Dyson PA-C     • hydrALAZINE  10 mg Oral [facility] rFancis Molina PA-C     • isosorbide dinitrate  10 mg Oral TID Francis Molina PA-C     • nicotine  1 patch Transdermal Daily Davion Pulling, DO     • ondansetron  4 mg Intravenous Q6H PRN Davion Pulling, DO     • zolpidem  5 mg Oral HS Davion Pulling, DO          Today, Patient Was Seen By: Gustavo Lacey PA-C    **Please Note: This note may have been constructed using a voice recognition system  **

## 2023-02-21 NOTE — APP STUDENT NOTE
CANDE STUDENT  Inpatient Progress Note for TRAINING ONLY  Not Part of Legal Medical Record       Progress Note - Crow Clifford [de-identified] y o  male MRN: [id number]    Unit/Bed#: E4 -01 Encounter: [id number]      Assessment:  1  Dizziness  2  Hypertensive urgency  3  CKD Stage 3  4  COPD  5  Malnutrition  6  Tobacco abuse     Plan:  1  Patient presented to the ED with one episode of dizziness with head movement and light headedness  He denies any further episodes since his hospital stay  CTA head and neck showed old infarcts and greater than 95% occlusion of the left proximal subclavian and the proximal right cervical ICA, with moderate to severe plaque stenosis in the b/l vertebral arteries and left proximal V4 segment  MRI brain showed chronic microangiopathy with no acute pathology  VAS carotid showed >70% stenosis of the bilateral internal carotid arteries with calcific plaques  Echo showed EF 65% with LVH, grade 1 diastolic dysfunction, aortic valve sclerosis/regurg, mitral valve sclerosis, and tricuspid valve regurg  Stress test today showed a post-stress EF of 57% with abnormal left ventricular perfusion and basal septal infarct without cristin-infarct ischemia  Continue plavix and statin with plan for CEA on 3/22/23  Appreciate cardiology input  2  Blood pressured elevated on admission with consistent elevations  Currently mildly improve, but still elevated at 161/93, possibly due to the holding of isordil for the stress test  Chest XR showed no cardiopulmonary disease  Echo results detailed above  Continue hydralazine, isordil, and carvedilol and continue to monitor  Continue telemetry  Appreciate cardiology input  3  Current GFR 40, at baseline, current creatinine 1 59, at baseline  Continue to monitor  4  Currently without symptoms  Continue PRN albuterol inhaler  5  Patient cachectic with BMI 15 98  Appreciate nutrition input  6  Continue nicotine patch      Subjective:   Patient lying comfortably in bed in NAD, just returned from stress test  He reports he felt short of breath and nauseous after the stress test causing him to refuse his lunch, but these have both resolved and he feels better  Otherwise he has been tolerating his diet  He has been able to ambulate without dizziness or SOB  Denies chest pain, fatigue, headaches, vision changes, V/D  No acute events overnight  Objective:     Vitals: Blood pressure 161/93, pulse 76, temperature 98 8 °F (37 1 °C), temperature source Temporal, resp  rate 18, height 5' 6" (1 676 m), weight 44 9 kg (99 lb), SpO2 93 %  ,Body mass index is 15 98 kg/m²  Intake/Output Summary (Last 24 hours) at 2/21/2023 1327  Last data filed at 2/21/2023 0539  Gross per 24 hour   Intake 60 ml   Output 525 ml   Net -465 ml       Physical Exam: General appearance: alert and oriented, in no acute distress and cachectic  Head: Normocephalic, without obvious abnormality, atraumatic  Throat: lips, mucosa, and tongue normal; teeth and gums normal  Lungs: clear to auscultation bilaterally  Heart: regular rate and rhythm, S1, S2 normal, no murmur, click, rub or gallop  Abdomen: soft, non-tender; bowel sounds normal; no masses,  no organomegaly  Extremities: extremities normal, warm and well-perfused; no cyanosis, clubbing, or edema  Skin: Skin color, texture, turgor normal  No rashes or lesions     Invasive Devices     Peripheral Intravenous Line  Duration           Peripheral IV 02/18/23 Left Antecubital 2 days                Lab, Imaging and other studies: I have personally reviewed pertinent reports      VTE Pharmacologic Prophylaxis: Heparin

## 2023-02-21 NOTE — ASSESSMENT & PLAN NOTE
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, moderate to severe plaque stenosis of bilateral vertebral artery origins, left proximal V4 segment moderate to severe plaque stenosis, greater than 95% near occluded plaque stenosis proximal right cervical ICA  · MRI without evidence of acute CVA  · Aim for normotension  · Continue Plavix and statin  · Vascular surgery following for carotid stenosis-plan for eventual CEA-scheduled for March 22  · Inpatient cardiology evaluation for preoperative optimization  · Echocardiogram completed yesterday showing EF of 65% and grade 1 diastolic dysfunction  · Nuclear medicine stress test completed today with small fixed infarct without ischemia  · Plan for discharge tomorrow if BP better controlled

## 2023-02-21 NOTE — PROGRESS NOTES
Progress Note - Cardiology   Kacie Pacheco [de-identified] y o  male MRN: [id number]  Unit/Bed#: E4 -01 Encounter: [id number]          Assessment / Plan  Dizziness and vertigo POA               --Possibly vertigo with neurology evaluation less suspicious of VBI, or relation to old stroke (no new stroke), or vascular stenoses of the head and neck              --MRI of the brain, 2/19/2023, chronic microangiopathy with no acute intracranial pathology  Hx previous basal ganglia lacunar stroke  Asymptomatic severe internal carotid & cervical artery stenosis              --CTA 2/18/2023:                           * Moderate - severe stenosis bilateral vertebral artery origins                          * >95% near occluded plaque stenosis proximal right cervical ICA,  Asymptomatic severe left subclavian artery stenosis              --CTA 2/18/2023: Near total occlusion (>95%) proximal subclavian stenosis  Hypertension with hypertensive urgency   --Amlodipine allergy - rash               --Uncontrolled hypertension POA - possibly with rebound after withholding clonidine  --Prehospital Rx: Atenolol 50 mg daily, clonidine 0 1 mg daily  ECG with signs of prior age-indeterminate anteroseptal infarct  --Lexiscan stress 2/21/2023: Small size, mild reduction in uptake in the basal inferoseptal wall that is fixed C/W basal septal infarct without signs of cristin-infarct ischemia  Echo 2/20/2023: Mild-mod concentric LVH, grade 1 DD, LVEF 65%, normal wall motion  NL RV size & function  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    Trace TR  CKD 3  Active tobacco abuse prior to admission, COPD without exacerbation              --Previous pack per day smoker has for about the last 3 years been smoking approximately 3 cigarettes/day  Protein calorie malnutrition: Current BMI 15 9    · Stress test today --small fixed defect of the basal inferoseptal wall with overall normal EF-57% without ischemia  · Based on available data patient likely poses not more than an intermediate risk for his planned vascular surgery ~~> no additional cardiac testing planned in advance of surgery  · Blood pressure trend presently remains overall suboptimal with some waxing and waning  · Coreg increased to 6 25 mg twice daily yesterday (not yet given this morning), initiated on 3 times daily hydralazine yesterday afternoon, with isosorbide now interrupted for stress test ~~> following stress test isosorbide will be resumed deferring any additional titration until tomorrow when we can better assess efficacy of this regimen  · If renal function remains stable can consider exchanging isosorbide + hydralazine combination with ACE or ARB (or giving in addition to isosorbide, hydralazine) with additional consideration to include a thiazide diuretic        Subjective:  Recently back from stress test   No current complaint    Overall feeling well    Vitals:  Vitals:    02/18/23 1318 02/20/23 1030   Weight: 45 kg (99 lb 3 3 oz) 44 9 kg (99 lb)   ,  Vitals:    02/20/23 2243 02/21/23 0002 02/21/23 0537 02/21/23 0740   BP: (!) 209/85 154/71 (!) 193/88 163/74   BP Location: Right arm Right arm  Right arm   Pulse: 78 87 90 76   Resp: 18   18   Temp: 98 4 °F (36 9 °C)   98 8 °F (37 1 °C)   TempSrc: Temporal   Temporal   SpO2: 97%  96% 93%   Weight:       Height:           Exam:  General:  Alert normally conversant and comfortable-appearing  Heart:  Regular with controlled rate and no pathologic murmur or rub  Lungs:  Clear throughout  Lower Limbs:  No edema             Medications:    Current Facility-Administered Medications:   •  acetaminophen (TYLENOL) tablet 650 mg, 650 mg, Oral, Q6H PRN, Pervis Scale, DO  •  albuterol (PROVENTIL HFA,VENTOLIN HFA) inhaler 2 puff, 2 puff, Inhalation, Q6H PRN, Pervis Scale, DO  •  aluminum-magnesium hydroxide-simethicone (MYLANTA) oral suspension 30 mL, 30 mL, Oral, Q6H PRN, Pervis Scale, DO  •  atorvastatin (LIPITOR) tablet 40 mg, 40 mg, Oral, QPM, Mayte Conch, DO, 40 mg at 02/20/23 1723  •  carvedilol (COREG) tablet 6 25 mg, 6 25 mg, Oral, BID With Meals, Silvina Wilder PA-C, 6 25 mg at 02/20/23 1615  •  clopidogrel (PLAVIX) tablet 75 mg, 75 mg, Oral, Daily, Myke Camara, DO, 75 mg at 02/20/23 7281  •  heparin (porcine) subcutaneous injection 5,000 Units, 5,000 Units, Subcutaneous, Q8H [address], Mayte Conch, DO, 5,000 Units at 02/21/23 8871  •  hydrALAZINE (APRESOLINE) injection 10 mg, 10 mg, Intravenous, Q6H PRN, Oneida Dyson PA-C, 10 mg at 02/20/23 2256  •  hydrALAZINE (APRESOLINE) tablet 10 mg, 10 mg, Oral, Q8H [address], Justin Leung PA-C, 10 mg at 02/21/23 2303  •  nicotine (NICODERM CQ) 14 mg/24hr TD 24 hr patch 1 patch, 1 patch, Transdermal, Daily, Mayte Conch, DO  •  ondansetron (ZOFRAN) injection 4 mg, 4 mg, Intravenous, Q6H PRN, Mayte Conch, DO  •  zolpidem (AMBIEN) tablet 5 mg, 5 mg, Oral, HS, Mayte Conch, DO, 5 mg at 02/20/23 2139      Labs/Data:        Results from last 7 days   Lab Units 02/19/23  0526 02/18/23  1334   WBC Thousand/uL 9 91 10 73*   HEMOGLOBIN g/dL 12 8 14 3   HEMATOCRIT % 39 1 44 2   PLATELETS Thousands/uL 252 267     Results from last 7 days   Lab Units 02/19/23  0526 02/18/23  1334   POTASSIUM mmol/L 3 9 4 4   CHLORIDE mmol/L 105 104   CO2 mmol/L 28 26   BUN mg/dL 31* 27*   CREATININE mg/dL 1 59* 1 48*

## 2023-02-22 VITALS
HEIGHT: 66 IN | WEIGHT: 99 LBS | DIASTOLIC BLOOD PRESSURE: 62 MMHG | BODY MASS INDEX: 15.91 KG/M2 | TEMPERATURE: 98.6 F | RESPIRATION RATE: 20 BRPM | HEART RATE: 81 BPM | SYSTOLIC BLOOD PRESSURE: 130 MMHG | OXYGEN SATURATION: 96 %

## 2023-02-22 LAB
CHEST PAIN STATEMENT: NORMAL
EST. AVERAGE GLUCOSE BLD GHB EST-MCNC: 108 MG/DL
HBA1C MFR BLD: 5.4 %
MAX DIASTOLIC BP: 76 MMHG
MAX HEART RATE: 117 BPM
MAX PREDICTED HEART RATE: 140 BPM
MAX. SYSTOLIC BP: 198 MMHG
PROTOCOL NAME: NORMAL
REASON FOR TERMINATION: NORMAL
TARGET HR FORMULA: NORMAL
TEST INDICATION: NORMAL
TIME IN EXERCISE PHASE: NORMAL

## 2023-02-22 RX ORDER — HYDRALAZINE HYDROCHLORIDE 10 MG/1
10 TABLET, FILM COATED ORAL EVERY 8 HOURS SCHEDULED
Qty: 90 TABLET | Refills: 2 | Status: SHIPPED | OUTPATIENT
Start: 2023-02-22

## 2023-02-22 RX ORDER — CARVEDILOL 6.25 MG/1
6.25 TABLET ORAL 2 TIMES DAILY WITH MEALS
Qty: 60 TABLET | Refills: 2 | Status: SHIPPED | OUTPATIENT
Start: 2023-02-22

## 2023-02-22 RX ORDER — PRAVASTATIN SODIUM 40 MG
40 TABLET ORAL DAILY
Qty: 30 TABLET | Refills: 0 | Status: SHIPPED | OUTPATIENT
Start: 2023-02-22

## 2023-02-22 RX ORDER — ISOSORBIDE DINITRATE 10 MG/1
10 TABLET ORAL 3 TIMES DAILY
Qty: 90 TABLET | Refills: 2 | Status: SHIPPED | OUTPATIENT
Start: 2023-02-22

## 2023-02-22 RX ORDER — CLOPIDOGREL BISULFATE 75 MG/1
75 TABLET ORAL DAILY
Qty: 90 TABLET | Refills: 0 | Status: SHIPPED | OUTPATIENT
Start: 2023-02-23

## 2023-02-22 RX ADMIN — ISOSORBIDE DINITRATE 10 MG: 10 TABLET ORAL at 08:14

## 2023-02-22 RX ADMIN — HEPARIN SODIUM 5000 UNITS: 5000 INJECTION INTRAVENOUS; SUBCUTANEOUS at 05:10

## 2023-02-22 RX ADMIN — HYDRALAZINE HYDROCHLORIDE 10 MG: 10 TABLET, FILM COATED ORAL at 05:10

## 2023-02-22 RX ADMIN — CARVEDILOL 6.25 MG: 6.25 TABLET, FILM COATED ORAL at 08:14

## 2023-02-22 RX ADMIN — CLOPIDOGREL BISULFATE 75 MG: 75 TABLET ORAL at 08:14

## 2023-02-22 NOTE — ASSESSMENT & PLAN NOTE
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, moderate to severe plaque stenosis of bilateral vertebral artery origins, left proximal V4 segment moderate to severe plaque stenosis, greater than 95% near occluded plaque stenosis proximal right cervical ICA  · MRI without evidence of acute CVA  · Aim for normotension  · Continue Plavix and statin  · Vascular surgery following for carotid stenosis-plan for eventual CEA-scheduled for March 22  · Echocardiogram completed yesterday showing EF of 65% and grade 1 diastolic dysfunction  · Nuclear medicine stress test completed today with small fixed infarct without ischemia  · Plan for discharge with outpatient follow-up with vascular surgery and cardiology

## 2023-02-22 NOTE — PROGRESS NOTES
Progress Note - Cardiology   Wing Chance [de-identified] y o  male MRN: [id number]  Unit/Bed#: E4 -01 Encounter: [id number]          Assessment / Plan  Dizziness and vertigo POA               --Possibly vertigo with neurology evaluation less suspicious of VBI, or relation to old stroke (no new stroke), or vascular stenoses of the head and neck              --MRI of the brain, 2/19/2023, chronic microangiopathy with no acute intracranial pathology  Hx previous basal ganglia lacunar stroke  Asymptomatic severe internal carotid & cervical artery stenosis              --CTA 2/18/2023:                           * Moderate - severe stenosis bilateral vertebral artery origins                          * >95% near occluded plaque stenosis proximal right cervical ICA,  Asymptomatic severe left subclavian artery stenosis              --CTA 2/18/2023: Near total occlusion (>95%) proximal subclavian stenosis  Hypertension with hypertensive urgency: Now improved              --Amlodipine allergy - rash               --Uncontrolled hypertension POA - possibly with rebound after withholding clonidine                --Prehospital Rx: Atenolol 50 mg daily, clonidine 0 1 mg daily   -- Current regimen: Carvedilol 6 25 mg twice daily, hydralazine 10 mg 3 times daily, isosorbide 10 mg 3 times daily  ECG with signs of prior age-indeterminate anteroseptal infarct  --Lexiscan stress 2/21/2023: Small size, mild reduction in uptake in the basal inferoseptal wall that is fixed C/W basal septal infarct without signs of cristin-infarct ischemia  Echo 2/20/2023: Mild-mod concentric LVH, grade 1 DD, LVEF 65%, normal wall motion  NL RV size & function  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    Trace TR  CKD 3  Active tobacco abuse prior to admission, COPD without exacerbation              --Previous pack per day smoker has for about the last 3 years been smoking approximately 3 cigarettes/day  Protein calorie malnutrition: Current BMI 15 9    No Labs today    · Spurious increase in BP this morning was subsequently normotensive values  · At this point would advise continuing current regimen of antihypertensive including carvedilol, hydralazine, and isosorbide as listed above  · Have asked the patient to try to monitor his blood pressure in the outpatient setting and keep a log that can be reviewed by his PCP or cardiologist for possible Rx titration  · As per prior notes, patient not more than intermediate risk for planned vascular surgery with no plans for additional testing at this point      Subjective:  No complaint  Overall comfortable including while ambulating short distances in the halls        Vitals:  Vitals:    02/20/23 1030 02/21/23 0740   Weight: 44 9 kg (99 lb) 44 9 kg (99 lb)   ,  Vitals:    02/22/23 0510 02/22/23 0750 02/22/23 0921 02/22/23 0948   BP: (!) 189/92 (!) 194/87 126/63 130/62   BP Location:  Right arm     Pulse:  87 101 81   Resp:  20     Temp:  98 6 °F (37 °C)     TempSrc:  Temporal     SpO2:  96%     Weight:       Height:           Exam:  General: Pleasant, normally conversant and comfortable appearing  Heart: Regular with controlled rate with no significant murmur  Lungs: Presently clear throughout   lower Limbs: No edema      Medications:    Current Facility-Administered Medications:   •  acetaminophen (TYLENOL) tablet 650 mg, 650 mg, Oral, Q6H PRN, Sharyle Denise, DO  •  albuterol (PROVENTIL HFA,VENTOLIN HFA) inhaler 2 puff, 2 puff, Inhalation, Q6H PRN, Sharyle Denise, DO  •  aluminum-magnesium hydroxide-simethicone (MYLANTA) oral suspension 30 mL, 30 mL, Oral, Q6H PRN, Sharyle Denise, DO  •  atorvastatin (LIPITOR) tablet 40 mg, 40 mg, Oral, QPM, Sharyle Denise, DO, 40 mg at 02/21/23 1813  •  carvedilol (COREG) tablet 6 25 mg, 6 25 mg, Oral, BID With Meals, Brando Somers PA-C, 6 25 mg at 02/22/23 3086  •  clopidogrel (PLAVIX) tablet 75 mg, 75 mg, Oral, Daily, Myke Camara DO, 75 mg at 02/22/23 9882  • heparin (porcine) subcutaneous injection 5,000 Units, 5,000 Units, Subcutaneous, Q8H [address], Arzella Cage, DO, 5,000 Units at 02/22/23 0510  •  hydrALAZINE (APRESOLINE) injection 10 mg, 10 mg, Intravenous, Q6H PRN, CAT Aviles-C, 10 mg at 02/20/23 2256  •  hydrALAZINE (APRESOLINE) tablet 10 mg, 10 mg, Oral, Q8H [address], WESLEY CarboneC, 10 mg at 02/22/23 0510  •  isosorbide dinitrate (ISORDIL) tablet 10 mg, 10 mg, Oral, TID, Saroj Travis PA-C, 10 mg at 02/22/23 8272  •  nicotine (NICODERM CQ) 14 mg/24hr TD 24 hr patch 1 patch, 1 patch, Transdermal, Daily, Sharlene Cage, DO  •  ondansetron [facility]) injection 4 mg, 4 mg, Intravenous, Q6H PRN, Arzella Cage, DO  •  zolpidem (AMBIEN) tablet 5 mg, 5 mg, Oral, HS, Armissy Cage, DO, 5 mg at 02/21/23 2111      Labs/Data:        Results from last 7 days   Lab Units 02/19/23  0526 02/18/23  1334   WBC Thousand/uL 9 91 10 73*   HEMOGLOBIN g/dL 12 8 14 3   HEMATOCRIT % 39 1 44 2   PLATELETS Thousands/uL 252 267     Results from last 7 days   Lab Units 02/19/23  0526 02/18/23  1334   POTASSIUM mmol/L 3 9 4 4   CHLORIDE mmol/L 105 104   CO2 mmol/L 28 26   BUN mg/dL 31* 27*   CREATININE mg/dL 1 59* 1 48*

## 2023-02-22 NOTE — DISCHARGE SUMMARY
[facility]  Discharge- Goyo Smith 1940, [de-identified] y o  male MRN: [id number]  Unit/Bed#: E4 -01 Encounter: [id number]  Primary Care Provider: Miya Plummer DO   Date and time admitted to hospital: 2/18/2023  1:15 PM    * Episode of dizziness  Assessment & Plan  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, moderate to severe plaque stenosis of bilateral vertebral artery origins, left proximal V4 segment moderate to severe plaque stenosis, greater than 95% near occluded plaque stenosis proximal right cervical ICA  · MRI without evidence of acute CVA  · Aim for normotension  · Continue Plavix and statin  · Vascular surgery following for carotid stenosis-plan for eventual CEA-scheduled for March 22  · Echocardiogram completed yesterday showing EF of 65% and grade 1 diastolic dysfunction  · Nuclear medicine stress test completed today with small fixed infarct without ischemia  · Plan for discharge with outpatient follow-up with vascular surgery and cardiology    Severe protein-calorie malnutrition ([address] )  Assessment & Plan  Malnutrition Findings:     BMI Findings: Body mass index is 15 98 kg/m²       · Encourage three meals daily  · Appreciate nutrition recommendations    Stage 3b chronic kidney disease [facility])  Assessment & Plan  Lab Results   Component Value Date    EGFR 40 02/19/2023    EGFR 44 02/18/2023    EGFR 35 11/14/2022    CREATININE 1 59 (H) 02/19/2023    CREATININE 1 48 (H) 02/18/2023    CREATININE 1 75 (H) 11/14/2022     · Currently at baseline    Tobacco abuse  Assessment & Plan  · NRT  · Nicotine cessation education on discharge    COPD (chronic obstructive pulmonary disease) ([facility])  Assessment & Plan  · Albuterol inhaler as needed    Essential hypertension  Assessment & Plan  · Patient presented with hypertensive urgency, BP uncontrolled during hospitalization requiring significant medication titration  · Plan for discharge today on Coreg 6 25 mg twice daily, hydralazine 10 mg 3 times daily, isosorbide 10 mg 3 times daily  · Home Catapres and atenolol discontinued  · Cardiology and PCP follow-up at time of discharge        Medical Problems     Resolved Problems  Date Reviewed: 2/22/2023   None       Discharging Physician / Practitioner: Alex Gonzalez PA-C  PCP: Michael Starr DO  Admission Date:   Admission Orders (From admission, onward)     Ordered        02/20/23 1131  Inpatient Admission  Once            02/18/23 1553  Place in Observation  Once                      Discharge Date: 02/22/23    Consultations During Hospital Stay:  · Neurology, cardiology, vascular surgery    Procedures Performed:   CTA head and neck with and without contrast    Result Date: 2/18/2023  Impression: No evidence of acute intracranial hemorrhage  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  Chronic microangiopathy changes  Greater than 95% near occluded left proximal subclavian plaque stenosis /near occlusion  Moderate to severe plaque stenosis bilateral vertebral artery origins  Left proximal V4 segment moderate to severe plaque stenosis  Greater than 95% near occluded plaque stenosis proximal right cervical ICA, vascular consult recommended  XR chest 1 view portable    Result Date: 2/18/2023  Impression: No acute cardiopulmonary disease  MRI brain wo contrast    Result Date: 2/19/2023  Impression: No acute intracranial pathology  Chronic microangiopathy  Significant Findings / Test Results:   · See above    Incidental Findings:   · See above  · I reviewed the above mentioned incidental findings with the patient and/or family and they expressed understanding  Test Results Pending at Discharge (will require follow up):    · None     Outpatient Tests Requested:  · Outpatient vascular surgery follow-up with plan for CEA  · Cardiology and PCP follow-up    Complications: None    Reason for Admission: Lightheadedness and dizziness    Hospital Course:   Neo Sanchez is a [de-identified] y o  male patient who originally presented to the hospital on 2/18/2023 due to lightheadedness and dizziness  He was placed on the stroke pathway and was seen in consult by neurology  His MRI was negative for acute pathology  His dizziness was thought to be due to a peripheral cause and did not recur during hospitalization  He was found to have significant vascular abnormalities including bilateral carotid stenosis  He was seen in consult by vascular surgery who recommended outpatient follow-up with plan for CEA  He was seen by cardiology for expedited preoperative clearance  He underwent echocardiogram as well as nuclear medicine stress test   He did have significant hypertension during hospitalization and medications were titrated  Please see above list of diagnoses and related plan for additional information  Condition at Discharge: stable    Discharge Day Visit / Exam:   Subjective: Patient seen and examined at bedside  Notes he is feeling okay today  Has not had any further episodes of lightheadedness or dizziness  Happy to hear his blood pressure is under better control  Vitals: Blood Pressure: 130/62 (02/22/23 0948)  Pulse: 81 (02/22/23 0948)  Temperature: 98 6 °F (37 °C) (02/22/23 0750)  Temp Source: Temporal (02/22/23 0750)  Respirations: 20 (02/22/23 0750)  Height: 5' 6" (167 6 cm) (02/21/23 0740)  Weight - Scale: 44 9 kg (99 lb) (02/21/23 0740)  SpO2: 96 % (02/22/23 0750)  Exam:   Physical Exam  Vitals reviewed  Constitutional:       General: He is not in acute distress  Appearance: He is cachectic  HENT:      Head: Normocephalic and atraumatic  Eyes:      General: No scleral icterus       Conjunctiva/sclera: Conjunctivae normal    Cardiovascular:      Rate and Rhythm: Normal rate and regular rhythm  Heart sounds: No murmur heard  Pulmonary:      Effort: Pulmonary effort is normal  No respiratory distress  Breath sounds: Normal breath sounds  Abdominal:      General: Bowel sounds are normal  There is no distension  Palpations: Abdomen is soft  Tenderness: There is no abdominal tenderness  Musculoskeletal:      Cervical back: Neck supple  Right lower leg: No edema  Left lower leg: No edema  Skin:     General: Skin is warm and dry  Neurological:      Mental Status: He is alert and oriented to person, place, and time  Psychiatric:         Mood and Affect: Mood normal          Behavior: Behavior normal           Discussion with Family: Updated  (son) at bedside  Discharge instructions/Information to patient and family:   See after visit summary for information provided to patient and family  Provisions for Follow-Up Care:  See after visit summary for information related to follow-up care and any pertinent home health orders  Disposition:   Home-recommendation was for home with home health but patient refusing, also refusing outpatient therapy evaluations    Planned Readmission: Plan for readmission for CEA to be scheduled by vascular surgery     Discharge Statement:  I spent 45 minutes discharging the patient  This time was spent on the day of discharge  I had direct contact with the patient on the day of discharge  Greater than 50% of the total time was spent examining patient, answering all patient questions, arranging and discussing plan of care with patient as well as directly providing post-discharge instructions  Additional time then spent on discharge activities  Discharge Medications:  See after visit summary for reconciled discharge medications provided to patient and/or family        **Please Note: This note may have been constructed using a voice recognition system**

## 2023-02-22 NOTE — PHYSICAL THERAPY NOTE
PHYSICAL THERAPY NOTE          Patient Name: Barbara Wolff  NCGLS'J Date: 2/22/2023 02/22/23 0845   Note Type   Note Type Treatment   Pain Assessment   Pain Assessment Tool 0-10   Pain Score No Pain   Restrictions/Precautions   Other Precautions Bed Alarm; Chair Alarm; Fall Risk;Hard of hearing   General   Chart Reviewed Yes   Cognition   Overall Cognitive Status WFL   Arousal/Participation Alert; Responsive; Cooperative   Attention Within functional limits   Orientation Level Oriented X4   Memory Within functional limits   Subjective   Subjective i can do steps I do it every day to get into my apt  Bed Mobility   Supine to Sit 7  Independent   Additional items HOB elevated   Sit to Supine 7  Independent   Additional items HOB elevated   Transfers   Sit to Stand 5  Supervision   Additional items Armrests; Increased time required;Verbal cues   Stand to Sit 5  Supervision   Additional items Verbal cues   Additional Comments i sit to stand I with fatigue supervision assist cuesf or hand placement with stand to sit transfers  Ambulation/Elevation   Gait pattern Forward Flexion; Excessively slow;Decreased toe off;Decreased heel strike  (dec push off and heel strike on R foot )   Gait Assistance 5  Supervision   Additional items Assist x 1;Verbal cues   Assistive Device Rolling walker   Distance 125' x1, 135' x1   Stair Management Assistance 5  Supervision   Additional items Assist x 1; Increased time required   Stair Management Technique One rail R;Foreward;Reciprocal;Nonreciprocal;With cane   Number of Stairs 16   Ambulation/Elevation Additional Comments reciprocal to ascend steps and non- reciprocal to descend steps   Balance   Static Sitting Normal   Dynamic Sitting Good   Static Standing Fair +   Dynamic Standing Fair   Ambulatory Fair   Endurance Deficit   Endurance Deficit Description fatigue, b/l leg fatigue and discomfort  spo2 on RA 97% hr 110bpm, connors   Activity Tolerance   Activity Tolerance Patient limited by fatigue   Exercises   Quad Sets Supine;10 reps;Bilateral   Hip Flexion Supine;10 reps;AROM; Bilateral  (SLR)   Ankle Pumps Supine;15 reps;AROM; Bilateral   Assessment   Prognosis Fair   Problem List Decreased strength;Decreased endurance; Impaired balance;Decreased mobility;Pain   Assessment Pt seen for PT treatment session this date with interventions consisting of bed mobility, transfer,gait and stair training and education provided as needed for safety and direction to improve functional mobility, safety awareness, and activity tolerance  Pt agreeable to PT treatment session upon arrival, pt found supine in bed  At end of session, pt left supine in bed with all needs in reach  In comparison to previous session, pt with improvements in ambulation distances and progression to stair climbing  pt progressed with ambulation distances to > household distances with use of Rw and supervision, no gross lob noted though pt demonstrates unsteadiness at times with increased lateral sway  pt  required one seated rest breaks due to fatigue and reports of b/l le fatigue/ achiness  connors noted hr 110 bpm and spo2 on RA 97%  pt progresed to stair climbing performing with useof R HR and SPC with supervision demonstrating safe stair climbing techniques  Increased fatigue and noted with increased distances and stair climbing  pt encouraged to ambulate 3x daiyl while here and to get up Q 2-3 hours to ambulate around his apt upon d/c to home  pt tolerated le arom exercises x 10- 15 reps  recommend continued use of Rw for improved endurance and balance    Continue to recommend Home PT with family support at time of d/c in order to maximize pt's functional independence and safety w/ mobility  Pt continues to be functioning below baseline level   PT will continue to see pt while here in order to address the deficits listed above and provide interventions consistent w/ POC in effort to achieve STGs  Goals   Patient Goals to be able to get around easily  STG Expiration Date 03/01/23   PT Treatment Day 1   Plan   Treatment/Interventions Functional transfer training;Elevations; Therapeutic exercise; Endurance training;Patient/family training;Equipment eval/education; Bed mobility;Gait training   Progress Progressing toward goals   PT Frequency 3-5x/wk   Recommendation   PT Discharge Recommendation Home with home health rehabilitation   Additional Comments pt will benefit from HHPT upon d/c for continued strengthening and endurance training  AM-PAC Basic Mobility Inpatient   Turning in Flat Bed Without Bedrails 4   Lying on Back to Sitting on Edge of Flat Bed Without Bedrails 4   Moving Bed to Chair 4   Standing Up From Chair Using Arms 4   Walk in Room 3   Climb 3-5 Stairs With Railing 3   Basic Mobility Inpatient Raw Score 22   Basic Mobility Standardized Score 47 4   Highest Level Of Mobility   JH-HLM Goal 7: Walk 25 feet or more   JH-HLM Achieved 7: Walk 25 feet or more   Education   Education Provided Mobility training;Assistive device   Patient Demonstrates acceptance/verbal understanding   End of Consult   Patient Position at End of Consult Supine;Bed/Chair alarm activated; All needs within reach   [place], [place]

## 2023-02-22 NOTE — CONSULTS
Physical medicine and rehabilitation consultation  Patient: Camila Shafer  : 1940  MRN [id number]    I reviewed the chart and the patient's functional status including the most recent physical therapy notes from  and the patient would most likely benefit from a home discharge with home health physical therapy to continue working on strength, endurance and also home safety recommendations  Plan for reevaluation as an outpatient for carotid endarterectomy later in March  The patient was not directly seen by myself but a comprehensive chart review was completed and a total of 15 minutes was spent reviewing the chart and completing this note      Shabana Ontiveros DO  Physical Medicine and [facility]

## 2023-02-22 NOTE — NURSING NOTE
Patient discharged with all personal belongings  Patients meds brought from home were left in hospital pharmacy  Voice message left on patients home answering machine to inform him that his medications are still at the hospital  Will keep meds in [address]  Patient and son had no further questions about AVS at this time

## 2023-02-22 NOTE — PLAN OF CARE
Problem: PHYSICAL THERAPY ADULT  Goal: Performs mobility at highest level of function for planned discharge setting  See evaluation for individualized goals  Description: Treatment/Interventions: Functional transfer training, Elevations, Therapeutic exercise, Endurance training, Patient/family training, Equipment eval/education, Bed mobility, Gait training  Equipment Recommended: Shivam Epstein (pt owns RW and SPC at home for use)       See flowsheet documentation for full assessment, interventions and recommendations  Outcome: Progressing  Note: Prognosis: Fair  Problem List: Decreased strength, Decreased endurance, Impaired balance, Decreased mobility, Pain  Assessment: Pt seen for PT treatment session this date with interventions consisting of bed mobility, transfer,gait and stair training and education provided as needed for safety and direction to improve functional mobility, safety awareness, and activity tolerance  Pt agreeable to PT treatment session upon arrival, pt found supine in bed  At end of session, pt left supine in bed with all needs in reach  In comparison to previous session, pt with improvements in ambulation distances and progression to stair climbing  pt progressed with ambulation distances to > household distances with use of Rw and supervision, no gross lob noted though pt demonstrates unsteadiness at times with increased lateral sway  pt  required one seated rest breaks due to fatigue and reports of b/l le fatigue/ achiness  connors noted hr 110 bpm and spo2 on RA 97%  pt progresed to stair climbing performing with useof R HR and SPC with supervision demonstrating safe stair climbing techniques  Increased fatigue and noted with increased distances and stair climbing  pt encouraged to ambulate 3x daiyl while here and to get up Q 2-3 hours to ambulate around his apt upon d/c to home  pt tolerated le arom exercises x 10- 15 reps    recommend continued use of Rw for improved endurance and balance    Continue to recommend Home PT with family support at time of d/c in order to maximize pt's functional independence and safety w/ mobility  Pt continues to be functioning below baseline level  PT will continue to see pt while here in order to address the deficits listed above and provide interventions consistent w/ POC in effort to achieve STGs  Barriers to Discharge: Decreased caregiver support, Inaccessible home environment (livs alone and [address])     PT Discharge Recommendation: Home with home health rehabilitation    See flowsheet documentation for full assessment

## 2023-02-22 NOTE — ASSESSMENT & PLAN NOTE
· Patient presented with hypertensive urgency, BP uncontrolled during hospitalization requiring significant medication titration  · Plan for discharge today on Coreg 6 25 mg twice daily, hydralazine 10 mg 3 times daily, isosorbide 10 mg 3 times daily  · Home Catapres and atenolol discontinued  · Cardiology and PCP follow-up at time of discharge

## 2023-02-23 ENCOUNTER — TRANSITIONAL CARE MANAGEMENT (OUTPATIENT)
Dept: FAMILY MEDICINE CLINIC | Facility: CLINIC | Age: 83
End: 2023-02-23

## 2023-02-23 NOTE — UTILIZATION REVIEW
NOTIFICATION OF ADMISSION DISCHARGE   This is a Notification of Discharge from [address]  Please be advised that this patient has been discharge from our facility  Below you will find the admission and discharge date and time including the patient’s disposition  UTILIZATION REVIEW CONTACT:  [place]  Utilization   Network Utilization Review Department  Phone: [phone] x carefully listen to the prompts  All voicemails are confidential   Email: [email or web address]     ADMISSION INFORMATION  PRESENTATION DATE: 2/18/2023  1:15 PM  OBERVATION ADMISSION DATE: INPATIENT ADMISSION DATE: 2/20/23 11:32 AM   DISCHARGE DATE: 2/22/2023 12:58 PM   DISPOSITION:Home/Self Care    IMPORTANT INFORMATION:  Send all requests for admission clinical reviews, approved or denied determinations and any other requests to dedicated fax number below belonging to the campus where the patient is receiving treatment   List of dedicated fax numbers:  [address] DENIALS (Administrative/Medical Necessity) [phone]   [address] (Maternity/NICU/Pediatrics) [phone]   [facility] [phone]   [facility] [phone]   [address] [phone]   220 [facility] [phone] 90 [facility] [phone]   [address] [place], [place] [address] 119 [phone]   [facility]  [phone]   4051 [facility] [phone]   412 [facility] [address] [facility] [phone]

## 2023-02-28 ENCOUNTER — TELEPHONE (OUTPATIENT)
Dept: NEUROLOGY | Facility: CLINIC | Age: 83
End: 2023-02-28

## 2023-02-28 NOTE — TELEPHONE ENCOUNTER
1ST ATTEMPT - Called patient and LVM to call back to schedule HFU  Left our number  HFU/SLA/TRANSIENT VERTIGO      NOTES: Will arrange for outpatient neurologic/neurovascular follow-up in 6 to 8 weeks (for scheduling team, will attending), no further inpatient neurologic testing is anticipated

## 2023-03-02 NOTE — TELEPHONE ENCOUNTER
2ND ATTEMPT - Called patient and LVM to call back to schedule HFU appt  Left our number  Letter sent

## 2023-03-03 DIAGNOSIS — G47.00 INSOMNIA, UNSPECIFIED TYPE: ICD-10-CM

## 2023-03-03 RX ORDER — ZOLPIDEM TARTRATE 5 MG/1
5 TABLET ORAL
Qty: 90 TABLET | Refills: 0 | Status: SHIPPED | OUTPATIENT
Start: 2023-03-03

## 2023-03-09 NOTE — TELEPHONE ENCOUNTER
SCHEDULED: Wed, 5/3/23 at 2:00pm w/ Dr Aries Molina in [facility]  Called patient's primary number and spoke with daughter, Nargis Nair  She accepted appt date/time/address

## 2023-03-22 ENCOUNTER — CONSULT (OUTPATIENT)
Dept: VASCULAR SURGERY | Facility: CLINIC | Age: 83
End: 2023-03-22

## 2023-03-22 VITALS
BODY MASS INDEX: 16.71 KG/M2 | WEIGHT: 104 LBS | DIASTOLIC BLOOD PRESSURE: 82 MMHG | HEART RATE: 80 BPM | HEIGHT: 66 IN | SYSTOLIC BLOOD PRESSURE: 140 MMHG

## 2023-03-22 DIAGNOSIS — I65.23 ASYMPTOMATIC CAROTID ARTERY STENOSIS, BILATERAL: ICD-10-CM

## 2023-03-22 DIAGNOSIS — I65.23 OCCLUSION AND STENOSIS OF BILATERAL CAROTID ARTERIES: ICD-10-CM

## 2023-03-22 DIAGNOSIS — J44.9 CHRONIC OBSTRUCTIVE PULMONARY DISEASE, UNSPECIFIED COPD TYPE (HCC): Primary | ICD-10-CM

## 2023-03-22 DIAGNOSIS — I10 ESSENTIAL HYPERTENSION: ICD-10-CM

## 2023-03-22 DIAGNOSIS — I25.10 CORONARY ARTERY DISEASE INVOLVING NATIVE HEART WITHOUT ANGINA PECTORIS, UNSPECIFIED VESSEL OR LESION TYPE: ICD-10-CM

## 2023-03-22 RX ORDER — CLOPIDOGREL BISULFATE 75 MG/1
75 TABLET ORAL DAILY
Qty: 90 TABLET | Refills: 3 | Status: SHIPPED | OUTPATIENT
Start: 2023-03-22

## 2023-03-22 RX ORDER — PRAVASTATIN SODIUM 40 MG
40 TABLET ORAL DAILY
Qty: 30 TABLET | Refills: 3 | Status: SHIPPED | OUTPATIENT
Start: 2023-03-22

## 2023-03-22 NOTE — ASSESSMENT & PLAN NOTE
46-year-old male with multiple comorbidities found to have bilateral carotid stenosis of greater than 70% during his work-up for his vertigo while hospitalized in February 2023  Patient without a history of TIA stroke or amaurosis fugax  Discussed with the patient and his family today pathophysiology of atherosclerotic disease as well as stroke risk in significant carotid disease  We discussed medical management as well as surgical options  After reviewing the patient's CTA he has significant tortuosity of the internal carotid artery as well as a significant calcium burden that I believe would make stenting difficult  For that reason I believe he would be a better candidate for open surgery  Unfortunately with open surgery there is a risk of MI  During the patient's hospitalization he had a nuclear stress test that did show ST depressions in the inferior leads and an echo that showed concentric hypertrophy consistent with diastolic heart failure     During surgery he would require intubation  He has a history of COPD with significant shortness of breath after walking a small distance  The patient is also been put on new medications since he last saw his primary care physician in October  Given the cardiac and pulmonary issues I would like him better optimized and seen by cardiologist and pulmonologist in the outpatient setting  I also highly recommend he sees his primary care doctor since there are new medications since his last visit in October  Overall there is no rush for surgery and we can continue with medical management using Plavix and statin as well as blood pressure control  We will have the patient return to me after he is seen his PCP and these consultants

## 2023-03-22 NOTE — PROGRESS NOTES
Assessment/Plan:    Asymptomatic carotid artery stenosis, bilateral  24-year-old male with multiple comorbidities found to have bilateral carotid stenosis of greater than 70% during his work-up for his vertigo while hospitalized in February 2023  Patient without a history of TIA stroke or amaurosis fugax  Discussed with the patient and his family today pathophysiology of atherosclerotic disease as well as stroke risk in significant carotid disease  We discussed medical management as well as surgical options  After reviewing the patient's CTA he has significant tortuosity of the internal carotid artery as well as a significant calcium burden that I believe would make stenting difficult  For that reason I believe he would be a better candidate for open surgery  Unfortunately with open surgery there is a risk of MI  During the patient's hospitalization he had a nuclear stress test that did show ST depressions in the inferior leads and an echo that showed concentric hypertrophy consistent with diastolic heart failure     During surgery he would require intubation  He has a history of COPD with significant shortness of breath after walking a small distance  The patient is also been put on new medications since he last saw his primary care physician in October  Given the cardiac and pulmonary issues I would like him better optimized and seen by cardiologist and pulmonologist in the outpatient setting  I also highly recommend he sees his primary care doctor since there are new medications since his last visit in October  Overall there is no rush for surgery and we can continue with medical management using Plavix and statin as well as blood pressure control  We will have the patient return to me after he is seen his PCP and these consultants         Diagnoses and all orders for this visit:    Chronic obstructive pulmonary disease, unspecified COPD type ([facility] [address] )  -     Ambulatory Referral to Pulmonology; Future    Occlusion and stenosis of bilateral carotid arteries  -     Ambulatory Referral to Vascular Surgery    Essential hypertension  -     pravastatin (PRAVACHOL) 40 mg tablet; Take 1 tablet (40 mg total) by mouth daily  -     clopidogrel (PLAVIX) 75 mg tablet; Take 1 tablet (75 mg total) by mouth daily    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, unspecified vessel or lesion type  -     Ambulatory Referral to Cardiology; Future    Asymptomatic carotid artery stenosis, bilateral        Subjective:      Patient ID: Jaylen Schooling is a [de-identified] y o  male  Pt is [address] f/u SLA 2/18/23-2/22/23 for dizziness  Pt had CV 2/20/23, MRI brain 2/19/23 and CTA head and neck 2/18/23  Pt is here to discuss CEA  Pt denies TIA stroke symptoms  Pt is a smoker  HPI  This is an 51-year-old male with a past medical history of COPD, hypertension, CKD stage III, heavy tobacco use for 66 years 1 pack/day, malnutrition presenting to the office after recent hospitalization for vertigo where they incidentally found bilateral carotid artery stenosis of greater than 70%  Patient has no history of TIA or strokelike symptoms  No history of amaurosis fugax  Patient is accompanied by his family  They discussed how he lives on the second floor and has significant weakness and shortness of breath after climbing a flight of stairs  The patient also has a significant weakness and some cramping in his legs after walking roughly 50 feet      Patient reports quitting smoking 1 week ago      The following portions of the patient's history were reviewed and updated as appropriate: allergies, current medications, past family history, past medical history, past social history, past surgical history and problem list     I have spent a total time of 45 minutes on 03/22/23 in caring for this patient including Diagnostic results, Prognosis, Risks and benefits of tx options, Instructions for management, Patient and family education, Importance of tx compliance, Risk factor reductions, Impressions, Counseling / Coordination of care, Documenting in the medical record, Reviewing / ordering tests, medicine, procedures   and Obtaining or reviewing history    Review of Systems   Constitutional: Negative  HENT: Negative  Eyes: Negative  Respiratory: Negative  Cardiovascular: Negative  Gastrointestinal: Negative  Endocrine: Negative  Genitourinary: Negative  Musculoskeletal: Negative  Skin: Negative  Allergic/Immunologic: Negative  Neurological: Negative  Hematological: Negative  Psychiatric/Behavioral: Negative  Objective:      /82 (BP Location: Left arm, Patient Position: Sitting, Cuff Size: Standard)   Pulse 80   Ht 5' 6" (1 676 m)   Wt 47 2 kg (104 lb)   BMI 16 79 kg/m²          Physical Exam  Constitutional:       Appearance: Normal appearance  HENT:      Head: Normocephalic and atraumatic  Eyes:      Extraocular Movements: Extraocular movements intact  Pupils: Pupils are equal, round, and reactive to light  Cardiovascular:      Rate and Rhythm: Normal rate and regular rhythm  Pulses:           Radial pulses are 2+ on the right side and 2+ on the left side  Dorsalis pedis pulses are detected w/ Doppler on the right side and detected w/ Doppler on the left side  Pulmonary:      Effort: Pulmonary effort is normal       Breath sounds: Normal breath sounds  Abdominal:      Palpations: Abdomen is soft  Tenderness: There is abdominal tenderness  Musculoskeletal:         General: Normal range of motion  Cervical back: Normal range of motion  Neurological:      General: No focal deficit present  Mental Status: He is oriented to person, place, and time     Psychiatric:         Mood and Affect: Mood normal

## 2023-03-23 ENCOUNTER — TELEPHONE (OUTPATIENT)
Dept: VASCULAR SURGERY | Facility: CLINIC | Age: 83
End: 2023-03-23

## 2023-03-23 DIAGNOSIS — I10 ESSENTIAL HYPERTENSION: ICD-10-CM

## 2023-03-23 RX ORDER — HYDRALAZINE HYDROCHLORIDE 10 MG/1
10 TABLET, FILM COATED ORAL EVERY 8 HOURS SCHEDULED
Qty: 90 TABLET | Refills: 2 | Status: SHIPPED | OUTPATIENT
Start: 2023-03-23

## 2023-03-23 RX ORDER — ISOSORBIDE DINITRATE 10 MG/1
10 TABLET ORAL 3 TIMES DAILY
Qty: 90 TABLET | Refills: 2 | Status: SHIPPED | OUTPATIENT
Start: 2023-03-23

## 2023-03-23 RX ORDER — CARVEDILOL 6.25 MG/1
6.25 TABLET ORAL 2 TIMES DAILY WITH MEALS
Qty: 60 TABLET | Refills: 2 | Status: SHIPPED | OUTPATIENT
Start: 2023-03-23

## 2023-03-23 NOTE — TELEPHONE ENCOUNTER
23    Re: Pulmonology  Clearance    Patient Name: James Hunt   Patient : 1940   Patient MRN: [id number]   Patient Phone: [phone]     PULMONOLOGY,    Dr Julio Mak MD is requesting clearance for above patient, prior to proceeding with carotid endarterectomy (CEA) / patch angioplasty   Patient's procedure will be scheduled at [place] [address] once clearance has been obtained  Patient will be given general anesthesia  We spoke with your office today regarding clearance request   PLEASE CALL PATIENT FOR APPOINTMENT  Please fax your recommendations, including any medication recommendations, to [phone], attention nursing  Or route your recommendations to Epic pool [facility] Clearance Pool [[id number]]  Please reach out with any questions or concerns      Sincerely,    [facility]

## 2023-03-23 NOTE — TELEPHONE ENCOUNTER
23    Re: Cardiology  Clearance    Patient Name: Madhuri Lott   Patient : 1940   Patient MRN: [id number]   Patient Phone: [phone]     Dr Gus Canales MD is requesting clearance for above patient, prior to proceeding with carotid endarterectomy (CEA) / patch angioplasty   Patient's procedure will be scheduled at [facility] once clearance has been obtained  Patient will be given general anesthesia  We spoke with your office today regarding clearance request   [facility]  Please fax your recommendations, including any medication recommendations, to [phone], attention nursing  Or route your recommendations to Epic pool [facility] Clearance Pool [[id number]]  Please reach out with any questions or concerns      Sincerely,    [facility]

## 2023-03-23 NOTE — TELEPHONE ENCOUNTER
Per Dr Montelongo [place] office note from 3/22/23:    Overall there is no rush for surgery and we can continue with medical management using Plavix and statin as well as blood pressure control      We will have the patient return to me after he is seen his PCP and these consultants

## 2023-03-23 NOTE — TELEPHONE ENCOUNTER
23    Re: Primary Care Clearance    Patient Name: La Vann   Patient : 1940   Patient MRN: [id number]   Patient Phone: [phone]     Dr Musa Ray MD is requesting clearance for above patient, prior to proceeding with carotid endarterectomy (CEA) / patch angioplasty   Patient's procedure will be scheduled at [place] [address] once clearance has been obtained  Patient will be given general anesthesia  We spoke with your office today regarding clearance request   PATIENT IS SCHEDULED 3/28/2023 PER PATRICIA  Please fax your recommendations, including any medication recommendations, to [phone], attention nursing  Or route your recommendations to Epic pool [facility] Clearance Pool [[id number]]  Please reach out with any questions or concerns      Sincerely,    [facility]

## 2023-03-28 ENCOUNTER — OFFICE VISIT (OUTPATIENT)
Dept: FAMILY MEDICINE CLINIC | Facility: CLINIC | Age: 83
End: 2023-03-28

## 2023-03-28 VITALS
HEIGHT: 66 IN | OXYGEN SATURATION: 98 % | HEART RATE: 46 BPM | BODY MASS INDEX: 16.78 KG/M2 | WEIGHT: 104.4 LBS | RESPIRATION RATE: 16 BRPM | SYSTOLIC BLOOD PRESSURE: 124 MMHG | DIASTOLIC BLOOD PRESSURE: 74 MMHG | TEMPERATURE: 96.3 F

## 2023-03-28 DIAGNOSIS — I10 ESSENTIAL HYPERTENSION: ICD-10-CM

## 2023-03-28 DIAGNOSIS — J44.9 CHRONIC OBSTRUCTIVE PULMONARY DISEASE, UNSPECIFIED COPD TYPE (HCC): ICD-10-CM

## 2023-03-28 DIAGNOSIS — N18.32 STAGE 3B CHRONIC KIDNEY DISEASE (HCC): Primary | ICD-10-CM

## 2023-03-28 DIAGNOSIS — I65.23 ASYMPTOMATIC CAROTID ARTERY STENOSIS, BILATERAL: ICD-10-CM

## 2023-03-28 DIAGNOSIS — Z23 ENCOUNTER FOR IMMUNIZATION: ICD-10-CM

## 2023-03-28 NOTE — PROGRESS NOTES
Name: Eli Contreras      : 1940      MRN: [id number]  Encounter Provider: Partha Fish DO  Encounter Date: 3/28/2023   Encounter department: [address] [place] [address]  Chief Complaint   Patient presents with   • [address] f/up for dizziness, high BP, SOB , med clearance CEA in the future by Dr Sugey Coto, also will be having cardiac and pulmonary clearance  Patient Instructions     Here for recheck and is stable and has known b/l Carotid artery stenosis >70%  Rec taking plavix and statin as directed and will f-up with Vascular and cardiology and pulmonary as directed  States he quit tobacco  Call if any problems and take all meds as directed and f-up in 6 months  Hospital discharge reviewed: * Episode of dizziness  Assessment & Plan  • Patient presents to the hospital with episode of vertigo that occurred while turning his head then persistent lightheadedness   • Patient remained symptom-free during hospitalization  • 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, moderate to severe plaque stenosis of bilateral vertebral artery origins, left proximal V4 segment moderate to severe plaque stenosis, greater than 95% near occluded plaque stenosis proximal right cervical ICA  • MRI without evidence of acute CVA  • Aim for normotension  • Continue Plavix and statin  • Vascular surgery following for carotid stenosis-plan for eventual CEA-scheduled for   • Echocardiogram completed yesterday showing EF of 65% and grade 1 diastolic dysfunction  • Nuclear medicine stress test completed today with small fixed infarct without ischemia  • Plan for discharge with outpatient follow-up with vascular surgery and cardiology     Severe protein-calorie malnutrition ([facility] [address] )  Assessment & Plan  Malnutrition Findings:      BMI Findings: Body mass index is 15 98 kg/m²        • Encourage three meals daily  • Appreciate nutrition recommendations     Stage 3b chronic kidney disease ([address] )  Assessment & Plan        Lab Results   Component Value Date     EGFR 40 02/19/2023     EGFR 44 02/18/2023     EGFR 35 11/14/2022     CREATININE 1 59 (H) 02/19/2023     CREATININE 1 48 (H) 02/18/2023     CREATININE 1 75 (H) 11/14/2022      • Currently at baseline     Tobacco abuse  Assessment & Plan  • NRT  • Nicotine cessation education on discharge     COPD (chronic obstructive pulmonary disease) (HCC)  Assessment & Plan  • Albuterol inhaler as needed     Essential hypertension  Assessment & Plan  • Patient presented with hypertensive urgency, BP uncontrolled during hospitalization requiring significant medication titration  • Plan for discharge today on Coreg 6 25 mg twice daily, hydralazine 10 mg 3 times daily, isosorbide 10 mg 3 times daily  • Home Catapres and atenolol discontinued  • Cardiology and PCP follow-up at time of discharge        Assessment & Plan     1  Stage 3b chronic kidney disease ([facility] [address] )    2  Encounter for immunization    3  Chronic obstructive pulmonary disease, unspecified COPD type ([facility] [address] )    4  Essential hypertension    5  Asymptomatic carotid artery stenosis, bilateral           Subjective      Follow-up [facility] f/up for dizziness, high BP, SOB , med clearance CEA in the future by Dr Zulay Thurston, also will be having cardiac and pulmonary clearance  )  Patient quit tobacco          Medical records reviewed:    Hospital Course:   Maureen De La Torre is a [de-identified] y o  male patient who originally presented to the hospital on 2/18/2023 due to lightheadedness and dizziness  He was placed on the stroke pathway and was seen in consult by neurology  His MRI was negative for acute pathology  His dizziness was thought to be due to a peripheral cause and did not recur during hospitalization  He was found to have significant vascular abnormalities including bilateral carotid stenosis  He was seen in consult by vascular surgery who recommended outpatient follow-up with plan for CEA    He was seen by cardiology for expedited "preoperative clearance  He underwent echocardiogram as well as nuclear medicine stress test   He did have significant hypertension during hospitalization and medications were titrated  Review of Systems   Constitutional: Negative  HENT: Negative  Eyes: Negative  Respiratory: Negative  Cardiovascular: Negative  Gastrointestinal: Negative  Endocrine: Negative  Genitourinary: Negative  Musculoskeletal: Negative  Skin: Negative  Allergic/Immunologic: Negative  Neurological: Positive for dizziness (much improved since discharge)  Hematological: Negative  Psychiatric/Behavioral: Negative  Current Outpatient Medications on File Prior to Visit   Medication Sig   • albuterol (PROVENTIL HFA,VENTOLIN HFA) 90 mcg/act inhaler Inhale 2 puffs every 6 (six) hours as needed for wheezing or shortness of breath   • carvedilol (COREG) 6 25 mg tablet Take 1 tablet (6 25 mg total) by mouth 2 (two) times a day with meals   • cholecalciferol (VITAMIN D3) 1,000 units tablet Take 1,000 Units by mouth daily   • clopidogrel (PLAVIX) 75 mg tablet Take 1 tablet (75 mg total) by mouth daily   • hydrALAZINE (APRESOLINE) 10 mg tablet Take 1 tablet (10 mg total) by mouth every 8 (eight) hours   • isosorbide dinitrate (ISORDIL) 10 mg tablet Take 1 tablet (10 mg total) by mouth 3 (three) times a day   • pravastatin (PRAVACHOL) 40 mg tablet Take 1 tablet (40 mg total) by mouth daily   • zolpidem (AMBIEN) 5 mg tablet Take 1 tablet (5 mg total) by mouth daily at bedtime as needed for sleep       Objective     /74 (BP Location: Left arm, Patient Position: Sitting, Cuff Size: Adult)   Pulse (!) 46   Temp (!) 96 3 °F (35 7 °C) (Temporal)   Resp 16   Ht 5' 6\" (1 676 m)   Wt 47 4 kg (104 lb 6 4 oz)   SpO2 98%   BMI 16 85 kg/m²     Physical Exam  Constitutional:       Appearance: Normal appearance  He is well-developed  HENT:      Head: Normocephalic and atraumatic        Right Ear: External ear " normal       Left Ear: External ear normal       Nose: Nose normal       Mouth/Throat:      Mouth: Mucous membranes are moist    Eyes:      Conjunctiva/sclera: Conjunctivae normal       Pupils: Pupils are equal, round, and reactive to light  Cardiovascular:      Rate and Rhythm: Normal rate and regular rhythm  Pulses: Normal pulses  Heart sounds: Normal heart sounds  Pulmonary:      Effort: Pulmonary effort is normal       Breath sounds: Normal breath sounds  Musculoskeletal:         General: Normal range of motion  Cervical back: Normal range of motion and neck supple  Skin:     General: Skin is warm and dry  Capillary Refill: Capillary refill takes less than 2 seconds  Neurological:      General: No focal deficit present  Mental Status: He is alert and oriented to person, place, and time  Deep Tendon Reflexes: Reflexes are normal and symmetric  Psychiatric:         Mood and Affect: Mood normal          Behavior: Behavior normal          Thought Content:  Thought content normal          Judgment: Judgment normal        Serge Staff, DO

## 2023-03-28 NOTE — PATIENT INSTRUCTIONS
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, moderate to severe plaque stenosis of bilateral vertebral artery origins, left proximal V4 segment moderate to severe plaque stenosis, greater than 95% near occluded plaque stenosis proximal right cervical ICA  MRI without evidence of acute CVA  Aim for normotension  Continue Plavix and statin  Vascular surgery following for carotid stenosis-plan for eventual CEA-scheduled for March 22  Echocardiogram completed yesterday showing EF of 65% and grade 1 diastolic dysfunction  Nuclear medicine stress test completed today with small fixed infarct without ischemia  Plan for discharge with outpatient follow-up with vascular surgery and cardiology     Severe protein-calorie malnutrition ([address] )  Assessment & Plan  Malnutrition Findings:      BMI Findings: Body mass index is 15 98 kg/m²        Encourage three meals daily  Appreciate nutrition recommendations     Stage 3b chronic kidney disease [facility])  Assessment & Plan        Lab Results   Component Value Date     EGFR 40 02/19/2023     EGFR 44 02/18/2023     EGFR 35 11/14/2022     CREATININE 1 59 (H) 02/19/2023     CREATININE 1 48 (H) 02/18/2023     CREATININE 1 75 (H) 11/14/2022      Currently at baseline     Tobacco abuse  Assessment & Plan  NRT  Nicotine cessation education on discharge     COPD (chronic obstructive pulmonary disease) ([facility])  Assessment & Plan  Albuterol inhaler as needed     Essential hypertension  Assessment & Plan  Patient presented with hypertensive urgency, BP uncontrolled during hospitalization requiring significant medication titration  Plan for discharge today on Coreg 6 25 mg twice daily, hydralazine 10 mg 3 times daily, isosorbide 10 mg 3 times daily  Home Catapres and atenolol discontinued  Cardiology and PCP follow-up at time of discharge

## 2023-05-11 ENCOUNTER — HOSPITAL ENCOUNTER (EMERGENCY)
Facility: HOSPITAL | Age: 83
Discharge: HOME | End: 2023-05-11
Attending: EMERGENCY MEDICINE

## 2023-05-11 ENCOUNTER — APPOINTMENT (EMERGENCY)
Dept: RADIOLOGY | Facility: HOSPITAL | Age: 83
End: 2023-05-11

## 2023-05-11 VITALS
SYSTOLIC BLOOD PRESSURE: 190 MMHG | TEMPERATURE: 97.9 F | DIASTOLIC BLOOD PRESSURE: 70 MMHG | RESPIRATION RATE: 18 BRPM | HEART RATE: 90 BPM | OXYGEN SATURATION: 95 %

## 2023-05-11 DIAGNOSIS — M79.671 FOOT PAIN, RIGHT: Primary | ICD-10-CM

## 2023-05-11 DIAGNOSIS — I73.9 PVD (PERIPHERAL VASCULAR DISEASE) (HCC): ICD-10-CM

## 2023-05-11 DIAGNOSIS — M10.9 GOUT: ICD-10-CM

## 2023-05-11 DIAGNOSIS — I70.223 REST PAIN OF BOTH LOWER EXTREMITIES DUE TO ATHEROSCLEROSIS (HCC): ICD-10-CM

## 2023-05-11 LAB
ALBUMIN SERPL BCP-MCNC: 4.3 G/DL (ref 3.5–5)
ALP SERPL-CCNC: 100 U/L (ref 34–104)
ALT SERPL W P-5'-P-CCNC: 12 U/L (ref 7–52)
ANION GAP SERPL CALCULATED.3IONS-SCNC: 6 MMOL/L (ref 4–13)
AST SERPL W P-5'-P-CCNC: 17 U/L (ref 13–39)
BASOPHILS # BLD AUTO: 0.04 THOUSANDS/ÂΜL (ref 0–0.1)
BASOPHILS NFR BLD AUTO: 0 % (ref 0–1)
BILIRUB DIRECT SERPL-MCNC: 0.26 MG/DL (ref 0–0.2)
BILIRUB SERPL-MCNC: 0.92 MG/DL (ref 0.2–1)
BUN SERPL-MCNC: 29 MG/DL (ref 5–25)
CALCIUM SERPL-MCNC: 9.7 MG/DL (ref 8.4–10.2)
CHLORIDE SERPL-SCNC: 102 MMOL/L (ref 96–108)
CO2 SERPL-SCNC: 28 MMOL/L (ref 21–32)
CREAT SERPL-MCNC: 1.65 MG/DL (ref 0.6–1.3)
CRP SERPL HS-MCNC: 8.42 MG/L
EOSINOPHIL # BLD AUTO: 0.5 THOUSAND/ÂΜL (ref 0–0.61)
EOSINOPHIL NFR BLD AUTO: 5 % (ref 0–6)
ERYTHROCYTE [DISTWIDTH] IN BLOOD BY AUTOMATED COUNT: 12.3 % (ref 11.6–15.1)
ERYTHROCYTE [SEDIMENTATION RATE] IN BLOOD: 12 MM/HOUR (ref 0–19)
GFR SERPL CREATININE-BSD FRML MDRD: 38 ML/MIN/1.73SQ M
GLUCOSE SERPL-MCNC: 153 MG/DL (ref 65–140)
HCT VFR BLD AUTO: 37.9 % (ref 36.5–49.3)
HGB BLD-MCNC: 12.5 G/DL (ref 12–17)
IMM GRANULOCYTES # BLD AUTO: 0.04 THOUSAND/UL (ref 0–0.2)
IMM GRANULOCYTES NFR BLD AUTO: 0 % (ref 0–2)
LYMPHOCYTES # BLD AUTO: 1.39 THOUSANDS/ÂΜL (ref 0.6–4.47)
LYMPHOCYTES NFR BLD AUTO: 14 % (ref 14–44)
MAGNESIUM SERPL-MCNC: 1.8 MG/DL (ref 1.9–2.7)
MCH RBC QN AUTO: 29.5 PG (ref 26.8–34.3)
MCHC RBC AUTO-ENTMCNC: 33 G/DL (ref 31.4–37.4)
MCV RBC AUTO: 89 FL (ref 82–98)
MONOCYTES # BLD AUTO: 0.96 THOUSAND/ÂΜL (ref 0.17–1.22)
MONOCYTES NFR BLD AUTO: 10 % (ref 4–12)
NEUTROPHILS # BLD AUTO: 7.11 THOUSANDS/ÂΜL (ref 1.85–7.62)
NEUTS SEG NFR BLD AUTO: 71 % (ref 43–75)
NRBC BLD AUTO-RTO: 0 /100 WBCS
PLATELET # BLD AUTO: 254 THOUSANDS/UL (ref 149–390)
PMV BLD AUTO: 8.7 FL (ref 8.9–12.7)
POTASSIUM SERPL-SCNC: 4.1 MMOL/L (ref 3.5–5.3)
PROT SERPL-MCNC: 6.9 G/DL (ref 6.4–8.4)
RBC # BLD AUTO: 4.24 MILLION/UL (ref 3.88–5.62)
SODIUM SERPL-SCNC: 136 MMOL/L (ref 135–147)
WBC # BLD AUTO: 10.04 THOUSAND/UL (ref 4.31–10.16)

## 2023-05-11 RX ORDER — COLCHICINE 0.6 MG/1
0.6 TABLET ORAL ONCE
Status: COMPLETED | OUTPATIENT
Start: 2023-05-11 | End: 2023-05-11

## 2023-05-11 RX ORDER — COLCHICINE 0.6 MG/1
0.6 TABLET ORAL DAILY
Qty: 3 TABLET | Refills: 0 | Status: ON HOLD | OUTPATIENT
Start: 2023-05-11 | End: 2023-05-25 | Stop reason: CLARIF

## 2023-05-11 RX ADMIN — SODIUM CHLORIDE 1000 ML: 0.9 INJECTION, SOLUTION INTRAVENOUS at 09:51

## 2023-05-11 RX ADMIN — COLCHICINE 0.6 MG: 0.6 TABLET, FILM COATED ORAL at 10:48

## 2023-05-11 RX ADMIN — MORPHINE SULFATE 2 MG: 2 INJECTION, SOLUTION INTRAMUSCULAR; INTRAVENOUS at 09:49

## 2023-05-11 NOTE — ED PROVIDER NOTES
History  Chief Complaint   Patient presents with   • Foot Pain     Worsening right foot pain xweeks  No new trauma/injury  Believes to have some type of wound/ulcer  [de-identified] YO male presents with pain in the Right foot for the last 2 weeks  Patient states this has been constant, worsening  He has tried licocaine patches for the last 2 days without relief  Patient states he has a history of gout and pain does feel similar  He has noticed redness to the lateral aspect of the foot with an ulceration, this has been present for the last few days  Patient denies fevers or chills, no nausea  Pt denies CP/SOB/F/C/N/V/D/C, no dysuria, burning on urination or blood in urine  History provided by:  Patient   used: No        Prior to Admission Medications   Prescriptions Last Dose Informant Patient Reported? Taking? albuterol (PROVENTIL HFA,VENTOLIN HFA) 90 mcg/act inhaler   No No   Sig: Inhale 2 puffs every 6 (six) hours as needed for wheezing or shortness of breath   carvedilol (COREG) 6 25 mg tablet   No No   Sig: Take 1 tablet (6 25 mg total) by mouth 2 (two) times a day with meals   cholecalciferol (VITAMIN D3) 1,000 units tablet   Yes No   Sig: Take 1,000 Units by mouth daily   clopidogrel (PLAVIX) 75 mg tablet   No No   Sig: Take 1 tablet (75 mg total) by mouth daily   hydrALAZINE (APRESOLINE) 10 mg tablet   No No   Sig: Take 1 tablet (10 mg total) by mouth every 8 (eight) hours   isosorbide dinitrate (ISORDIL) 10 mg tablet   No No   Sig: Take 1 tablet (10 mg total) by mouth 3 (three) times a day   pravastatin (PRAVACHOL) 40 mg tablet   No No   Sig: Take 1 tablet (40 mg total) by mouth daily   zolpidem (AMBIEN) 5 mg tablet   No No   Sig: Take 1 tablet (5 mg total) by mouth daily at bedtime as needed for sleep      Facility-Administered Medications: None       Past Medical History:   Diagnosis Date   • Abnormal thyroid function test     non specified /  [place]    1/8/13     • Chronic kidney disease • COPD (chronic obstructive pulmonary disease) (HCC)    • 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 3/22/2023   • Eczema     LA   11/26/13      • Esophagitis    • Gout    • Hypertension    • Pyloric channel ulcer        Past Surgical History:   Procedure Laterality Date   • BACK SURGERY     • ESOPHAGOGASTRODUODENOSCOPY N/A 12/1/2016    Procedure: ESOPHAGOGASTRODUODENOSCOPY (EGD); Surgeon: Geoff Delgado MD;  Location: [facility]; Service:    • EYE SURGERY         Family History   Problem Relation Age of Onset   • Other Mother         Dec in her 66's (gangrene foot)   • Heart failure Mother    • Diabetes type II Mother    • Peripheral vascular disease Mother    • Rectal cancer Father    • Other Maternal Aunt    • Other Family         situs inversus / Dec 89 yo     I have reviewed and agree with the history as documented  E-Cigarette/Vaping   • E-Cigarette Use Never User      E-Cigarette/Vaping Substances   • Nicotine No    • THC No    • CBD No    • Flavoring No      Social History     Tobacco Use   • Smoking status: Every Day     Packs/day: 0 50     Years: 60 00     Pack years: 30 00     Types: Cigarettes   • Smokeless tobacco: Current   • Tobacco comments:     current some day smoker per [employer]   Vaping Use   • Vaping Use: Never used   Substance Use Topics   • Alcohol use: Not Currently   • Drug use: No       Review of Systems   Constitutional: Negative for fever  HENT: Negative for dental problem  Eyes: Negative for visual disturbance  Respiratory: Negative for shortness of breath  Cardiovascular: Negative for chest pain  Gastrointestinal: Negative for abdominal pain, nausea and vomiting  Genitourinary: Negative for dysuria and frequency  Musculoskeletal: Negative for neck pain and neck stiffness  Skin: Negative for rash  Neurological: Negative for dizziness, weakness and light-headedness     Psychiatric/Behavioral: Negative for agitation, behavioral problems and confusion  All other systems reviewed and are negative  Physical Exam  Physical Exam  Vitals and nursing note reviewed  Constitutional:       Appearance: He is well-developed  HENT:      Head: Normocephalic and atraumatic  Eyes:      Extraocular Movements: Extraocular movements intact  Pupils: Pupils are equal, round, and reactive to light  Cardiovascular:      Rate and Rhythm: Normal rate  Pulmonary:      Effort: Pulmonary effort is normal       Breath sounds: Normal breath sounds  Abdominal:      General: Abdomen is flat  There is no distension  Palpations: Abdomen is soft  Musculoskeletal:         General: Normal range of motion  Cervical back: Normal range of motion  Skin:     Findings: No rash  Comments: Redness and swelling to the lateral aspect of the Right foot, redness, skin breakdown  Neurological:      Mental Status: He is alert and oriented to person, place, and time     Psychiatric:         Behavior: Behavior normal          Vital Signs  ED Triage Vitals [05/11/23 0916]   Temperature Pulse Respirations Blood Pressure SpO2   97 9 °F (36 6 °C) 94 18 (!) 79/55 95 %      Temp src Heart Rate Source Patient Position - Orthostatic VS BP Location FiO2 (%)   -- -- Sitting Right arm --      Pain Score       --           Vitals:    05/11/23 0916   BP: (!) 79/55   Pulse: 94   Patient Position - Orthostatic VS: Sitting         Visual Acuity      ED Medications  Medications   sodium chloride 0 9 % bolus 1,000 mL (has no administration in time range)   morphine injection 2 mg (has no administration in time range)       Diagnostic Studies  Results Reviewed     Procedure Component Value Units Date/Time    CBC and differential [[id number]]     Lab Status: No result Specimen: Blood     Basic metabolic panel [[id number]]     Lab Status: No result Specimen: Blood     Hepatic function panel [[id number]]     Lab Status: No result Specimen: Blood     Magnesium [[id number]]     Lab Status: No result Specimen: Blood     Sedimentation rate, automated [[id number]]     Lab Status: No result Specimen: Blood     High sensitivity CRP [[id number]]     Lab Status: No result Specimen: Blood                  XR foot 3+ views RIGHT    (Results Pending)              Procedures  Procedures         ED Course  ED Course as of 05/11/23 1834   Thu May 11, 2023   0942 Blood Pressure(!): 79/55  Patient has no lightheadedness, states took morning medications, has not eaten  Will give fluids, recheck  Medical Decision Making  1  Foot pain - Patient with 2 weeks of Right foot pain, redness over the lateral aspect with skin breakdown  Will check ESR and CRP, CBC for leukocytosis, metabolic panel for electrolyte abnormalities and dehydration, will give fluids  Amount and/or Complexity of Data Reviewed  Labs: ordered  Radiology: ordered  Risk  Prescription drug management  Disposition  Final diagnoses:   None     ED Disposition     None      Follow-up Information    None         Patient's Medications   Discharge Prescriptions    No medications on file       No discharge procedures on file      PDMP Review       Value Time User    PDMP Reviewed  Yes 3/3/2023 11:16 AM Rossana Murphy DO          ED Provider  Electronically Signed by           Michell Schneider MD  05/11/23 6522

## 2023-05-11 NOTE — ED PROVIDER NOTES
History  Chief Complaint   Patient presents with   • Foot Pain     Worsening right foot pain xweeks  No new trauma/injury  Believes to have some type of wound/ulcer  Patient is an [de-identified] y/o male presenting for evaluation of right foot pain x2 weeks  He states that he has significant history of gout in his feet and states that he does have arthritis  He admits to pain in his legs with ambulation and states that he does feel burning in his feet at night  He gets relief from his pain with legs hanging in dependent position, and otherwise nothing offers much relief of pain in his lower extremities  He states that he does have follow up with vascular sugery and cardiology for blockages in bilateral carotid arteries  History provided by:  Patient      Prior to Admission Medications   Prescriptions Last Dose Informant Patient Reported? Taking? albuterol (PROVENTIL HFA,VENTOLIN HFA) 90 mcg/act inhaler   No No   Sig: Inhale 2 puffs every 6 (six) hours as needed for wheezing or shortness of breath   carvedilol (COREG) 6 25 mg tablet   No No   Sig: Take 1 tablet (6 25 mg total) by mouth 2 (two) times a day with meals   cholecalciferol (VITAMIN D3) 1,000 units tablet   Yes No   Sig: Take 1,000 Units by mouth daily   clopidogrel (PLAVIX) 75 mg tablet   No No   Sig: Take 1 tablet (75 mg total) by mouth daily   hydrALAZINE (APRESOLINE) 10 mg tablet   No No   Sig: Take 1 tablet (10 mg total) by mouth every 8 (eight) hours   isosorbide dinitrate (ISORDIL) 10 mg tablet   No No   Sig: Take 1 tablet (10 mg total) by mouth 3 (three) times a day   pravastatin (PRAVACHOL) 40 mg tablet   No No   Sig: Take 1 tablet (40 mg total) by mouth daily   zolpidem (AMBIEN) 5 mg tablet   No No   Sig: Take 1 tablet (5 mg total) by mouth daily at bedtime as needed for sleep      Facility-Administered Medications: None       Past Medical History:   Diagnosis Date   • Abnormal thyroid function test     non specified /  [place]    1/8/13     • Chronic kidney disease    • COPD (chronic obstructive pulmonary disease) ([facility])    • 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 3/22/2023   • Eczema     [place]   11/26/13      • Esophagitis    • Gout    • Hypertension    • Pyloric channel ulcer        Past Surgical History:   Procedure Laterality Date   • BACK SURGERY     • ESOPHAGOGASTRODUODENOSCOPY N/A 12/1/2016    Procedure: ESOPHAGOGASTRODUODENOSCOPY (EGD); Surgeon: Mehnaz Mensah MD;  Location: [facility]; Service:    • EYE SURGERY         Family History   Problem Relation Age of Onset   • Other Mother         Dec in her 66's (gangrene foot)   • Heart failure Mother    • Diabetes type II Mother    • Peripheral vascular disease Mother    • Rectal cancer Father    • Other Maternal Aunt    • Other Family         situs inversus / Dec 89 yo     I have reviewed and agree with the history as documented  E-Cigarette/Vaping   • E-Cigarette Use Never User      E-Cigarette/Vaping Substances   • Nicotine No    • THC No    • CBD No    • Flavoring No      Social History     Tobacco Use   • Smoking status: Every Day     Packs/day: 0 50     Years: 60 00     Pack years: 30 00     Types: Cigarettes   • Smokeless tobacco: Current   • Tobacco comments:     current some day smoker per [employer]   Vaping Use   • Vaping Use: Never used   Substance Use Topics   • Alcohol use: Not Currently   • Drug use: No        Review of Systems   Constitutional: Negative  HENT: Negative  Eyes: Negative  Respiratory: Negative  Cardiovascular:        Rest pain in bilateral lower extremities  Claudication symptoms with short distances  Endocrine: Negative  Musculoskeletal: Positive for arthralgias  Bilateral foot pain     Skin: Positive for wound  Patient admits to wound on lateral right foot   Neurological: Negative          Physical Exam  ED Triage Vitals   Temperature Pulse Respirations Blood Pressure SpO2   05/11/23 0916 05/11/23 0916 05/11/23 0916 05/11/23 0916 05/11/23 0916   97 9 °F (36 6 °C) 94 18 (!) 79/55 95 %      Temp src Heart Rate Source Patient Position - Orthostatic VS BP Location FiO2 (%)   -- -- 05/11/23 0916 05/11/23 0916 --     Sitting Right arm       Pain Score       05/11/23 0949       10 - Worst Possible Pain             Orthostatic Vital Signs  Vitals:    05/11/23 0916 05/11/23 0948   BP: (!) 79/55 170/92   Pulse: 94    Patient Position - Orthostatic VS: Sitting        Physical Exam  Vitals reviewed  Constitutional:       Comments: Patient appears underweight   HENT:      Head: Normocephalic and atraumatic  Cardiovascular:      Rate and Rhythm: Normal rate and regular rhythm  Comments: Pedal pulses are non-palpable bilaterally  DP is not detected on doppler bilaterally  PT is monophasic on doppler  Pulmonary:      Effort: Pulmonary effort is normal       Breath sounds: Normal breath sounds  Musculoskeletal:         General: Tenderness present  Right lower leg: No edema  Left lower leg: No edema  Comments: Bilateral lower extremity pain, R > L  Pain on palpation of right forefoot and lateral 5th metatarsal base  ROM is decreased in lower extremities due to patient discomfort   Skin:     Capillary Refill: Capillary refill takes more than 3 seconds  Comments: Skin is cool and pale in appearance  Stable eschar at lateral right foot overlying 5th metatarsal base   Neurological:      General: No focal deficit present  Mental Status: He is alert and oriented to person, place, and time           ED Medications  Medications   sodium chloride 0 9 % bolus 1,000 mL (1,000 mL Intravenous New Bag 5/11/23 0951)   colchicine (COLCRYS) tablet 0 6 mg (has no administration in time range)   morphine injection 2 mg (2 mg Intravenous Given 5/11/23 0949)       Diagnostic Studies  Results Reviewed     Procedure Component Value Units Date/Time    Sedimentation rate, automated [[id number]]  (Normal) Collected: 05/11/23 0940 Lab Status: Final result Specimen: Blood from Arm, Right Updated: 05/11/23 1012     Sed Rate 12 mm/hour     Basic metabolic panel [[id number]]  (Abnormal) Collected: 05/11/23 0940    Lab Status: Final result Specimen: Blood from Arm, Right Updated: 05/11/23 1004     Sodium 136 mmol/L      Potassium 4 1 mmol/L      Chloride 102 mmol/L      CO2 28 mmol/L      ANION GAP 6 mmol/L      BUN 29 mg/dL      Creatinine 1 65 mg/dL      Glucose 153 mg/dL      Calcium 9 7 mg/dL      eGFR 38 ml/min/1 73sq m     Narrative:      [place] guidelines for Chronic Kidney Disease (CKD):   •  Stage 1 with normal or high GFR (GFR > 90 mL/min/1 73 square meters)  •  Stage 2 Mild CKD (GFR = 60-89 mL/min/1 73 square meters)  •  Stage 3A Moderate CKD (GFR = 45-59 mL/min/1 73 square meters)  •  Stage 3B Moderate CKD (GFR = 30-44 mL/min/1 73 square meters)  •  Stage 4 Severe CKD (GFR = 15-29 mL/min/1 73 square meters)  •  Stage 5 End Stage CKD (GFR <15 mL/min/1 73 square meters)  Note: GFR calculation is accurate only with a steady state creatinine    Hepatic function panel [[id number]]  (Abnormal) Collected: 05/11/23 0940    Lab Status: Final result Specimen: Blood from Arm, Right Updated: 05/11/23 1004     Total Bilirubin 0 92 mg/dL      Bilirubin, Direct 0 26 mg/dL      Alkaline Phosphatase 100 U/L      AST 17 U/L      ALT 12 U/L      Total Protein 6 9 g/dL      Albumin 4 3 g/dL     Magnesium [[id number]]  (Abnormal) Collected: 05/11/23 0940    Lab Status: Final result Specimen: Blood from Arm, Right Updated: 05/11/23 1004     Magnesium 1 8 mg/dL     CBC and differential [[id number]]  (Abnormal) Collected: 05/11/23 0940    Lab Status: Final result Specimen: Blood from Arm, Right Updated: 05/11/23 0947     WBC 10 04 Thousand/uL      RBC 4 24 Million/uL      Hemoglobin 12 5 g/dL      Hematocrit 37 9 %      MCV 89 fL      MCH 29 5 pg      MCHC 33 0 g/dL      RDW 12 3 %      MPV 8 7 fL      Platelets 310 Thousands/uL nRBC 0 /100 WBCs      Neutrophils Relative 71 %      Immat GRANS % 0 %      Lymphocytes Relative 14 %      Monocytes Relative 10 %      Eosinophils Relative 5 %      Basophils Relative 0 %      Neutrophils Absolute 7 11 Thousands/µL      Immature Grans Absolute 0 04 Thousand/uL      Lymphocytes Absolute 1 39 Thousands/µL      Monocytes Absolute 0 96 Thousand/µL      Eosinophils Absolute 0 50 Thousand/µL      Basophils Absolute 0 04 Thousands/µL     High sensitivity CRP [[id number]] Collected: 05/11/23 0940    Lab Status: In process Specimen: Blood from Arm, Right Updated: 05/11/23 0944                 XR foot 3+ views RIGHT    (Results Pending)   VAS lower limb arterial duplex, complete bilateral    (Results Pending)         Procedures  Procedures      ED Course                                     Medical Decision Making  Patient stable for discharge from ED, recommend follow up with vascular surgery for suspected PVD with absent pedal pulses and rest pain/claudication symptoms  Lower extremity arterial duplex study ordered for outpatient, recommend patient has this completed prior to vascular appointment  Amount and/or Complexity of Data Reviewed  Labs: ordered  Radiology: ordered  Risk  Prescription drug management  Disposition  Final diagnoses:    Foot pain, right   Gout   Rest pain of both lower extremities due to atherosclerosis [facility])   PVD (peripheral vascular disease) ([facility] )     Time reflects when diagnosis was documented in both MDM as applicable and the Disposition within this note     Time User Action Codes Description Comment    5/11/2023 10:22 AM Bevelyn Daring Add [M42 552] Foot pain, right     5/11/2023 10:22 AM Bevelyn Daring Add [M10 9] Gout     5/11/2023 10:22 AM Bevelyn Daring Add [I70 223] Rest pain of both lower extremities due to atherosclerosis ([facility] )     5/11/2023 10:38 AM Bevelyn Daring Add [I73 9] PVD (peripheral vascular disease) [facility])       ED Disposition     ED Disposition Discharge    Condition   Stable    Date/Time   Thu May 11, 2023 10:22 AM    Comment   Woody Shallow discharge to home/self care  Follow-up Information     Follow up With Specialties Details Why Contact Info Additional Sharad Hawkins DO Family Medicine Schedule an appointment as soon as possible for a visit   [address]   [phone]       [facility] Schedule an appointment as soon as possible for a visit   [place] [id number] [phone] [address], [address] [place] [address], [place], [place], [address] [phone]          Patient's Medications   Discharge Prescriptions    COLCHICINE (COLCRYS) 0 6 MG TABLET    Take 1 tablet (0 6 mg total) by mouth daily for 3 days       Start Date: 5/11/2023 End Date: 5/14/2023       Order Dose: 0 6 mg       Quantity: 3 tablet    Refills: 0     Outpatient Discharge Orders   VAS lower limb arterial duplex, complete bilateral   Standing Status: Future Standing Exp  Date: 05/11/27       PDMP Review       Value Time User    PDMP Reviewed  Yes 3/3/2023 11:16 AM David Pepe DO           ED Provider  Attending physically available and evaluated Bong Holley I managed the patient along with the ED Attending      Electronically Signed by         Lazarus Gaudier, DPM  05/11/23 2018

## 2023-05-12 ENCOUNTER — VBI (OUTPATIENT)
Dept: ADMINISTRATIVE | Facility: OTHER | Age: 83
End: 2023-05-12

## 2023-05-12 NOTE — TELEPHONE ENCOUNTER
Pardeep Maria Esther    ED Visit Information     Ed visit date: 5/11/23  Diagnosis Description: Foot pain, right  In Network? Yes [facility]  Discharge status: Home  Discharged with meds ? Yes  Number of ED visits to date: 2  ED Severity:N/A     Outreach Information    Outreach successful: Yes 1  Date letter mailed:N/A  Date Finalized:5/12/23    Care Coordination    Follow up appointment with specialilty: yes 5/15/23   Transportation issues ? No    Value [employer] type: 7 Day Outreach  [facility] PCP: Yes  Transportation: Friend/Family Transport  Ambulance - Was Pt given choice of of ED: No  If able to choose an ED  Would you have chosen [facility]: Yes  Called PCP first?: No  Feels able to call PCP for urgent problems ?: Yes  Understands what emergencies can be handled by PCP ?: Yes  Ever any problems getting appointment with PCP for minor emergency/urgency problems?: No  Practice Contacted Patient ?: No  Pt had ED follow up with pcp/staff ?: No    Seen for follow-up out of network ?: No  Reason Patient went to ED instead of Urgent Care or PCP?: Perceived Severity of Illness  Urgent care Education?: Yes  05/12/2023 01:34 PM EDT by Phan Poole 05/12/2023 01:34 PM EDT by Phan Poole  [facility] [address] [facility] ([place] [address]) [phone] (GZGE)[phone] (Home) [phone] (Home) Remove - Call Complete    Spoke with patient daughter regarding most recent visit- states doing well but still having pain   Patient has a pulmonology appt scheduled Monday and stated will wait to schedule PCP next week

## 2023-05-15 ENCOUNTER — CONSULT (OUTPATIENT)
Dept: PULMONOLOGY | Facility: CLINIC | Age: 83
End: 2023-05-15

## 2023-05-15 VITALS
TEMPERATURE: 98.1 F | BODY MASS INDEX: 16.07 KG/M2 | HEIGHT: 66 IN | RESPIRATION RATE: 14 BRPM | SYSTOLIC BLOOD PRESSURE: 110 MMHG | WEIGHT: 100 LBS | OXYGEN SATURATION: 96 % | DIASTOLIC BLOOD PRESSURE: 74 MMHG | HEART RATE: 80 BPM

## 2023-05-15 DIAGNOSIS — Z01.811 PREOP PULMONARY/RESPIRATORY EXAM: ICD-10-CM

## 2023-05-15 DIAGNOSIS — E43 SEVERE PROTEIN-CALORIE MALNUTRITION (HCC): ICD-10-CM

## 2023-05-15 DIAGNOSIS — Z72.0 TOBACCO ABUSE: ICD-10-CM

## 2023-05-15 DIAGNOSIS — N18.32 STAGE 3B CHRONIC KIDNEY DISEASE (HCC): ICD-10-CM

## 2023-05-15 DIAGNOSIS — I73.9 PERIPHERAL ARTERIAL DISEASE (HCC): ICD-10-CM

## 2023-05-15 DIAGNOSIS — J44.9 CHRONIC OBSTRUCTIVE PULMONARY DISEASE, UNSPECIFIED COPD TYPE (HCC): Primary | ICD-10-CM

## 2023-05-15 DIAGNOSIS — I65.23 ASYMPTOMATIC CAROTID ARTERY STENOSIS, BILATERAL: ICD-10-CM

## 2023-05-15 DIAGNOSIS — J96.01 ACUTE RESPIRATORY FAILURE WITH HYPOXIA (HCC): ICD-10-CM

## 2023-05-15 PROBLEM — U07.1 COVID: Status: RESOLVED | Noted: 2022-11-13 | Resolved: 2023-05-15

## 2023-05-15 NOTE — ASSESSMENT & PLAN NOTE
The patient and family were concerned about the carotid stenosis bilaterally, he has more than 70% bilaterally, no symptoms currently, they would follow with vascular surgery and neurology

## 2023-05-15 NOTE — ASSESSMENT & PLAN NOTE
Malnutrition Findings: There is some temporal wasting and hand interosseous wasting    BMI Findings: Body mass index is 16 14 kg/m²  Encourage patient to increase calorie/protein intake and to exercise as tolerated  To consider nutritional consult

## 2023-05-15 NOTE — ASSESSMENT & PLAN NOTE
That was attributed to COVID-19 infection at that time but resolved and his x-rays and chest CT scan were clear so doubt COVID as a cause of his hypoxia at that time, suspect some mild vascular congestion due to diastolic CHF from hypertension  Regardless it is resolved

## 2023-05-15 NOTE — ASSESSMENT & PLAN NOTE
Patient has no significant pulmonary disease, even his COPD/emphysema is mild and minimally symptomatic  And his pulse ox is more than 96% and he is not on oxygen  His exam and imaging studies are clear  No current indication from pulmonary standpoint for the planned procedures specially if there is urgent given his vascular complaints in the lower extremities or to prevent stroke  In general he is at low risk for perioperative pulmonary complications  Patient needs to be monitored for any hypoxia and to use bronchodilators perioperatively and to encourage early ambulation and incentive spirometry  I explained to the family that the risk may arise from his low weight and body mass index and malnutrition more than his respiratory condition

## 2023-05-15 NOTE — ASSESSMENT & PLAN NOTE
Not sure if he has COPD although he had minimal emphysematous changes and his lungs appeared hyperinflated on chest x-ray and he has significant history of smoking  Symptomatically he has only mild dyspnea on exertion that improves quickly with rest and he does not feel much benefit of as needed albuterol  Since the patient is doing fine I did not feel the need to do PFTs  Continue as needed albuterol for now    If he gets worse in the future we can do PFTs or add empiric inhalers with [facility] or [facility] Yas Hauser

## 2023-05-15 NOTE — PROGRESS NOTES
Consultation - Pulmonary Medicine   Hung Camargo [de-identified] y o  male MRN: [id number]    Physician Requesting Consult: Referring: Maura Licona MD, PCP:   Reason for Consult: Emphysema, preop evaluation    COPD (chronic obstructive pulmonary disease) ([facility] )  Not sure if he has COPD although he had minimal emphysematous changes and his lungs appeared hyperinflated on chest x-ray and he has significant history of smoking  Symptomatically he has only mild dyspnea on exertion that improves quickly with rest and he does not feel much benefit of as needed albuterol  Since the patient is doing fine I did not feel the need to do PFTs  Continue as needed albuterol for now  If he gets worse in the future we can do PFTs or add empiric inhalers with LAMA or LABA /LAMA    Acute respiratory failure with hypoxia ([facility] )  That was attributed to COVID-19 infection at that time but resolved and his x-rays and chest CT scan were clear so doubt COVID as a cause of his hypoxia at that time, suspect some mild vascular congestion due to diastolic CHF from hypertension  Regardless it is resolved  Asymptomatic carotid artery stenosis, bilateral  The patient and family were concerned about the carotid stenosis bilaterally, he has more than 70% bilaterally, no symptoms currently, they would follow with vascular surgery and neurology  Tobacco abuse  Fortunately he quit recently  Severe protein-calorie malnutrition ([facility] )  Malnutrition Findings: There is some temporal wasting and hand interosseous wasting    BMI Findings: Body mass index is 16 14 kg/m²  Encourage patient to increase calorie/protein intake and to exercise as tolerated  To consider nutritional consult  Peripheral arterial disease ([facility] )  With claudication of lower extremities  Follow-up with vascular surgery for planned procedure      Preop pulmonary/respiratory exam  Patient has no significant pulmonary disease, even his COPD/emphysema is mild and minimally symptomatic  And his pulse ox is more than 96% and he is not on oxygen  His exam and imaging studies are clear  No current indication from pulmonary standpoint for the planned procedures specially if there is urgent given his vascular complaints in the lower extremities or to prevent stroke  In general he is at low risk for perioperative pulmonary complications  Patient needs to be monitored for any hypoxia and to use bronchodilators perioperatively and to encourage early ambulation and incentive spirometry  I explained to the family that the risk may arise from his low weight and body mass index and malnutrition more than his respiratory condition  ______________________________________________________________________    HPI:    Hung Camargo is a [de-identified] y o  male who presents for preop evaluation referred by his vascular surgeon     Patient has bilateral carotid stenosis and also lower extremity peripheral arterial disease with claudication  He is in the process of work-up to decide vascular procedures for both conditions  Given his history of possible emphysema and long-term smoking he was referred for pulmonary evaluation  Patient has been very mild dyspnea on exertion and he uses as needed albuterol but in general he feels albuterol does not help and has dyspnea on exertion is very short-lived and stops quickly with rest   He denies chest pain or wheezing or chronic cough or sputum production, denies history of exacerbation of his COPD but he was told before that he might have COPD/emphysema  He denies fever chills or night sweats  He was admitted to the hospital several months ago and found to have COVID-19 infection but his chest x-ray was clear although he required oxygen at that time probably for other reasons  Also he had admission due to hypotension    Patient currently denies dysphagia but in the past he had dysphagia with the need to do esophageal dilation, last time was about 10-20 years ago and since then he has been doing fine  It was done few times in the past   He denies significant allergies or postnasal drip  Denies other complaints  He denies weight loss  He ambulates around with the help of a cane but his main complaint when he walks is the lower extremity claudication not the shortness of breath  Patient smoked all his life, probably for 60 years but quit several months ago  He lives at home with his son, no pets, no exposure to birds, no history of occupational exposure  Review of Systems:  Review of Systems   Constitutional: Negative  HENT: Negative  Eyes: Negative  Respiratory: Positive for shortness of breath  Cardiovascular: Negative  Gastrointestinal: Negative  Endocrine: Negative  Genitourinary: Negative  Musculoskeletal: Negative  Claudication lower EXTR   Skin: Negative  Allergic/Immunologic: Negative  Neurological: Negative  Hematological: Negative  Psychiatric/Behavioral: Negative  Aside from what is mentioned in the HPI, the review of systems otherwise negative      Current Medications:    Current Outpatient Medications:   •  albuterol (PROVENTIL HFA,VENTOLIN HFA) 90 mcg/act inhaler, Inhale 2 puffs every 6 (six) hours as needed for wheezing or shortness of breath, Disp: 18 g, Rfl: 2  •  carvedilol (COREG) 6 25 mg tablet, Take 1 tablet (6 25 mg total) by mouth 2 (two) times a day with meals, Disp: 60 tablet, Rfl: 2  •  cholecalciferol (VITAMIN D3) 1,000 units tablet, Take 1,000 Units by mouth daily, Disp: , Rfl:   •  clopidogrel (PLAVIX) 75 mg tablet, Take 1 tablet (75 mg total) by mouth daily, Disp: 90 tablet, Rfl: 3  •  colchicine (COLCRYS) 0 6 mg tablet, Take 1 tablet (0 6 mg total) by mouth daily for 3 days, Disp: 3 tablet, Rfl: 0  •  hydrALAZINE (APRESOLINE) 10 mg tablet, Take 1 tablet (10 mg total) by mouth every 8 (eight) hours, Disp: 90 tablet, Rfl: 2  •  isosorbide dinitrate (ISORDIL) 10 mg "tablet, Take 1 tablet (10 mg total) by mouth 3 (three) times a day, Disp: 90 tablet, Rfl: 2  •  pravastatin (PRAVACHOL) 40 mg tablet, Take 1 tablet (40 mg total) by mouth daily, Disp: 30 tablet, Rfl: 3  •  zolpidem (AMBIEN) 5 mg tablet, Take 1 tablet (5 mg total) by mouth daily at bedtime as needed for sleep, Disp: 90 tablet, Rfl: 0    Historical Information   Past Medical History:   Diagnosis Date   • Abnormal thyroid function test     non specified /  [place]    1/8/13     • Chronic kidney disease    • COPD (chronic obstructive pulmonary disease) (HCC)    • 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 3/22/2023   • Eczema     [place]   11/26/13      • Esophagitis    • Gout    • Hypertension    • Pyloric channel ulcer      Past Surgical History:   Procedure Laterality Date   • BACK SURGERY     • ESOPHAGOGASTRODUODENOSCOPY N/A 12/1/2016    Procedure: ESOPHAGOGASTRODUODENOSCOPY (EGD); Surgeon: Saba Cameron MD;  Location: [facility];   Service:    • EYE SURGERY       Social History   Social History     Tobacco Use   Smoking Status Some Days   • Packs/day: 0 50   • Years: 67 00   • Pack years: 33 50   • Types: Cigarettes   • Start date: 28   Smokeless Tobacco Never   Tobacco Comments    current some day smoker: a couple cigs a week       Occupational history:  No occupational exposures    Family History:   Family History   Problem Relation Age of Onset   • Other Mother         Dec in her 66's (gangrene foot)   • Heart failure Mother    • Diabetes type II Mother    • Peripheral vascular disease Mother    • Rectal cancer Father    • Other Maternal Aunt    • Other Family         situs inversus / Dec 90 yo         PhysicalExamination:  Vitals:   /74 (BP Location: Left arm, Patient Position: Sitting, Cuff Size: Standard)   Pulse 80   Temp 98 1 °F (36 7 °C) (Tympanic)   Resp 14   Ht 5' 6\" (1 676 m)   Wt 45 5 kg (100 lb 3 2 oz)   SpO2 96%   BMI 16 17 kg/m²       General: alert, not in acute " distress  HEENT: PERRL, no icteric sclera or cyanosis, no thrush  Neck: Supple, no lymphadenopathy or thyromegaly, no JVD  Lungs: Equal breath sounds and clear auscultations bilaterally, no wheezing or crackles  Heart: S1S2 regular, no murmurs or gallops  Abdomen: soft, nontender, bowel sounds present  Extremities: no edema, no clubbing or cyanosis  Neuro: Alert and oriented x 3, no focal neurodeficits   Skin: intact, no rashes      Diagnostic Data:  Labs: I personally reviewed the most recent laboratory data pertinent to today's visit    Lab Results   Component Value Date    WBC 10 04 05/11/2023    HGB 12 5 05/11/2023    HCT 37 9 05/11/2023    MCV 89 05/11/2023     05/11/2023     Lab Results   Component Value Date    CALCIUM 9 7 05/11/2023    K 4 1 05/11/2023    CO2 28 05/11/2023     05/11/2023    BUN 29 (H) 05/11/2023    CREATININE 1 65 (H) 05/11/2023     No results found for: IGE  Lab Results   Component Value Date    ALT 12 05/11/2023    AST 17 05/11/2023    ALKPHOS 100 05/11/2023         Imaging:  I personally reviewed the images on the [facility] system pertinent to today's visit  Chest x-ray reviewed on PACS: Hyperinflated lungs, clear otherwise  Chest CT scan reviewed on PACS: Minimal emphysematous changes otherwise no suspicious lesions or consolidation      Other studies:  Echocardiogram: LVEF 79%, grade 1 diastolic dysfunction, normal RV      Jorge Alberto Carter MD

## 2023-05-17 ENCOUNTER — HOSPITAL ENCOUNTER (OUTPATIENT)
Dept: NON INVASIVE DIAGNOSTICS | Facility: HOSPITAL | Age: 83
Discharge: HOME | End: 2023-05-17
Attending: EMERGENCY MEDICINE

## 2023-05-17 ENCOUNTER — OFFICE VISIT (OUTPATIENT)
Dept: FAMILY MEDICINE CLINIC | Facility: CLINIC | Age: 83
End: 2023-05-17

## 2023-05-17 VITALS
WEIGHT: 100.8 LBS | DIASTOLIC BLOOD PRESSURE: 76 MMHG | BODY MASS INDEX: 16.79 KG/M2 | TEMPERATURE: 96.3 F | RESPIRATION RATE: 16 BRPM | SYSTOLIC BLOOD PRESSURE: 126 MMHG | HEIGHT: 65 IN | OXYGEN SATURATION: 98 % | HEART RATE: 70 BPM

## 2023-05-17 DIAGNOSIS — M10.9 GOUT, UNSPECIFIED CAUSE, UNSPECIFIED CHRONICITY, UNSPECIFIED SITE: ICD-10-CM

## 2023-05-17 DIAGNOSIS — M79.671 RIGHT FOOT PAIN: ICD-10-CM

## 2023-05-17 DIAGNOSIS — I70.223 REST PAIN OF BOTH LOWER EXTREMITIES DUE TO ATHEROSCLEROSIS (HCC): ICD-10-CM

## 2023-05-17 DIAGNOSIS — I73.9 PVD (PERIPHERAL VASCULAR DISEASE) (HCC): ICD-10-CM

## 2023-05-17 DIAGNOSIS — I73.9 PVD (PERIPHERAL VASCULAR DISEASE) WITH CLAUDICATION (HCC): ICD-10-CM

## 2023-05-17 DIAGNOSIS — S91.301A WOUND OF RIGHT FOOT: Primary | ICD-10-CM

## 2023-05-17 RX ORDER — CEPHALEXIN 500 MG/1
500 CAPSULE ORAL EVERY 12 HOURS SCHEDULED
Qty: 14 CAPSULE | Refills: 0 | Status: ON HOLD | OUTPATIENT
Start: 2023-05-17 | End: 2023-05-24

## 2023-05-17 NOTE — PROGRESS NOTES
Name: Trevon Yañez      : 1940      MRN: [id number]  Encounter Provider: Renata Pearce DO  Encounter Date: 2023   Encounter department: [address] [place] [address] Road     1  Wound of right foot  -     cephalexin (KEFLEX) 500 mg capsule; Take 1 capsule (500 mg total) by mouth every 12 (twelve) hours for 7 days    2  PVD (peripheral vascular disease) with claudication (HCC)  Comments:  needs to see vascular for f-up for PVD and right foot pain at rest and claudication  Orders:  -     Ambulatory Referral to Vascular Surgery; Future    3  Gout, unspecified cause, unspecified chronicity, unspecified site    4  Right foot pain           Subjective      Foot Pain (Pt was seen at ED for right foot pain and was prescribed a med but is very expensive pt would like to discuss alternative med for pain )  Seen by podiatry in ER  ER records reviewed:    • Foot Pain      Worsening right foot pain xweeks  No new trauma/injury  Believes to have some type of wound/ulcer       Patient is an [de-identified] y/o male presenting for evaluation of right foot pain x2 weeks  He states that he has significant history of gout in his feet and states that he does have arthritis  He admits to pain in his legs with ambulation and states that he does feel burning in his feet at night  He gets relief from his pain with legs hanging in dependent position, and otherwise nothing offers much relief of pain in his lower extremities  He states that he does have follow up with vascular sugery and cardiology for blockages in bilateral carotid arteries  Review of Systems   Constitutional: Negative  HENT: Negative  Eyes: Negative  Respiratory: Negative  Cardiovascular: Negative  Gastrointestinal: Negative  Endocrine: Negative  Genitourinary: Negative  Musculoskeletal: Negative  Skin:        Right foot lateral scab/redness around area   Allergic/Immunologic: Negative      Neurological: "Negative  Hematological: Negative  Psychiatric/Behavioral: Negative  Current Outpatient Medications on File Prior to Visit   Medication Sig   • albuterol (PROVENTIL HFA,VENTOLIN HFA) 90 mcg/act inhaler Inhale 2 puffs every 6 (six) hours as needed for wheezing or shortness of breath   • carvedilol (COREG) 6 25 mg tablet Take 1 tablet (6 25 mg total) by mouth 2 (two) times a day with meals   • cholecalciferol (VITAMIN D3) 1,000 units tablet Take 1,000 Units by mouth daily   • clopidogrel (PLAVIX) 75 mg tablet Take 1 tablet (75 mg total) by mouth daily   • hydrALAZINE (APRESOLINE) 10 mg tablet Take 1 tablet (10 mg total) by mouth every 8 (eight) hours   • isosorbide dinitrate (ISORDIL) 10 mg tablet Take 1 tablet (10 mg total) by mouth 3 (three) times a day   • pravastatin (PRAVACHOL) 40 mg tablet Take 1 tablet (40 mg total) by mouth daily   • zolpidem (AMBIEN) 5 mg tablet Take 1 tablet (5 mg total) by mouth daily at bedtime as needed for sleep   • colchicine (COLCRYS) 0 6 mg tablet Take 1 tablet (0 6 mg total) by mouth daily for 3 days       Objective     /76 (BP Location: Left arm, Patient Position: Sitting, Cuff Size: Adult)   Pulse 70   Temp (!) 96 3 °F (35 7 °C) (Temporal)   Resp 16   Ht 5' 5\" (1 651 m)   Wt 45 7 kg (100 lb 12 8 oz)   SpO2 98%   BMI 16 77 kg/m²     Physical Exam  Constitutional:       Appearance: He is well-developed  HENT:      Head: Normocephalic and atraumatic  Eyes:      Conjunctiva/sclera: Conjunctivae normal       Pupils: Pupils are equal, round, and reactive to light  Cardiovascular:      Rate and Rhythm: Normal rate and regular rhythm  Heart sounds: Normal heart sounds  Pulmonary:      Effort: Pulmonary effort is normal       Breath sounds: Normal breath sounds  Musculoskeletal:         General: Normal range of motion  Cervical back: Normal range of motion and neck supple  Skin:     General: Skin is warm and dry        Capillary Refill: " Capillary refill takes less than 2 seconds  Findings: Erythema (right lateral foot sore/scab and redness) present  Neurological:      General: No focal deficit present  Mental Status: He is alert and oriented to person, place, and time  Mental status is at baseline  Deep Tendon Reflexes: Reflexes are normal and symmetric  Psychiatric:         Mood and Affect: Mood normal          Behavior: Behavior normal          Thought Content:  Thought content normal          Judgment: Judgment normal        Radha Laguna DO

## 2023-05-17 NOTE — PATIENT INSTRUCTIONS
Here for ER f-up and needs to see Vascular specialist tomorrow and also rec keeping area clean and dry and rec starting abx to cover for cellulitis and rec also to avoid pressure around area right lateral foot  ER discharge summary reviewed:    ED Course  Medical Decision Making  Patient stable for discharge from ED, recommend follow up with vascular surgery for suspected PVD with absent pedal pulses and rest pain/claudication symptoms  Lower extremity arterial duplex study ordered for outpatient, recommend patient has this completed prior to vascular appointment

## 2023-05-18 ENCOUNTER — OFFICE VISIT (OUTPATIENT)
Dept: NEUROLOGY | Facility: CLINIC | Age: 83
End: 2023-05-18

## 2023-05-18 ENCOUNTER — TELEPHONE (OUTPATIENT)
Dept: VASCULAR SURGERY | Facility: CLINIC | Age: 83
End: 2023-05-18

## 2023-05-18 VITALS
HEIGHT: 65 IN | BODY MASS INDEX: 16.66 KG/M2 | DIASTOLIC BLOOD PRESSURE: 90 MMHG | SYSTOLIC BLOOD PRESSURE: 120 MMHG | WEIGHT: 100 LBS | HEART RATE: 98 BPM

## 2023-05-18 DIAGNOSIS — R42 VERTIGO: Primary | ICD-10-CM

## 2023-05-18 DIAGNOSIS — I77.9 BILATERAL CAROTID ARTERY DISEASE (HCC): ICD-10-CM

## 2023-05-18 RX ORDER — ATORVASTATIN CALCIUM 80 MG/1
80 TABLET, FILM COATED ORAL DAILY
Qty: 30 TABLET | Refills: 3 | Status: ON HOLD | OUTPATIENT
Start: 2023-05-18

## 2023-05-18 NOTE — PROGRESS NOTES
Patient ID: Deshawn Pizano is a [de-identified] y o  male  Assessment/Plan:    Episode of dizziness  [de-identified] M w/ a PMH of HTN, CAD, PAD, CKD, bilateral carotid stenosis (>70%), smoking hx (stopped smoking 1 month ago, 1ppd for >60years) coming in for HFU transient vertigo and bilateral carotid stenosis  Patient went to the hospital after having an episode of transient vertigo/lightheadedness/tingling from motion that lasted for approximately 3 hours not associated with any head movement and was not associated any recent illnesses or low blood pressure  Patient was evaluated in the ED and got Plavix and statin at that time and his CTA showed severe atherosclerosis and multivessel arteries going to the brain  Patient did not have a stroke as evidenced on the MRI but given patient's severe atherosclerosis, patient was recommended to continue the Plavix and statin and to follow-up with vascular surgery  Patient follows with vascular surgery and given patient's severe atherosclerosis in bilateral ICA left worse than right, they recommended possible intervention but continue to be optimized from a cardiology, pulmonology as well as neurology standpoint  Patient had follow-up with pulmonology and they are not clearing patient for possible intervention  W/U: personally reviewed  From 02/2023 admission  - MRI brain w/o contrast: no acute infarcts, chronic microangiopathy, Stable chronic left basal ganglia/capsular lacunar infarcts    Small chronic lacunar infarct in the superior right paramedian cerebellar vermis  Markos cisterna magna   - CTA H/N: No evidence of acute intracranial hemorrhage  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  Chronic microangiopathy changes  Greater than 95% near occluded left proximal subclavian plaque stenosis /near occlusion  Moderate to severe plaque stenosis bilateral vertebral artery origins  Left proximal V4 segment moderate to severe plaque stenosis     Greater than 95% near occluded plaque stenosis proximal right cervical ICA, vascular consult recommended  - carotid duplex VAS: L ICA >70% stenosis in the internal carotid artery  Calcific plaque with shadowing may obscure a more significant stenosis  R ICA: >70% stenosis in the internal carotid artery  Calcific plaque with  shadowing may obscure a more significant stenosis  - 02/18/2023 Chol 161, Tri: 93, LDL: 96  - TTE 02/20/2023: Mild to moderate concentric left ventricular hypertrophy with prominent basal interventricular septum, normal left ventricle systolic function, grade 1 diastolic dysfunction  Left ventricle ejection fraction is estimated as around 65%  Normal left and right atrial cavity size  Impression: Likely TIA episode given patient's noted severe atherosclerosis both in bilateral internal carotids left worse than right as well as left vertebral    Plan   - continue f/u w/ vascular surgery for intervention in regards to ICA intervention (possible CEA)  - continue w/ f/u with cardiology for surgical optimization  - continue w/ plavix 75mg and pravastatin 40mg for stroke prevention  - likely would hold plavix 5-7 days if he need surgical intervention for his legs, vascular surgery can contact neuro team for additional questions  - f/u in 3 months          Problem List Items Addressed This Visit    None               Subjective:    HPI      [de-identified] M w/ a PMH of HTN, CAD, PAD, CKD, bilateral carotid stenosis (>70%), smoking hx (stopped smoking 1 month ago, 1ppd for >60years) coming in for HFU transient vertigo and bilateral carotid stenosis   Patient in 02/19/2023  As Per original story,  he was lying in bed, turned his head toward the left, and developed immediate sensitives spinning dizziness  This improved somewhat with closing his eyes, and abated after about 30 seconds  However it recurred again transiently when he got out of bed  Once again the episode was fairly transient and lasting less than a minute  It lasted for about 2 hours was intermittent throughout and felt like things were moving in slow motion  NO recent illnesses beforehand and and he did not take any additional BP meds  He felt very light headed and felt very off  Last approximately 3 hours  He was not moving up his head during this time  He did not fall  He was nauseous but did not vomit  He has some baseline gait unsteadiness for which he uses either a cane or walker  /122 in while in the ED  He got IV BP meds and improved  Due ot his CKD, he was avoided on ASA during the hospitalization  He did not get any meclizine or other vertigo meds at that time  Followed w/ vascular surgery and at this time due to diastolic heart failure and requiring intubation and him havingg COPD w/ small walking distances, vascular surgery wants him to be better optimized by cardiologist and pulmonologist  Pulmonology stated that he likely can still get surgery  They are meeting w/ cardiology on Monday  He is taking plavix 75mg daily  He is on pravastatin 40mg  He is currently on           The following portions of the patient's history were reviewed and updated as appropriate:   He  has a past medical history of Abnormal thyroid function test, Chronic kidney disease, COPD (chronic obstructive pulmonary disease) ([address] ), 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 (3/22/2023), Eczema, Esophagitis, Gout, Hypertension, and Pyloric channel ulcer    He   Patient Active Problem List    Diagnosis Date Noted   • Right foot pain 05/17/2023   • Peripheral arterial disease ([address] ) 05/15/2023   • Preop pulmonary/respiratory exam 05/15/2023   • 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 03/22/2023   • Asymptomatic carotid artery stenosis, bilateral 03/22/2023   • Episode of dizziness 02/18/2023   • Hypertensive urgency 02/18/2023   • Acute respiratory failure with hypoxia ([address] ) 11/13/2022   • Severe protein-calorie malnutrition ([place] [address] ) 11/13/2022   • CKD (chronic kidney disease) 10/26/2022   • Stage 3b chronic kidney disease ([place] [address] ) 05/23/2022   • Secondary hyperparathyroidism of renal origin ([place] [address] ) 05/23/2022   • Hyperuricemia 05/23/2022   • Insomnia 12/10/2019   • Essential hypertension 07/25/2017   • Gout 07/25/2017   • COPD (chronic obstructive pulmonary disease) ([place] [address] ) 07/25/2017   • Esophagitis 07/25/2017   • Tobacco abuse 07/25/2017     He  has a past surgical history that includes Back surgery; Eye surgery; and Esophagogastroduodenoscopy (N/A, 12/1/2016)  His family history includes Diabetes type II in his mother; Heart failure in his mother; Other in his family, maternal aunt, and mother; Peripheral vascular disease in his mother; Rectal cancer in his father  He  reports that he quit smoking about 4 weeks ago  His smoking use included cigarettes  He started smoking about 67 years ago  He has a 33 50 pack-year smoking history  He has never used smokeless tobacco  He reports that he does not currently use alcohol  He reports that he does not use drugs    Current Outpatient Medications   Medication Sig Dispense Refill   • albuterol (PROVENTIL HFA,VENTOLIN HFA) 90 mcg/act inhaler Inhale 2 puffs every 6 (six) hours as needed for wheezing or shortness of breath 18 g 2   • carvedilol (COREG) 6 25 mg tablet Take 1 tablet (6 25 mg total) by mouth 2 (two) times a day with meals 60 tablet 2   • cephalexin (KEFLEX) 500 mg capsule Take 1 capsule (500 mg total) by mouth every 12 (twelve) hours for 7 days 14 capsule 0   • cholecalciferol (VITAMIN D3) 1,000 units tablet Take 1,000 Units by mouth daily     • clopidogrel (PLAVIX) 75 mg tablet Take 1 tablet (75 mg total) by mouth daily 90 tablet 3   • hydrALAZINE (APRESOLINE) 10 mg tablet Take 1 tablet (10 mg total) by mouth every 8 (eight) hours 90 tablet 2   • isosorbide dinitrate (ISORDIL) 10 mg tablet Take 1 tablet (10 mg total) by mouth 3 (three) times a day 90 tablet 2   • pravastatin "(PRAVACHOL) 40 mg tablet Take 1 tablet (40 mg total) by mouth daily 30 tablet 3   • zolpidem (AMBIEN) 5 mg tablet Take 1 tablet (5 mg total) by mouth daily at bedtime as needed for sleep 90 tablet 0   • colchicine (COLCRYS) 0 6 mg tablet Take 1 tablet (0 6 mg total) by mouth daily for 3 days (Patient not taking: Reported on 5/18/2023) 3 tablet 0     No current facility-administered medications for this visit  Current Outpatient Medications on File Prior to Visit   Medication Sig   • albuterol (PROVENTIL HFA,VENTOLIN HFA) 90 mcg/act inhaler Inhale 2 puffs every 6 (six) hours as needed for wheezing or shortness of breath   • carvedilol (COREG) 6 25 mg tablet Take 1 tablet (6 25 mg total) by mouth 2 (two) times a day with meals   • cephalexin (KEFLEX) 500 mg capsule Take 1 capsule (500 mg total) by mouth every 12 (twelve) hours for 7 days   • cholecalciferol (VITAMIN D3) 1,000 units tablet Take 1,000 Units by mouth daily   • clopidogrel (PLAVIX) 75 mg tablet Take 1 tablet (75 mg total) by mouth daily   • hydrALAZINE (APRESOLINE) 10 mg tablet Take 1 tablet (10 mg total) by mouth every 8 (eight) hours   • isosorbide dinitrate (ISORDIL) 10 mg tablet Take 1 tablet (10 mg total) by mouth 3 (three) times a day   • pravastatin (PRAVACHOL) 40 mg tablet Take 1 tablet (40 mg total) by mouth daily   • zolpidem (AMBIEN) 5 mg tablet Take 1 tablet (5 mg total) by mouth daily at bedtime as needed for sleep   • colchicine (COLCRYS) 0 6 mg tablet Take 1 tablet (0 6 mg total) by mouth daily for 3 days (Patient not taking: Reported on 5/18/2023)     No current facility-administered medications on file prior to visit  He is allergic to norvasc [amlodipine]            Objective:    Blood pressure 120/90, pulse 98, height 5' 5\" (1 651 m), weight 45 4 kg (100 lb)  Physical Exam  Eyes:      General: Lids are normal       Extraocular Movements: Extraocular movements intact  Pupils: Pupils are equal, round, and reactive to light   " Neurological:      Motor: Motor strength is normal       Deep Tendon Reflexes:      Reflex Scores:       Tricep reflexes are 2+ on the right side and 2+ on the left side  Bicep reflexes are 2+ on the right side and 2+ on the left side  Brachioradialis reflexes are 2+ on the right side and 2+ on the left side  Patellar reflexes are 1+ on the right side and 1+ on the left side  Achilles reflexes are 2+ on the right side and 2+ on the left side  GENERAL EXAM:    CONSTITUTIONAL: Well developed, well nourished, well groomed  No dysmorphic features  Eyes:  PERRLA, EOM normal      Neck:  Normal ROM, neck supple  HEENT:  Normocephalic atraumatic  No meningismus  Oropharynx is clear and moist  No oral mucosal lesions  Chest:  Respirations regular and unlabored  Cardiovascular:  Distal extremities warm without palpable edema or tenderness, no observed significant swelling  Musculoskeletal:  Full range of motion  Skin:  warm and dry   Psychiatric:  Normal behavior and appropriate affect          Neurological Exam  Mental Status  Awake, alert and oriented to person, place and time  Cranial Nerves  CN II: Visual acuity is normal  Visual fields full to confrontation  CN III, IV, VI: Extraocular movements intact bilaterally  Normal lids and orbits bilaterally  Pupils equal round and reactive to light bilaterally  CN V: Facial sensation is normal   CN VII: Full and symmetric facial movement  CN VIII: Hearing is normal   CN IX, X: Palate elevates symmetrically  Normal gag reflex  CN XI: Shoulder shrug strength is normal   CN XII: Tongue midline without atrophy or fasciculations  Motor   Strength is 5/5 throughout all four extremities  Sensory  Sensation is intact to light touch, pinprick, vibration and proprioception in all four extremities      Reflexes                                            Right                      Left  Brachioradialis                    2+ 2+  Biceps                                 2+                         2+  Triceps                                2+                         2+  Finger flex                           2+                         2+  Hamstring                            2+                         2+  Patellar                                1+                         1+  Achilles                                2+                         2+  Plantar                           Downgoing                Downgoing    Coordination  Right: Finger-to-nose normal  Rapid alternating movement normal  Heel-to-shin normal Left: Finger-to-nose normal  Rapid alternating movement normal  Heel-to-shin normal     Gait  Casual gait is normal including stance, stride, and arm swing  ROS:    Review of Systems   Constitutional: Negative  Negative for appetite change and fever  HENT: Negative  Negative for hearing loss, tinnitus, trouble swallowing and voice change  Eyes: Negative  Negative for photophobia, pain and visual disturbance  Respiratory: Negative  Negative for shortness of breath  Cardiovascular: Negative  Negative for palpitations  Gastrointestinal: Negative  Negative for nausea and vomiting  Endocrine: Negative  Negative for cold intolerance  Genitourinary: Negative  Negative for dysuria, frequency and urgency  Musculoskeletal: Negative  Negative for gait problem, myalgias and neck pain  Skin: Negative  Negative for rash  Allergic/Immunologic: Negative  Neurological: Negative  Negative for dizziness, tremors, seizures, syncope, facial asymmetry, speech difficulty, weakness, light-headedness, numbness and headaches  Hematological: Negative  Does not bruise/bleed easily  Psychiatric/Behavioral: Positive for sleep disturbance  Negative for confusion and hallucinations

## 2023-05-18 NOTE — TELEPHONE ENCOUNTER
Spoke with pts daughter  Pt was offered an apt on 5/22, 5/23 and 6/1  All appts were refused by the daughter  She stated she has too much going on and she is going to try and take him elsewhere for care   She will call us back if she changes her mind

## 2023-05-18 NOTE — TELEPHONE ENCOUNTER
pts daughter stated she will call back to schedule  Pt was called 2x off of asap ref  Placed by pcp on 5/17  Hello,    I got a call from vascular tech about his leads  They are in the ischemic range, he has rest pain but did not want to go ED  Relayed he should come if worsening rest pain but needs to have office follow up with week  Discussed with Dr Juancarlos Deutsch      Thanks,    Avni

## 2023-05-18 NOTE — ASSESSMENT & PLAN NOTE
[de-identified] M w/ a PMH of HTN, CAD, PAD, CKD, bilateral carotid stenosis (>70%), smoking hx (stopped smoking 1 month ago, 1ppd for >60years) coming in for HFU transient vertigo and bilateral carotid stenosis  Patient went to the hospital after having an episode of transient vertigo/lightheadedness/tingling from motion that lasted for approximately 3 hours not associated with any head movement and was not associated any recent illnesses or low blood pressure  Patient was evaluated in the ED and got Plavix and statin at that time and his CTA showed severe atherosclerosis and multivessel arteries going to the brain  Patient did not have a stroke as evidenced on the MRI but given patient's severe atherosclerosis, patient was recommended to continue the Plavix and statin and to follow-up with vascular surgery  Patient follows with vascular surgery and given patient's severe atherosclerosis in bilateral ICA left worse than right, they recommended possible intervention but continue to be optimized from a cardiology, pulmonology as well as neurology standpoint  Patient had follow-up with pulmonology and they are not clearing patient for possible intervention  W/U: personally reviewed  From 02/2023 admission  - MRI brain w/o contrast: no acute infarcts, chronic microangiopathy, Stable chronic left basal ganglia/capsular lacunar infarcts    Small chronic lacunar infarct in the superior right paramedian cerebellar vermis  Markos cisterna magna   - CTA H/N: No evidence of acute intracranial hemorrhage  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  Chronic microangiopathy changes  Greater than 95% near occluded left proximal subclavian plaque stenosis /near occlusion  Moderate to severe plaque stenosis bilateral vertebral artery origins  Left proximal V4 segment moderate to severe plaque stenosis  Greater than 95% near occluded plaque stenosis proximal right cervical ICA, vascular consult recommended    - carotid duplex VAS: L ICA >70% stenosis in the internal carotid artery  Calcific plaque with shadowing may obscure a more significant stenosis  R ICA: >70% stenosis in the internal carotid artery  Calcific plaque with  shadowing may obscure a more significant stenosis  - 02/18/2023 Chol 161, Tri: 93, LDL: 96  - TTE 02/20/2023: Mild to moderate concentric left ventricular hypertrophy with prominent basal interventricular septum, normal left ventricle systolic function, grade 1 diastolic dysfunction  Left ventricle ejection fraction is estimated as around 65%  Normal left and right atrial cavity size        Impression: Likely TIA episode given patient's noted severe atherosclerosis both in bilateral internal carotids left worse than right as well as left vertebral    Plan   - continue f/u w/ vascular surgery for intervention in regards to ICA intervention (possible CEA)  - continue w/ f/u with cardiology for surgical optimization  - continue w/ plavix 75mg and pravastatin 40mg for stroke prevention  - likely would hold plavix 5-7 days if he need surgical intervention for his legs, vascular surgery can contact neuro team for additional questions  - f/u in 3 months

## 2023-05-18 NOTE — PATIENT INSTRUCTIONS
- continue f/u w/ vascular surgery for intervention in regards to ICA intervention (possible CEA)  - continue w/ f/u with cardiology for surgical optimization  - continue w/ plavix 75mg and pravastatin 40mg for stroke prevention  - likely would hold plavix 5-7 days if he need surgical intervention for his legs, vascular surgery can contact neuro team for additional questions  - f/u in 3 months

## 2023-05-19 ENCOUNTER — TELEPHONE (OUTPATIENT)
Dept: OTHER | Facility: OTHER | Age: 83
End: 2023-05-19

## 2023-05-19 NOTE — TELEPHONE ENCOUNTER
Apparently this patients appointment was changed to [facility] OF [facility] and that is not going to work for his ride  There is no way she can make it to [place] by 10  She needs her appt  For her father to be either in [facility], or \[facility]\"" on Monday at 10 am because she took off work and had everything arranged  She is quite upset because she was told this was a stat appointment and they changed the location on her without even talking to her  So please call her ASAP this morning    Ty

## 2023-05-21 ENCOUNTER — TELEPHONE (OUTPATIENT)
Dept: OTHER | Facility: OTHER | Age: 83
End: 2023-05-21

## 2023-05-21 NOTE — TELEPHONE ENCOUNTER
Pt daughter is very upset about appointment change  Pt daughter works for [employer] and has scheduled her work day around this appointment  evi is not possible  She would like original appointment location  She will be calling in tomorrow to speak with a manager about this

## 2023-05-22 ENCOUNTER — CONSULT (OUTPATIENT)
Dept: CARDIOLOGY CLINIC | Facility: CLINIC | Age: 83
End: 2023-05-22

## 2023-05-22 ENCOUNTER — TELEPHONE (OUTPATIENT)
Dept: FAMILY MEDICINE CLINIC | Facility: CLINIC | Age: 83
End: 2023-05-22

## 2023-05-22 ENCOUNTER — TELEPHONE (OUTPATIENT)
Dept: VASCULAR SURGERY | Facility: CLINIC | Age: 83
End: 2023-05-22

## 2023-05-22 VITALS
SYSTOLIC BLOOD PRESSURE: 112 MMHG | WEIGHT: 100 LBS | BODY MASS INDEX: 16.66 KG/M2 | OXYGEN SATURATION: 94 % | DIASTOLIC BLOOD PRESSURE: 58 MMHG | HEIGHT: 65 IN | HEART RATE: 83 BPM

## 2023-05-22 DIAGNOSIS — I25.10 CORONARY ARTERY DISEASE INVOLVING NATIVE HEART WITHOUT ANGINA PECTORIS, UNSPECIFIED VESSEL OR LESION TYPE: Primary | ICD-10-CM

## 2023-05-22 DIAGNOSIS — I65.23 ASYMPTOMATIC CAROTID ARTERY STENOSIS, BILATERAL: ICD-10-CM

## 2023-05-22 DIAGNOSIS — I73.9 PERIPHERAL ARTERIAL DISEASE (HCC): ICD-10-CM

## 2023-05-22 DIAGNOSIS — I10 ESSENTIAL HYPERTENSION: ICD-10-CM

## 2023-05-22 NOTE — PATIENT INSTRUCTIONS
You were seen today in the Cardiology office  Please continue your current cardiac medications as prescribed  Thank you for choosing [address]  Please call our office or use [employer] with any questions

## 2023-05-22 NOTE — PROGRESS NOTES
[facility]    CHIEF COMPLAINT: No chief complaint on file  HPI:  Hoesa Salcedo is a [de-identified] y o  male with a past medical history of smoker with recent cessation, hypertension, chronic kidney disease, coronary artery disease, peripheral arterial disease, and bilateral asymptomatic carotid stenosis who initially presented for cardiovascular optimization prior to elective carotid surgery  Surgical date is to be determined  Today, he reports pain in the right foot  He recently had LEADs which demonstrated >75% stenosis of the proximal SFA and occlusion of mid SFA with AKUA 0 22  He denies any rest pain but very sensitive to touch  Symptoms have been worse over the past 2 weeks  He now requires assistance by family members to go up and down steps  Prior to this he would have some mild dyspnea on exertion  He denies any chest pressure, heaviness, or tightness  He denies visual changes, lightheadedness, syncope, orthopnea, PND, leg edema  The following portions of the patient's history were reviewed and updated as appropriate: allergies, current medications, past family history, past medical history, past social history, past surgical history, and problem list     SINCE LAST OV I REVIEWED WITH THE PATIENT THE INTERIM LABS, TEST RESULTS, CONSULTANT(S) NOTES AND PERFORMED AN INTERIM REVIEW OF HISTORY    Past Medical History:   Diagnosis Date   • Abnormal thyroid function test     non specified /  LA    1/8/13     • Chronic kidney disease    • COPD (chronic obstructive pulmonary disease) (HCC)    • 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 3/22/2023   • Eczema     LA   11/26/13      • Esophagitis    • Gout    • Hypertension    • Pyloric channel ulcer        Past Surgical History:   Procedure Laterality Date   • BACK SURGERY     • ESOPHAGOGASTRODUODENOSCOPY N/A 12/1/2016    Procedure: ESOPHAGOGASTRODUODENOSCOPY (EGD);   Surgeon: Yazan Real MD;  Location: [facility]; Service:    • EYE SURGERY         Social History     Socioeconomic History   • Marital status:      Spouse name: Not on file   • Number of children: 3   • Years of education: Not on file   • Highest education level: Not on file   Occupational History     Comment: unemployed   Tobacco Use   • Smoking status: Former     Packs/day: 0 50     Years: 67 00     Pack years: 33 50     Types: Cigarettes     Start date:      Quit date: 2023     Years since quittin 0   • Smokeless tobacco: Never   • Tobacco comments:     current some day smoker: a couple cigs a week   Vaping Use   • Vaping Use: Never used   Substance and Sexual Activity   • Alcohol use: Not Currently   • Drug use: No   • Sexual activity: Not on file   Other Topics Concern   • Not on file   Social History Narrative   • Not on file     Social Determinants of Health     Financial Resource Strain: Not on file   Food Insecurity: No Food Insecurity   • Worried About Running Out of Food in the Last Year: Never true   • Ran Out of Food in the Last Year: Never true   Transportation Needs: No Transportation Needs   • Lack of Transportation (Medical): No   • Lack of Transportation (Non-Medical):  No   Physical Activity: Not on file   Stress: Not on file   Social Connections: Not on file   Intimate Partner Violence: Not on file   Housing Stability: Unknown   • Unable to Pay for Housing in the Last Year: No   • Number of Places Lived in the Last Year: Not on file   • Unstable Housing in the Last Year: No       Family History   Problem Relation Age of Onset   • Other Mother         Dec in her 66's (gangrene foot)   • Heart failure Mother    • Diabetes type II Mother    • Peripheral vascular disease Mother    • Rectal cancer Father    • Other Maternal Aunt    • Other Family         situs inversus / Dec 90 yo       Allergies   Allergen Reactions   • Norvasc [Amlodipine]        Current Outpatient Medications   Medication Sig Dispense Refill   • albuterol "(PROVENTIL HFA,VENTOLIN HFA) 90 mcg/act inhaler Inhale 2 puffs every 6 (six) hours as needed for wheezing or shortness of breath 18 g 2   • atorvastatin (LIPITOR) 80 mg tablet Take 1 tablet (80 mg total) by mouth daily 30 tablet 3   • carvedilol (COREG) 6 25 mg tablet Take 1 tablet (6 25 mg total) by mouth 2 (two) times a day with meals 60 tablet 2   • cephalexin (KEFLEX) 500 mg capsule Take 1 capsule (500 mg total) by mouth every 12 (twelve) hours for 7 days 14 capsule 0   • cholecalciferol (VITAMIN D3) 1,000 units tablet Take 1,000 Units by mouth daily     • clopidogrel (PLAVIX) 75 mg tablet Take 1 tablet (75 mg total) by mouth daily 90 tablet 3   • hydrALAZINE (APRESOLINE) 10 mg tablet Take 1 tablet (10 mg total) by mouth every 8 (eight) hours 90 tablet 2   • isosorbide dinitrate (ISORDIL) 10 mg tablet Take 1 tablet (10 mg total) by mouth 3 (three) times a day 90 tablet 2   • colchicine (COLCRYS) 0 6 mg tablet Take 1 tablet (0 6 mg total) by mouth daily for 3 days (Patient not taking: Reported on 5/18/2023) 3 tablet 0   • zolpidem (AMBIEN) 5 mg tablet Take 1 tablet (5 mg total) by mouth daily at bedtime as needed for sleep 90 tablet 0     No current facility-administered medications for this visit  /58 (BP Location: Left arm, Patient Position: Sitting, Cuff Size: Large)   Pulse 83   Ht 5' 5\" (1 651 m)   Wt 45 4 kg (100 lb)   SpO2 94%   BMI 16 64 kg/m²     Review of Systems   All other systems reviewed and are negative  Physical Exam  Vitals and nursing note reviewed  Constitutional:       General: He is not in acute distress  Appearance: He is well-developed  He is not toxic-appearing  Comments: malnourished   HENT:      Head: Normocephalic and atraumatic  Eyes:      General: No scleral icterus  Extraocular Movements: Extraocular movements intact  Conjunctiva/sclera: Conjunctivae normal    Neck:      Vascular: Carotid bruit present     Cardiovascular:      Rate and " Rhythm: Normal rate and regular rhythm  Pulses: Normal pulses  Heart sounds: Normal heart sounds  No murmur heard  No gallop  Pulmonary:      Effort: Pulmonary effort is normal  No respiratory distress  Breath sounds: Normal breath sounds  No wheezing or rales  Abdominal:      General: Abdomen is flat  Bowel sounds are normal  There is no distension  Palpations: Abdomen is soft  Tenderness: There is no abdominal tenderness  Musculoskeletal:      Right lower leg: No edema  Skin:     General: Skin is warm and dry  Coloration: Skin is not jaundiced  Neurological:      Mental Status: He is alert  Psychiatric:         Mood and Affect: Mood normal          Behavior: Behavior normal           Lab Results   Component Value Date    K 4 1 05/11/2023     05/11/2023    CO2 28 05/11/2023    BUN 29 (H) 05/11/2023    CREATININE 1 65 (H) 05/11/2023    CALCIUM 9 7 05/11/2023    ALT 12 05/11/2023    AST 17 05/11/2023    INR 0 98 02/18/2023       Lab Results   Component Value Date    HDL 46 02/18/2023    LDLCALC 96 02/18/2023    TRIG 93 02/18/2023       Lab Results   Component Value Date    WBC 10 04 05/11/2023    HGB 12 5 05/11/2023    HCT 37 9 05/11/2023     05/11/2023       Lab Results   Component Value Date     02/19/2023    HGBA1C 5 4 02/19/2023     Cardiac studies:   Results for orders placed or performed in visit on 05/22/23   POCT ECG    Impression    Normal sinus rhythm  Septal infarct, age undetermined       LEADS - 5/17/23:  RIGHT LOWER LIMB: There is a >75% stenosis of the proximal SFA and an occlusion of the mid SFA that reconstitutes distally  The distal anterior tibial artery appears to be  Occluded  Ankle/Brachial index:   0 22  which is in the tissue loss disease category  PVR/ PPG tracings are dampened/flattened  Unable to obtain the metatarsal and toe pressures due to attenuated PPG       LEFT LOWER LIMB: The mid SFA appears to be occluded and reconstitutes distally  The distal anterior tibial artery appears to be occluded  Ankle/Brachial index:   0 33  which is in the ischemic disease category PVR/ PPG tracings are normal or dampened  Metatarsal pressure of 108 mmHg Great toe pressure of 59 mmHg, within the healing range     TECH NOTE: There is a >20 mmHg brachial pressure gradient which is suggestive  of subclavian steal     Pharm NM stress  23:   •  Stress EC 5-1mm Horizontal ST depression in the inferior leads (II, III and aVF) is noted  The ECG was equivocal for ischemia  •  Perfusion: There is a left ventricular perfusion defect that is small in size with mild reduction in uptake present in the basal inferoseptal location(s) that is fixed  TTE - 23: Normal LV cavity size with mild to moderate LCH  Normal wall motion and systolic function, LVEF 12%  Grade 1 DD  Normal RV size and systolic function  Mild to moderate AI     ASSESSMENT AND PLAN:  Diagnoses and all orders for this visit:    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, unspecified vessel or lesion type: ECG with septal infarct pattern  NM stress personally reviewed and consistent with basal inferoseptal infarct  There was no evidence of prognostically significant ischemia   -He has no current anginal complaints but his functional capacity is limited, i e , functionally he is partially dependent at current due to RLE pain   -Continue clopidogrel, atorvastatin 80 mg (recent increase), carvedilol 6 25 mg BID, isordil 10 mg TID  -Goal LDL <70 but most recent lipid panel not reflective of recent change in statin therapy   -Continued avoidance of smoking  -     POCT ECG    Asymptomatic carotid artery stenosis, bilateral: From a cardiac perspective, his medication regimen is optimized and echocardiogram demonstrates preserved biventricular systolic function  He has no signs/symptoms of congestive heart failure or severe valvular stenosis   No anginal symptoms  For upcoming surgery if within the next 30 days - using ASA class III, his Margarito Johnson Perioperative risk for myocardial infarction or cardiac arrest intraoperatively or within 30 days post op is 3%  He is at least intermediate risk for this generally low risk procedure  He is not prohibitive risk  There are no contraindications to proceeding with carotid surgery  Continue beta blocker and statin therapy throughout  Given poor functional capacity, severe protein calorie malnutrition, severe diffuse vascular disease - would strongly consider addressing goals of care with patient and family  We briefly spoke about this today and my recommendation is that it would reasonable to consider focusing only on medically necessary procedures  Such as for his RLE claudication to prevent limb loss, infection etc       Peripheral arterial disease ([facility] [address] ): Severe bilateral LE arterial disease, carotid stenosis, 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

## 2023-05-23 ENCOUNTER — TELEPHONE (OUTPATIENT)
Dept: FAMILY MEDICINE CLINIC | Facility: CLINIC | Age: 83
End: 2023-05-23

## 2023-05-23 NOTE — TELEPHONE ENCOUNTER
I spoke to the patient's daughter, she is aware and in agreement with the patient going to the ER, however she feels he is going to refuse  She also asked me to cancel his appointment for today as he called her and told her he was not coming  She asked me to call the patient and give him your recommendations  The patient did not answer his phone, and I needed to leave him a voicemail message  Daughter was made aware he did not answer his phone

## 2023-05-23 NOTE — TELEPHONE ENCOUNTER
Patient is coming in today for swollen irritated eye  However daughter states his leg is very swollen, has an ulcer on his foot, can't walk at all  His son has to carry him to get him out of the apartment  Daughter feels he needs to be admitted as his ulcer is becoming necrotic  She is asking if you can please call her to discuss his situation

## 2023-05-25 ENCOUNTER — OFFICE VISIT (OUTPATIENT)
Dept: VASCULAR SURGERY | Facility: CLINIC | Age: 83
End: 2023-05-25

## 2023-05-25 ENCOUNTER — APPOINTMENT (INPATIENT)
Dept: RADIOLOGY | Facility: HOSPITAL | Age: 83
End: 2023-05-25

## 2023-05-25 ENCOUNTER — HOSPITAL ENCOUNTER (INPATIENT)
Facility: HOSPITAL | Age: 83
LOS: 6 days | Discharge: NURSING, REHAB OR LONG-TERM CARE | End: 2023-05-31
Attending: EMERGENCY MEDICINE | Admitting: SURGERY

## 2023-05-25 VITALS
SYSTOLIC BLOOD PRESSURE: 138 MMHG | WEIGHT: 100 LBS | DIASTOLIC BLOOD PRESSURE: 80 MMHG | HEIGHT: 65 IN | BODY MASS INDEX: 16.66 KG/M2 | HEART RATE: 74 BPM

## 2023-05-25 DIAGNOSIS — I70.299 ATHEROSCLEROSIS OF ARTERY OF EXTREMITY WITH ULCERATION (HCC): ICD-10-CM

## 2023-05-25 DIAGNOSIS — M79.671 RIGHT FOOT PAIN: Primary | ICD-10-CM

## 2023-05-25 DIAGNOSIS — L97.909 ATHEROSCLEROSIS OF ARTERY OF EXTREMITY WITH ULCERATION (HCC): ICD-10-CM

## 2023-05-25 DIAGNOSIS — I16.0 HYPERTENSIVE URGENCY: ICD-10-CM

## 2023-05-25 DIAGNOSIS — N18.32 STAGE 3B CHRONIC KIDNEY DISEASE (HCC): ICD-10-CM

## 2023-05-25 DIAGNOSIS — I73.9 PAD (PERIPHERAL ARTERY DISEASE) (HCC): ICD-10-CM

## 2023-05-25 DIAGNOSIS — I73.9 PVD (PERIPHERAL VASCULAR DISEASE) WITH CLAUDICATION (HCC): Primary | ICD-10-CM

## 2023-05-25 DIAGNOSIS — I73.9 PERIPHERAL ARTERIAL DISEASE (HCC): ICD-10-CM

## 2023-05-25 DIAGNOSIS — M79.606 ISCHEMIC REST PAIN OF LOWER EXTREMITY: Primary | ICD-10-CM

## 2023-05-25 DIAGNOSIS — I73.9 PVD (PERIPHERAL VASCULAR DISEASE) WITH CLAUDICATION (HCC): ICD-10-CM

## 2023-05-25 DIAGNOSIS — M79.606 ISCHEMIC REST PAIN OF LOWER EXTREMITY: ICD-10-CM

## 2023-05-25 DIAGNOSIS — I99.8 ISCHEMIC REST PAIN OF LOWER EXTREMITY: Primary | ICD-10-CM

## 2023-05-25 DIAGNOSIS — I99.8 ISCHEMIC REST PAIN OF LOWER EXTREMITY: ICD-10-CM

## 2023-05-25 DIAGNOSIS — M10.9 GOUT OF RIGHT FOOT, UNSPECIFIED CAUSE, UNSPECIFIED CHRONICITY: ICD-10-CM

## 2023-05-25 DIAGNOSIS — L97.511: ICD-10-CM

## 2023-05-25 PROBLEM — H10.9 CONJUNCTIVITIS, LEFT EYE: Status: ACTIVE | Noted: 2023-05-25

## 2023-05-25 PROBLEM — R63.6 PATIENT UNDERWEIGHT: Status: ACTIVE | Noted: 2023-05-25

## 2023-05-25 LAB
ANION GAP SERPL CALCULATED.3IONS-SCNC: 2 MMOL/L (ref 4–13)
APTT PPP: 29 SECONDS (ref 23–37)
BASOPHILS # BLD AUTO: 0.05 THOUSANDS/ÂΜL (ref 0–0.1)
BASOPHILS NFR BLD AUTO: 1 % (ref 0–1)
BUN SERPL-MCNC: 41 MG/DL (ref 5–25)
CALCIUM SERPL-MCNC: 9.6 MG/DL (ref 8.3–10.1)
CHLORIDE SERPL-SCNC: 106 MMOL/L (ref 96–108)
CO2 SERPL-SCNC: 27 MMOL/L (ref 21–32)
CREAT SERPL-MCNC: 1.76 MG/DL (ref 0.6–1.3)
EOSINOPHIL # BLD AUTO: 0.66 THOUSAND/ÂΜL (ref 0–0.61)
EOSINOPHIL NFR BLD AUTO: 6 % (ref 0–6)
ERYTHROCYTE [DISTWIDTH] IN BLOOD BY AUTOMATED COUNT: 12.4 % (ref 11.6–15.1)
GFR SERPL CREATININE-BSD FRML MDRD: 35 ML/MIN/1.73SQ M
GLUCOSE SERPL-MCNC: 94 MG/DL (ref 65–140)
HCT VFR BLD AUTO: 40.6 % (ref 36.5–49.3)
HGB BLD-MCNC: 13.4 G/DL (ref 12–17)
IMM GRANULOCYTES # BLD AUTO: 0.05 THOUSAND/UL (ref 0–0.2)
IMM GRANULOCYTES NFR BLD AUTO: 1 % (ref 0–2)
INR PPP: 1 (ref 0.84–1.19)
LYMPHOCYTES # BLD AUTO: 1.62 THOUSANDS/ÂΜL (ref 0.6–4.47)
LYMPHOCYTES NFR BLD AUTO: 15 % (ref 14–44)
MCH RBC QN AUTO: 29.6 PG (ref 26.8–34.3)
MCHC RBC AUTO-ENTMCNC: 33 G/DL (ref 31.4–37.4)
MCV RBC AUTO: 90 FL (ref 82–98)
MONOCYTES # BLD AUTO: 0.92 THOUSAND/ÂΜL (ref 0.17–1.22)
MONOCYTES NFR BLD AUTO: 9 % (ref 4–12)
NEUTROPHILS # BLD AUTO: 7.27 THOUSANDS/ÂΜL (ref 1.85–7.62)
NEUTS SEG NFR BLD AUTO: 68 % (ref 43–75)
NRBC BLD AUTO-RTO: 0 /100 WBCS
PLATELET # BLD AUTO: 323 THOUSANDS/UL (ref 149–390)
PMV BLD AUTO: 9 FL (ref 8.9–12.7)
POTASSIUM SERPL-SCNC: 4.4 MMOL/L (ref 3.5–5.3)
PROTHROMBIN TIME: 13.4 SECONDS (ref 11.6–14.5)
RBC # BLD AUTO: 4.52 MILLION/UL (ref 3.88–5.62)
SODIUM SERPL-SCNC: 135 MMOL/L (ref 135–147)
WBC # BLD AUTO: 10.57 THOUSAND/UL (ref 4.31–10.16)

## 2023-05-25 RX ORDER — HYDRALAZINE HYDROCHLORIDE 20 MG/ML
10 INJECTION INTRAMUSCULAR; INTRAVENOUS ONCE AS NEEDED
Status: COMPLETED | OUTPATIENT
Start: 2023-05-25 | End: 2023-05-26

## 2023-05-25 RX ORDER — ONDANSETRON 2 MG/ML
4 INJECTION INTRAMUSCULAR; INTRAVENOUS EVERY 4 HOURS PRN
Status: DISCONTINUED | OUTPATIENT
Start: 2023-05-25 | End: 2023-05-31 | Stop reason: HOSPADM

## 2023-05-25 RX ORDER — HEPARIN SODIUM 5000 [USP'U]/ML
5000 INJECTION, SOLUTION INTRAVENOUS; SUBCUTANEOUS EVERY 8 HOURS SCHEDULED
Status: DISCONTINUED | OUTPATIENT
Start: 2023-05-25 | End: 2023-05-31 | Stop reason: HOSPADM

## 2023-05-25 RX ORDER — CARVEDILOL 6.25 MG/1
6.25 TABLET ORAL 2 TIMES DAILY WITH MEALS
Status: DISCONTINUED | OUTPATIENT
Start: 2023-05-25 | End: 2023-05-26

## 2023-05-25 RX ORDER — ACETYLCYSTEINE 200 MG/ML
1200 SOLUTION ORAL; RESPIRATORY (INHALATION) 2 TIMES DAILY
Status: DISCONTINUED | OUTPATIENT
Start: 2023-05-25 | End: 2023-05-27

## 2023-05-25 RX ORDER — CLOPIDOGREL BISULFATE 75 MG/1
75 TABLET ORAL DAILY
Status: DISCONTINUED | OUTPATIENT
Start: 2023-05-25 | End: 2023-05-31 | Stop reason: HOSPADM

## 2023-05-25 RX ORDER — ALBUTEROL SULFATE 90 UG/1
2 AEROSOL, METERED RESPIRATORY (INHALATION) EVERY 6 HOURS PRN
Status: DISCONTINUED | OUTPATIENT
Start: 2023-05-25 | End: 2023-05-31 | Stop reason: HOSPADM

## 2023-05-25 RX ORDER — ISOSORBIDE DINITRATE 10 MG/1
10 TABLET ORAL 3 TIMES DAILY
Status: DISCONTINUED | OUTPATIENT
Start: 2023-05-25 | End: 2023-05-31 | Stop reason: HOSPADM

## 2023-05-25 RX ORDER — NEOMYCIN SULFATE, POLYMYXIN B SULFATE AND DEXAMETHASONE 3.5; 10000; 1 MG/ML; [USP'U]/ML; MG/ML
1 SUSPENSION/ DROPS OPHTHALMIC
Status: DISPENSED | OUTPATIENT
Start: 2023-05-25 | End: 2023-05-30

## 2023-05-25 RX ORDER — ZOLPIDEM TARTRATE 5 MG/1
5 TABLET ORAL
Status: DISCONTINUED | OUTPATIENT
Start: 2023-05-25 | End: 2023-05-31 | Stop reason: HOSPADM

## 2023-05-25 RX ORDER — HYDRALAZINE HYDROCHLORIDE 20 MG/ML
10 INJECTION INTRAMUSCULAR; INTRAVENOUS ONCE
Status: CANCELLED | OUTPATIENT
Start: 2023-05-25

## 2023-05-25 RX ORDER — ACETAMINOPHEN 325 MG/1
650 TABLET ORAL EVERY 6 HOURS PRN
Status: DISCONTINUED | OUTPATIENT
Start: 2023-05-25 | End: 2023-05-31 | Stop reason: HOSPADM

## 2023-05-25 RX ORDER — SODIUM CHLORIDE 9 MG/ML
50 INJECTION, SOLUTION INTRAVENOUS CONTINUOUS
Status: DISCONTINUED | OUTPATIENT
Start: 2023-05-26 | End: 2023-05-27

## 2023-05-25 RX ORDER — HYDRALAZINE HYDROCHLORIDE 10 MG/1
10 TABLET, FILM COATED ORAL EVERY 8 HOURS SCHEDULED
Status: DISCONTINUED | OUTPATIENT
Start: 2023-05-25 | End: 2023-05-31 | Stop reason: HOSPADM

## 2023-05-25 RX ORDER — HYDRALAZINE HYDROCHLORIDE 20 MG/ML
5 INJECTION INTRAMUSCULAR; INTRAVENOUS ONCE
Status: COMPLETED | OUTPATIENT
Start: 2023-05-25 | End: 2023-05-25

## 2023-05-25 RX ORDER — ATORVASTATIN CALCIUM 80 MG/1
80 TABLET, FILM COATED ORAL DAILY
Status: DISCONTINUED | OUTPATIENT
Start: 2023-05-25 | End: 2023-05-31 | Stop reason: HOSPADM

## 2023-05-25 RX ADMIN — CLOPIDOGREL BISULFATE 75 MG: 75 TABLET ORAL at 14:11

## 2023-05-25 RX ADMIN — HYDRALAZINE HYDROCHLORIDE 10 MG: 10 TABLET, FILM COATED ORAL at 14:11

## 2023-05-25 RX ADMIN — CARVEDILOL 6.25 MG: 6.25 TABLET, FILM COATED ORAL at 15:46

## 2023-05-25 RX ADMIN — ISOSORBIDE DINITRATE 10 MG: 10 TABLET ORAL at 15:46

## 2023-05-25 RX ADMIN — HYDRALAZINE HYDROCHLORIDE 5 MG: 20 INJECTION, SOLUTION INTRAMUSCULAR; INTRAVENOUS at 22:54

## 2023-05-25 RX ADMIN — HEPARIN SODIUM 5000 UNITS: 5000 INJECTION INTRAVENOUS; SUBCUTANEOUS at 21:08

## 2023-05-25 RX ADMIN — ATORVASTATIN CALCIUM 80 MG: 80 TABLET, FILM COATED ORAL at 14:11

## 2023-05-25 RX ADMIN — HEPARIN SODIUM 5000 UNITS: 5000 INJECTION INTRAVENOUS; SUBCUTANEOUS at 14:11

## 2023-05-25 RX ADMIN — ACETYLCYSTEINE 1200 MG: 200 INHALANT RESPIRATORY (INHALATION) at 17:49

## 2023-05-25 RX ADMIN — ISOSORBIDE DINITRATE 10 MG: 10 TABLET ORAL at 21:12

## 2023-05-25 RX ADMIN — NEOMYCIN SULFATE, POLYMYXIN B SULFATE AND DEXAMETHASONE 1 DROP: 3.5; 10000; 1 SUSPENSION OPHTHALMIC at 21:09

## 2023-05-25 RX ADMIN — HYDRALAZINE HYDROCHLORIDE 10 MG: 10 TABLET, FILM COATED ORAL at 21:12

## 2023-05-25 RX ADMIN — ZOLPIDEM TARTRATE 5 MG: 5 TABLET ORAL at 21:08

## 2023-05-25 NOTE — ASSESSMENT & PLAN NOTE
Background: History of hypertension CKD and PAD with carotid stenosis presents for anterior insufficiency of lower extremities  · Vascular planning angiogram tomorrow  · On clopidogrel for carotid disease

## 2023-05-25 NOTE — TELEMEDICINE
e-Consult ([facility])  - Interventional Radiology  James Reza [de-identified] y o  male MRN: [id number]  Unit/Bed#[facility] Encounter: [id number]          Interventional Radiology has been consulted to evaluate James Reza    We were consulted by vascular surgery concerning this patient with RLE extremity rest pain and foot wound       Inpatient Consult to IR  Consult performed by: NATE Mueller  Consult ordered by: Steve House PA-C        05/25/23    Assessment/Recommendation:     [de-identified]year old male with PAD, severe protein malnutirtion, CAD, COPD, CKD who presents from the vascular office where Dr Katherine Lagos recommended he come to the ER given his rest pain and right lateral foot wound  Arterial duplex from 5/17/2023 demonstrates a >75% stenosis of the right proximal SFA and occlusion of the mid SFA that reconstitutes distally  The distal AT artery appears to be occluded  AKUA is 0 22  Plan for IR RLE angio with intervention to help with rest pain and wound healing      - NPO after midnight  - plan for RLE angio with intervention   - may continue plavix  - rest of care per primary    11-20 minutes, >50% of the total time devoted to medical consultative verbal/EMR discussion between providers  Written report will be generated in the EMR  Thank you for allowing Interventional Radiology to participate in the care of James Reza  Please don't hesitate to call or TigerText us with any questions       [address] [place] [address] Marcella Whiting

## 2023-05-25 NOTE — ASSESSMENT & PLAN NOTE
· Unilateral eye erythema with irritation  · Denies any visual field deficits  · Will start neomycin/polymyxin eyedrops

## 2023-05-25 NOTE — PATIENT INSTRUCTIONS
Presents with recent history of pain discomfort in the right lower extremity with development of an ulceration with dry gangrene lateral foot  He does have significant peripheral arterial disease in the right lower extremity with no identifiable transmet or great toe pressures  Does get discomfort in his foot after his reclining and does stand up or hang his foot over the side of the bed for improvement  He has developed a right lateral foot ulceration with dry gangrene  Plan: After long discussion with the patient and his family we decided to recommend that he go to the emergency room at [place] [id number] for eventual admission and treatment of his severe ischemic rest pain and tissue loss

## 2023-05-25 NOTE — CONSULTS
[facility]  Consult  Name: Misael Bowman [de-identified] y o  male I MRN: [id number]  Unit/Bed#: [facility] 501[address]01 I Date of Admission: 5/25/2023   Date of Service: 5/25/2023 I Hospital Day: 0    Inpatient consult to Internal Medicine  Consult performed by: Jonathan Monte DO  Consult ordered by: Kayleen Perez PA-C        Assessment/Plan   * Atherosclerosis of artery of extremity with ulceration [facility])  Assessment & Plan  Background: History of hypertension CKD and PAD with carotid stenosis presents for anterior insufficiency of lower extremities  · Vascular planning angiogram tomorrow  · On clopidogrel for carotid disease  Patient underweight  Assessment & Plan  · Body mass index is 14 74 kg/m²  · Will add ensure    Conjunctivitis, left eye  Assessment & Plan  · Unilateral eye erythema with irritation  · Denies any visual field deficits  · Will start neomycin/polymyxin eyedrops    Asymptomatic carotid artery stenosis, bilateral  Assessment & Plan  · Continue clopidogrel    Stage 3b chronic kidney disease [facility])  Assessment & Plan  Lab Results   Component Value Date    CREATININE 1 76 (H) 05/25/2023    CREATININE 1 65 (H) 05/11/2023    CREATININE 1 59 (H) 02/19/2023    EGFR 35 05/25/2023    EGFR 38 05/11/2023    EGFR 40 02/19/2023     · Renal function appears to be at baseline  Nephrology consulted for periprocedure management    COPD (chronic obstructive pulmonary disease) ([facility] [address] )  Assessment & Plan  · Reported history of COPD on albuterol as needed  · No longer smoking    Essential hypertension  Assessment & Plan  · Continue carvedilol hydralazine and isosorbide      VTE Prophylaxis:   Moderate Risk (Score 3-4) - Pharmacological DVT Prophylaxis Ordered: heparin  Total Time Spent on Date of Encounter in care of patient:   This time was spent on one or more of the following: performing physical exam; counseling and coordination of care; obtaining or reviewing history; documenting in the medical record; reviewing/ordering tests, medications or procedures; communicating with other healthcare professionals and discussing with patient's family/caregivers  Collaboration of Care: Were Recommendations Directly Discussed with Primary Treatment Team? Yes    History of Present Illness:  Sophy Stafford is a [de-identified] y o  male who is originally admitted to the vascular service due to right leg pain with arterial insufficiency and foot wound  We are consulted for medical management of hypertension and possible conjunctivitis  The patient was seen in vascular office today and due to comorbidities was sent to the hospital for expedited arteriogram   He recently finished up a course of cephalexin for right foot wound  In the ED he also complained of drainage/erythema of left eye for the last several days  He otherwise denies any fevers or chills  He feels quite well  He has multiple comorbidities including COPD CKD hypertension and prior stenosis on clopidogrel  He is on room air and stopped smoking  Son at bedside to help provide history  Review of Systems:  Review of Systems   Constitutional: Negative for chills, diaphoresis and fever  HENT: Negative for facial swelling and trouble swallowing  Eyes: Positive for discharge, redness and itching  Negative for photophobia, pain and visual disturbance  Respiratory: Negative for shortness of breath  Cardiovascular: Negative for chest pain and palpitations  Gastrointestinal: Negative for abdominal distention, abdominal pain, diarrhea, nausea and vomiting  Genitourinary: Negative for dysuria, hematuria and urgency  Musculoskeletal: Positive for gait problem (Foot pain)  Negative for back pain and myalgias  Skin: Positive for wound (Foot)  Negative for rash  Neurological: Negative for facial asymmetry, speech difficulty and numbness  Psychiatric/Behavioral: The patient is not nervous/anxious      All other systems reviewed and are negative  Past Medical and Surgical History:   Past Medical History:   Diagnosis Date   • Abnormal thyroid function test     non specified /  LA    1/8/13     • Chronic kidney disease (CKD), stage III (moderate) ([facility])    • COPD (chronic obstructive pulmonary disease) ([facility])    • Eczema     LA   11/26/13      • Esophagitis    • Gout    • Hypertension    • PAD (peripheral artery disease) ([facility])    • Pyloric channel ulcer        Past Surgical History:   Procedure Laterality Date   • BACK SURGERY     • ESOPHAGOGASTRODUODENOSCOPY N/A 12/1/2016    Procedure: ESOPHAGOGASTRODUODENOSCOPY (EGD); Surgeon: Mary Mina MD;  Location: [facility]; Service:    • EYE SURGERY         Meds/Allergies:  PTA meds:   Prior to Admission Medications   Prescriptions Last Dose Informant Patient Reported? Taking?    albuterol (PROVENTIL HFA,VENTOLIN HFA) 90 mcg/act inhaler  Self No No   Sig: Inhale 2 puffs every 6 (six) hours as needed for wheezing or shortness of breath   atorvastatin (LIPITOR) 80 mg tablet  Self No No   Sig: Take 1 tablet (80 mg total) by mouth daily   carvedilol (COREG) 6 25 mg tablet  Self No No   Sig: Take 1 tablet (6 25 mg total) by mouth 2 (two) times a day with meals   cephalexin (KEFLEX) 500 mg capsule   No No   Sig: Take 1 capsule (500 mg total) by mouth every 12 (twelve) hours for 7 days   cholecalciferol (VITAMIN D3) 1,000 units tablet  Self Yes No   Sig: Take 1,000 Units by mouth daily   clopidogrel (PLAVIX) 75 mg tablet  Self No No   Sig: Take 1 tablet (75 mg total) by mouth daily   hydrALAZINE (APRESOLINE) 10 mg tablet  Self No No   Sig: Take 1 tablet (10 mg total) by mouth every 8 (eight) hours   isosorbide dinitrate (ISORDIL) 10 mg tablet  Self No No   Sig: Take 1 tablet (10 mg total) by mouth 3 (three) times a day   zolpidem (AMBIEN) 5 mg tablet  Self No No   Sig: Take 1 tablet (5 mg total) by mouth daily at bedtime as needed for sleep      Facility-Administered Medications: None       Allergies: "  Allergies   Allergen Reactions   • Norvasc [Amlodipine]        Social History:  Marital Status:   Substance Use History:   Social History     Substance and Sexual Activity   Alcohol Use Not Currently     Social History     Tobacco Use   Smoking Status Former   • Packs/day: 0 50   • Years: 67 00   • Total pack years: 33 50   • Types: Cigarettes   • Start date:    • Quit date: 2023   • Years since quittin 1   Smokeless Tobacco Never   Tobacco Comments    current some day smoker: a couple cigs a week     Social History     Substance and Sexual Activity   Drug Use No       Family History:  Family History   Problem Relation Age of Onset   • Other Mother         Dec in her 66's (gangrene foot)   • Heart failure Mother    • Diabetes type II Mother    • Peripheral vascular disease Mother    • Rectal cancer Father    • Other Maternal Aunt    • Other Family         situs inversus / Dec 90 yo       Physical Exam:   Vitals:   Blood Pressure: 168/91 (23 1748)  Pulse: 85 (23 1748)  Temperature: (!) 97 4 °F (36 3 °C) (23 1653)  Temp Source: Temporal (23 1032)  Respirations: 21 (23 1725)  Height: 5' 7\" (170 2 cm) (23 1649)  Weight - Scale: 42 7 kg (94 lb 2 2 oz) (23 1649)  SpO2: 97 % (23 1748)    Physical Exam  Vitals reviewed  Constitutional:       General: He is not in acute distress  Appearance: Normal appearance  He is underweight  HENT:      Head: Atraumatic  Eyes:      Conjunctiva/sclera:      Left eye: Left conjunctiva is injected  Exudate present  Cardiovascular:      Rate and Rhythm: Regular rhythm  Heart sounds: Normal heart sounds  Pulmonary:      Effort: Pulmonary effort is normal       Breath sounds: Decreased breath sounds present  No wheezing  Abdominal:      General: Bowel sounds are normal       Palpations: Abdomen is soft  Tenderness: There is no abdominal tenderness  There is no rebound     Musculoskeletal:         " General: No swelling or tenderness  Cervical back: Normal range of motion  Skin:     General: Skin is warm and dry  Neurological:      General: No focal deficit present  Mental Status: He is alert and oriented to person, place, and time  Cranial Nerves: No cranial nerve deficit  Psychiatric:         Mood and Affect: Mood normal          Additional Data:   Lab Results:    Results from last 7 days   Lab Units 05/25/23  1142   EOS PCT % 6   HEMATOCRIT % 40 6   HEMOGLOBIN g/dL 13 4   LYMPHS PCT % 15   MONOS PCT % 9   NEUTROS PCT % 68   PLATELETS Thousands/uL 323   WBC Thousand/uL 10 57*     Results from last 7 days   Lab Units 05/25/23  1142   ANION GAP mmol/L 2*   BUN mg/dL 41*   CALCIUM mg/dL 9 6   CHLORIDE mmol/L 106   CO2 mmol/L 27   CREATININE mg/dL 1 76*   GLUCOSE RANDOM mg/dL 94   POTASSIUM mmol/L 4 4   SODIUM mmol/L 135     Results from last 7 days   Lab Units 05/25/23  1142   INR  1 00         Lab Results   Component Value Date/Time    HGBA1C 5 4 02/19/2023 05:26 AM               Imaging: Personally reviewed the following imaging: xray(s)  IR lower extremity angiogram    (Results Pending)   XR foot 3+ vw right    (Results Pending)       EKG, Pathology, and Other Studies Reviewed on Admission:     ** Please Note: This note may have been constructed using a voice recognition system   **

## 2023-05-25 NOTE — ASSESSMENT & PLAN NOTE
Presents with recent history of pain discomfort in the right lower extremity with development of an ulceration with dry gangrene lateral foot  He does have significant peripheral arterial disease in the right lower extremity with no identifiable transmet or great toe pressures  Does get discomfort in his foot after his reclining and does stand up or hang his foot over the side of the bed for improvement  He has developed a right lateral foot ulceration with dry gangrene  Plan: After long discussion with the patient and his family we decided to recommend that he go to the emergency room at [place] [id number] for eventual admission and treatment of his severe ischemic rest pain and tissue loss

## 2023-05-25 NOTE — PROGRESS NOTES
Assessment/Plan:    Presents with recent history of pain discomfort in the right lower extremity with development of an ulceration with dry gangrene lateral foot  He does have significant peripheral arterial disease in the right lower extremity with no identifiable transmet or great toe pressures  Does get discomfort in his foot after his reclining and does stand up or hang his foot over the side of the bed for improvement  He has developed a right lateral foot ulceration with dry gangrene  Plan: After long discussion with the patient and his family we decided to recommend that he go to the emergency room at [place] [address] for eventual admission and treatment of his severe ischemic rest pain and tissue loss  Diagnoses and all orders for this visit:    Ischemic rest pain of lower extremity    PVD (peripheral vascular disease) with claudication ([facility] [address] )  Comments:  needs to see vascular for f-up for PVD and right foot pain at rest and claudication  Orders:  -     Ambulatory Referral to Vascular Surgery    [facility]        Subjective:      Patient ID: Mynor Squires is a [de-identified] y o  male  Pt is an ED follow-up on 5/11/23 for R foot pain- presents today for eval of rest pain  Pt reports R foot pain @ night and w/ walking  HPI    The following portions of the patient's history were reviewed and updated as appropriate: allergies, current medications, past family history, past medical history, past social history, past surgical history and problem list     Review of Systems   Constitutional: Negative  HENT: Negative  Eyes: Negative  Respiratory: Negative  Cardiovascular: Negative  Gastrointestinal: Negative  Endocrine: Negative  Genitourinary: Negative  Musculoskeletal: Positive for gait problem  Skin: Positive for wound (R foot)  Allergic/Immunologic: Negative  Hematological: Negative      Psychiatric/Behavioral: "Negative  Objective: There were no vitals taken for this visit  Physical Exam  Dependent rubor right foot, palpable femoral pulses bilaterally no pulses below the inguinal ligaments bilaterally  I have reviewed and made appropriate changes to the review of systems input by the medical assistant  Vitals:    05/25/23 0855   BP: 138/80   BP Location: Left arm   Patient Position: Sitting   Cuff Size: Standard   Pulse: 74   Weight: 45 4 kg (100 lb)   Height: 5' 5\" (1 651 m)       Patient Active Problem List   Diagnosis   • Essential hypertension   • Gout   • COPD (chronic obstructive pulmonary disease) ([facility])   • Esophagitis   • Tobacco abuse   • Insomnia   • Stage 3b chronic kidney disease ([facility])   • Secondary hyperparathyroidism of renal origin ([facility] [address] )   • Hyperuricemia   • CKD (chronic kidney disease)   • Acute respiratory failure with hypoxia ([facility])   • Severe protein-calorie malnutrition ([facility])   • Vertigo   • Hypertensive urgency   • 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   • Asymptomatic carotid artery stenosis, bilateral   • Peripheral arterial disease ([facility])   • Preop pulmonary/respiratory exam   • Right foot pain   • Atherosclerosis of artery of extremity with ulceration ([facility])   • Ischemic rest pain of lower extremity       Past Surgical History:   Procedure Laterality Date   • BACK SURGERY     • ESOPHAGOGASTRODUODENOSCOPY N/A 12/1/2016    Procedure: ESOPHAGOGASTRODUODENOSCOPY (EGD); Surgeon: Renita Castillo MD;  Location: [facility];   Service:    • EYE SURGERY         Family History   Problem Relation Age of Onset   • Other Mother         Dec in her 66's (gangrene foot)   • Heart failure Mother    • Diabetes type II Mother    • Peripheral vascular disease Mother    • Rectal cancer Father    • Other Maternal Aunt    • Other Family         situs inversus / Dec 92 yo       Social History     Socioeconomic History   • Marital status:      Spouse name: Not on " file   • Number of children: 3   • Years of education: Not on file   • Highest education level: Not on file   Occupational History     Comment: unemployed   Tobacco Use   • Smoking status: Former     Packs/day: 0 50     Years: 67 00     Total pack years: 33 50     Types: Cigarettes     Start date:      Quit date: 2023     Years since quittin 1   • Smokeless tobacco: Never   • Tobacco comments:     current some day smoker: a couple cigs a week   Vaping Use   • Vaping Use: Never used   Substance and Sexual Activity   • Alcohol use: Not Currently   • Drug use: No   • Sexual activity: Not on file   Other Topics Concern   • Not on file   Social History Narrative   • Not on file     Social Determinants of Health     Financial Resource Strain: Not on file   Food Insecurity: No Food Insecurity (2022)    Hunger Vital Sign    • Worried About Running Out of Food in the Last Year: Never true    • Ran Out of Food in the Last Year: Never true   Transportation Needs: No Transportation Needs (2022)    PRAPARE - Transportation    • Lack of Transportation (Medical): No    • Lack of Transportation (Non-Medical):  No   Physical Activity: Not on file   Stress: Not on file   Social Connections: Not on file   Intimate Partner Violence: Not on file   Housing Stability: Unknown (2022)    Housing Stability Vital Sign    • Unable to Pay for Housing in the Last Year: No    • Number of Places Lived in the Last Year: Not on file    • Unstable Housing in the Last Year: No       Allergies   Allergen Reactions   • Norvasc [Amlodipine]          Current Outpatient Medications:   •  albuterol (PROVENTIL HFA,VENTOLIN HFA) 90 mcg/act inhaler, Inhale 2 puffs every 6 (six) hours as needed for wheezing or shortness of breath, Disp: 18 g, Rfl: 2  •  atorvastatin (LIPITOR) 80 mg tablet, Take 1 tablet (80 mg total) by mouth daily, Disp: 30 tablet, Rfl: 3  •  carvedilol (COREG) 6 25 mg tablet, Take 1 tablet (6 25 mg total) by mouth 2 (two) times a day with meals, Disp: 60 tablet, Rfl: 2  •  cholecalciferol (VITAMIN D3) 1,000 units tablet, Take 1,000 Units by mouth daily, Disp: , Rfl:   •  clopidogrel (PLAVIX) 75 mg tablet, Take 1 tablet (75 mg total) by mouth daily, Disp: 90 tablet, Rfl: 3  •  hydrALAZINE (APRESOLINE) 10 mg tablet, Take 1 tablet (10 mg total) by mouth every 8 (eight) hours, Disp: 90 tablet, Rfl: 2  •  isosorbide dinitrate (ISORDIL) 10 mg tablet, Take 1 tablet (10 mg total) by mouth 3 (three) times a day, Disp: 90 tablet, Rfl: 2  •  zolpidem (AMBIEN) 5 mg tablet, Take 1 tablet (5 mg total) by mouth daily at bedtime as needed for sleep, Disp: 90 tablet, Rfl: 0  •  colchicine (COLCRYS) 0 6 mg tablet, Take 1 tablet (0 6 mg total) by mouth daily for 3 days (Patient not taking: Reported on 5/18/2023), Disp: 3 tablet, Rfl: 0

## 2023-05-25 NOTE — CONSULTS
Consultation    Nephrology   Dylan Sims [de-identified] y o  male MRN: [id number]  Unit/Bed#Sandy Shrestha Encounter: [id number]    History of Present Illness   Physician Requesting Consult: Gale Dillon MD  Reason for Consult : -Perioperative optimization to reduce incidence for acute kidney injury    ASSESSMENT/PLAN:   [de-identified] y o   male with pmh of peripheral chill disease, CAD, hypertension, hyperlipidemia, protein calorie malnutrition and CKD stage III initially presented to vascular center with rest pain and foot wound and subsequently transferred over to ED for expedited arteriogram   Nephrology has been consulted for perioperative optimization to reduce incidence for acute kidney injury as patient is likely for angiogram tomorrow  Perioperative optimization to reduce incidence for acute kidney injury/CKD stage IIIb:  At risk for CEE multifactorial most likely secondary to NITHIN due to planned contrast exposure on 5/26/2023 with angiogram  After review of records In [facility] as well as Care everywhere patient has a baseline Creatinine of 1 5-1 8 mg/dL  Admission creatinine of 1 76 mg/dL on 5/25/2023  Creatinine today is at 1 76 mg/dL  patient is stable and cleared from renal standpoint to undergo planned  angiogram as long as renal parameters are stable at or below baseline on 5/26/2023  Minimize IV contrast exposure as much as possible both in terms of the amount of dye used and in the frequency of studies performed  Please use iso-osmolar or low osmolar contrast material   We will start patient on IV fluids of normal saline at 50 cc an hour starting midnight and to continue for 4 hours post procedure then DC  Recommend giving mucomyst 1200mg po bid for two doses pre procedure and two doses post procedure for total of 4 doses    Please check the renal function approxinately 48 hours after dye exposure to assess renal response to contrast    All risks and benefits of the procedure from a renal perspective were discussed with the patient in depth including risk for acute kidney injury due to NITHIN as well as probability of needing renal replacement therapy for severe acute kidney injury if it occurs and the patient voiced understanding  All questions were answered  check BMP in a m  Optimize hemodynamic status to avoid delay in renal recovery  Avoid nephrotoxins, adjust meds to appropriate GFR  Strict I/O  Daily weights  Urinary retention protocol if patient does not have a Eduardo  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 plus age-related nephron loss  will need to set up patient for follow up with Nephrology as an outpatient post hospitalization  Outpatient nephrology follow-up with Dr Anup Desir    Acid-base electrolytes:    Electrolytes:    Stable    Acid-base:    Most recent bicarb stable 27    H/H/anemia:  most recent hemoglobin at 13 4 g/dL  maintain hemoglobin greater than 8 grams/deciliter    Blood pressure/hypertension:  home medications: Coreg 6 25 mg p o  twice daily, hydralazine 10 mg p o  every 8, Isordil 10 mg p o  3 times daily  current medications: Coreg 6 25 mg p o  twice daily, hydralazine 10 mg p o  every 8, Isordil 10 mg p o  3 times daily  recommendations: No changes for now may give as needed diuretics with worsening shortness of breath from volume overload  Optimize hemodynamics  Maintain MAP > 65mmHg  Avoid BP fluctuations  Other medical problems:  Proteinuria: Most recent UA with no blood or protein  Peripheral arterial disease/lower extremity foot wound:  Management per primary team   Follow-up with vascular and IR plan for angiogram tomorrow  See above with regards to clearance  Thanks for the consult  Will continue to follow  Please call with questions/ concerns    Above-mentioned orders and Plan in terms of initiation of IV fluids as above and clearance for angiogram were discussed with the team in depth and they agree  1 Bassem Sands MD, [facility], 5/25/2023, 2:05 PM          HISTORY OF PRESENT ILLNESS:   Misael Bowman is a [de-identified] y o   male with pmh of peripheral chill disease, CAD, hypertension, hyperlipidemia, protein calorie malnutrition and CKD stage III initially presented to vascular center with rest pain and foot wound and subsequently transferred over to ED for expedited arteriogram   Nephrology has been consulted for perioperative optimization to reduce incidence for acute kidney injury as patient is likely for angiogram tomorrow  Patient seen and examined in the ED  As an outpatient follows up with Dr Oren Crandall for nephrology baseline creatinine 1 5 to 1 8 mg/dL  No recent NSAID use no issues with edema thankful for the information  No complaints other than lower extremity wound  History obtained from chart review and the patient    Inpatient consult to Nephrology  Consult performed by: [address], MD  Consult ordered by: Kayleen Perez PA-C          Review of Systems   Constitutional: Negative for chills and fatigue  HENT: Negative for congestion  Respiratory: Negative for cough and shortness of breath  Cardiovascular: Negative for leg swelling  Gastrointestinal: Negative for abdominal pain, diarrhea and nausea  Genitourinary: Negative for dysuria  Musculoskeletal: Negative for back pain  Neurological: Negative for headaches  Psychiatric/Behavioral: Negative for agitation and confusion  All other systems reviewed and are negative         Historical Information   Patient Active Problem List   Diagnosis   • Essential hypertension   • Gout   • COPD (chronic obstructive pulmonary disease) ([facility])   • Esophagitis   • Tobacco abuse   • Insomnia   • Stage 3b chronic kidney disease ([facility] [address] )   • Secondary hyperparathyroidism of renal origin ([facility] [address] )   • Hyperuricemia   • CKD (chronic kidney disease)   • Acute respiratory failure with hypoxia ([facility])   • Severe protein-calorie malnutrition ([facility] [address] )   • Vertigo   • Hypertensive urgency   • 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   • Asymptomatic carotid artery stenosis, bilateral   • Peripheral arterial disease ([facility])   • Preop pulmonary/respiratory exam   • Right foot pain   • Atherosclerosis of artery of extremity with ulceration ([facility])   • Ischemic rest pain of lower extremity     Past Medical History:   Diagnosis Date   • Abnormal thyroid function test     non specified /  [place]    13     • Chronic kidney disease    • COPD (chronic obstructive pulmonary disease) ([facility])    • 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 3/22/2023   • Eczema     [place]   13      • Esophagitis    • Gout    • Hypertension    • Pyloric channel ulcer      Past Surgical History:   Procedure Laterality Date   • BACK SURGERY     • ESOPHAGOGASTRODUODENOSCOPY N/A 2016    Procedure: ESOPHAGOGASTRODUODENOSCOPY (EGD); Surgeon: Sarah Khan MD;  Location: [facility];   Service:    • EYE SURGERY       Social History   Social History     Substance and Sexual Activity   Alcohol Use Not Currently     Social History     Substance and Sexual Activity   Drug Use No     Social History     Tobacco Use   Smoking Status Former   • Packs/day: 0 50   • Years: 67 00   • Total pack years: 33 50   • Types: Cigarettes   • Start date:    • Quit date: 2023   • Years since quittin 1   Smokeless Tobacco Never   Tobacco Comments    current some day smoker: a couple cigs a week     Family History   Problem Relation Age of Onset   • Other Mother         Dec in her 66's (gangrene foot)   • Heart failure Mother    • Diabetes type II Mother    • Peripheral vascular disease Mother    • Rectal cancer Father    • Other Maternal Aunt    • Other Family         situs inversus / Dec 90 yo       Meds/Allergies   current meds:   Current Facility-Administered Medications   Medication Dose Route Frequency   • acetylcysteine (MUCOMYST) 200 mg/mL oral solution 1,200 mg  1,200 mg Oral BID   • albuterol (PROVENTIL HFA,VENTOLIN HFA) inhaler 2 puff  2 puff Inhalation Q6H PRN   • atorvastatin (LIPITOR) tablet 80 mg  80 mg Oral Daily   • carvedilol (COREG) tablet 6 25 mg  6 25 mg Oral BID With Meals   • clopidogrel (PLAVIX) tablet 75 mg  75 mg Oral Daily   • heparin (porcine) subcutaneous injection 5,000 Units  5,000 Units Subcutaneous Q8H [address]   • hydrALAZINE (APRESOLINE) tablet 10 mg  10 mg Oral Q8H [address]   • isosorbide dinitrate (ISORDIL) tablet 10 mg  10 mg Oral TID   • [START ON 5/26/2023] sodium chloride 0 9 % infusion  50 mL/hr Intravenous Continuous   • zolpidem (AMBIEN) tablet 5 mg  5 mg Oral HS PRN    and PTA meds:   Prior to Admission Medications   Prescriptions Last Dose Informant Patient Reported? Taking?    albuterol (PROVENTIL HFA,VENTOLIN HFA) 90 mcg/act inhaler  Self No No   Sig: Inhale 2 puffs every 6 (six) hours as needed for wheezing or shortness of breath   atorvastatin (LIPITOR) 80 mg tablet  Self No No   Sig: Take 1 tablet (80 mg total) by mouth daily   carvedilol (COREG) 6 25 mg tablet  Self No No   Sig: Take 1 tablet (6 25 mg total) by mouth 2 (two) times a day with meals   cephalexin (KEFLEX) 500 mg capsule   No No   Sig: Take 1 capsule (500 mg total) by mouth every 12 (twelve) hours for 7 days   cholecalciferol (VITAMIN D3) 1,000 units tablet  Self Yes No   Sig: Take 1,000 Units by mouth daily   clopidogrel (PLAVIX) 75 mg tablet  Self No No   Sig: Take 1 tablet (75 mg total) by mouth daily   colchicine (COLCRYS) 0 6 mg tablet   No No   Sig: Take 1 tablet (0 6 mg total) by mouth daily for 3 days   Patient not taking: Reported on 5/18/2023   hydrALAZINE (APRESOLINE) 10 mg tablet  Self No No   Sig: Take 1 tablet (10 mg total) by mouth every 8 (eight) hours   isosorbide dinitrate (ISORDIL) 10 mg tablet  Self No No   Sig: Take 1 tablet (10 mg total) by mouth 3 (three) times a day   zolpidem (AMBIEN) 5 mg tablet  Self No No   Sig: Take 1 tablet (5 mg total) by mouth daily at bedtime as needed for sleep      Facility-Administered Medications: None       Scheduled Meds:  Current Facility-Administered Medications   Medication Dose Route Frequency Provider Last Rate   • albuterol  2 puff Inhalation Q6H PRN Emy Gaming PA-C     • atorvastatin  80 mg Oral Daily Emy Gaming PA-C     • carvedilol  6 25 mg Oral BID With Meals Eym Gaming PA-C     • clopidogrel  75 mg Oral Daily mEy Gaming PA-C     • heparin (porcine)  5,000 Units Subcutaneous [facility] Margotkierra MarteHerchris Machuca PA-C     • hydrALAZINE  10 mg Oral Q8H [address] [place]     • isosorbide dinitrate  10 mg Oral TID Emy Gaming PA-C     • zolpidem  5 mg Oral HS PRN Emy Gaming PA-C         PRN Meds: •  albuterol  •  zolpidem    Continuous Infusions: Allergies   Allergen Reactions   • Norvasc [Amlodipine]          Invasive Devices: Invasive Devices     Peripheral Intravenous Line  Duration           Peripheral IV 23 Left Antecubital <1 day                  PHYSICAL EXAM  BP (!) 206/96 (BP Location: Right arm)   Pulse 79   Temp 97 8 °F (36 6 °C) (Temporal)   Resp 18   SpO2 98%   Temp (24hrs), Av 8 °F (36 6 °C), Min:97 8 °F (36 6 °C), Max:97 8 °F (36 6 °C)      No intake or output data in the 24 hours ending 23 1405    No intake/output data recorded  Current Weight:    First Weight:    Physical Exam  Vitals and nursing note reviewed  Constitutional:       General: He is not in acute distress  Appearance: Normal appearance  He is normal weight  He is ill-appearing  He is not toxic-appearing or diaphoretic  HENT:      Head: Normocephalic and atraumatic  Mouth/Throat:      Mouth: Mucous membranes are dry  Pharynx: Oropharynx is clear  No oropharyngeal exudate  Eyes:      General: No scleral icterus  Conjunctiva/sclera: Conjunctivae normal    Cardiovascular:      Rate and Rhythm: Normal rate        Heart sounds: Normal heart "sounds  No friction rub  Pulmonary:      Effort: No respiratory distress  Breath sounds: Normal breath sounds  No stridor  Abdominal:      General: There is no distension  Palpations: Abdomen is soft  Tenderness: There is no abdominal tenderness  Musculoskeletal:         General: No swelling  Cervical back: Normal range of motion  No rigidity  Comments: Right lower extremity wound   Skin:     General: Skin is warm  Coloration: Skin is not jaundiced  Neurological:      General: No focal deficit present  Mental Status: He is alert and oriented to person, place, and time  Psychiatric:         Mood and Affect: Mood normal          Behavior: Behavior normal            LABORATORY:    Results from last 7 days   Lab Units 05/25/23  1142   BUN mg/dL 41*   CALCIUM mg/dL 9 6   CHLORIDE mmol/L 106   CO2 mmol/L 27   CREATININE mg/dL 1 76*   HEMATOCRIT % 40 6   HEMOGLOBIN g/dL 13 4   POTASSIUM mmol/L 4 4   PLATELETS Thousands/uL 323   WBC Thousand/uL 10 57*      rest all reviewed    RADIOLOGY:  No orders to display     Rest all reviewed    Portions of the record may have been created with voice recognition software  Occasional wrong word or \"sound a like\" substitutions may have occurred due to the inherent limitations of voice recognition software  Read the chart carefully and recognize, using context, where substitutions have occurred  If you have any questions, please contact the dictating provider      "

## 2023-05-25 NOTE — ED PROVIDER NOTES
History  Chief Complaint   Patient presents with   • Foot Pain     Pt c/o right foot pain for the past couple months  Pt states he was sent in by doctor for an angiogram and to be pre-admitted for procedure  Pt      HPI     [de-identified] male presents for evaluation of right foot ischemic pain  The patient [place] [facility] [place] due to right foot pain that seem to be worse with ambulation or movement  Determined to have likely peripheral vascular disease  Already has known 75% carotid stenosis or blockage  He had an arterial duplex on 17 May found to have an AKUA of 0 22 with a SFA occlusion with distal reconstitution  He went to the vascular surgeon today and was sent in from there for evaluation and angiogram   Patient has mild pain at rest   Has been ongoing for a few weeks  He has developed an ulcer on the lateral portion of the right foot  Apparently was concern for infection at some point someone put him on Keflex  There is no active infection right now is not consistent infection  On exam his foot is slightly cooler on the right than on the left  He has dopplerable DP and PT  neurovascular intact extensor hallux longus and flexor hallux longus intact  No proximal pain or tenderness  Son is at the bedside and provides some of history otherwise most of the history by chart review and the patient  Assessment and plan: Concern for ischemia secondary to peripheral arterial disease  Will discuss with vascular surgery send basic labs may need anticoagulation and subsequent angiogram depending on availability    Prior to Admission Medications   Prescriptions Last Dose Informant Patient Reported? Taking?    albuterol (PROVENTIL HFA,VENTOLIN HFA) 90 mcg/act inhaler  Self No No   Sig: Inhale 2 puffs every 6 (six) hours as needed for wheezing or shortness of breath   atorvastatin (LIPITOR) 80 mg tablet  Self No No   Sig: Take 1 tablet (80 mg total) by mouth daily carvedilol (COREG) 6 25 mg tablet  Self No No   Sig: Take 1 tablet (6 25 mg total) by mouth 2 (two) times a day with meals   cephalexin (KEFLEX) 500 mg capsule   No No   Sig: Take 1 capsule (500 mg total) by mouth every 12 (twelve) hours for 7 days   cholecalciferol (VITAMIN D3) 1,000 units tablet  Self Yes No   Sig: Take 1,000 Units by mouth daily   clopidogrel (PLAVIX) 75 mg tablet  Self No No   Sig: Take 1 tablet (75 mg total) by mouth daily   colchicine (COLCRYS) 0 6 mg tablet   No No   Sig: Take 1 tablet (0 6 mg total) by mouth daily for 3 days   Patient not taking: Reported on 5/18/2023   hydrALAZINE (APRESOLINE) 10 mg tablet  Self No No   Sig: Take 1 tablet (10 mg total) by mouth every 8 (eight) hours   isosorbide dinitrate (ISORDIL) 10 mg tablet  Self No No   Sig: Take 1 tablet (10 mg total) by mouth 3 (three) times a day   zolpidem (AMBIEN) 5 mg tablet  Self No No   Sig: Take 1 tablet (5 mg total) by mouth daily at bedtime as needed for sleep      Facility-Administered Medications: None       Past Medical History:   Diagnosis Date   • Abnormal thyroid function test     non specified /  LA    1/8/13     • Chronic kidney disease    • COPD (chronic obstructive pulmonary disease) (HCC)    • 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 3/22/2023   • Eczema     LA   11/26/13      • Esophagitis    • Gout    • Hypertension    • Pyloric channel ulcer        Past Surgical History:   Procedure Laterality Date   • BACK SURGERY     • ESOPHAGOGASTRODUODENOSCOPY N/A 12/1/2016    Procedure: ESOPHAGOGASTRODUODENOSCOPY (EGD); Surgeon: Bhavna Chester MD;  Location: [facility];   Service:    • EYE SURGERY         Family History   Problem Relation Age of Onset   • Other Mother         Dec in her 66's (gangrene foot)   • Heart failure Mother    • Diabetes type II Mother    • Peripheral vascular disease Mother    • Rectal cancer Father    • Other Maternal Aunt    • Other Family         situs inversus / Dec 81 yo     I have reviewed and agree with the history as documented  E-Cigarette/Vaping   • E-Cigarette Use Never User      E-Cigarette/Vaping Substances   • Nicotine No    • THC No    • CBD No    • Flavoring No      Social History     Tobacco Use   • Smoking status: Former     Packs/day: 0 50     Years: 67 00     Total pack years: 33 50     Types: Cigarettes     Start date:      Quit date: 2023     Years since quittin 1   • Smokeless tobacco: Never   • Tobacco comments:     current some day smoker: a couple cigs a week   Vaping Use   • Vaping Use: Never used   Substance Use Topics   • Alcohol use: Not Currently   • Drug use: No       Review of Systems   Constitutional: Negative for chills, fatigue and fever  Eyes: Negative for photophobia and visual disturbance  Respiratory: Negative for cough and shortness of breath  Cardiovascular: Negative for chest pain, palpitations and leg swelling  Gastrointestinal: Negative for diarrhea, nausea and vomiting  Endocrine: Negative for polydipsia and polyuria  Genitourinary: Negative for decreased urine volume, difficulty urinating, dysuria and frequency  Musculoskeletal: Positive for myalgias  Negative for back pain, neck pain and neck stiffness  Skin: Negative for color change and rash  Allergic/Immunologic: Negative for environmental allergies and immunocompromised state  Neurological: Negative for dizziness and headaches  Hematological: Negative for adenopathy  Does not bruise/bleed easily  Psychiatric/Behavioral: Negative for dysphoric mood  The patient is not nervous/anxious  Physical Exam  Physical Exam  Vitals and nursing note reviewed  Constitutional:       Appearance: He is well-developed  HENT:      Head: Normocephalic and atraumatic        Right Ear: External ear normal       Left Ear: External ear normal    Eyes:      Conjunctiva/sclera: Conjunctivae normal       Pupils: Pupils are equal, round, and reactive to light    Neck:      Thyroid: No thyromegaly  Vascular: No JVD  Cardiovascular:      Rate and Rhythm: Normal rate and regular rhythm  Heart sounds: Normal heart sounds  No murmur heard  No friction rub  No gallop  Pulmonary:      Effort: Pulmonary effort is normal  No respiratory distress  Breath sounds: Normal breath sounds  No wheezing or rales  Abdominal:      General: Bowel sounds are normal  There is no distension  Palpations: Abdomen is soft  Tenderness: There is no guarding or rebound  Musculoskeletal:         General: Normal range of motion  Cervical back: Normal range of motion and neck supple  Lymphadenopathy:      Cervical: No cervical adenopathy  Skin:     General: Skin is warm  Findings: Lesion present  Comments: Right lateral foot necrotic ulcer   Neurological:      Mental Status: He is alert and oriented to person, place, and time  Cranial Nerves: No cranial nerve deficit     Psychiatric:         Behavior: Behavior normal          Vital Signs  ED Triage Vitals   Temperature Pulse Respirations Blood Pressure SpO2   05/25/23 1032 05/25/23 1032 05/25/23 1032 05/25/23 1033 05/25/23 1032   97 8 °F (36 6 °C) 84 18 (!) 217/103 100 %      Temp Source Heart Rate Source Patient Position - Orthostatic VS BP Location FiO2 (%)   05/25/23 1032 05/25/23 1032 05/25/23 1033 05/25/23 1033 --   Temporal Monitor Lying Right arm       Pain Score       05/25/23 1032       No Pain           Vitals:    05/25/23 1230 05/25/23 1411 05/25/23 1415 05/25/23 1548   BP: (!) 206/96 167/85 167/85 (!) 201/89   Pulse: 79  91 83   Patient Position - Orthostatic VS:             Visual Acuity  Visual Acuity    Flowsheet Row Most Recent Value   L Pupil Size (mm) 2   R Pupil Size (mm) 2          ED Medications  Medications   albuterol (PROVENTIL HFA,VENTOLIN HFA) inhaler 2 puff (has no administration in time range)   atorvastatin (LIPITOR) tablet 80 mg (80 mg Oral Given 5/25/23 1411)   carvedilol (COREG) tablet 6 25 mg (6 25 mg Oral Given 5/25/23 1546)   clopidogrel (PLAVIX) tablet 75 mg (75 mg Oral Given 5/25/23 1411)   hydrALAZINE (APRESOLINE) tablet 10 mg (10 mg Oral Given 5/25/23 1411)   isosorbide dinitrate (ISORDIL) tablet 10 mg (10 mg Oral Given 5/25/23 1546)   zolpidem (AMBIEN) tablet 5 mg (has no administration in time range)   heparin (porcine) subcutaneous injection 5,000 Units (5,000 Units Subcutaneous Given 5/25/23 1411)   acetylcysteine (MUCOMYST) 200 mg/mL oral solution 1,200 mg (has no administration in time range)   sodium chloride 0 9 % infusion (has no administration in time range)       Diagnostic Studies  Results Reviewed     Procedure Component Value Units Date/Time    Protime-INR [[id number]]  (Normal) Collected: 05/25/23 1142    Lab Status: Final result Specimen: Blood from Arm, Left Updated: 05/25/23 1224     Protime 13 4 seconds      INR 1 00    APTT [[id number]]  (Normal) Collected: 05/25/23 1142    Lab Status: Final result Specimen: Blood from Arm, Left Updated: 05/25/23 1224     PTT 29 seconds     Basic metabolic panel [[id number]]  (Abnormal) Collected: 05/25/23 1142    Lab Status: Final result Specimen: Blood from Arm, Left Updated: 05/25/23 1216     Sodium 135 mmol/L      Potassium 4 4 mmol/L      Chloride 106 mmol/L      CO2 27 mmol/L      ANION GAP 2 mmol/L      BUN 41 mg/dL      Creatinine 1 76 mg/dL      Glucose 94 mg/dL      Calcium 9 6 mg/dL      eGFR 35 ml/min/1 73sq m     Narrative:      [place] guidelines for Chronic Kidney Disease (CKD):   •  Stage 1 with normal or high GFR (GFR > 90 mL/min/1 73 square meters)  •  Stage 2 Mild CKD (GFR = 60-89 mL/min/1 73 square meters)  •  Stage 3A Moderate CKD (GFR = 45-59 mL/min/1 73 square meters)  •  Stage 3B Moderate CKD (GFR = 30-44 mL/min/1 73 square meters)  •  Stage 4 Severe CKD (GFR = 15-29 mL/min/1 73 square meters)  •  Stage 5 End Stage CKD (GFR <15 mL/min/1 73 square meters)  Note: GFR calculation is accurate only with a steady state creatinine    CBC and differential [[id number]]  (Abnormal) Collected: 05/25/23 1142    Lab Status: Final result Specimen: Blood from Arm, Left Updated: 05/25/23 1155     WBC 10 57 Thousand/uL      RBC 4 52 Million/uL      Hemoglobin 13 4 g/dL      Hematocrit 40 6 %      MCV 90 fL      MCH 29 6 pg      MCHC 33 0 g/dL      RDW 12 4 %      MPV 9 0 fL      Platelets 382 Thousands/uL      nRBC 0 /100 WBCs      Neutrophils Relative 68 %      Immat GRANS % 1 %      Lymphocytes Relative 15 %      Monocytes Relative 9 %      Eosinophils Relative 6 %      Basophils Relative 1 %      Neutrophils Absolute 7 27 Thousands/µL      Immature Grans Absolute 0 05 Thousand/uL      Lymphocytes Absolute 1 62 Thousands/µL      Monocytes Absolute 0 92 Thousand/µL      Eosinophils Absolute 0 66 Thousand/µL      Basophils Absolute 0 05 Thousands/µL                  IR lower extremity angiogram    (Results Pending)              Procedures  Procedures         ED Course  ED Course as of 05/25/23 1602   Thu May 25, 2023   1131 Discussed with vascular surgery PA, patient may be able to get an angiogram tomorrow, pending admission based on labs  May need nephrology involved if creatinine is worse  Will be admitted at some point                                             Medical Decision Making  Amount and/or Complexity of Data Reviewed  Labs: ordered  Risk  Decision regarding hospitalization            Disposition  Final diagnoses:   Right foot pain   PAD (peripheral artery disease) ([facility] )   Ischemic ulcer of foot, limited to breakdown of skin, right ([facility] )     Time reflects when diagnosis was documented in both MDM as applicable and the Disposition within this note     Time User Action Codes Description Comment    5/25/2023 10:59 AM Ivette Faulkner Add [E67 112] Right foot pain     5/25/2023 11:35 AM Gabo AGUILERA Add [I73 9] PAD [facility]peripheral artery disease) ([facility] ) 5/25/2023 11:35 AM Honey Godwin F Add [U42 780] Ischemic ulcer of foot, limited to breakdown of skin, right ([email or web address] )     5/25/2023 12:24 PM Tommas Pointer Add [N18 32] Stage 3b chronic kidney disease ([email or web address] )     5/25/2023 12:24 PM Donny Michelle M Add [I73 9] Peripheral arterial disease ([place] [address] )     5/25/2023 12:24 PM Tommas Pointer Add [X34 987,  I99 8] Ischemic rest pain of lower extremity     5/25/2023 12:42 PM Tommas Pointer Add [I16 0] Hypertensive urgency       ED Disposition     ED Disposition   Admit    Condition   Stable    Date/Time   Thu May 25, 2023 12:40 PM    Comment   Case was discussed with vascular surgery and the patient's admission status was agreed to be Admission Status: inpatient status to the service vascular surgery   Follow-up Information    None         Patient's Medications   Discharge Prescriptions    No medications on file       No discharge procedures on file      PDMP Review       Value Time User    PDMP Reviewed  Yes 3/3/2023 11:16 AM Beto Bernstein DO          ED Provider  Electronically Signed by           Jenifer Franco DO  05/25/23 9530

## 2023-05-25 NOTE — ASSESSMENT & PLAN NOTE
Recently evaluated for asymptomatic bilateral carotid stenosis with plan for CEA in near future  Surgical clearances previously obtained from medicine, cardiology and pulmonary      Plan:  Outpatient follow-up for surgical planning following discharge   Continue Plavix, lipitor

## 2023-05-25 NOTE — ASSESSMENT & PLAN NOTE
Chronic kidney disease stage IIIB; baseline cr 1 5-1 6 egfr 38    Plan:  Consult Nephrology for recommendations regarding planned Agram  Labs pending

## 2023-05-25 NOTE — ASSESSMENT & PLAN NOTE
[de-identified] y/o M w/ Right leg rest pain and foot wound w/ known hx of PAD   PMHx CAD, severe Left subclavian stenosis/occl, CKD, COPD, severe protein calorie malnutrition, HTN, HLD, hyperparathyroidism, Gout, PUD, esophagitis, and tobacco abuse    LEAD 5/17-  Right >75% prox SFA, occl distal SFA  KAUA 0 22/x/x    Left SFA occl  AKUA 0 33/108/59    Plan:  Admit for Arteriogram in IR tomorrow  Keep NPO after MN  Consult Nephrology for CKD and recommendations for IV hydration, Cr pending  Consult Podiatry Right foot wound  Pain control  Reverse trendelenberg  D/W Dr Kelly Sanchez

## 2023-05-25 NOTE — H&P
Vascular Surgery    Accept Vascular Surgery consultation by me 5/25 at time of ED evaluation as H&P      Jorge Alberto Dixon

## 2023-05-25 NOTE — CONSULTS
Podiatry - Consultation    Patient Information:   Reggie Weir [de-identified] y o  male MRN: [id number]  Unit/Bed#: QCB Encounter: [id number]  PCP: Marley Whitmore DO  Date of Admission:  5/25/2023  Date of Consultation: 05/25/23  Requesting Physician: Salomon Jorge MD      ASSESSMENT:    Reggie Weir is a [de-identified] y o  male with:    1  Right foot wound   2  PAD  3  Gout  4  Stage 3B kidney disease  5  Tobacco abuse     PLAN:    · Plan for local wound care for right foot wound consisting of Betadine JOSSY  Patient just finished 7 days of keflex 500mg BID, with some redness still present  We will closely watch wound for  Additional signs of infection or elevated white count  · Significant end-stage gout noted on previous x-ray as well as on examination  Rheumatology consult placed as patient has been unable to tolerate colchicine as prescribed  · Follow-up right foot x-ray  · Elevation and offloading on green foam wedges or pillows when non-ambulatory  · Rest of care per primary team   · Will discuss this plan with my attending and update as needed  Weightbearing status: Weightbearing as tolerated    SUBJECTIVE:    History of Present Illness:    Reggie Weir is a [de-identified] y o  male who is originally admitted 5/25/2023 due to PAD  Patient has a past medical history of PAD, arthrosclerosis, gout and tobacco abuse    We are consulted for foot wound on the right foot  Patient reports that he has been having moderate pain to the right foot for the past several weeks  Patient also reports that he has had a wound to the right lateral foot for the past patient reports that approximately 7 days ago he noted some redness extending from the wound itself  Patient was given a course of Keflex and reported that it has responded well with significant decrease in pain  Patient reports that there is a slight amount of redness but is only very minorly tender    Patient denies nausea vomiting shortness of breath or chest pain    Review of Systems:    Constitutional: Negative  HENT: Negative  Eyes: Negative  Respiratory: Negative  Cardiovascular: Negative  Gastrointestinal: Negative  Musculoskeletal: negative  Skin: Right lateral foot wound  Neurological: Neuropathy  Psych: Negative  Past Medical and Surgical History:     Past Medical History:   Diagnosis Date   • Abnormal thyroid function test     non specified /  LA    13     • Chronic kidney disease    • COPD (chronic obstructive pulmonary disease) ([facility])    • 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 3/22/2023   • Eczema     LA   13      • Esophagitis    • Gout    • Hypertension    • Pyloric channel ulcer        Past Surgical History:   Procedure Laterality Date   • BACK SURGERY     • ESOPHAGOGASTRODUODENOSCOPY N/A 2016    Procedure: ESOPHAGOGASTRODUODENOSCOPY (EGD); Surgeon: Balbina Cox MD;  Location: [facility];   Service:    • EYE SURGERY         Meds/Allergies:    (Not in a hospital admission)      Allergies   Allergen Reactions   • Norvasc [Amlodipine]        Social History:     Marital Status:     Substance Use History:   Social History     Substance and Sexual Activity   Alcohol Use Not Currently     Social History     Tobacco Use   Smoking Status Former   • Packs/day: 0 50   • Years: 67 00   • Total pack years: 33 50   • Types: Cigarettes   • Start date:    • Quit date: 2023   • Years since quittin 1   Smokeless Tobacco Never   Tobacco Comments    current some day smoker: a couple cigs a week     Social History     Substance and Sexual Activity   Drug Use No       Family History:    Family History   Problem Relation Age of Onset   • Other Mother         Dec in her 66's (gangrene foot)   • Heart failure Mother    • Diabetes type II Mother    • Peripheral vascular disease Mother    • Rectal cancer Father    • Other Maternal Aunt    • Other Family         situs inversus / Dec 89 yo OBJECTIVE:    Vitals:   Blood Pressure: (!) 206/96 (05/25/23 1230)  Pulse: 79 (05/25/23 1230)  Temperature: 97 8 °F (36 6 °C) (05/25/23 1032)  Temp Source: Temporal (05/25/23 1032)  Respirations: 18 (05/25/23 1230)  SpO2: 98 % (05/25/23 1230)    Physical Exam:    General Appearance: Alert, cooperative, no distress  HEENT: Head normocephalic, atraumatic, without obvious abnormality  Heart: Normal rate and rhythm  Lungs: Non-labored breathing  No respiratory distress  Abdomen: Without distension  Psychiatric: AAOx3  Lower Extremity:    Vascular:   DP: Right: non-palpable Left: non-palpable  PT: Right: non-palpable Left: non-palpable  CRT > 3 seconds at the digits  +0/4 edema noted at bilateral lower extremities  Pedal hair is absent  Skin temperature is cool bilaterally  Musculoskeletal:  4/5 MMT bilateral  Reduced dorsiflexion bilaterally, equinus  Pain noted with dorsiflexion plantarflexion of right third toe  There is a large palpable mass overlying the third and fourth MPJs  There is no fluctuance or crepitus noted  It is hard and unable to be moved  Dermatological:  Lower extremity wound(s) as noted below:    Wound #: 1  Location: right lateral foot  Length 1cm: Width 1cm: Depth unstagable cm:   Deepest Tissue Noted in Base: eschar  Probe to Bone: No  Peripheral Skin Description: Attached  Granulation: 0% Fibrotic Tissue: 0% Necrotic Tissue: 100%   Drainage Amount: minimal  Signs of Infection: No  however there is some redness and tenderness  Patient previously had a slight infection however it appears to be resolving this time  We will continue with close evaluation of the area    Neurological:  Gross sensation is diminished  Light touch is diminished  Protective sensation is diminished      Clinical Images 05/25/23:          Additional data:     Lab Results: I have personally reviewed pertinent labs including:    Results from last 7 days   Lab Units 05/25/23  1142   EOS PCT "% 6   HEMATOCRIT % 40 6   HEMOGLOBIN g/dL 13 4   LYMPHS PCT % 15   MONOS PCT % 9   NEUTROS PCT % 68   PLATELETS Thousands/uL 323   WBC Thousand/uL 10 57*     Results from last 7 days   Lab Units 05/25/23  1142   BUN mg/dL 41*   CALCIUM mg/dL 9 6   CHLORIDE mmol/L 106   CO2 mmol/L 27   CREATININE mg/dL 1 76*   POTASSIUM mmol/L 4 4     Results from last 7 days   Lab Units 05/25/23  1142   INR  1 00       Cultures: I have personally reviewed pertinent cultures including:              Imaging: I have personally reviewed pertinent reports in PACS  EKG, Pathology, and Other Studies: I have personally reviewed pertinent reports  Time Spent for Care: 30 minutes  More than 50% of total time spent on counseling and coordination of care as described above  ** Please Note: Portions of the record may have been created with voice recognition software  Occasional wrong word or \"sound a like\" substitutions may have occurred due to the inherent limitations of voice recognition software  Read the chart carefully and recognize, using context, where substitutions have occurred   **    "

## 2023-05-25 NOTE — CONSULTS
[address] [facility]  Consult  Name: Amparo Christianson [de-identified] y o  male I MRN: [id number]  Unit/Bed#: QCB I Date of Admission: 5/25/2023   Date of Service: 5/25/2023 I Hospital Day: 0    Inpatient consult to Vascular Surgery  Consult performed by: Familia Costa PA-C  Consult ordered by: Aldo Salgado,           Assessment/Plan   * Atherosclerosis of artery of extremity with ulceration [facility])  Assessment & Plan  [de-identified] y/o M w/ Right leg rest pain and foot wound w/ known hx of PAD  PMHx CAD, severe Left subclavian stenosis/occl, CKD, COPD, severe protein calorie malnutrition, HTN, HLD, hyperparathyroidism, Gout, PUD, esophagitis, and tobacco abuse    LEAD 5/17-  Right >75% prox SFA, occl distal SFA  AKUA 0 22/x/x    Left SFA occl  AKUA 0 33/108/59    Plan:  Admit to Vascular surgery service for Arteriogram in  tomorrow, Dr Lisa Turner, anticipate greater than 2 midnight stay for Agram and recovery  Keep NPO after MN  Consult Nephrology for CKD and recommendations for IV hydration, Cr pending  Consult Podiatry Right foot wound  Pain control  Reverse trendelenberg  D/W Dr Linda Dotson    CKD (chronic kidney disease)  Assessment & Plan  Chronic kidney disease stage IIIB; prior baseline from 2022 cr 1 5-1 6 egfr 45    Plan:  Consult Nephrology for recommendations regarding planned Agram  Labs pending    Asymptomatic carotid artery stenosis, bilateral  Assessment & Plan  Recently evaluated for asymptomatic bilateral carotid stenosis with plan for CEA in near future  Surgical clearances previously obtained from medicine, cardiology and pulmonary  Plan:  Outpatient follow-up for surgical planning following discharge   Continue Plavix, lipitor        Chief Complaint: Right leg pain at night and intermittently with activity, Right foot necrotic wound      HPI: Amparo Christianson is a [de-identified] y o  male with past medical history of PAD, CAD, COPD, CKD, subclavian stenosis, HTN, HLD, severe protein calorie malnutrition, BMI 16 6, hyperparathyroidism, gout, peptic ulcer disease, esophagitis, eczema and tobacco abuse who presents from the vascular center where he saw Dr Rula Vazquez in office visit for rest pain and right foot wound  He was directed to present to the emergency department for evaluation and expedited arteriogram   He had recent evaluation in the emergency department on 5/11/2023 for rest pain and lower extremity arterial duplex was performed on 5/17/2023  Duplex reveals greater than 75% proximal SFA stenosis and distal SFA occlusion on the right with AKUA 0 22 and metatarsal and great toe pressures were unobtainable  Patient's visit today was in follow-up from the hospital and his recent duplex  On discussion with the patient he currently is not having right foot pain at the moment however he has been experiencing an increase in right foot pain at night and with activity which is relieved with putting his feet down and rest   He does have right lateral foot necrotic wound which has worsened recently  The symptoms have been present for approximately 2 to 4 weeks  Also of note the patient has been following in the vascular center recently for asymptomatic bilateral carotid stenosis and was planned for carotid endarterectomy in the near future  He has had clearances obtained from medicine, cardiology and pulmonology to proceed with surgery  At this time he denies any strokelike symptoms or visual changes  He denies chest pain, shortness of breath, fevers or chills  He does get dyspnea on exertion at baseline  He is currently on Plavix and statin therapy      Review of Systems:  General: negative  Cardiovascular: no chest pain or dyspnea on exertion  Respiratory: no cough, shortness of breath, or wheezing  Gastrointestinal: no abdominal pain, change in bowel habits, or black or bloody stools  Genitourinary ROS: no dysuria, trouble voiding, or hematuria  Musculoskeletal ROS: positive for - pain in leg - right  negative for - gait disturbance, joint pain, joint stiffness, joint swelling, muscular weakness or swelling in leg - bilateral  Neurological ROS: no TIA or stroke symptoms  Hematological and Lymphatic ROS: negative  Dermatological ROS: positive for skin lesion changes  negative for acne, dry skin, hair changes, lumps, mole changes, nail changes, pruritus and rash  Psychological ROS: negative  Ophthalmic ROS: negative  ENT ROS: negative    Past Medical History:  Past Medical History:   Diagnosis Date   • Abnormal thyroid function test     non specified /  LA    13     • Chronic kidney disease    • COPD (chronic obstructive pulmonary disease) (HCC)    • 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 3/22/2023   • Eczema     LA   13      • Esophagitis    • Gout    • Hypertension    • Pyloric channel ulcer        Past Surgical History:  Past Surgical History:   Procedure Laterality Date   • BACK SURGERY     • ESOPHAGOGASTRODUODENOSCOPY N/A 2016    Procedure: ESOPHAGOGASTRODUODENOSCOPY (EGD); Surgeon: Scarlett Viramontes MD;  Location: [facility];   Service:    • EYE SURGERY         Social History:  Social History     Substance and Sexual Activity   Alcohol Use Not Currently     Social History     Substance and Sexual Activity   Drug Use No     Social History     Tobacco Use   Smoking Status Former   • Packs/day: 0 50   • Years: 67 00   • Total pack years: 33 50   • Types: Cigarettes   • Start date:    • Quit date: 2023   • Years since quittin 1   Smokeless Tobacco Never   Tobacco Comments    current some day smoker: a couple cigs a week       Family History:  Family History   Problem Relation Age of Onset   • Other Mother         Dec in her 66's (gangrene foot)   • Heart failure Mother    • Diabetes type II Mother    • Peripheral vascular disease Mother    • Rectal cancer Father    • Other Maternal Aunt    • Other Family         situs inversus / Dec 90 yo "      Allergies: Allergies   Allergen Reactions   • Norvasc [Amlodipine]        Medications:  No current facility-administered medications for this encounter  Vitals:  BP (!) 190/108 (BP Location: Right arm)   Pulse 83   Temp 97 8 °F (36 6 °C) (Temporal)   Resp 20   SpO2 99%     I/Os:  No intake/output data recorded  Lab Results and Cultures:   Lab Results   Component Value Date    HCT 40 6 05/25/2023    HGB 13 4 05/25/2023    MCV 90 05/25/2023     05/25/2023    WBC 10 57 (H) 05/25/2023     Lab Results   Component Value Date    BUN 29 (H) 05/11/2023    CALCIUM 9 7 05/11/2023     05/11/2023    CO2 28 05/11/2023    CREATININE 1 65 (H) 05/11/2023    K 4 1 05/11/2023     Lab Results   Component Value Date    INR 0 98 02/18/2023    INR 1 12 11/13/2022    INR 0 99 11/12/2022    PROTIME 13 0 02/18/2023    PROTIME 14 5 11/13/2022    PROTIME 13 1 11/12/2022       Lipid Panel: No results found for: \"CHOL\",     Blood Culture:   Lab Results   Component Value Date    BLOODCX No Growth After 5 Days  11/13/2022    BLOODCX No Growth After 5 Days  11/13/2022   ,   Urinalysis:   Lab Results   Component Value Date    BILIRUBINUR Negative 11/13/2022    BLOODU Negative 11/13/2022    CLARITYU Clear 11/13/2022    COLORU Yellow 11/13/2022    GLUCOSEU Negative 11/13/2022    KETONESU Negative 11/13/2022    LEUKOCYTESUR Negative 11/13/2022    NITRITE Negative 11/13/2022    PHUR 6 0 11/13/2022    SPECGRAV 1 010 11/13/2022   ,   Urine Culture: No results found for: \"URINECX\",   Wound Culure: No results found for: \"WOUNDCULT\"    Imaging:  LEAD 5/17- See A&P    Physical Exam:    General appearance: alert and oriented, in no acute distress and thin  Skin: Skin color, texture, turgor normal  No rashes or lesions or with exception of right lateral foot wound necrotic without erythema or drainage  Neurologic: Grossly normal  Head: Normocephalic, without obvious abnormality, atraumatic  Eyes: conjunctivae/corneas clear   " PERRL, EOM's intact  Fundi benign  Throat: lips, mucosa, and tongue normal; teeth and gums normal  Neck: no adenopathy, no JVD, supple, symmetrical, trachea midline and thyroid not enlarged, symmetric, no tenderness/mass/nodules  Back: symmetric, no curvature  ROM normal  No CVA tenderness    Lungs: clear to auscultation bilaterally  Chest wall: no tenderness  Heart: S1, S2 normal  Abdomen: soft, non-tender; bowel sounds normal; no masses,  no organomegaly  Extremities: extremities pale, cool, right foot necrotic wound; no cyanosis, clubbing, or edema  + gouty joints B/L feet    Wound/Incision:  Right foot         Pulse exam:  Femoral: Right: 2+ Left: 2+  DP: Right: doppler signal and monophasic Left: doppler signal  PT: Right: doppler signal Left: doppler signal      Lena Escamilla PA-C  5/25/2023

## 2023-05-25 NOTE — ASSESSMENT & PLAN NOTE
Presents with recent history of pain discomfort in the right lower extremity with development of an ulceration with dry gangrene lateral foot  He does have significant peripheral arterial disease in the right lower extremity with no identifiable transmet or great toe pressures  Does get discomfort in his foot after his reclining and does stand up or hang his foot over the side of the bed for improvement  He has developed a right lateral foot ulceration with dry gangrene  Plan: After long discussion with the patient and his family we decided to recommend that he go to the emergency room at [place] [address] for eventual admission and treatment of his severe ischemic rest pain and tissue loss

## 2023-05-25 NOTE — CASE MANAGEMENT
Case Management Assessment & Discharge Planning Note    Patient name Rich Ang  Location [address]70[address]64 MRN [id number]  : 1940 Date 2023       Current Admission Date: 2023  Current Admission Diagnosis:Atherosclerosis of artery of extremity with ulceration [facility])   Patient Active Problem List    Diagnosis Date Noted   • Atherosclerosis of artery of extremity with ulceration ([facility] ) 2023   • Ischemic rest pain of lower extremity 2023   • Right foot pain 2023   • Peripheral arterial disease ([facility] ) 05/15/2023   • Preop pulmonary/respiratory exam 05/15/2023   • 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 2023   • Asymptomatic carotid artery stenosis, bilateral 2023   • Vertigo 2023   • Hypertensive urgency 2023   • Acute respiratory failure with hypoxia ([facility] ) 2022   • Severe protein-calorie malnutrition ([facility] ) 2022   • CKD (chronic kidney disease) 10/26/2022   • Stage 3b chronic kidney disease ([facility] ) 2022   • Secondary hyperparathyroidism of renal origin ([facility] ) 2022   • Hyperuricemia 2022   • Insomnia 12/10/2019   • Essential hypertension 2017   • Gout 2017   • COPD (chronic obstructive pulmonary disease) ([facility] ) 2017   • Esophagitis 2017   • Tobacco abuse 2017      LOS (days): 0  Geometric Mean LOS (GMLOS) (days): 3 00  Days to GMLOS:2 7     OBJECTIVE:    Risk of Unplanned Readmission Score: 15 71         Current admission status: Inpatient       Preferred Pharmacy:   [facility] [address] IV[address] ACE [address] [place] [address] - [address]  [address]  [address] [address]  Phone: [phone] Fax: [phone]    Primary Care Provider: Beto Bernstein DO    Primary Insurance: [facility]  Secondary Insurance:     ASSESSMENT:  [facility], [address] [place] [address]   Primary Phone: [phone] (Mobile) Advance Directives  Does patient have a [address] [facility] [address]?: No  Was patient offered paperwork?: Yes (CM will bring paperwork to patient's room tomorrow)  Does patient currently have a Health Care decision maker?: Yes, please see Health Care Proxy section (both daugher, Scarlett Davis and son, Caio Green)  Does patient have Advance Directives?: No  Was patient offered paperwork?: Yes (CM will bring paperwork to patient's room tomorrow)  Primary Contact: Alirio Chacon, proxy [phone]         Readmission Root Cause  30 Day Readmission: No    Patient Information  Admitted from[de-identified] Other (comment) (vascular surgeon's office)  Mental Status: Alert  During Assessment patient was accompanied by: Son  Assessment information provided by[de-identified] Patient  Primary Caregiver: Self  Support Systems: Self, Son, Daughter  What city do you live in?: [address] [place] [address] Street entry access options   Select all that apply : Stairs  Number of steps to enter home :  (17 stairs)  Do the steps have railings?: Yes  Type of Current Residence: Other (Comment) (pt lives in apt on top of a garage)  In the last 12 months, was there a time when you were not able to pay the mortgage or rent on time?: No  In the last 12 months, how many places have you lived?: 1  In the last 12 months, was there a time when you did not have a steady place to sleep or slept in a shelter (including now)?: No  Homeless/housing insecurity resource given?: N/A  Living Arrangements: Lives Alone    Activities of Daily Living Prior to Admission  Functional Status: Independent  Completes ADLs independently?: Yes  Ambulates independently?: Yes  Does patient use assisted devices?: Yes  Assisted Devices (DME) used: Paulo Parra  Does patient have a history of Outpatient Therapy (PT/OT)?: No  Does the patient have a history of Short-Term Rehab?: No  Does patient have a history of HHC?: No  Does patient currently have Ceasar Lainez?: No         Patient Information Continued  Income Source: Pension/longterm  Does patient have prescription coverage?: Yes  Within the past 12 months, you worried that your food would run out before you got the money to buy more : Never true  Within the past 12 months, the food you bought just didn't last and you didn't have money to get more : Never true  Food insecurity resource given?: N/A  Does patient receive dialysis treatments?: No  Does patient have a history of substance abuse?: No  Does patient have a history of Mental Health Diagnosis?: No         Means of Transportation  Means of Transport to Appts[de-identified] Family transport  In the past 12 months, has lack of transportation kept you from medical appointments or from getting medications?: No  In the past 12 months, has lack of transportation kept you from meetings, work, or from getting things needed for daily living?: No  Was application for public transport provided?: N/A        DISCHARGE DETAILS:    Discharge planning discussed with[de-identified] CM evening hours  TC to patient in room        CM contacted family/caregiver?: Yes (son, Chanda Tejada in room during time of call)             Contacts  Patient Contacts: Kyle Genao, proxy [phone]  Relationship to Patient[de-identified] Family  Contact Method: Phone  Reason/Outcome: Continuity of Care, Emergency Contact, Discharge Planning, Referral      Would you like to participate in our [address] service program?  : No - Declined    Additional Comments: TC to patient's room to introduce CM role, obtain baseline assessment information and discuss DCP  Pt lives alone in apt on top of a garage w/ 17STE w/ railing  Pt amb w/ can/walker  He is retired and does drive but son drives pt to appointments as needed  The address listed is his daughter's address  Dtr and son are proxies  CM will follow for DCP needs  ? need for VN for wound care

## 2023-05-25 NOTE — ASSESSMENT & PLAN NOTE
Lab Results   Component Value Date    CREATININE 1 76 (H) 05/25/2023    CREATININE 1 65 (H) 05/11/2023    CREATININE 1 59 (H) 02/19/2023    EGFR 35 05/25/2023    EGFR 38 05/11/2023    EGFR 40 02/19/2023     · Renal function appears to be at baseline    Nephrology consulted for periprocedure management

## 2023-05-25 NOTE — PROGRESS NOTES
Follow up Consultation    Nephrology   Ruth Ann Sands [de-identified] y o  male MRN: [id number]  Unit/Bed#: [facility] 501-01 Encounter: [id number]      Physician Requesting Consult: Jean Paul Mohr MD        ASSESSMENT/PLAN:  [de-identified] y o   male with pmh of peripheral chill disease, CAD, hypertension, hyperlipidemia, protein calorie malnutrition and CKD stage III initially presented to vascular center with rest pain and foot wound and subsequently transferred over to ED for expedited arteriogram   Nephrology has been consulted for perioperative optimization to reduce incidence for acute kidney injury as patient is likely for angiogram          Perioperative optimization to reduce incidence for acute kidney injury/CKD stage IIIb:  At risk for CEE multifactorial most likely secondary to NITHIN due to contrast exposure on 5/26/2023 with angiogram   Needs close monitoring of renal parameters  After review of records In [facility] as well as Care everywhere patient has a baseline Creatinine of 1 5-1 8 mg/dL  Admission creatinine of 1 76 mg/dL on 5/25/2023  Creatinine today is at 1 99 mg/dL, creatinine slightly elevated  check BMP in a m  Optimize hemodynamic status to avoid delay in renal recovery  Avoid nephrotoxins, adjust meds to appropriate GFR  Strict I/O  Daily weights  Completing Mucomyst  Urinary retention protocol if patient does not have a Eduardo  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 plus age-related nephron loss  will need to set up patient for follow up with Nephrology as an outpatient post hospitalization  Outpatient nephrology follow-up with Dr Johanna Schneider     Acid-base electrolytes:     Electrolytes:    Stable     Acid-base:    Most recent bicarb stable 24     H/H/anemia:  most recent hemoglobin at 11 7g/dL  maintain hemoglobin greater than 8 grams/deciliter     Blood pressure/hypertension:  home medications: Coreg 6 25 mg p o  twice daily, hydralazine 10 mg p o  "every 8, Isordil 10 mg p o  3 times daily  current medications: Coreg 12 5 mg p o  twice daily, hydralazine 10 mg p o  every 8, Isordil 10 mg p o  3 times daily  recommendations: No changes for now may give as needed diuretics with worsening shortness of breath from volume overload  Blood pressures better  Optimize hemodynamics  Maintain MAP > 65mmHg  Avoid BP fluctuations      Other medical problems:  Proteinuria: Most recent UA with no blood or protein  Peripheral arterial disease/lower extremity foot wound:  Management per primary team   Follow-up with vascular and podiatry  Status post lower extremity angiogram   Ongoing discussions between patient and vascular and podiatry with regards to local wound care versus aggressive surgical revascularization as per vascular note patient overall is a poor surgical candidate      Thanks for the consult  Will continue to follow  Please call with questions/ concerns  Above-mentioned orders and Plan continue monitoring renal parameters for now while patient decides on further plans for surgery still at risk for NITHIN were discussed with the team in depth and they agree  Michael Nieto MD, FASN, 2023, 10:32 AM                  Objective :   Patient seen and examined in his room no overnight events he medically stable remains afebrile urine output 400 cc plus, status post angiogram yesterday  Overall not ideal candidate for surgery patient still deciding whether he wishes to pursue only local wound care versus aggressive surgical revascularization        PHYSICAL EXAM  /77   Pulse 83   Temp 97 8 °F (36 6 °C)   Resp (!) 28   Ht 5' 7\" (1 702 m)   Wt 44 6 kg (98 lb 5 2 oz)   SpO2 96%   BMI 15 40 kg/m²   Temp (24hrs), Av 7 °F (36 5 °C), Min:97 3 °F (36 3 °C), Max:98 °F (36 7 °C)        Intake/Output Summary (Last 24 hours) at 2023 1032  Last data filed at 2023 7369  Gross per 24 hour   Intake 500 ml   Output 400 ml   Net 100 ml       I/O " last 24 hours: In: 662 5 [I V :662 5]  Out: 400 [Urine:400]      Current Weight: Weight - Scale: 44 6 kg (98 lb 5 2 oz)  First Weight: Weight - Scale: 42 7 kg (94 lb 2 2 oz)  Physical Exam  Vitals and nursing note reviewed  Constitutional:       General: He is not in acute distress  Appearance: Normal appearance  He is normal weight  He is not ill-appearing or toxic-appearing  HENT:      Head: Normocephalic and atraumatic  Mouth/Throat:      Pharynx: Oropharynx is clear  No oropharyngeal exudate  Eyes:      General: No scleral icterus  Conjunctiva/sclera: Conjunctivae normal    Cardiovascular:      Rate and Rhythm: Normal rate  Heart sounds: Normal heart sounds  No friction rub  Pulmonary:      Effort: No respiratory distress  Breath sounds: Normal breath sounds  No stridor  Abdominal:      General: There is no distension  Palpations: Abdomen is soft  There is no mass  Tenderness: There is no abdominal tenderness  Musculoskeletal:         General: No swelling  Cervical back: Normal range of motion  No rigidity  Skin:     Coloration: Skin is not jaundiced  Neurological:      General: No focal deficit present  Mental Status: He is alert and oriented to person, place, and time  Psychiatric:         Mood and Affect: Mood normal          Behavior: Behavior normal              Review of Systems   Constitutional: Negative for fatigue  HENT: Negative for congestion  Respiratory: Negative for cough and shortness of breath  Cardiovascular: Negative for leg swelling  Gastrointestinal: Negative for abdominal pain, constipation and diarrhea  Genitourinary: Negative for dysuria  Musculoskeletal: Negative for back pain  Skin: Positive for wound  Neurological: Negative for headaches  Psychiatric/Behavioral: Negative for agitation and confusion  All other systems reviewed and are negative        Scheduled Meds:  Current Facility-Administered Medications   Medication Dose Route Frequency Provider Last Rate   • acetaminophen  650 mg Oral Q6H PRN Tata Monroy DO     • acetylcysteine  1,200 mg Oral BID Laury Damon MD     • albuterol  2 puff Inhalation Q6H PRN Darlene Alvarado PA-C     • ALPRAZolam  0 25 mg Oral BID PRN Tata Monroy DO     • atorvastatin  80 mg Oral Daily Margot Valle PA-C     • carvedilol  12 5 mg Oral BID With Meals Shawna Mcfarland MD     • clopidogrel  75 mg Oral Daily Darlene Alvarado PA-C     • heparin (porcine)  5,000 Units Subcutaneous [facility] Margot Valle PA-C     • [facility]AZINE  10 [facility]H [facility]     • isosorbide dinitrate  10 mg Oral TID Darlene Alvarado PA-C     • labetalol  10 mg Intravenous Q10 Min PRN Kishan Almeida CRNA     • magnesium Oxide  400 mg Oral Daily Shawna Mcfarland MD     • neomycin-polymyxin-dexamethasone  1 drop Left Eye 6x Daily Tata Monroy DO     • ondansetron  4 mg Intravenous Q4H PRN Tata Monroy DO     • oxyCODONE  2 5 mg Oral Q6H PRN Wilfredo Crook PA-C     • sodium chloride  50 mL/hr Intravenous Continuous Laury Connelly MD Stopped (05/26/23 1716)   • zolpidem  5 mg Oral HS PRN Darlene Alvarado PA-C         PRN Meds: •  acetaminophen  •  albuterol  •  ALPRAZolam  •  labetalol  •  ondansetron  •  oxyCODONE  •  zolpidem    Continuous Infusions:sodium chloride, 50 mL/hr, Last Rate: Stopped (05/26/23 1716)          Invasive Devices:      Invasive Devices     Peripheral Intravenous Line  Duration           Peripheral IV 05/25/23 Left Antecubital 1 day                  LABORATORY:    Results from last 7 days   Lab Units 05/27/23  0224 05/27/23  0224 05/26/23  0449 05/25/23  1142   BUN mg/dL  --  41* 39* 41*   CALCIUM mg/dL  --  8 5 9 2 9 6   CHLORIDE mmol/L  --  110* 110* 106   CO2 mmol/L  --  24 24 27   CREATININE mg/dL  --  1 99* 1 60* 1 76*   HEMATOCRIT % 36 0*  --  36 5 40 6   HEMOGLOBIN g/dL 11 7*  --  12 3 13 4   POTASSIUM "mmol/L  --  4 2 3 7 4 4   PLATELETS Thousands/uL 272  --  297 323   WBC Thousand/uL 13 11*  --  10 36* 10 57*      rest all reviewed    RADIOLOGY:  IR lower extremity angiogram   Final Result by Stanley Feng MD (05/26 1528)   Impression:      Diffusely atherosclerotic abdominal aorta and iliac vessels without a significant stenosis  80% right common femoral artery stenosis with high-grade stenosis versus occlusion of the profunda femoris artery  Reconstitution of an isolated distal superficial femoral artery/above-knee popliteal artery with reconstitution of an atretic below-knee    popliteal artery supplying posterior tibial runoff to the foot  Moderate to severe stenoses in the left common femoral artery and proximal superficial femoral artery  Workstation performed: [id number]         XR foot 3+ vw right   Final Result by Denver Hernandez MD (05/26 1530)      Small soft tissue defect overlying the base of the right 5th metatarsal, however with no associated cortical erosion to suggest osteomyelitis  Large erosions involving the head of the 3rd metatarsal and the base of the 3rd proximal phalanx with associated soft tissue swelling and increased density, as well as small effusions involving the head of the 1st metatarsal, base of 1st proximal phalanx    and base of 2nd proximal phalanx also with some associated increased tissue swelling and density similar to 5/11/2023  These findings are suggestive of gout, however superimposed osteomyelitis is not excluded  Cortical erosion involving the medial aspect of the proximal metaphysis of the right 4th proximal phalanx, increased compared to 5/11/2023  The study was marked in [facility] for immediate notification  Workstation performed: [id number]           Rest all reviewed    Portions of the record may have been created with voice recognition software   Occasional wrong word or \"sound a like\" substitutions may have occurred due " to the inherent limitations of voice recognition software  Read the chart carefully and recognize, using context, where substitutions have occurred  If you have any questions, please contact the dictating provider

## 2023-05-26 ENCOUNTER — APPOINTMENT (INPATIENT)
Dept: RADIOLOGY | Facility: HOSPITAL | Age: 83
End: 2023-05-26

## 2023-05-26 ENCOUNTER — ANESTHESIA (INPATIENT)
Dept: RADIOLOGY | Facility: HOSPITAL | Age: 83
End: 2023-05-26

## 2023-05-26 ENCOUNTER — ANESTHESIA EVENT (INPATIENT)
Dept: RADIOLOGY | Facility: HOSPITAL | Age: 83
End: 2023-05-26

## 2023-05-26 LAB
ANION GAP SERPL CALCULATED.3IONS-SCNC: 4 MMOL/L (ref 4–13)
BUN SERPL-MCNC: 39 MG/DL (ref 5–25)
CALCIUM SERPL-MCNC: 9.2 MG/DL (ref 8.3–10.1)
CHLORIDE SERPL-SCNC: 110 MMOL/L (ref 96–108)
CO2 SERPL-SCNC: 24 MMOL/L (ref 21–32)
CREAT SERPL-MCNC: 1.6 MG/DL (ref 0.6–1.3)
ERYTHROCYTE [DISTWIDTH] IN BLOOD BY AUTOMATED COUNT: 12.4 % (ref 11.6–15.1)
GFR SERPL CREATININE-BSD FRML MDRD: 40 ML/MIN/1.73SQ M
GLUCOSE SERPL-MCNC: 87 MG/DL (ref 65–140)
HCT VFR BLD AUTO: 36.5 % (ref 36.5–49.3)
HGB BLD-MCNC: 12.3 G/DL (ref 12–17)
MCH RBC QN AUTO: 29.6 PG (ref 26.8–34.3)
MCHC RBC AUTO-ENTMCNC: 33.7 G/DL (ref 31.4–37.4)
MCV RBC AUTO: 88 FL (ref 82–98)
PLATELET # BLD AUTO: 297 THOUSANDS/UL (ref 149–390)
PMV BLD AUTO: 9.1 FL (ref 8.9–12.7)
POTASSIUM SERPL-SCNC: 3.7 MMOL/L (ref 3.5–5.3)
RBC # BLD AUTO: 4.15 MILLION/UL (ref 3.88–5.62)
SODIUM SERPL-SCNC: 138 MMOL/L (ref 135–147)
WBC # BLD AUTO: 10.36 THOUSAND/UL (ref 4.31–10.16)

## 2023-05-26 PROCEDURE — B41FYZZ FLUOROSCOPY OF RIGHT LOWER EXTREMITY ARTERIES USING OTHER CONTRAST: ICD-10-PCS | Performed by: SURGERY

## 2023-05-26 PROCEDURE — B410YZZ FLUOROSCOPY OF ABDOMINAL AORTA USING OTHER CONTRAST: ICD-10-PCS | Performed by: SURGERY

## 2023-05-26 RX ORDER — LIDOCAINE HYDROCHLORIDE 10 MG/ML
INJECTION, SOLUTION EPIDURAL; INFILTRATION; INTRACAUDAL; PERINEURAL AS NEEDED
Status: COMPLETED | OUTPATIENT
Start: 2023-05-26 | End: 2023-05-26

## 2023-05-26 RX ORDER — PROMETHAZINE HYDROCHLORIDE 25 MG/ML
25 INJECTION, SOLUTION INTRAMUSCULAR; INTRAVENOUS ONCE AS NEEDED
Status: DISCONTINUED | OUTPATIENT
Start: 2023-05-26 | End: 2023-05-26 | Stop reason: HOSPADM

## 2023-05-26 RX ORDER — HYDROMORPHONE HCL/PF 1 MG/ML
0.4 SYRINGE (ML) INJECTION
Status: DISCONTINUED | OUTPATIENT
Start: 2023-05-26 | End: 2023-05-26 | Stop reason: HOSPADM

## 2023-05-26 RX ORDER — LANOLIN ALCOHOL/MO/W.PET/CERES
400 CREAM (GRAM) TOPICAL DAILY
Status: DISCONTINUED | OUTPATIENT
Start: 2023-05-26 | End: 2023-05-31 | Stop reason: HOSPADM

## 2023-05-26 RX ORDER — IODIXANOL 320 MG/ML
100 INJECTION, SOLUTION INTRAVASCULAR
Status: COMPLETED | OUTPATIENT
Start: 2023-05-26 | End: 2023-05-26

## 2023-05-26 RX ORDER — SODIUM CHLORIDE, SODIUM LACTATE, POTASSIUM CHLORIDE, CALCIUM CHLORIDE 600; 310; 30; 20 MG/100ML; MG/100ML; MG/100ML; MG/100ML
INJECTION, SOLUTION INTRAVENOUS CONTINUOUS PRN
Status: DISCONTINUED | OUTPATIENT
Start: 2023-05-26 | End: 2023-05-26

## 2023-05-26 RX ORDER — ONDANSETRON 2 MG/ML
4 INJECTION INTRAMUSCULAR; INTRAVENOUS ONCE AS NEEDED
Status: DISCONTINUED | OUTPATIENT
Start: 2023-05-26 | End: 2023-05-26 | Stop reason: HOSPADM

## 2023-05-26 RX ORDER — PROPOFOL 10 MG/ML
INJECTION, EMULSION INTRAVENOUS AS NEEDED
Status: DISCONTINUED | OUTPATIENT
Start: 2023-05-26 | End: 2023-05-26

## 2023-05-26 RX ORDER — FENTANYL CITRATE/PF 50 MCG/ML
50 SYRINGE (ML) INJECTION
Status: DISCONTINUED | OUTPATIENT
Start: 2023-05-26 | End: 2023-05-26 | Stop reason: HOSPADM

## 2023-05-26 RX ORDER — CARVEDILOL 12.5 MG/1
12.5 TABLET ORAL 2 TIMES DAILY WITH MEALS
Status: DISCONTINUED | OUTPATIENT
Start: 2023-05-26 | End: 2023-05-28

## 2023-05-26 RX ORDER — ALPRAZOLAM 0.25 MG/1
0.25 TABLET ORAL ONCE
Status: COMPLETED | OUTPATIENT
Start: 2023-05-26 | End: 2023-05-26

## 2023-05-26 RX ORDER — LABETALOL HYDROCHLORIDE 5 MG/ML
10 INJECTION, SOLUTION INTRAVENOUS
Status: DISCONTINUED | OUTPATIENT
Start: 2023-05-26 | End: 2023-05-31 | Stop reason: HOSPADM

## 2023-05-26 RX ORDER — LIDOCAINE HYDROCHLORIDE 10 MG/ML
INJECTION, SOLUTION EPIDURAL; INFILTRATION; INTRACAUDAL; PERINEURAL AS NEEDED
Status: DISCONTINUED | OUTPATIENT
Start: 2023-05-26 | End: 2023-05-26

## 2023-05-26 RX ORDER — PROPOFOL 10 MG/ML
INJECTION, EMULSION INTRAVENOUS CONTINUOUS PRN
Status: DISCONTINUED | OUTPATIENT
Start: 2023-05-26 | End: 2023-05-26

## 2023-05-26 RX ORDER — FENTANYL CITRATE 50 UG/ML
INJECTION, SOLUTION INTRAMUSCULAR; INTRAVENOUS AS NEEDED
Status: DISCONTINUED | OUTPATIENT
Start: 2023-05-26 | End: 2023-05-26

## 2023-05-26 RX ORDER — CARVEDILOL 3.12 MG/1
3.12 TABLET ORAL ONCE
Status: COMPLETED | OUTPATIENT
Start: 2023-05-26 | End: 2023-05-26

## 2023-05-26 RX ORDER — LABETALOL HYDROCHLORIDE 5 MG/ML
INJECTION, SOLUTION INTRAVENOUS AS NEEDED
Status: DISCONTINUED | OUTPATIENT
Start: 2023-05-26 | End: 2023-05-26

## 2023-05-26 RX ORDER — ONDANSETRON 2 MG/ML
INJECTION INTRAMUSCULAR; INTRAVENOUS AS NEEDED
Status: DISCONTINUED | OUTPATIENT
Start: 2023-05-26 | End: 2023-05-26

## 2023-05-26 RX ADMIN — CARVEDILOL 6.25 MG: 6.25 TABLET, FILM COATED ORAL at 08:24

## 2023-05-26 RX ADMIN — HYDRALAZINE HYDROCHLORIDE 10 MG: 10 TABLET, FILM COATED ORAL at 05:16

## 2023-05-26 RX ADMIN — ISOSORBIDE DINITRATE 10 MG: 10 TABLET ORAL at 17:18

## 2023-05-26 RX ADMIN — HEPARIN SODIUM 5000 UNITS: 5000 INJECTION INTRAVENOUS; SUBCUTANEOUS at 13:41

## 2023-05-26 RX ADMIN — ACETAMINOPHEN 650 MG: 325 TABLET ORAL at 00:58

## 2023-05-26 RX ADMIN — LIDOCAINE HYDROCHLORIDE 50 MG: 10 INJECTION, SOLUTION EPIDURAL; INFILTRATION; INTRACAUDAL; PERINEURAL at 10:53

## 2023-05-26 RX ADMIN — NEOMYCIN SULFATE, POLYMYXIN B SULFATE AND DEXAMETHASONE 1 DROP: 3.5; 10000; 1 SUSPENSION OPHTHALMIC at 08:25

## 2023-05-26 RX ADMIN — SODIUM CHLORIDE 50 ML/HR: 0.9 INJECTION, SOLUTION INTRAVENOUS at 00:17

## 2023-05-26 RX ADMIN — ACETAMINOPHEN 650 MG: 325 TABLET ORAL at 22:04

## 2023-05-26 RX ADMIN — IODIXANOL 30 ML: 320 INJECTION, SOLUTION INTRAVASCULAR at 11:56

## 2023-05-26 RX ADMIN — CLOPIDOGREL BISULFATE 75 MG: 75 TABLET ORAL at 08:24

## 2023-05-26 RX ADMIN — FENTANYL CITRATE 25 MCG: 50 INJECTION, SOLUTION INTRAMUSCULAR; INTRAVENOUS at 10:53

## 2023-05-26 RX ADMIN — SODIUM CHLORIDE, SODIUM LACTATE, POTASSIUM CHLORIDE, AND CALCIUM CHLORIDE: .6; .31; .03; .02 INJECTION, SOLUTION INTRAVENOUS at 11:26

## 2023-05-26 RX ADMIN — PHENYLEPHRINE HYDROCHLORIDE 50 MCG/MIN: 10 INJECTION INTRAVENOUS at 11:08

## 2023-05-26 RX ADMIN — HYDRALAZINE HYDROCHLORIDE 10 MG: 20 INJECTION, SOLUTION INTRAMUSCULAR; INTRAVENOUS at 00:19

## 2023-05-26 RX ADMIN — HYDRALAZINE HYDROCHLORIDE 10 MG: 10 TABLET, FILM COATED ORAL at 13:41

## 2023-05-26 RX ADMIN — HEPARIN SODIUM 5000 UNITS: 5000 INJECTION INTRAVENOUS; SUBCUTANEOUS at 05:16

## 2023-05-26 RX ADMIN — HEPARIN SODIUM 5000 UNITS: 5000 INJECTION INTRAVENOUS; SUBCUTANEOUS at 21:08

## 2023-05-26 RX ADMIN — MAGNESIUM GLUCONATE 500 MG ORAL TABLET 400 MG: 500 TABLET ORAL at 13:41

## 2023-05-26 RX ADMIN — SODIUM CHLORIDE, SODIUM LACTATE, POTASSIUM CHLORIDE, AND CALCIUM CHLORIDE: .6; .31; .03; .02 INJECTION, SOLUTION INTRAVENOUS at 10:50

## 2023-05-26 RX ADMIN — PROPOFOL 20 MCG/KG/MIN: 10 INJECTION, EMULSION INTRAVENOUS at 10:53

## 2023-05-26 RX ADMIN — FENTANYL CITRATE 12.5 MCG: 50 INJECTION, SOLUTION INTRAMUSCULAR; INTRAVENOUS at 11:46

## 2023-05-26 RX ADMIN — ZOLPIDEM TARTRATE 5 MG: 5 TABLET ORAL at 21:08

## 2023-05-26 RX ADMIN — LABETALOL HYDROCHLORIDE 5 MG: 5 INJECTION, SOLUTION INTRAVENOUS at 11:48

## 2023-05-26 RX ADMIN — ISOSORBIDE DINITRATE 10 MG: 10 TABLET ORAL at 08:24

## 2023-05-26 RX ADMIN — ALPRAZOLAM 0.25 MG: 0.25 TABLET ORAL at 13:44

## 2023-05-26 RX ADMIN — CARVEDILOL 12.5 MG: 12.5 TABLET, FILM COATED ORAL at 17:17

## 2023-05-26 RX ADMIN — LIDOCAINE HYDROCHLORIDE 10 ML: 10 INJECTION, SOLUTION EPIDURAL; INFILTRATION; INTRACAUDAL; PERINEURAL at 11:09

## 2023-05-26 RX ADMIN — ACETYLCYSTEINE 1200 MG: 200 INHALANT RESPIRATORY (INHALATION) at 17:17

## 2023-05-26 RX ADMIN — ATORVASTATIN CALCIUM 80 MG: 80 TABLET, FILM COATED ORAL at 08:24

## 2023-05-26 RX ADMIN — NEOMYCIN SULFATE, POLYMYXIN B SULFATE AND DEXAMETHASONE 1 DROP: 3.5; 10000; 1 SUSPENSION OPHTHALMIC at 21:16

## 2023-05-26 RX ADMIN — NEOMYCIN SULFATE, POLYMYXIN B SULFATE AND DEXAMETHASONE 1 DROP: 3.5; 10000; 1 SUSPENSION OPHTHALMIC at 17:18

## 2023-05-26 RX ADMIN — NEOMYCIN SULFATE, POLYMYXIN B SULFATE AND DEXAMETHASONE 1 DROP: 3.5; 10000; 1 SUSPENSION OPHTHALMIC at 13:41

## 2023-05-26 RX ADMIN — ONDANSETRON 4 MG: 2 INJECTION INTRAMUSCULAR; INTRAVENOUS at 10:53

## 2023-05-26 RX ADMIN — NEOMYCIN SULFATE, POLYMYXIN B SULFATE AND DEXAMETHASONE 1 DROP: 3.5; 10000; 1 SUSPENSION OPHTHALMIC at 19:24

## 2023-05-26 RX ADMIN — PROPOFOL 50 MG: 10 INJECTION, EMULSION INTRAVENOUS at 11:06

## 2023-05-26 RX ADMIN — CARVEDILOL 3.12 MG: 3.12 TABLET, FILM COATED ORAL at 13:41

## 2023-05-26 RX ADMIN — FENTANYL CITRATE 12.5 MCG: 50 INJECTION, SOLUTION INTRAMUSCULAR; INTRAVENOUS at 11:51

## 2023-05-26 RX ADMIN — Medication 2.5 MG: at 23:37

## 2023-05-26 RX ADMIN — HYDRALAZINE HYDROCHLORIDE 10 MG: 10 TABLET, FILM COATED ORAL at 21:08

## 2023-05-26 RX ADMIN — ISOSORBIDE DINITRATE 10 MG: 10 TABLET ORAL at 21:16

## 2023-05-26 RX ADMIN — PROPOFOL 20 MG: 10 INJECTION, EMULSION INTRAVENOUS at 10:59

## 2023-05-26 NOTE — PROGRESS NOTES
"[facility]  Progress Note  Name: Ambrose Thomas  MRN: [id number]  [facility] Date of Admission: 5/25/2023   Date of Service: 5/26/2023 I Hospital Day: 1    Assessment/Plan   * Atherosclerosis of artery of extremity with ulceration [facility])  Assessment & Plan  [de-identified] y/o M w/ Right leg rest pain and foot wound w/ known hx of PAD  PMHx CAD, severe Left subclavian stenosis/occl, CKD, COPD, severe protein calorie malnutrition, HTN, HLD, hyperparathyroidism, Gout, PUD, esophagitis, and tobacco abuse    LEAD 5/17-  Right >75% prox SFA, occl distal SFA  AKUA 0 22/x/x    Left SFA occl  AKUA 0 33/108/59    Plan:  Plan for Agram today  Keep NPO   Continue Plavix, statin  Nephrology following  Podiatry following  Pain control  Reverse trendelenberg               Subjective:  Pt c/o Right foot pain    Vitals:  BP (!) 174/88   Pulse 80   Temp 97 5 °F (36 4 °C)   Resp 13   Ht 5' 7\" (1 702 m)   Wt 42 7 kg (94 lb 2 2 oz)   SpO2 96%   BMI 14 74 kg/m²     I/Os:  I/O last 3 completed shifts:  In: -   Out: 450 [Urine:450]  No intake/output data recorded  Lab Results and Cultures:   Lab Results   Component Value Date    HCT 36 5 05/26/2023    HGB 12 3 05/26/2023    MCV 88 05/26/2023     05/26/2023    WBC 10 36 (H) 05/26/2023     Lab Results   Component Value Date    BUN 39 (H) 05/26/2023    CALCIUM 9 2 05/26/2023     (H) 05/26/2023    CO2 24 05/26/2023    CREATININE 1 60 (H) 05/26/2023    K 3 7 05/26/2023     Lab Results   Component Value Date    INR 1 00 05/25/2023    INR 0 98 02/18/2023    INR 1 12 11/13/2022    PROTIME 13 4 05/25/2023    PROTIME 13 0 02/18/2023    PROTIME 14 5 11/13/2022        Blood Culture:   Lab Results   Component Value Date    BLOODCX No Growth After 5 Days  11/13/2022    BLOODCX No Growth After 5 Days   11/13/2022   ,   Urinalysis:   Lab Results   Component Value Date    BILIRUBINUR Negative 11/13/2022    BLOODU Negative 11/13/2022    CLARITYU " "Clear 11/13/2022    COLORU Yellow 11/13/2022    GLUCOSEU Negative 11/13/2022    KETONESU Negative 11/13/2022    LEUKOCYTESUR Negative 11/13/2022    NITRITE Negative 11/13/2022    PHUR 6 0 11/13/2022    SPECGRAV 1 010 11/13/2022   ,   Urine Culture: No results found for: \"URINECX\",   Wound Culure: No results found for: \"WOUNDCULT\"    Medications:  Current Facility-Administered Medications   Medication Dose Route Frequency   • acetaminophen (TYLENOL) tablet 650 mg  650 mg Oral Q6H PRN   • acetylcysteine (MUCOMYST) 200 mg/mL oral solution 1,200 mg  1,200 mg Oral BID   • albuterol (PROVENTIL HFA,VENTOLIN HFA) inhaler 2 puff  2 puff Inhalation Q6H PRN   • atorvastatin (LIPITOR) tablet 80 mg  80 mg Oral Daily   • carvedilol (COREG) tablet 6 25 mg  6 25 mg Oral BID With Meals   • clopidogrel (PLAVIX) tablet 75 mg  75 mg Oral Daily   • heparin (porcine) subcutaneous injection 5,000 Units  5,000 Units Subcutaneous Q8H [facility]   • hydrALAZINE (APRESOLINE) tablet 10 mg  10 mg Oral Q8H [facility]   • isosorbide dinitrate (ISORDIL) tablet 10 mg  10 mg Oral TID   • neomycin-polymyxin-dexamethasone (MAXITROL) ophthalmic suspension 1 drop  1 drop Left Eye 6x Daily   • ondansetron (ZOFRAN) injection 4 mg  4 mg Intravenous Q4H PRN   • sodium chloride 0 9 % infusion  50 mL/hr Intravenous Continuous   • zolpidem (AMBIEN) tablet 5 mg  5 mg Oral HS PRN       Physical Exam:    General appearance: alert and oriented, in no acute distress  Lungs: clear to auscultation bilaterally  Heart: S1, S2 normal  Abdomen: soft, non-tender; bowel sounds normal; no masses,  no organomegaly  Extremities: M/S intact, feet cool    Pulse exam:  DP: Right: doppler signal Left: doppler signal  PT: Right: doppler signal Left: doppler signal      Manfred Morse PA-C  5/26/2023      "

## 2023-05-26 NOTE — ASSESSMENT & PLAN NOTE
[de-identified] y/o M w/ Right leg rest pain and foot wound w/ known hx of PAD   PMHx CAD, severe Left subclavian stenosis/occl, CKD, COPD, severe protein calorie malnutrition, HTN, HLD, hyperparathyroidism, Gout, PUD, esophagitis, and tobacco abuse    LEAD 5/17-  Right >75% prox SFA, occl distal SFA  AKUA 0 22/x/x    Left SFA occl  AKUA 0 33/108/59    Plan:  Plan for Agram today  Keep NPO   Continue Plavix, statin  Nephrology following  Podiatry following  Pain control  Reverse trendelenberg

## 2023-05-26 NOTE — PROGRESS NOTES
Upon initial assessment by this RN, no pedal pulse or posterior tibial pulse located via doppler  Patient's foot is warm and has good color, he denies pain, numbness, tingling  Vascular resident also at the bedside to assess, and similar findings  Left groin site C/D/I  Will continue to monitor at this time

## 2023-05-26 NOTE — ANESTHESIA PREPROCEDURE EVALUATION
Procedure:  IR LOWER EXTREMITY ANGIOGRAM    Relevant Problems   CARDIO   (+) 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   (+) Essential hypertension   (+) Hypertensive urgency      ENDO   (+) Secondary hyperparathyroidism of renal origin (HCC)      /RENAL   (+) CKD (chronic kidney disease)   (+) Stage 3b chronic kidney disease (HCC)      MUSCULOSKELETAL   (+) Gout      NEURO/PSYCH   (+) Ischemic rest pain of lower extremity      PULMONARY   (+) Acute respiratory failure with hypoxia (HCC)   (+) COPD (chronic obstructive pulmonary disease) ([facility] [address] )      1  Echo 2023:  Mild to moderate concentric left ventricular hypertrophy with prominent basal interventricular septum, normal left ventricle systolic function, grade 1 diastolic dysfunction  Left ventricle ejection fraction is estimated as around 65%  2  Normal right ventricle size and systolic function  3  Normal left and right atrial cavity size  4  Aortic valve sclerosis, no aortic valve stenosis  Suspected mild to moderate aortic valve regurgitation  5  Mitral valve leaflet thickening and sclerosis  No definite mitral valve regurgitation noted on limited visualization  6  Trace tricuspid valve regurgitation  7  No obvious pulmonary hypertension  8  No pericardial effusion  Stress test 2023:  •  Stress EC 5-1mm Horizontal ST depression in the inferior leads (II, III and aVF) is noted  The ECG was equivocal for ischemia  •  Perfusion: There is a left ventricular perfusion defect that is small in size with mild reduction in uptake present in the basal inferoseptal location(s) that is fixed  •  Stress Function: Left ventricular function post-stress is normal  Post-stress ejection fraction is 57 %  •  Stress Combined Conclusion: The ECG and SPECT imaging portions of the stress study are discordant   Left ventricular perfusion is abnormal and consistent with basal septal infarct without cristin-infarct ischemia        Physical Exam    Airway    Mallampati score: I  TM Distance: >3 FB  Neck ROM: full     Dental   No notable dental hx     Cardiovascular      Pulmonary      Other Findings        Anesthesia Plan  ASA Score- 3     Anesthesia Type- IV sedation with anesthesia with ASA Monitors  Additional Monitors:   Airway Plan:           Plan Factors-    Chart reviewed  Existing labs reviewed  Patient summary reviewed  Induction- intravenous  Postoperative Plan-     Informed Consent- Anesthetic plan and risks discussed with patient  I personally reviewed this patient with the CRNA  Discussed and agreed on the Anesthesia Plan with the CRNA  Ar Sanon

## 2023-05-26 NOTE — QUICK NOTE
S: Sophy Stafford is a [de-identified] y o  male who returns to the floor s/p RLE agram     Reports some pain mainly in the LLE more than RLE, tolerating diet without n/v      O:    Vitals:    05/26/23 1400   BP: 139/76   Pulse: 85   Resp:    Temp:    SpO2: 98%     General: NAD  Skin: L groin site c/d/i no hematoma   HEENT: Normocephalic, atraumatic  CV: RRR, no m/r/g, fleeting dopp signal R DP  Pulm: CTA b/l, no inc WOB  Abd: Soft, ND/NT  MSK: RLE and R foot motor and sensory intact  Neuro: AOx3, GCS 15    A: [de-identified] y/o M w/ Right leg rest pain and foot wound w/ known hx of PAD, now s/p RLE agram w IR on 5/26    P:  -bed rest per IR, monitor L groin site, RLE neurovasc checks  -IVF post procedure per nephro  -ok for diet  -plavix/statin  -SQH  -pain control  -care per primary

## 2023-05-26 NOTE — DISCHARGE INSTRUCTIONS
ARTERIOGRAM    WHAT YOU SHOULD KNOW:   An angiogram is a procedure to look at arteries in your body  Arteries are the blood vessels that carry blood from your heart to your body  AFTER YOU LEAVE:     Self-care:   Limit activity: Rest for the remainder of the day of your procedure  Have some one with you until the next morning  Keep your arm or leg straight as much as possible  Rest as much as possible, sitting lying or reclining  Walk only to go to the bathroom, to bed or to eat  If the angiogram catheter was put in your leg, use the stairs as little as possible  No driving for 26-84 hours  No heavy lifting, >10 lbs  Or strenuous activity for 48 hours  Keep your wound clean and dry  Remove band aid/ dressing tomorrow  You may shower 24 hours after your procedure  Shower and wash groin area or wrist area gently with soap and water: beginning tomorrow  Rinse and pat Dry  Apply new water seal band aid  Repeat this process for 5 days  If there is any drainage from the puncture site, you should put on a clean bandage  No Powders, creams, lotions or antibiotic ointments for 5 days  No tub baths, hot tubs or swimming for 5 days  Watch for bleeding and bruising: It is normal to have a bruise and soreness where the angiogram catheter went in  Medication: If your angiogram was performed to treat blockages in your leg arteries, it is strongly recommended that you take both an antiplatelet medication (like aspirin or Plavix) to prevent clotting AND a statin drug (like Lipitor or Crestor), even if you have normal cholesterol  If these drugs are not ordered for you please contact either your Vascular Surgery office or the Interventional Radiology Dept during normal daytime working hours  See Interventional Radiology telephone numbers below    You Should Have Follow up with the vascular surgeon   call [phone] with questions  Diet:   You may resume your regular diet, Sips of flat soda will help with mild nausea  Drink more liquids than usual for the next 24 hours      IMMEDIATELY Contact Interventional Radiology at [phone] Yo PATIENTS: Contact Interventional Radiology at [phone]) Denver Stone PATIENTS: Contact Interventional Radiology at [phone]) if any of the following occur: If your bruise gets larger or if you notice any active bleeding  APPLY DIRECT PRESSURE TO THE BLEEDING SITE  If you notice increased swelling or have increased pain at the puncture site   If you have any numbness or pain in the extremity of the puncture site   If that extremity seems cold or pale  You have fever greater than 101  Persistent nausea or vomitting    Follow up with your primary healthcare provider  as directed: Write down your questions so you remember to ask them during your visits

## 2023-05-26 NOTE — ASSESSMENT & PLAN NOTE
Background: History of hypertension CKD and PAD with carotid stenosis presents for anterior insufficiency of lower extremities  · On clopidogrel for carotid disease  · Underwent angiogram today    · Management per primary service

## 2023-05-26 NOTE — PROGRESS NOTES
"  Progress Note - Nephrology   Dixon Alvarenga [de-identified] y o  male MRN: [id number]  Unit/Bed#: [facility] 501-01 Encounter: [id number]      Assessment / Plan:    CKD stage IIIb  · Baseline creatinine of 1 5-1 8  · Creatinine currently at baseline-1 6  · Prepping for dye exposure to decrease risk of NITHIN, with intravenous fluids and p o  Mucomyst  · Intravenous fluids will be 1 for 4 hours after angiogram  · Recheck BMP in a m  · Risk of contrast-induced nephropathy discussed extensively with the patient and the son including up to the need for dialysis  · The patient follows with Dr Louis Friedman as an outpatient  · Calculated risk of contrast-induced nephropathy is 7 5% and risk of dialysis is less than 1% assuming 50 mL of dye  Hypertension  · Avoid relative hypotension given his advanced age which is associated with baseline decreased renal blood flow, as well as upcoming dye exposure which is associated with renal ischemia as well  · Blood pressure is currently not well controlled  · Will increase carvedilol dose and give an extra dose now  · Trend blood pressure with this  The patient did state that he was very anxious over his upcoming angiogram  Peripheral arterial disease/lower extremity foot wound  · For angiogram today  Hypomagnesemia  · Replete orally          Subjective: The patient was seen and examined  He denied any shortness of breath, chest pain or pressure, abdominal pain, vomiting, diarrhea, sweats, chills, paroxysmal nocturnal dyspnea, orthopnea  His son was at bedside  Objective:     Vitals: Blood pressure (!) 174/88, pulse 80, temperature 97 5 °F (36 4 °C), temperature source Oral, resp  rate 13, height 5' 7\" (1 702 m), weight 42 7 kg (94 lb 2 2 oz), SpO2 96 %  ,Body mass index is 14 74 kg/m²  Temp (24hrs), Av 6 °F (36 4 °C), Min:97 4 °F (36 3 °C), Max:97 8 °F (36 6 °C)      Weight (last 2 days)     Date/Time Weight    23 1649 42 7 (94 14)                 Intake/Output Summary (Last 24 hours) " "at 5/26/2023 1018  Last data filed at 5/26/2023 1001  Gross per 24 hour   Intake 486 66 ml   Output 750 ml   Net -263 34 ml     I/O last 24 hours:   In: 486 7 [I V :486 7]  Out: 750 [Urine:750]        Physical Exam    BP (!) 174/88 (BP Location: Right arm)   Pulse 80   Temp 97 5 °F (36 4 °C) (Oral)   Resp 13   Ht 5' 7\" (1 702 m)   Wt 42 7 kg (94 lb 2 2 oz)   SpO2 96%   BMI 14 74 kg/m²   Vital signs were reviewed  Constitutional: The patient was awake, alert, pleasant, cooperative and in no apparent distress  Cardiovascular: No overt jugular distention was noted, S1-S2, no pericardial friction rub S3 were appreciated, there was no peripheral edema noted in the right lower extremity  Pulmonary: Adequate inspiratory effort, decreased breath sounds, no rales/rhonchi or wheezing noted  Abdominal/GI: Soft, nontender              Invasive Devices     Peripheral Intravenous Line  Duration           Peripheral IV 05/25/23 Left Antecubital <1 day                Medications:    Scheduled Meds:  Current Facility-Administered Medications   Medication Dose Route Frequency Provider Last Rate   • acetaminophen  650 mg Oral Q6H PRN Yobany Tee DO     • acetylcysteine  1,200 mg Oral BID Laury Damon MD     • albuterol  2 puff Inhalation Q6H PRN Titi Wang PA-C     • atorvastatin  80 mg Oral Daily Titi Wang PA-C     • carvedilol  6 25 mg Oral BID With Meals Titi Wang PA-C     • clopidogrel  75 mg Oral Daily Titi Wang PA-C     • heparin (porcine)  5,000 Units Subcutaneous [facility] Titi Wang PA-C     • hydrALAZINE  10 mg Oral [facility] Titi Wang PA-C     • isosorbide dinitrate  10 mg Oral TID Titi Wang PA-C     • neomycin-polymyxin-dexamethasone  1 drop Left Eye 6x Daily Yobany Tee DO     • ondansetron  4 mg Intravenous Q4H PRN Yobany Tee DO     • sodium chloride  50 mL/hr Intravenous Continuous Laury Damon MD 50 mL/hr (05/26/23 0017)   • zolpidem  5 mg " Oral HS PRN Subhash Gurrola PA-C         PRN Meds: •  acetaminophen  •  albuterol  •  ondansetron  •  zolpidem    Continuous Infusions:sodium chloride, 50 mL/hr, Last Rate: 50 mL/hr (05/26/23 0017)            LAB RESULTS:      Results from last 7 days   Lab Units 05/26/23  0449 05/25/23  1142   BUN mg/dL 39* 41*   CALCIUM mg/dL 9 2 9 6   CHLORIDE mmol/L 110* 106   CO2 mmol/L 24 27   CREATININE mg/dL 1 60* 1 76*   EOS PCT %  --  6   HEMATOCRIT % 36 5 40 6   HEMOGLOBIN g/dL 12 3 13 4   POTASSIUM mmol/L 3 7 4 4   LYMPHS PCT %  --  15   MONOS PCT %  --  9   NEUTROS PCT %  --  68   PLATELETS Thousands/uL 297 323   WBC Thousand/uL 10 36* 10 57*       CUTURES:  Lab Results   Component Value Date    BLOODCX No Growth After 5 Days  11/13/2022    BLOODCX No Growth After 5 Days  11/13/2022                 PLEASE NOTE:  This encounter was completed utilizing the [employer] Direct Speech Voice Recognition Software  Grammatical errors, random word insertions, pronoun errors and incomplete sentences are occasional consequences of the system due to software limitations, ambient noise and hardware issues  These may be missed by proof reading prior to affixing electronic signature  Any questions or concerns about the content, text or information contained within the body of this dictation should be directly addressed to the physician for clarification  Please do not hesitate to call me directly if you have any any questions or concerns

## 2023-05-26 NOTE — ASSESSMENT & PLAN NOTE
Lab Results   Component Value Date    CREATININE 1 60 (H) 05/26/2023    CREATININE 1 76 (H) 05/25/2023    CREATININE 1 65 (H) 05/11/2023    EGFR 40 05/26/2023    EGFR 35 05/25/2023    EGFR 38 05/11/2023     · Renal function appears to be at baseline    Nephrology consulted for management

## 2023-05-26 NOTE — ANESTHESIA POSTPROCEDURE EVALUATION
Post-Op Assessment Note    CV Status:  Stable    Pain management: adequate     Mental Status:  Awake   Hydration Status:  Stable   Airway Patency:  Patent      Post Op Vitals Reviewed: Yes      Staff: Anesthesiologist         No notable events documented      BP  110/89   Temp   98   Pulse 84 (05/26/23 1159)   Resp   15   SpO2 99 % (05/26/23 1159)

## 2023-05-26 NOTE — UTILIZATION REVIEW
Initial Clinical Review    Admission: Date/Time/Statement:   Admission Orders (From admission, onward)     Ordered        05/25/23 1226  Inpatient Admission  Once                      Orders Placed This Encounter   Procedures   • Inpatient Admission     Standing Status:   Standing     Number of Occurrences:   1     Order Specific Question:   Level of Care     Answer:   Med Surg [16]     Order Specific Question:   Estimated length of stay     Answer:   More than 2 Midnights     Order Specific Question:   Certification     Answer:   I certify that inpatient services are medically necessary for this patient for a duration of greater than two midnights  See H&P and MD Progress Notes for additional information about the patient's course of treatment  ED Arrival Information     Expected   5/25/2023     Arrival   5/25/2023 10:30    Acuity   Emergent            Means of arrival   Walk-In    Escorted by   Self    Service   Vascular Surgery    Admission type   Emergency            Arrival complaint   PVD (peripheral vascular disease) with claudication [facility])           Chief Complaint   Patient presents with   • Foot Pain     Pt c/o right foot pain for the past couple months  Pt states he was sent in by doctor for an angiogram and to be pre-admitted for procedure  Pt        Initial Presentation: [de-identified] y o  male with past medical history of PAD, CAD, COPD, CKD, subclavian stenosis, HTN, HLD, severe protein calorie malnutrition, BMI 16 6, hyperparathyroidism, gout, peptic ulcer disease, esophagitis, eczema and tobacco abuse  He presents to the ED  from the Vascular center where he presented for follow up due to recently being  seen at [facility] ED on 5/11/23 for resting pain RLE pain and had a LE arterial duplex done  The duplex showed greater than 75% proximal SFA stenosis and distal SFA occlusion on the right with AKUA 0 22 and metatarsal and great toe pressures were unobtainable    He reports he has increased pain in his R foot at night and with activity which is relieved with putting his feet down and rest  He has a right lateral foot necrotic wound which has worsened recently  On note hi is being followed by vascular for asymptomatic bilateral carotid stenosis and was planned for carotid endarterectomy in the near future  He has obtained clearances from medicine,cardiology and pulmonology to proceed with that surgery  He is admitted inpatient  for evaluation  Of his R leg wound and PAD, with an expedited arteriogram planned  Podiatry consult - 5/25 -  Plan local care for  R foot wound, Betadine JOSSY  He just finished 7 days of keflex 500 mg bid, with some redness still present, monitor for infection  Or elevated WBC's  Significant end-stage  Gout noted on prior x-ray as well as on exam  Rheumatology consult place, as he has been unable to tolerate colchicine  Elevate and offloading on foam wedges or pillows when non- ambulatory  Nephrology Consult - 5/25 - Consulted for perioperative optimization to reduce incidence for CEE, as he is likely for angiogram tomorrow  Baseline CR 1 5-1 8 was 1 76 on admit  Plan , minimize IV contrast exposure as much as possible  Start NSS @ 50 cc/hr starting at midnight abd continue for 4 hors post procedure  Give mucomyst 1200 mg po bid 2 doses pre and 2 doses post procedure/   Monitor renal function approximately 48 hours after dye exposure  Strict I/O, daily weights, He will need follow- up as an outpatient post hospitalization  Interventional Radiology Consult  - 5/25 - consulted re his RLE rest pain and foot wound  Arterial duplex from 5/17/2023 demonstrates a >75% stenosis of the right proximal SFA and occlusion of the mid SFA that reconstitutes distally  The distal AT artery appears to be occluded  AKUA is 0 22   Plan for IR RLE angio with intervention to help with rest pain and wound healing    Internal Medicine Consult - 5/25 -  [de-identified] yo m admitted to Vascular service due to RLE pain with arterial insufficiency and foot wound  Consulted for medical management of HTN and possible conjunctivitis  IN the ED he c/o drainage of left eye for the past several days  Plan to start neomycin/polymyxin eye gtts  Contniue carvedilol hydralazine and isosorbide  Nephrology for CKD pre-procedure management, continue plavix for carotid artery stenosis bilateral  Ensure added to diet due to BMI 14 74 kg/m2  Date: 5/26/2023   Day 2: Patient with R foot pain, continue pain management  Reverse trendelenburg  Plan A gram today, Keep NPO, Continue plavix, statin  Being followed by Nephrology & podiatry  Prepping for dye exposure  With IVF's and PO mucomyst, monitor BMP  Post procedure- Manual pressure held about 12 min, pressure dressing and knee immoblizer applied  Agram - 5/26 - Right CFA 80% stenosis with critical right PFA stenosis vs occlusion  Occlusion of the mid SFA with reconstitution of a severally diseased and isolated distal SFA/above knee popliteal artery  Reconstitution of the proximal below knee popliteal artery that supplies runoff via the PT to plantar arch      ED Triage Vitals   Temperature Pulse Respirations Blood Pressure SpO2   05/25/23 1032 05/25/23 1032 05/25/23 1032 05/25/23 1033 05/25/23 1032   97 8 °F (36 6 °C) 84 18 (!) 217/103 100 %      Temp Source Heart Rate Source Patient Position - Orthostatic VS BP Location FiO2 (%)   05/25/23 1032 05/25/23 1032 05/25/23 1033 05/25/23 1033 --   Temporal Monitor Lying Right arm       Pain Score       05/25/23 1032       No Pain          Wt Readings from Last 1 Encounters:   05/25/23 42 7 kg (94 lb 2 2 oz)     Additional Vital Signs:   Date/Time Temp Pulse Resp BP MAP (mmHg) SpO2 O2 Device Cardiac (WDL) Patient Position - Orthostatic VS   05/26/23 1245 -- 86 28 Abnormal  173/73 Abnormal  110 96 % -- -- --   05/26/23 1230 98 °F (36 7 °C) 78 24 Abnormal  134/64 98 96 % None (Room air) WDL --   05/26/23 1215 -- 78 22 163/64 102 96 % -- -- --   05/26/23 1200 -- 84 16 110/89 96 99 % -- -- --   05/26/23 1159 98 °F (36 7 °C) 84 16 110/89 96 99 % None (Room air) WDL --   05/26/23 07:28:38 97 5 °F (36 4 °C) 80 13 174/88 Abnormal  117 96 % None (Room air) -- Lying     Date/Time Temp Pulse Resp BP MAP (mmHg) SpO2 O2 Device Patient Position - Orthostatic VS   05/26/23 06:45:59 -- 75 -- 187/90 Abnormal  122 97 % -- --   05/26/23 05:17:49 -- 86 -- 182/92 Abnormal  122 96 % -- --   05/26/23 0516 -- -- -- 182/92 Abnormal  -- -- -- --   05/26/23 03:32:55 97 5 °F (36 4 °C) 83 -- -- -- 94 % -- --   05/26/23 03:30:49 97 5 °F (36 4 °C) 86 17 172/88 Abnormal  116 95 % -- --   05/26/23 00:47:03 -- -- -- 168/96 120 -- -- --   05/26/23 00:04:45 -- -- -- 178/90 Abnormal  119 -- -- --   05/25/23 2319 -- -- -- 172/92 Abnormal  -- -- -- Lying   05/25/23 23:15:21 97 6 °F (36 4 °C) -- 19 187/90 Abnormal  122 -- -- --   05/25/23 22:38:22 -- 82 -- 186/91 Abnormal  123 95 % -- --   05/25/23 21:11:51 -- 79 -- 195/97 Abnormal  130 96 % -- --   05/25/23 19:47:56 -- 84 -- 162/90 114 97 % -- --   05/25/23 17:48:45 -- 85 -- 168/91 117 97 % None (Room air) --   05/25/23 17:29:17 -- 85 -- 118/83 95 95 % -- --   05/25/23 17:25:20 -- 90 21 118/83 95 96 % -- --   05/25/23 17:09:37 -- 90 -- 181/109 Abnormal  133 96 % -- --   05/25/23 16:53:36 97 4 °F (36 3 °C) Abnormal  92 18 162/110 Abnormal  127 96 % -- --   05/25/23 1548 -- 83 -- 201/89 Abnormal  128 100 % None (Room air) --   05/25/23 1415 -- 91 20 167/85 122 98 % None (Room air) --   05/25/23 1411 -- -- -- 167/85 -- -- -- --   05/25/23 1230 -- 79 18 206/96 Abnormal  138 98 % None (Room air) --   05/25/23 1159 -- 83 20 190/108 Abnormal  142 99 % None (Room air) --   05/25/23 1033 -- -- -- 217/103 Abnormal  -- -- -- Lying     Pertinent Labs/Diagnostic Test Results:   IR lower extremity angiogram  5/25  Findings:  Right CFA 80% stenosis with critical right PFA stenosis vs occlusion   Occlusion of the mid SFA with reconstitution of a severally diseased and isolated distal SFA/above knee popliteal artery  Reconstitution of the proximal below knee popliteal artery that supplies runoff via the PT to plantar arch  Results from last 7 days   Lab Units 05/26/23  0449 05/25/23  1142   HEMATOCRIT % 36 5 40 6   HEMOGLOBIN g/dL 12 3 13 4   NEUTROS ABS Thousands/µL  --  7 27   PLATELETS Thousands/uL 297 323   WBC Thousand/uL 10 36* 10 57*         Results from last 7 days   Lab Units 05/26/23  0449 05/25/23  1142   ANION GAP mmol/L 4 2*   BUN mg/dL 39* 41*   CALCIUM mg/dL 9 2 9 6   CHLORIDE mmol/L 110* 106   CO2 mmol/L 24 27   CREATININE mg/dL 1 60* 1 76*   EGFR ml/min/1 73sq m 40 35   POTASSIUM mmol/L 3 7 4 4   SODIUM mmol/L 138 135       Results from last 7 days   Lab Units 05/26/23  0449 05/25/23  1142   GLUCOSE RANDOM mg/dL 87 94       Results from last 7 days   Lab Units 05/25/23  1142   INR  1 00   PROTIME seconds 13 4   PTT seconds 29       ED Treatment:   Medication Administration from 05/25/2023 0946 to 05/25/2023 1646       Date/Time Order Dose Route Action Comments     05/25/2023 1411 EDT atorvastatin (LIPITOR) tablet 80 mg 80 mg Oral Given --     05/25/2023 1546 EDT carvedilol (COREG) tablet 6 25 mg 6 25 mg Oral Given /89 HR 77     05/25/2023 1411 EDT clopidogrel (PLAVIX) tablet 75 mg 75 mg Oral Given --     05/25/2023 1411 EDT hydrALAZINE (APRESOLINE) tablet 10 mg 10 mg Oral Given --     05/25/2023 1546 EDT isosorbide dinitrate (ISORDIL) tablet 10 mg 10 mg Oral Given /89     05/25/2023 1411 EDT heparin (porcine) subcutaneous injection 5,000 Units 5,000 Units Subcutaneous Given --        Past Medical History:   Diagnosis Date   • Abnormal thyroid function test     non specified /  [place]    1/8/13     • Chronic kidney disease (CKD), stage III (moderate) ([facility])    • COPD (chronic obstructive pulmonary disease) ([facility])    • Eczema     LA   11/26/13      • Esophagitis    • Gout    • Hypertension    • PAD (peripheral artery disease) [facility])    • Pyloric channel ulcer      Present on Admission:  • Atherosclerosis of artery of extremity with ulceration ([facility] )  • Asymptomatic carotid artery stenosis, bilateral  • COPD (chronic obstructive pulmonary disease) ([facility])  • Essential hypertension  • Stage 3b chronic kidney disease ([facility])  • Conjunctivitis, left eye  • Patient underweight      Admitting Diagnosis: PVD (peripheral vascular disease) with claudication ([facility]) [I73 9]  Peripheral arterial disease ([facility]) [I73 9]  Right foot pain [M79 671]  PAD (peripheral artery disease) ([facility]) [I73 9]  Hypertensive urgency [I16 0]  Ischemic rest pain of lower extremity [M79 606, I99 8]  Ischemic ulcer of foot, limited to breakdown of skin, right ([facility]) [L97 511]  Gout of right foot, unspecified cause, unspecified chronicity [M10 9]  Stage 3b chronic kidney disease ([facility] ) [N18 32]  Age/Sex: [de-identified] y o  male       Admission Orders:  Scheduled Medications:  acetylcysteine, 1,200 mg, Oral, BID  atorvastatin, 80 mg, Oral, Daily  carvedilol, 6 25 mg, Oral, BID With Meals  clopidogrel, 75 mg, Oral, Daily  heparin (porcine), 5,000 Units, Subcutaneous, Q8H [facility]  hydrALAZINE, 10 mg, Oral, Q8H [facility]  isosorbide dinitrate, 10 mg, Oral, TID  neomycin-polymyxin-dexamethasone, 1 drop, Left Eye, 6x Daily  Hydralazine 5 mg iv once- 5/25 x 1(22:54)       Continuous IV Infusions:  sodium chloride, 50 mL/hr, Intravenous, Continuous - started 5/26 @ 00:17    PRN Meds:  acetaminophen, 650 mg, Oral, Q6H PRN - 5/26 x 1   albuterol, 2 puff, Inhalation, Q6H PRN  ondansetron, 4 mg, Intravenous, Q4H PRN  zolpidem, 5 mg, Oral, HS PRN - 5/25 x 1   Hydralazine 10 mg iv once as needed - 5/26 x1       Nursing Orders -  VS continuous pulse ox monitoring - Puncture site care - I /O q shift - up and OOB as tolerated - Neurovascular checks q shift    Network Utilization Review Department  ATTENTION: Please call with any questions or concerns to [phone] and carefully listen to the prompts so that you are directed to the right person  All voicemails are confidential   Shefali Pinzon all requests for admission clinical reviews, approved or denied determinations and any other requests to dedicated fax number below belonging to the campus where the patient is receiving treatment   List of dedicated fax numbers for the Facilities:  [address] DENIALS (Administrative/Medical Necessity) [phone]   [address] (Maternity/NICU/Pediatrics) [phone]   [address] [phone]   [facility]  [phone]   [address] [address] [place] [place] [address] [phone]   [facility] [address] [phone]

## 2023-05-26 NOTE — SEDATION DOCUMENTATION
Right lower extremity angiogram completed by Dr Nish Schaefer  Tolerated well  Manual pressure held for approximately 12min, pressure dressing and knee immoblizer applied  Bedrest start at 12:00  Transported to PACU by anesthesiologist and IR RN  Report given at bedside

## 2023-05-26 NOTE — PLAN OF CARE
Problem: Nutrition/Hydration-ADULT  Goal: Nutrient/Hydration intake appropriate for improving, restoring or maintaining nutritional needs  Description: Monitor and assess patient's nutrition/hydration status for malnutrition  Collaborate with interdisciplinary team and initiate plan and interventions as ordered  Monitor patient's weight and dietary intake as ordered or per policy  Utilize nutrition screening tool and intervene as necessary  Determine patient's food preferences and provide high-protein, high-caloric foods as appropriate       INTERVENTIONS:  - Monitor oral intake, urinary output, labs, and treatment plans  - Assess nutrition and hydration status and recommend course of action  - Evaluate amount of meals eaten  - Assist patient with eating if necessary   - Allow adequate time for meals  - Recommend/ encourage appropriate diets, oral nutritional supplements, and vitamin/mineral supplements  - Order, calculate, and assess calorie counts as needed  - Recommend, monitor, and adjust tube feedings and TPN/PPN based on assessed needs  - Assess need for intravenous fluids  - Provide specific nutrition/hydration education as appropriate  - Include patient/family/caregiver in decisions related to nutrition  Outcome: Progressing     Problem: MOBILITY - ADULT  Goal: Maintain or return to baseline ADL function  Description: INTERVENTIONS:  -  Assess patient's ability to carry out ADLs; assess patient's baseline for ADL function and identify physical deficits which impact ability to perform ADLs (bathing, care of mouth/teeth, toileting, grooming, dressing, etc )  - Assess/evaluate cause of self-care deficits   - Assess range of motion  - Assess patient's mobility; develop plan if impaired  - Assess patient's need for assistive devices and provide as appropriate  - Encourage maximum independence but intervene and supervise when necessary  - Involve family in performance of ADLs  - Assess for home care needs following discharge   - Consider OT consult to assist with ADL evaluation and planning for discharge  - Provide patient education as appropriate  Outcome: Progressing  Goal: Maintains/Returns to pre admission functional level  Description: INTERVENTIONS:  - Perform BMAT or MOVE assessment daily    - Set and communicate daily mobility goal to care team and patient/family/caregiver     - Collaborate with rehabilitation services on mobility goals if consulted  Problem: Prexisting or High Potential for Compromised Skin Integrity  Goal: Skin integrity is maintained or improved  Description: INTERVENTIONS:  - Identify patients at risk for skin breakdown  - Assess and monitor skin integrity  - Assess and monitor nutrition and hydration status  - Monitor labs   - Assess for incontinence   - Turn and reposition patient  - Assist with mobility/ambulation  - Relieve pressure over bony prominences  - Avoid friction and shearing  - Provide appropriate hygiene as needed including keeping skin clean and dry  - Evaluate need for skin moisturizer/barrier cream  - Collaborate with interdisciplinary team   - Patient/family teaching  - Consider wound care consult   Outcome: Progressing     - Out of bed for toileting  - Record patient progress and toleration of activity level   Outcome: Progressing

## 2023-05-26 NOTE — TREATMENT PLAN
Podiatry Treatment Plan  -X-ray of right foot reviewed  Significant degeneration of the first second and third metatarsal phalangeal joints noted which clinically correlated with continually elevated uric acid levels is most likely severe end-stage gout  Osteomyelitis of this area is highly unlikely given the absence of wounds to the area  Normally small joint arthrocentesis would be indicated for confirmation, however given his significant peripheral arterial disease creating a wound with a puncture would be more detrimental to his care  No osteomyelitis noted over the fifth metatarsal base near patient's wound    We we will continue to closely follow patient

## 2023-05-26 NOTE — PROGRESS NOTES
[address] [facility]  Progress Note  Name: Jose M Hawkins  MRN: [id number]  [facility] I Date of Admission: 5/25/2023   Date of Service: 5/26/2023 I Hospital Day: 1    Assessment/Plan   * Atherosclerosis of artery of extremity with ulceration ([facility] 75 )  Assessment & Plan  Background: History of hypertension CKD and PAD with carotid stenosis presents for anterior insufficiency of lower extremities  · On clopidogrel for carotid disease  · Underwent angiogram today  · Management per primary service    Patient underweight  Assessment & Plan  · Body mass index is 14 74 kg/m²  · Added ensure    Conjunctivitis, left eye  Assessment & Plan  · Unilateral eye erythema with irritation  · Denies any visual field deficits  · Started neomycin/polymyxin eyedrops with improvement    Asymptomatic carotid artery stenosis, bilateral  Assessment & Plan  · Continue clopidogrel    Stage 3b chronic kidney disease [facility])  Assessment & Plan  Lab Results   Component Value Date    CREATININE 1 60 (H) 05/26/2023    CREATININE 1 76 (H) 05/25/2023    CREATININE 1 65 (H) 05/11/2023    EGFR 40 05/26/2023    EGFR 35 05/25/2023    EGFR 38 05/11/2023     · Renal function appears to be at baseline  Nephrology consulted for management    COPD (chronic obstructive pulmonary disease) ([facility] 75 )  Assessment & Plan  · Reported history of COPD on albuterol as needed  · No longer smoking    Essential hypertension  Assessment & Plan  · Continue hydralazine and isosorbide  · Carvedilol increased by nephrology this morning due to elevated blood pressures    VTE Pharmacologic Prophylaxis:   Moderate Risk (Score 3-4) - Pharmacological DVT Prophylaxis Ordered: heparin  Patient Centered Rounds: I have performed bedside rounds with nursing staff today  Discussions with Specialists or Other Care Team Provider: This management    Education and Discussions with Family / Patient:     Time Spent for Care:    This time was spent "on one or more of the following: performing physical exam; counseling and coordination of care; obtaining or reviewing history; documenting in the medical record; reviewing/ordering tests, medications or procedures; communicating with other healthcare professionals and discussing with patient's family/caregivers  Current Length of Stay: 1 day(s)  Current Patient Status: Inpatient   Certification Statement: The patient will continue to require additional inpatient hospital stay due to Postprocedure  Discharge Plan: [facility] is following this patient on consult  They are medically stable for discharge when deemed appropriate by primary service  Code Status: Level 1 - Full Code      Subjective:   Patient seen and examined  Returns from angiogram   No new complaints    Objective:   Vitals: Blood pressure (!) 123/46, pulse 72, temperature 98 °F (36 7 °C), resp  rate (!) 28, height 5' 7\" (1 702 m), weight 42 7 kg (94 lb 2 2 oz), SpO2 97 %  Intake/Output Summary (Last 24 hours) at 5/26/2023 1558  Last data filed at 5/26/2023 1153  Gross per 24 hour   Intake 986 66 ml   Output 400 ml   Net 586 66 ml       Physical Exam  Vitals reviewed  Constitutional:       Appearance: He is underweight  HENT:      Head: Atraumatic  Eyes:      Conjunctiva/sclera: Conjunctivae normal    Cardiovascular:      Rate and Rhythm: Regular rhythm  Pulmonary:      Effort: Pulmonary effort is normal       Breath sounds: Decreased breath sounds present  No wheezing  Abdominal:      General: Bowel sounds are normal       Palpations: Abdomen is soft  Tenderness: There is no abdominal tenderness  There is no rebound  Musculoskeletal:         General: No swelling or tenderness  Skin:     General: Skin is warm and dry  Neurological:      General: No focal deficit present  Mental Status: He is alert  Cranial Nerves: No cranial nerve deficit     Psychiatric:         Mood and Affect: Mood normal        Additional Data: " Labs:  Results from last 7 days   Lab Units 05/26/23  0449 05/25/23  1142   HEMOGLOBIN g/dL 12 3 13 4   INR   --  1 00   MCV fL 88 90   PLATELETS Thousands/uL 297 323   WBC Thousand/uL 10 36* 10 57*     Results from last 7 days   Lab Units 05/26/23  0449 05/25/23  1142   ANION GAP mmol/L 4 2*   BUN mg/dL 39* 41*   CALCIUM mg/dL 9 2 9 6   CHLORIDE mmol/L 110* 106   CO2 mmol/L 24 27   CREATININE mg/dL 1 60* 1 76*   EGFR ml/min/1 73sq m 40 35   GLUCOSE RANDOM mg/dL 87 94   POTASSIUM mmol/L 3 7 4 4   SODIUM mmol/L 138 135                                      * I Have Reviewed All Lab Data Listed Above  Cultures:                   Lines/Drains:  Invasive Devices     Peripheral Intravenous Line  Duration           Peripheral IV 05/25/23 Left Antecubital 1 day              Telemetry:      Imaging:  Imaging Reports Reviewed Today Include:   XR foot 3+ vw right    Result Date: 5/26/2023  Impression: Small soft tissue defect overlying the base of the right 5th metatarsal, however with no associated cortical erosion to suggest osteomyelitis  Large erosions involving the head of the 3rd metatarsal and the base of the 3rd proximal phalanx with associated soft tissue swelling and increased density, as well as small effusions involving the head of the 1st metatarsal, base of 1st proximal phalanx  and base of 2nd proximal phalanx also with some associated increased tissue swelling and density similar to 5/11/2023  These findings are suggestive of gout, however superimposed osteomyelitis is not excluded  Cortical erosion involving the medial aspect of the proximal metaphysis of the right 4th proximal phalanx, increased compared to 5/11/2023  The study was marked in [facility] for immediate notification  Workstation performed: [id number]     IR lower extremity angiogram    Result Date: 5/26/2023  Impression: Impression: Diffusely atherosclerotic abdominal aorta and iliac vessels without a significant stenosis   80% right common femoral artery stenosis with high-grade stenosis versus occlusion of the profunda femoris artery  Reconstitution of an isolated distal superficial femoral artery/above-knee popliteal artery with reconstitution of an atretic below-knee popliteal artery supplying posterior tibial runoff to the foot  Moderate to severe stenoses in the left common femoral artery and proximal superficial femoral artery  Workstation performed: [id number]       Scheduled Meds:  Current Facility-Administered Medications   Medication Dose Route Frequency Provider Last Rate   • acetaminophen  650 mg Oral Q6H PRN Anika Hsu DO     • acetylcysteine  1,200 mg Oral BID Laury Damon MD     • albuterol  2 puff Inhalation Q6H PRN Familia Costa PA-C     • atorvastatin  80 mg Oral Daily Familia Costa PA-C     • carvedilol  12 5 mg Oral BID With Meals Durga Serrato MD     • clopidogrel  75 mg Oral Daily Familia Costa PA-C     • heparin (porcine)  5,000 Units Subcutaneous [facility] Margot Olivares PA-C     • hydrALAZINE  10 [facility]8H [address] [facility]     • isosorbide dinitrate  10 mg Oral TID Familia Costa PA-C     • labetalol  10 mg Intravenous Q10 Min PRN Randall Velasquez CRNA     • magnesium Oxide  400 mg Oral Daily Durga Serrato MD     • neomycin-polymyxin-dexamethasone  1 drop Left Eye 6x Daily Anika Hsu DO     • ondansetron  4 mg Intravenous Q4H PRN Anika Hsu DO     • sodium chloride  50 mL/hr Intravenous Continuous Laury Damon MD 50 mL/hr (05/26/23 0017)   • zolpidem  5 mg Oral HS PRN Familia Costa PA-C         Today, Patient Was Seen By: Anika Hsu DO    ** Please Note: Dictation voice to text software may have been used in the creation of this document   **

## 2023-05-26 NOTE — ASSESSMENT & PLAN NOTE
· Continue hydralazine and isosorbide  · Carvedilol increased by nephrology this morning due to elevated blood pressures done

## 2023-05-26 NOTE — ASSESSMENT & PLAN NOTE
· Unilateral eye erythema with irritation  · Denies any visual field deficits  · Started neomycin/polymyxin eyedrops with improvement

## 2023-05-26 NOTE — BRIEF OP NOTE (RAD/CATH)
INTERVENTIONAL RADIOLOGY PROCEDURE NOTE    Date: 5/26/2023    Procedure: IR LOWER EXTREMITY ANGIOGRAM     Preoperative diagnosis:   1  Right foot pain    2  PAD (peripheral artery disease) ([facility] )    3  Ischemic ulcer of foot, limited to breakdown of skin, right ([facility] )    4  Stage 3b chronic kidney disease ([facility] )    5  Peripheral arterial disease ([facility] )    6  Ischemic rest pain of lower extremity    7  Hypertensive urgency    8  Gout of right foot, unspecified cause, unspecified chronicity         Postoperative diagnosis: Same  Surgeon: Nas Duffy MD     Assistant: None  No qualified resident was available  Blood loss: minimal    Specimens: none    Findings:  Right CFA 80% stenosis with critical right PFA stenosis vs occlusion  Occlusion of the mid SFA with reconstitution of a severally diseased and isolated distal SFA/above knee popliteal artery  Reconstitution of the proximal below knee popliteal artery that supplies runoff via the PT to plantar arch  Complications: None immediate      Anesthesia: local and general anesthesia

## 2023-05-27 PROBLEM — F41.8 ANXIETY ABOUT HEALTH: Status: ACTIVE | Noted: 2023-05-27

## 2023-05-27 PROBLEM — R45.89 ANXIETY ABOUT HEALTH: Status: ACTIVE | Noted: 2023-01-01

## 2023-05-27 LAB
ANION GAP SERPL CALCULATED.3IONS-SCNC: 1 MMOL/L (ref 4–13)
BUN SERPL-MCNC: 41 MG/DL (ref 5–25)
CALCIUM SERPL-MCNC: 8.5 MG/DL (ref 8.3–10.1)
CHLORIDE SERPL-SCNC: 110 MMOL/L (ref 96–108)
CO2 SERPL-SCNC: 24 MMOL/L (ref 21–32)
CREAT SERPL-MCNC: 1.99 MG/DL (ref 0.6–1.3)
ERYTHROCYTE [DISTWIDTH] IN BLOOD BY AUTOMATED COUNT: 12.5 % (ref 11.6–15.1)
GFR SERPL CREATININE-BSD FRML MDRD: 30 ML/MIN/1.73SQ M
GLUCOSE SERPL-MCNC: 99 MG/DL (ref 65–140)
HCT VFR BLD AUTO: 36 % (ref 36.5–49.3)
HGB BLD-MCNC: 11.7 G/DL (ref 12–17)
MCH RBC QN AUTO: 29.5 PG (ref 26.8–34.3)
MCHC RBC AUTO-ENTMCNC: 32.5 G/DL (ref 31.4–37.4)
MCV RBC AUTO: 91 FL (ref 82–98)
PLATELET # BLD AUTO: 272 THOUSANDS/UL (ref 149–390)
PMV BLD AUTO: 8.8 FL (ref 8.9–12.7)
POTASSIUM SERPL-SCNC: 4.2 MMOL/L (ref 3.5–5.3)
RBC # BLD AUTO: 3.97 MILLION/UL (ref 3.88–5.62)
SODIUM SERPL-SCNC: 135 MMOL/L (ref 135–147)
WBC # BLD AUTO: 13.11 THOUSAND/UL (ref 4.31–10.16)

## 2023-05-27 RX ORDER — ALPRAZOLAM 0.25 MG/1
0.25 TABLET ORAL 2 TIMES DAILY PRN
Status: DISCONTINUED | OUTPATIENT
Start: 2023-05-27 | End: 2023-05-31 | Stop reason: HOSPADM

## 2023-05-27 RX ADMIN — HYDRALAZINE HYDROCHLORIDE 10 MG: 10 TABLET, FILM COATED ORAL at 20:48

## 2023-05-27 RX ADMIN — ISOSORBIDE DINITRATE 10 MG: 10 TABLET ORAL at 17:14

## 2023-05-27 RX ADMIN — NEOMYCIN SULFATE, POLYMYXIN B SULFATE AND DEXAMETHASONE 1 DROP: 3.5; 10000; 1 SUSPENSION OPHTHALMIC at 17:14

## 2023-05-27 RX ADMIN — HEPARIN SODIUM 5000 UNITS: 5000 INJECTION INTRAVENOUS; SUBCUTANEOUS at 06:36

## 2023-05-27 RX ADMIN — HEPARIN SODIUM 5000 UNITS: 5000 INJECTION INTRAVENOUS; SUBCUTANEOUS at 20:48

## 2023-05-27 RX ADMIN — Medication 2.5 MG: at 08:43

## 2023-05-27 RX ADMIN — NEOMYCIN SULFATE, POLYMYXIN B SULFATE AND DEXAMETHASONE 1 DROP: 3.5; 10000; 1 SUSPENSION OPHTHALMIC at 20:48

## 2023-05-27 RX ADMIN — ATORVASTATIN CALCIUM 80 MG: 80 TABLET, FILM COATED ORAL at 08:44

## 2023-05-27 RX ADMIN — CARVEDILOL 12.5 MG: 12.5 TABLET, FILM COATED ORAL at 06:34

## 2023-05-27 RX ADMIN — CLOPIDOGREL BISULFATE 75 MG: 75 TABLET ORAL at 08:44

## 2023-05-27 RX ADMIN — ALPRAZOLAM 0.25 MG: 0.25 TABLET ORAL at 20:48

## 2023-05-27 RX ADMIN — CARVEDILOL 12.5 MG: 12.5 TABLET, FILM COATED ORAL at 17:14

## 2023-05-27 RX ADMIN — HEPARIN SODIUM 5000 UNITS: 5000 INJECTION INTRAVENOUS; SUBCUTANEOUS at 13:34

## 2023-05-27 RX ADMIN — ISOSORBIDE DINITRATE 10 MG: 10 TABLET ORAL at 08:44

## 2023-05-27 RX ADMIN — MAGNESIUM GLUCONATE 500 MG ORAL TABLET 400 MG: 500 TABLET ORAL at 08:44

## 2023-05-27 RX ADMIN — Medication 2.5 MG: at 20:48

## 2023-05-27 RX ADMIN — HYDRALAZINE HYDROCHLORIDE 10 MG: 10 TABLET, FILM COATED ORAL at 13:34

## 2023-05-27 RX ADMIN — NEOMYCIN SULFATE, POLYMYXIN B SULFATE AND DEXAMETHASONE 1 DROP: 3.5; 10000; 1 SUSPENSION OPHTHALMIC at 06:39

## 2023-05-27 RX ADMIN — HYDRALAZINE HYDROCHLORIDE 10 MG: 10 TABLET, FILM COATED ORAL at 06:34

## 2023-05-27 RX ADMIN — ACETYLCYSTEINE 1200 MG: 200 INHALANT RESPIRATORY (INHALATION) at 08:49

## 2023-05-27 RX ADMIN — ISOSORBIDE DINITRATE 10 MG: 10 TABLET ORAL at 20:48

## 2023-05-27 RX ADMIN — NEOMYCIN SULFATE, POLYMYXIN B SULFATE AND DEXAMETHASONE 1 DROP: 3.5; 10000; 1 SUSPENSION OPHTHALMIC at 13:36

## 2023-05-27 RX ADMIN — NEOMYCIN SULFATE, POLYMYXIN B SULFATE AND DEXAMETHASONE 1 DROP: 3.5; 10000; 1 SUSPENSION OPHTHALMIC at 10:41

## 2023-05-27 NOTE — QUICK NOTE
"Post Op Check Note - Surgery Resident  Donavan Sanon [de-identified] y o  male MRN: [id number]  Unit/Bed#: [facility] 501-01 Encounter: [id number]    ASSESSMENT:  Donavan Sanon is a [de-identified] y o  male who is status post RLE agram    Subjective: Patient reports feeling ok, No complaints at this time    Physical Exam:  GEN: NAD  CV: RRR  Lung: Normal effort  Ab: Soft, NT/ND  Neuro: A+Ox3, RLE motor and sensation intact  Incisions: L groin dressing c/d/i  Pulses: RLE absent DP and PT signals    Blood pressure 124/61, pulse 91, temperature 97 8 °F (36 6 °C), resp  rate (!) 28, height 5' 7\" (1 702 m), weight 42 7 kg (94 lb 2 2 oz), SpO2 93 %  ,Body mass index is 14 74 kg/m²        Intake/Output Summary (Last 24 hours) at 5/27/2023 0329  Last data filed at 5/27/2023 0100  Gross per 24 hour   Intake 986 66 ml   Output 600 ml   Net 386 66 ml       Invasive Devices     Peripheral Intravenous Line  Duration           Peripheral IV 05/25/23 Left Antecubital 1 day                VTE Pharmacologic Prophylaxis: Heparin            "

## 2023-05-27 NOTE — PROGRESS NOTES
[address] [facility]  Progress Note  Name: Jacob Laughlin  MRN: [id number]  [facility] Date of Admission: 5/25/2023   Date of Service: 5/27/2023 I Hospital Day: 2    Assessment/Plan   * Atherosclerosis of artery of extremity with ulceration ([facility] 75 )  Assessment & Plan  Background: History of hypertension CKD and PAD with carotid stenosis presents for anterior insufficiency of lower extremities  · On clopidogrel for carotid disease  · Underwent angiogram not amenable to endovascular revascularization  · Management per primary service  · Right foot ulcer: Seen and being followed by by podiatry  No evidence of acute infection  Anxiety about health  Assessment & Plan  · Will add alprazolam as needed    Patient underweight  Assessment & Plan  · Body mass index is 15 4 kg/m²  · Added ensure    Conjunctivitis, left eye  Assessment & Plan  · Unilateral eye erythema with irritation  · Denies any visual field deficits  · Started neomycin/polymyxin eyedrops with improvement    Asymptomatic carotid artery stenosis, bilateral  Assessment & Plan  · Continue clopidogrel    Stage 3b chronic kidney disease [facility])  Assessment & Plan  Lab Results   Component Value Date    CREATININE 1 99 (H) 05/27/2023    CREATININE 1 60 (H) 05/26/2023    CREATININE 1 76 (H) 05/25/2023    EGFR 30 05/27/2023    EGFR 40 05/26/2023    EGFR 35 05/25/2023     · Renal function appears to be at baseline  Nephrology consulted for management    COPD (chronic obstructive pulmonary disease) ([facility] )  Assessment & Plan  · Reported history of COPD on albuterol as needed  · No longer smoking    Essential hypertension  Assessment & Plan  · Continue hydralazine and isosorbide  · Carvedilol increased by nephrology due to elevated blood pressures    VTE Pharmacologic Prophylaxis:   High Risk (Score >/= 5) - Pharmacological DVT Prophylaxis Ordered: heparin  Sequential Compression Devices Ordered      Patient Centered "Rounds: I have performed bedside rounds with nursing staff today  Discussions with Specialists or Other Care Team Provider:     Education and Discussions with Family / Patient: Updated  (son) at bedside  Time Spent for Care: This time was spent on one or more of the following: performing physical exam; counseling and coordination of care; obtaining or reviewing history; documenting in the medical record; reviewing/ordering tests, medications or procedures; communicating with other healthcare professionals and discussing with patient's family/caregivers  Current Length of Stay: 2 day(s)  Current Patient Status: Inpatient   Certification Statement:   Discharge Plan: [facility] is following this patient on consult  They are medically stable for discharge when deemed appropriate by primary service  Code Status: Level 1 - Full Code      Subjective:   Patient seen and examined  Had increased anxiety improved with alprazolam     Objective:   Vitals: Blood pressure 152/76, pulse 85, temperature 98 1 °F (36 7 °C), resp  rate 18, height 5' 7\" (1 702 m), weight 44 6 kg (98 lb 5 2 oz), SpO2 95 %  Intake/Output Summary (Last 24 hours) at 5/27/2023 1444  Last data filed at 5/27/2023 4276  Gross per 24 hour   Intake 0 ml   Output 400 ml   Net -400 ml       Physical Exam  Vitals reviewed  Constitutional:       General: He is not in acute distress  Appearance: He is underweight  HENT:      Head: Atraumatic  Cardiovascular:      Rate and Rhythm: Regular rhythm  Heart sounds: Normal heart sounds  Pulmonary:      Effort: Pulmonary effort is normal       Breath sounds: Decreased breath sounds present  No wheezing  Abdominal:      General: Bowel sounds are normal       Palpations: Abdomen is soft  Tenderness: There is no abdominal tenderness  There is no rebound  Musculoskeletal:         General: No swelling or tenderness  Skin:     General: Skin is warm     Neurological:      General: " No focal deficit present  Mental Status: He is alert  Cranial Nerves: No cranial nerve deficit  Psychiatric:         Mood and Affect: Mood normal        Additional Data:   Labs:  Results from last 7 days   Lab Units 05/27/23 0224 05/26/23 0449 05/25/23  1142   HEMOGLOBIN g/dL 11 7* 12 3 13 4   INR   --   --  1 00   MCV fL 91 88 90   PLATELETS Thousands/uL 272 297 323   WBC Thousand/uL 13 11* 10 36* 10 57*     Results from last 7 days   Lab Units 05/27/23 0224 05/26/23 0449 05/25/23  1142   ANION GAP mmol/L 1* 4 2*   BUN mg/dL 41* 39* 41*   CALCIUM mg/dL 8 5 9 2 9 6   CHLORIDE mmol/L 110* 110* 106   CO2 mmol/L 24 24 27   CREATININE mg/dL 1 99* 1 60* 1 76*   EGFR ml/min/1 73sq m 30 40 35   GLUCOSE RANDOM mg/dL 99 87 94   POTASSIUM mmol/L 4 2 3 7 4 4   SODIUM mmol/L 135 138 135             * I Have Reviewed All Lab Data Listed Above  Cultures:                   Lines/Drains:  Invasive Devices     Peripheral Intravenous Line  Duration           Peripheral IV 05/25/23 Left Antecubital 2 days              Telemetry:      Imaging:  Imaging Reports Reviewed Today Include:     IR lower extremity angiogram    Result Date: 5/26/2023  Impression: Impression: Diffusely atherosclerotic abdominal aorta and iliac vessels without a significant stenosis  80% right common femoral artery stenosis with high-grade stenosis versus occlusion of the profunda femoris artery  Reconstitution of an isolated distal superficial femoral artery/above-knee popliteal artery with reconstitution of an atretic below-knee popliteal artery supplying posterior tibial runoff to the foot  Moderate to severe stenoses in the left common femoral artery and proximal superficial femoral artery   Workstation performed: [id number]       Scheduled Meds:  Current Facility-Administered Medications   Medication Dose Route Frequency Provider Last Rate   • acetaminophen  650 mg Oral Q6H PRN Chet Hanny, DO     • albuterol  2 puff Inhalation Q6H PRN Steve House PA-C     • ALPRAZolam  0 25 mg Oral BID PRN Pepe Tadeo DO     • atorvastatin  80 mg Oral Daily Steve House PA-C     • carvedilol  12 5 mg Oral BID With Meals Ritu Clarke MD     • clopidogrel  75 mg Oral Daily Steve House PA-C     • heparin (porcine)  5,000 Units Subcutaneous [facility] Margotkierra HolbrookLuisanalan Vail PA-C     • hydrALAZINE  10 mg Oral Q8H [address]HA     • isosorbide dinitrate  10 mg Oral TID Steve House PA-C     • labetalol  10 mg Intravenous Q10 Min PRN Maureen Bates CRNA     • magnesium Oxide  400 mg Oral Daily Ritu Clarke MD     • neomycin-polymyxin-dexamethasone  1 drop Left Eye 6x Daily Pepe Tadeo DO     • ondansetron  4 mg Intravenous Q4H PRN Pepe Tadeo DO     • oxyCODONE  2 5 mg Oral Q6H PRN Mallorie Barillas PA-C     • zolpidem  5 mg Oral HS PRN Steve House PA-C         Today, Patient Was Seen By: Pepe Tadeo DO    ** Please Note: Dictation voice to text software may have been used in the creation of this document   **

## 2023-05-27 NOTE — PROGRESS NOTES
Follow up Consultation    Nephrology   Hattie Bocanegra [de-identified] y o  male MRN: [id number]  Unit/Bed#: [facility] 501-01 Encounter: [id number]      Physician Requesting Consult: Nellie Nuñez MD        ASSESSMENT/PLAN:  [de-identified] y o   male with pmh of peripheral chill disease, CAD, hypertension, hyperlipidemia, protein calorie malnutrition and CKD stage III initially presented to vascular center with rest pain and foot wound and subsequently transferred over to ED for expedited arteriogram   Nephrology has been consulted for perioperative optimization to reduce incidence for acute kidney injury as patient is likely for angiogram          Perioperative optimization to reduce incidence for acute kidney injury/CKD stage IIIb:  At risk for CEE multifactorial most likely secondary to NITHIN due to contrast exposure on 5/26/2023 with angiogram   Needs close monitoring of renal parameters  After review of records In [facility] as well as Care everywhere patient has a baseline Creatinine of 1 5-1 8 mg/dL  Admission creatinine of 1 76 mg/dL on 5/25/2023  Creatinine today is at  1 83 mg/dL improved slightly from yesterday, back at baseline  check BMP in a m  Optimize hemodynamic status to avoid delay in renal recovery  Avoid nephrotoxins, adjust meds to appropriate GFR  At risk for another episode of acute kidney injury if patient pursues surgical intervention this was discussed with the patient  Strict I/O  Daily weights  Completing Mucomyst  Urinary retention protocol if patient does not have a Eduardo  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 plus age-related nephron loss  will need to set up patient for follow up with Nephrology as an outpatient post hospitalization    Outpatient nephrology follow-up with Dr Nereyda Ortiz     Acid-base electrolytes:     Mild hyponatremia:    Recent serum sodium 134 mill equivalents  Likely secondary to increased ADH secondary to pain     Acid-base: "  Most recent bicarb stable 23     H/H/anemia:  most recent hemoglobin at 11 7g/dL  maintain hemoglobin greater than 8 grams/deciliter     Blood pressure/hypertension:  home medications: Coreg 6 25 mg p o  twice daily, hydralazine 10 mg p o  every 8, Isordil 10 mg p o  3 times daily  current medications: Coreg 12 5 mg p o  twice daily, hydralazine 10 mg p o  every 8, Isordil 10 mg p o  3 times daily  recommendations:  Likely some degree of blood pressure elevation secondary to pain  For now we will increase Coreg to 25 mg p o  twice daily  Optimize hemodynamics  Maintain MAP > 65mmHg  Avoid BP fluctuations      Other medical problems:  Proteinuria: Most recent UA with no blood or protein  Peripheral arterial disease/lower extremity foot wound:  Management per primary team   Follow-up with vascular and podiatry  Status post lower extremity angiogram   Ongoing discussions between patient and vascular and podiatry with regards to local wound care versus aggressive surgical revascularization as per vascular note patient overall is a poor surgical candidate      Thanks for the consult  Will continue to follow  Please call with questions/ concerns  Above-mentioned orders and Plan that we will continue to monitor his renal parameters while he is in the hospital awaiting further information with regards to management plans were discussed with the team in depth and they agree  Trey Narayanan MD, SMITHA, 2023, 8:50 AM                Objective :   Patient seen and examined in his room overnight events he medically stable remains afebrile reports he still undecided with regards to surgery as he wishes to talk to his children who are away for now    Happy that his renal parameters are stable      PHYSICAL EXAM  /86   Pulse 88   Temp 98 3 °F (36 8 °C)   Resp 18   Ht 5' 7\" (1 702 m)   Wt 45 kg (99 lb 3 3 oz)   SpO2 95%   BMI 15 54 kg/m²   Temp (24hrs), Av 4 °F (36 9 °C), Min:98 1 °F (36 7 °C), " Max:98 7 °F (37 1 °C)        Intake/Output Summary (Last 24 hours) at 5/28/2023 0850  Last data filed at 5/28/2023 0501  Gross per 24 hour   Intake 900 ml   Output 500 ml   Net 400 ml       I/O last 24 hours: In: 900 [P O :900]  Out: 500 [Urine:500]      Current Weight: Weight - Scale: 45 kg (99 lb 3 3 oz)  First Weight: Weight - Scale: 42 7 kg (94 lb 2 2 oz)  Physical Exam  Vitals and nursing note reviewed  Constitutional:       General: He is not in acute distress  Appearance: Normal appearance  He is normal weight  He is ill-appearing  He is not toxic-appearing or diaphoretic  HENT:      Head: Normocephalic and atraumatic  Mouth/Throat:      Pharynx: Oropharynx is clear  No oropharyngeal exudate  Eyes:      General: No scleral icterus  Conjunctiva/sclera: Conjunctivae normal    Cardiovascular:      Rate and Rhythm: Normal rate  Heart sounds: Normal heart sounds  No friction rub  Pulmonary:      Effort: No respiratory distress  Breath sounds: Normal breath sounds  No stridor  Abdominal:      General: There is no distension  Palpations: Abdomen is soft  There is no mass  Tenderness: There is no abdominal tenderness  Musculoskeletal:         General: No swelling  Cervical back: Normal range of motion  No rigidity  Comments: Lower extremity dressing in place   Skin:     General: Skin is warm  Coloration: Skin is not jaundiced  Neurological:      General: No focal deficit present  Mental Status: He is alert and oriented to person, place, and time  Mental status is at baseline  Psychiatric:         Mood and Affect: Mood normal          Behavior: Behavior normal              Review of Systems   Constitutional: Positive for fatigue  Negative for chills  HENT: Negative for congestion  Respiratory: Negative for cough and shortness of breath  Cardiovascular: Negative for leg swelling     Gastrointestinal: Negative for abdominal pain and diarrhea  Genitourinary: Negative for dysuria  Musculoskeletal: Negative for back pain  Skin: Positive for wound  Neurological: Negative for headaches  Psychiatric/Behavioral: Negative for agitation and confusion  All other systems reviewed and are negative  Scheduled Meds:  Current Facility-Administered Medications   Medication Dose Route Frequency Provider Last Rate   • acetaminophen  650 mg Oral Q6H PRN Isauro Sox, DO     • albuterol  2 puff Inhalation Q6H PRN Cristina Maya PA-C     • ALPRAZolam  0 25 mg Oral BID PRN Isauro Sox, DO     • atorvastatin  80 mg Oral Daily Margot Fallon PA-C     • carvedilol  12 5 mg Oral BID With Meals Mars Naranjo MD     • clopidogrel  75 mg Oral Daily Cristina Maya PA-C     • heparin (porcine)  5,000 Units Subcutaneous [facility] Margot Fallon PA-C     • [facility]ALAZINE  10 [facility] Q8H [facility]     • isosorbide dinitrate  10 mg Oral TID Cristina Maya PA-C     • labetalol  10 mg Intravenous Q10 Min PRN Juan Manuel Harvey CRNA     • magnesium Oxide  400 mg Oral Daily Mars Naranjo MD     • neomycin-polymyxin-dexamethasone  1 drop Left Eye 6x Daily Isauro Sox, DO     • ondansetron  4 mg Intravenous Q4H PRN Isauro Veloz, DO     • oxyCODONE  2 5 mg Oral Q6H PRN Ortega Guillaume PA-C     • zolpidem  5 mg Oral HS PRN Cristina Maya PA-C         PRN Meds: •  acetaminophen  •  albuterol  •  ALPRAZolam  •  labetalol  •  ondansetron  •  oxyCODONE  •  zolpidem    Continuous Infusions:       Invasive Devices:      Invasive Devices     Peripheral Intravenous Line  Duration           Peripheral IV 05/25/23 Left Antecubital 2 days                  LABORATORY:    Results from last 7 days   Lab Units 05/28/23  0455 05/27/23  0224 05/27/23  0224 05/26/23  0449 05/25/23  1142   BUN mg/dL 40*  --  41* 39* 41*   CALCIUM mg/dL 8 6  --  8 5 9 2 9 6   CHLORIDE mmol/L 109*  --  110* 110* 106   CO2 mmol/L 23  --  24 24 27   CREATININE mg/dL 1 83*  --  1 99* 1 60* 1 76*   HEMATOCRIT %  --  36 0*  --  36 5 40 6   HEMOGLOBIN g/dL  --  11 7*  --  12 3 13 4   POTASSIUM mmol/L 4 1  --  4 2 3 7 4 4   PLATELETS Thousands/uL  --  272  --  297 323   WBC Thousand/uL  --  13 11*  --  10 36* 10 57*      rest all reviewed    RADIOLOGY:  IR lower extremity angiogram   Final Result by Nas Duffy MD (05/26 1528)   Impression:      Diffusely atherosclerotic abdominal aorta and iliac vessels without a significant stenosis  80% right common femoral artery stenosis with high-grade stenosis versus occlusion of the profunda femoris artery  Reconstitution of an isolated distal superficial femoral artery/above-knee popliteal artery with reconstitution of an atretic below-knee    popliteal artery supplying posterior tibial runoff to the foot  Moderate to severe stenoses in the left common femoral artery and proximal superficial femoral artery  Workstation performed: [id number]         XR foot 3+ vw right   Final Result by Deyanira Garcia MD (05/26 1530)      Small soft tissue defect overlying the base of the right 5th metatarsal, however with no associated cortical erosion to suggest osteomyelitis  Large erosions involving the head of the 3rd metatarsal and the base of the 3rd proximal phalanx with associated soft tissue swelling and increased density, as well as small effusions involving the head of the 1st metatarsal, base of 1st proximal phalanx    and base of 2nd proximal phalanx also with some associated increased tissue swelling and density similar to 5/11/2023  These findings are suggestive of gout, however superimposed osteomyelitis is not excluded  Cortical erosion involving the medial aspect of the proximal metaphysis of the right 4th proximal phalanx, increased compared to 5/11/2023  The study was marked in [facility] for immediate notification        Workstation performed: [id number]           Rest all "reviewed    Portions of the record may have been created with voice recognition software  Occasional wrong word or \"sound a like\" substitutions may have occurred due to the inherent limitations of voice recognition software  Read the chart carefully and recognize, using context, where substitutions have occurred  If you have any questions, please contact the dictating provider      "

## 2023-05-27 NOTE — ASSESSMENT & PLAN NOTE
[de-identified] y/o M w/ Right leg rest pain and foot wound w/ known hx of PAD  PMHx CAD, severe Left subclavian stenosis/occl, CKD, COPD, severe protein calorie malnutrition, HTN, HLD, hyperparathyroidism, Gout, PUD, esophagitis, and tobacco abuse    5/17/2023 LEAD- AKUA 0 22 in the ischemic range  5/26/23 Diagnostic right lower extremity a gram  -Right lower extremity angiogram demonstrating 80% stenosis of right common femoral artery with high-grade stenosis/occlusion of the profunda  Long segment occlusion of the mid SFA with distal reconstitution above-knee pop  PT is primary runoff into the foot      Plan:  -Unfortunately, not amenable to endovascular revascularization and would require extensive open revascularization including common femoral endarterectomy, profundoplasty, fem-PT bypass  -Due to severe protein calorie malnutrition and frailty and extent revascularization required, he is a poor surgical candidate with risk of poor wound healing, bypass graft failure, and need for further intervention  -Patient's pain is currently controlled and the wound is not infected  -Patient in agreement for palliative local wound care  -PT/OT - recommend rehab  -CM for dispo planning

## 2023-05-27 NOTE — ASSESSMENT & PLAN NOTE
Background: History of hypertension CKD and PAD with carotid stenosis presents for anterior insufficiency of lower extremities  · On clopidogrel for carotid disease  · Underwent angiogram not amenable to endovascular revascularization  · Management per primary service  · Right foot ulcer: Seen and being followed by by podiatry  No evidence of acute infection

## 2023-05-27 NOTE — ASSESSMENT & PLAN NOTE
· Continue hydralazine and isosorbide  · Carvedilol increased by nephrology due to elevated blood pressures

## 2023-05-27 NOTE — PROGRESS NOTES
"[facility] Podiatry - Progress Note  Patient: Eliz Chambers [de-identified] y o  male   MRN: [id number]  PCP: Kathe Correa DO  Unit/Bed#: [facility] 501-01 Encounter: [id number]  Date Of Visit: 23  Hospital Stay Days: 2    ASSESSMENT:    Eliz Chambers is a [de-identified] y o  male with:    1  Right foot wound   2  PAD  3  Gout  4  Stage 3B kidney disease  5  Tobacco abuse     Now s/p lower extremity angiogram 2023    PLAN:    · Continue local wound care  · Improvement in pain today post angiogram  · Betadine JOSSY  · Elevate and offload when nonambulatory  · X-ray-negative for fifth metatarsal base osteomyelitis  · Follow-up vascular surgery  · Follow-up outpatient      Weight bearing status: As tolerated      SUBJECTIVE:     The patient was seen, evaluated, and assessed at bedside today  The patient was awake, alert, and in no acute distress  No acute events overnight  The patient reports feeling well  With diminished pain to right lower extremity    Patient denies N/V/F/chills/SOB/CP  OBJECTIVE:     Vitals:   /77   Pulse 83   Temp 97 8 °F (36 6 °C)   Resp (!) 28   Ht 5' 7\" (1 702 m)   Wt 44 6 kg (98 lb 5 2 oz)   SpO2 96%   BMI 15 40 kg/m²     Temp (24hrs), Av 7 °F (36 5 °C), Min:97 3 °F (36 3 °C), Max:98 °F (36 7 °C)      Physical Exam :     General:  Alert, cooperative, and in no distress  Lower extremity exam:  Neurovascular, musculoskeletal status at baseline    Dermatologic: Consistent with baseline  Eschar well adhered to the lateral fifth metatarsal base  No local signs of infection  Gouty tophi noted near the third MPJ      Clinical Images 23:           Additional Data:     Labs:    Results from last 7 days   Lab Units 23  0224 23  0449 23  1142   EOS PCT %  --   --  6   HEMATOCRIT % 36 0*   < > 40 6   HEMOGLOBIN g/dL 11 7*   < > 13 4   LYMPHS PCT %  --   --  15   MONOS PCT %  --   --  9   NEUTROS PCT %  --   --  68   PLATELETS Thousands/uL 272   < > 323   WBC " Thousand/uL 13 11*   < > 10 57*    < > = values in this interval not displayed  Results from last 7 days   Lab Units 05/27/23  0224   BUN mg/dL 41*   CALCIUM mg/dL 8 5   CHLORIDE mmol/L 110*   CO2 mmol/L 24   CREATININE mg/dL 1 99*   POTASSIUM mmol/L 4 2     Results from last 7 days   Lab Units 05/25/23  1142   INR  1 00       * I Have Reviewed All Lab Data Listed Above  Recent Cultures (last 7 days):               Imaging: I have personally reviewed pertinent films in PACS  XR foot 3+ vw right    Result Date: 5/26/2023  Impression: Small soft tissue defect overlying the base of the right 5th metatarsal, however with no associated cortical erosion to suggest osteomyelitis  Large erosions involving the head of the 3rd metatarsal and the base of the 3rd proximal phalanx with associated soft tissue swelling and increased density, as well as small effusions involving the head of the 1st metatarsal, base of 1st proximal phalanx  and base of 2nd proximal phalanx also with some associated increased tissue swelling and density similar to 5/11/2023  These findings are suggestive of gout, however superimposed osteomyelitis is not excluded  Cortical erosion involving the medial aspect of the proximal metaphysis of the right 4th proximal phalanx, increased compared to 5/11/2023  The study was marked in [facility] for immediate notification  Workstation performed: [id number]     IR lower extremity angiogram    Result Date: 5/26/2023  Impression: Impression: Diffusely atherosclerotic abdominal aorta and iliac vessels without a significant stenosis  80% right common femoral artery stenosis with high-grade stenosis versus occlusion of the profunda femoris artery  Reconstitution of an isolated distal superficial femoral artery/above-knee popliteal artery with reconstitution of an atretic below-knee popliteal artery supplying posterior tibial runoff to the foot   Moderate to severe stenoses in the left common femoral artery and "proximal superficial femoral artery  Workstation performed: [id number]     EKG, Pathology, and Other Studies: I have personally reviewed pertinent reports  Active medications:  Current Facility-Administered Medications   Medication Dose Route Frequency   • acetaminophen (TYLENOL) tablet 650 mg  650 mg Oral Q6H PRN   • acetylcysteine (MUCOMYST) 200 mg/mL oral solution 1,200 mg  1,200 mg Oral BID   • albuterol (PROVENTIL HFA,VENTOLIN HFA) inhaler 2 puff  2 puff Inhalation Q6H PRN   • atorvastatin (LIPITOR) tablet 80 mg  80 mg Oral Daily   • carvedilol (COREG) tablet 12 5 mg  12 5 mg Oral BID With Meals   • clopidogrel (PLAVIX) tablet 75 mg  75 mg Oral Daily   • heparin (porcine) subcutaneous injection 5,000 Units  5,000 Units Subcutaneous Q8H [address]   • hydrALAZINE (APRESOLINE) tablet 10 mg  10 mg Oral Q8H [address]   • isosorbide dinitrate (ISORDIL) tablet 10 mg  10 mg Oral TID   • labetalol (NORMODYNE) injection 10 mg  10 mg Intravenous Q10 Min PRN   • magnesium Oxide (MAG-OX) tablet 400 mg  400 mg Oral Daily   • neomycin-polymyxin-dexamethasone (MAXITROL) ophthalmic suspension 1 drop  1 drop Left Eye 6x Daily   • ondansetron (ZOFRAN) injection 4 mg  4 mg Intravenous Q4H PRN   • oxyCODONE (ROXICODONE) split tablet 2 5 mg  2 5 mg Oral Q6H PRN   • sodium chloride 0 9 % infusion  50 mL/hr Intravenous Continuous   • zolpidem (AMBIEN) tablet 5 mg  5 mg Oral HS PRN         ** Please Note: Portions of the record may have been created with voice recognition software  Occasional wrong word or \"sound a like\" substitutions may have occurred due to the inherent limitations of voice recognition software  Read the chart carefully and recognize, using context, where substitutions have occurred   **      "

## 2023-05-27 NOTE — PROGRESS NOTES
"[facility]  Progress Note  Name: Akhil Jones  MRN: [id number]  Unit/Bed#: [facility] 501-01 I Date of Admission: 5/25/2023   Date of Service: 5/27/2023 I Hospital Day: 2    Assessment/Plan   * Atherosclerosis of artery of extremity with ulceration [facility])  Assessment & Plan  [de-identified] y/o M w/ Right leg rest pain and foot wound w/ known hx of PAD  PMHx CAD, severe Left subclavian stenosis/occl, CKD, COPD, severe protein calorie malnutrition, HTN, HLD, hyperparathyroidism, Gout, PUD, esophagitis, and tobacco abuse    5/26/23 diagnostic right lower extremity a gram    -Noninvasive arterial studies 5/17/2023 demonstrating AKUA 0 22 in the ischemic range  -Right lower extremity angiogram demonstrating 80% stenosis of right common femoral artery with high-grade stenosis/occlusion of the profunda  Long segment occlusion of the mid SFA with distal reconstitution above-knee pop  PT is primary runoff into the foot   -Unfortunately, not amenable to endovascular revascularization and would require extensive open revascularization including common femoral endarterectomy, profundoplasty, fem-PT bypass  -Discussed this with the patient and his son at bedside, given his severe protein calorie malnutrition and frailty and extent revascularization required, he is a poor surgical candidate with risk of poor wound healing, bypass graft failure, and need for further intervention  -Patient's pain is currently controlled and the wound is not infected  -Patient wishes to take some time to discuss with his family regarding local wound care and aggressive surgical revascularization  -Updated daughter via telephone and all questions were answered and she is in agreement with the plan       Subjective:  Reports improvement in right foot pain, also helps when he dangles it off the side of the bed      Vitals:  /77   Pulse 83   Temp 97 8 °F (36 6 °C)   Resp (!) 28   Ht 5' 7\" (1 702 m)   Wt 44 6 kg (98 lb " "5 2 oz)   SpO2 96%   BMI 15 40 kg/m²     I/Os:  I/O last 3 completed shifts: In: 986 7 [I V :986 7]  Out: 800 [Urine:800]  No intake/output data recorded  Lab Results and Cultures:   Lab Results   Component Value Date    HCT 36 0 (L) 05/27/2023    HGB 11 7 (L) 05/27/2023    MCV 91 05/27/2023     05/27/2023    WBC 13 11 (H) 05/27/2023     Lab Results   Component Value Date    BUN 41 (H) 05/27/2023    CALCIUM 8 5 05/27/2023     (H) 05/27/2023    CO2 24 05/27/2023    CREATININE 1 99 (H) 05/27/2023    K 4 2 05/27/2023     Lab Results   Component Value Date    INR 1 00 05/25/2023    INR 0 98 02/18/2023    INR 1 12 11/13/2022    PROTIME 13 4 05/25/2023    PROTIME 13 0 02/18/2023    PROTIME 14 5 11/13/2022        Blood Culture:   Lab Results   Component Value Date    BLOODCX No Growth After 5 Days  11/13/2022    BLOODCX No Growth After 5 Days   11/13/2022   ,   Urinalysis:   Lab Results   Component Value Date    BILIRUBINUR Negative 11/13/2022    BLOODU Negative 11/13/2022    CLARITYU Clear 11/13/2022    COLORU Yellow 11/13/2022    GLUCOSEU Negative 11/13/2022    KETONESU Negative 11/13/2022    LEUKOCYTESUR Negative 11/13/2022    NITRITE Negative 11/13/2022    PHUR 6 0 11/13/2022    SPECGRAV 1 010 11/13/2022   ,   Urine Culture: No results found for: \"URINECX\",   Wound Culure: No results found for: \"WOUNDCULT\"    Medications:  Current Facility-Administered Medications   Medication Dose Route Frequency   • acetaminophen (TYLENOL) tablet 650 mg  650 mg Oral Q6H PRN   • acetylcysteine (MUCOMYST) 200 mg/mL oral solution 1,200 mg  1,200 mg Oral BID   • albuterol (PROVENTIL HFA,VENTOLIN HFA) inhaler 2 puff  2 puff Inhalation Q6H PRN   • atorvastatin (LIPITOR) tablet 80 mg  80 mg Oral Daily   • carvedilol (COREG) tablet 12 5 mg  12 5 mg Oral BID With Meals   • clopidogrel (PLAVIX) tablet 75 mg  75 mg Oral Daily   • heparin (porcine) subcutaneous injection 5,000 Units  5,000 Units Subcutaneous Q8H [address]   • " hydrALAZINE (APRESOLINE) tablet 10 mg  10 mg Oral Q8H [address]   • isosorbide dinitrate (ISORDIL) tablet 10 mg  10 mg Oral TID   • labetalol (NORMODYNE) injection 10 mg  10 mg Intravenous Q10 Min PRN   • magnesium Oxide (MAG-OX) tablet 400 mg  400 mg Oral Daily   • neomycin-polymyxin-dexamethasone (MAXITROL) ophthalmic suspension 1 drop  1 drop Left Eye 6x Daily   • ondansetron (ZOFRAN) injection 4 mg  4 mg Intravenous Q4H PRN   • oxyCODONE (ROXICODONE) split tablet 2 5 mg  2 5 mg Oral Q6H PRN   • sodium chloride 0 9 % infusion  50 mL/hr Intravenous Continuous   • zolpidem (AMBIEN) tablet 5 mg  5 mg Oral HS PRN       Physical Exam:    General appearance: alert and oriented, in no acute distress  Lungs: clear to auscultation bilaterally  Heart: S1, S2 normal  Abdomen: soft, non-tender; bowel sounds normal; no masses,  no organomegaly  Extremities: M/S intact, feet cool    Pulse exam:  DP: Right: doppler signal Left: doppler signal  PT: Right: doppler signal Left: doppler signal  Left groin access site without hematoma    Ray Santiago MD  5/27/2023

## 2023-05-27 NOTE — ASSESSMENT & PLAN NOTE
Lab Results   Component Value Date    CREATININE 1 99 (H) 05/27/2023    CREATININE 1 60 (H) 05/26/2023    CREATININE 1 76 (H) 05/25/2023    EGFR 30 05/27/2023    EGFR 40 05/26/2023    EGFR 35 05/25/2023     · Renal function appears to be at baseline    Nephrology consulted for management

## 2023-05-28 LAB
ANION GAP SERPL CALCULATED.3IONS-SCNC: 2 MMOL/L (ref 4–13)
BUN SERPL-MCNC: 40 MG/DL (ref 5–25)
CALCIUM SERPL-MCNC: 8.6 MG/DL (ref 8.3–10.1)
CHLORIDE SERPL-SCNC: 109 MMOL/L (ref 96–108)
CO2 SERPL-SCNC: 23 MMOL/L (ref 21–32)
CREAT SERPL-MCNC: 1.83 MG/DL (ref 0.6–1.3)
GFR SERPL CREATININE-BSD FRML MDRD: 34 ML/MIN/1.73SQ M
GLUCOSE SERPL-MCNC: 86 MG/DL (ref 65–140)
POTASSIUM SERPL-SCNC: 4.1 MMOL/L (ref 3.5–5.3)
SODIUM SERPL-SCNC: 134 MMOL/L (ref 135–147)

## 2023-05-28 RX ORDER — CARVEDILOL 25 MG/1
25 TABLET ORAL 2 TIMES DAILY WITH MEALS
Status: DISCONTINUED | OUTPATIENT
Start: 2023-05-28 | End: 2023-05-29

## 2023-05-28 RX ADMIN — MAGNESIUM GLUCONATE 500 MG ORAL TABLET 400 MG: 500 TABLET ORAL at 09:14

## 2023-05-28 RX ADMIN — Medication 2.5 MG: at 18:42

## 2023-05-28 RX ADMIN — HEPARIN SODIUM 5000 UNITS: 5000 INJECTION INTRAVENOUS; SUBCUTANEOUS at 21:05

## 2023-05-28 RX ADMIN — ISOSORBIDE DINITRATE 10 MG: 10 TABLET ORAL at 20:45

## 2023-05-28 RX ADMIN — HYDRALAZINE HYDROCHLORIDE 10 MG: 10 TABLET, FILM COATED ORAL at 05:01

## 2023-05-28 RX ADMIN — ATORVASTATIN CALCIUM 80 MG: 80 TABLET, FILM COATED ORAL at 09:14

## 2023-05-28 RX ADMIN — NEOMYCIN SULFATE, POLYMYXIN B SULFATE AND DEXAMETHASONE 1 DROP: 3.5; 10000; 1 SUSPENSION OPHTHALMIC at 16:24

## 2023-05-28 RX ADMIN — Medication 2.5 MG: at 10:55

## 2023-05-28 RX ADMIN — NEOMYCIN SULFATE, POLYMYXIN B SULFATE AND DEXAMETHASONE 1 DROP: 3.5; 10000; 1 SUSPENSION OPHTHALMIC at 14:33

## 2023-05-28 RX ADMIN — HYDRALAZINE HYDROCHLORIDE 10 MG: 10 TABLET, FILM COATED ORAL at 14:33

## 2023-05-28 RX ADMIN — NEOMYCIN SULFATE, POLYMYXIN B SULFATE AND DEXAMETHASONE 1 DROP: 3.5; 10000; 1 SUSPENSION OPHTHALMIC at 09:15

## 2023-05-28 RX ADMIN — CARVEDILOL 25 MG: 25 TABLET, FILM COATED ORAL at 16:23

## 2023-05-28 RX ADMIN — HEPARIN SODIUM 5000 UNITS: 5000 INJECTION INTRAVENOUS; SUBCUTANEOUS at 05:01

## 2023-05-28 RX ADMIN — HYDRALAZINE HYDROCHLORIDE 10 MG: 10 TABLET, FILM COATED ORAL at 21:05

## 2023-05-28 RX ADMIN — HEPARIN SODIUM 5000 UNITS: 5000 INJECTION INTRAVENOUS; SUBCUTANEOUS at 14:33

## 2023-05-28 RX ADMIN — CLOPIDOGREL BISULFATE 75 MG: 75 TABLET ORAL at 09:14

## 2023-05-28 RX ADMIN — NEOMYCIN SULFATE, POLYMYXIN B SULFATE AND DEXAMETHASONE 1 DROP: 3.5; 10000; 1 SUSPENSION OPHTHALMIC at 18:42

## 2023-05-28 RX ADMIN — ISOSORBIDE DINITRATE 10 MG: 10 TABLET ORAL at 16:22

## 2023-05-28 RX ADMIN — ALPRAZOLAM 0.25 MG: 0.25 TABLET ORAL at 09:14

## 2023-05-28 RX ADMIN — ISOSORBIDE DINITRATE 10 MG: 10 TABLET ORAL at 09:14

## 2023-05-28 RX ADMIN — NEOMYCIN SULFATE, POLYMYXIN B SULFATE AND DEXAMETHASONE 1 DROP: 3.5; 10000; 1 SUSPENSION OPHTHALMIC at 21:05

## 2023-05-28 RX ADMIN — Medication 2.5 MG: at 05:11

## 2023-05-28 NOTE — PLAN OF CARE
Problem: PHYSICAL THERAPY ADULT  Goal: Performs mobility at highest level of function for planned discharge setting  See evaluation for individualized goals  Description: Treatment/Interventions: ADL retraining, Functional transfer training, LE strengthening/ROM, Elevations, Therapeutic exercise, Endurance training, Patient/family training, Equipment eval/education, Bed mobility, Gait training, Compensatory technique education, Continued evaluation, Spoke to MD, Spoke to nursing, OT, Family  Equipment Recommended: Mars Carter (pt owns)       See flowsheet documentation for full assessment, interventions and recommendations  Note: Prognosis: Fair  Problem List: Decreased strength, Decreased range of motion, Decreased endurance, Impaired balance, Decreased mobility, Decreased safety awareness, Pain  Assessment: Pt seen for PT evaluation  Pt with active PT eval/treat orders  Pt is a [de-identified] y o  male who was admitted to 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  Comorbidities affecting pt's physical performance at time of assessment are as follows:  has a past medical history of Abnormal thyroid function test, Chronic kidney disease (CKD), stage III (moderate) ([facility]), COPD (chronic obstructive pulmonary disease) ([facility] [address] ), Eczema, Esophagitis, Gout, Hypertension, PAD (peripheral artery disease) ([facility] [address] ), and Pyloric channel ulcer  Personal factors affecting pt at time of initial examination include: steps to enter environment, limited home support, past experience, inability to perform IADLs, inability to perform ADLs, inability to ambulate household distances   Due to acute medical issues, ongoing medical workup for primary dx; pain, fall risk, increased reliance on more restrictive AD compared to baseline;  decreased activity tolerance compared to baseline, increased assistance needed from caregiver at current time, multiple lines, decline in overall functional mobility status; health management issues  At baseline, pt resides alone in an apartment with 17 FAN and was independent with ADLs/ IADLs  Currently, upon initial examination, pt is requiring min Ax1 for supine to sit and mod A x 2 for sit to stand and ambulation  PT to continue to follow and assess functional transfers and ambulation as appropriate and able  Currently, pt presents functioning below baseline and with overall mobility deficits 2* to: decreased LE strength/AROM; limited flexibility;  generalized weakness/ deconditioning; decreased endurance; decreased activity tolerance; impaired balance; gait deviations  Pt currently at increased risk for falls  At the end of evaluation, pt was left seated in bed side recliner with all needs in reach  Pt would benefit from continued skilled PT services while in hospital to address remaining limitations and maximize functional potential  The patient's AM-PAC Basic Mobility Inpatient Short Form Raw Score is 12  A Raw score of less than or equal to 16 suggests the patient may benefit from discharge to post-acute rehabilitation services  Please also refer to the recommendation of the Physical Therapist for safe discharge planning  Barriers to Discharge: Inaccessible home environment, Decreased caregiver support     PT Discharge Recommendation: Post acute rehabilitation services    See flowsheet documentation for full assessment

## 2023-05-28 NOTE — OCCUPATIONAL THERAPY NOTE
Occupational Therapy Evaluation     Patient Name: Misael Bowman  XNBNB'F Date: 5/28/2023  Problem List  Principal Problem: Atherosclerosis of artery of extremity with ulceration (HCC)  Active Problems:    Essential hypertension    COPD (chronic obstructive pulmonary disease) (HCC)    Stage 3b chronic kidney disease (HCC)    Asymptomatic carotid artery stenosis, bilateral    Conjunctivitis, left eye    Patient underweight    Anxiety about health    Past Medical History  Past Medical History:   Diagnosis Date   • Abnormal thyroid function test     non specified /  LA    1/8/13     • Chronic kidney disease (CKD), stage III (moderate) (HCC)    • COPD (chronic obstructive pulmonary disease) (HCC)    • Eczema     LA   11/26/13      • Esophagitis    • Gout    • Hypertension    • PAD (peripheral artery disease) ([facility] [address] )    • Pyloric channel ulcer      Past Surgical History  Past Surgical History:   Procedure Laterality Date   • BACK SURGERY     • ESOPHAGOGASTRODUODENOSCOPY N/A 12/1/2016    Procedure: ESOPHAGOGASTRODUODENOSCOPY (EGD); Surgeon: Bhavna Chester MD;  Location: [facility];   Service:    • EYE SURGERY     • IR LOWER EXTREMITY ANGIOGRAM  5/26/2023 05/28/23 1002   OT Last Visit   OT Visit Date 05/28/23   Note Type   Note type Evaluation   Pain Assessment   Pain Assessment Tool FLACC   Pain Score No Pain   Pain Rating: FLACC (Rest) - Face 0   Pain Rating: FLACC (Rest) - Legs 0   Pain Rating: FLACC (Rest) - Activity 0   Pain Rating: FLACC (Rest) - Cry 0   Pain Rating: FLACC (Rest) - Consolability 0   Score: FLACC (Rest) 0   Pain Rating: FLACC (Activity) - Face 1  (Reports no pain, but with step reports pain in RLE)   Pain Rating: FLACC (Activity) - Legs 0   Pain Rating: FLACC (Activity) - Activity 0   Pain Rating: FLACC (Activity) - Cry 1   Pain Rating: FLACC (Activity) - Consolability 1   Score: FLACC (Activity) 3   Restrictions/Precautions   Weight Bearing Precautions Per Order Yes   RLE Weight Bearing Per "Order (S)  WBAT   Other Precautions WBS; Telemetry; Fall Risk;Pain   Home Living   Type of [address] One level;Performs ADLs on one level  (17 FAN)   Bathroom Shower/Tub Walk-in shower   Bathroom Toilet Standard   Bathroom Equipment   (denies)   [address] Walker;Cane  (At baseline uses RW in apartment, [facility] in [facility])   Additional Comments Pt's son at bedside reports needing to lift pt by the waist to help him up the 17 steps into the apartment every time they leave the house   Prior Function   Level of [place] Independent with ADLs; Independent with functional mobility; Needs assistance with IADLS   Lives With (S)  Alone   Receives Help From Family   IADLs Family/Friend/Other provides transportation; Family/Friend/Other provides meals; Independent with medication management  (Pt reports he can drive, but both pt and son at bedside said son does most driving  Pt eats mostly microwave meals at home or out with son)   Falls in the last 6 months 1 to 4  (1 fall)   Vocational Retired   Lifestyle   Autonomy Pt was I with ADLs and FM (RW in home, [facility] in Critical access hospital) +/-   Reciprocal Relationships Has supportive son and daughter locally   Service to Others Retired   Intrinsic Gratification Watching tv   Subjective   Subjective \"You're going to have to help me\"   ADL   Where Assessed Edge of bed   Eating Assistance 6  Modified independent   Eating Deficit   (Eats mostly microwave meals)   Grooming Assistance 6  Modified Independent   UB Bathing Assistance 4  Minimal Assistance   LB Bathing Assistance 3  Moderate Assistance   UB Dressing Assistance 4  [address] 3  Moderate [address] [place] [address]  3  Moderate Assistance   Functional Assistance 3  Moderate Assistance   Bed Mobility   Rolling R 4  Minimal assistance   Additional items Assist x 1;HOB elevated; Bedrails; Increased time required;Verbal cues   Supine to Sit 4 " Minimal assistance   Additional items Assist x 1;HOB elevated; Bedrails; Increased time required;Verbal cues   Additional Comments Pt found supine in bed and left in bedside chair with all needs within reach   Transfers   Sit to Stand 3  Moderate assistance   Additional items Assist x 2; Increased time required;Verbal cues   Stand to Sit 4  Minimal assistance   Additional items Assist x 2; Increased time required;Verbal cues;Armrests   Additional Comments c RW   Functional Mobility   Functional Mobility 3  Moderate assistance   Additional Comments ModAx2 c RW for short household distance FM from EOB>Bedside chair  Pt req vc for achieving and mantaining upright posture and vc for sequencing   Additional items Rolling walker   Balance   Static Sitting Fair   Dynamic Sitting Poor +   Static Standing Poor   Dynamic Standing Poor   Ambulatory Poor   Activity Tolerance   Activity Tolerance Patient limited by fatigue   Medical Staff Made Aware DPT Tawnya 2' pt's medical complexity, comorbidities and regression from baseline   Nurse Made Aware OK per RN   RUE Assessment   RUE Assessment WFL   LUE Assessment   LUE Assessment WFL   Hand Function   Gross Motor Coordination Functional   Fine Motor Coordination Functional   Psychosocial   Psychosocial (WDL) WDL   Cognition   Overall Cognitive Status WFL   Arousal/Participation Responsive; Cooperative   Attention Attends with cues to redirect   Orientation Level Oriented X4   Memory Decreased recall of precautions   Following Commands Follows one step commands with increased time or repetition   Comments Pt responsive and cooperative  Pt with poor safety awareness and insight to condition  Pt more responsive to safety suggestions from his children rather than staff  Assessment   Limitation Decreased ADL status; Decreased Safe judgement during ADL;Decreased endurance;Decreased self-care trans;Decreased high-level ADLs   Prognosis Fair   Assessment Pt is an [de-identified]year old male admitted 5/25/23 with a non-healing R foot wound  Pt underwent R IR LE angiogram on 5/26/23 which found R CFA 80% stenosis, occlusion of mid SFA and severely diseased/isolated distal SFA/above knee popliteal artery  Per vascular 5/27/23, he is not a candidate for surgical intervention at this time  Pt is taking time to discuss with family and consider next steps  Pt  has a past medical history of Abnormal thyroid function test, Chronic kidney disease (CKD), stage III (moderate) ([facility]), COPD (chronic obstructive pulmonary disease) ([facility] [address] ), Eczema, Esophagitis, Gout, Hypertension, PAD (peripheral artery disease) ([facility] [address] ), and Pyloric channel ulcer  Pt with active OT eval and treat orders  Pt lives in a 2nd story apartment above a garage with 17-18 FAN  Pt lives alone, and his son and daugther live locally and check in a few times a week  Pt has a walk in shower and standard toilet  He has a RW which he uses in his apartment and a [facility] he uses in the community  Pt reports being an active , but also reports that his son drives him >33% of the time he needs to go somewhere  Pt's son reports he often just does the driving, but he is actually needed to help get his father in the apartment - son reports picking his father up at the waist to lift him for each step to get back into the apartment after being out  PTA, pt reports being I with ADLs, and FM (with RW and SPC)  Pt was Mod I for IADLs - typically utilizing frozen/microwave meals and meals out with son, and son typically drives  Pt I for med management  Pt is currently Min A for UB ADLs and ModA for LB ADLs  He is ModAx2 for functional transfers and functional mobility c RW  Pt is limited by dec ADL/self-care status, dec endurance, dec functional transfers/mobility, dec safety awareness  The patient's raw score on the AM-PAC Daily Activity Inpatient Short Form is 16   A raw score of less than 19 suggests the patient may benefit from discharge to post-acute rehabilitation services  Please refer to the recommendation of the Occupational Therapist for safe discharge planning  Pt lives alone and while he has support from his children, they are not able to check in with him every day  Pt would benefit from acute OT services focusing on ADL retraining, functional transfer/mobility training, endurance training, energy conservation/compensatory technique education and activity engagement 2-3x/week for 10-14 days to achieve goals  Goals   Patient Goals To go home   LTG Time Frame 10-14   Plan   Treatment Interventions ADL retraining;Functional transfer training;Patient/family training;Equipment evaluation/education; Compensatory technique education;Continued evaluation; Energy conservation; Activityengagement   Goal Expiration Date 06/11/23   OT Frequency 2-3x/wk   Recommendation   OT Discharge Recommendation Post acute rehabilitation services   AM-PAC Daily Activity Inpatient   Lower Body Dressing 2   Bathing 2   Toileting 3   Upper Body Dressing 3   Grooming 3   Eating 3   Daily Activity Raw Score 16   Daily Activity Standardized Score (Calc for Raw Score >=11) 35 96   AM-PAC Applied Cognition Inpatient   Following a Speech/Presentation 3   Understanding Ordinary Conversation 4   Taking Medications 4   Remembering Where Things Are Placed or Put Away 4   Remembering List of 4-5 Errands 3   Taking Care of Complicated Tasks 3   Applied Cognition Raw Score 21   Applied Cognition Standardized Score 44 3   Modified Tammie Scale   Modified Tammie Scale 4   End of Consult   Education Provided Yes   Patient Position at End of Consult Bedside chair; All needs within reach   Nurse Communication Nurse aware of consult       Pt will complete UB ADL tasks with Mod I using appropriate DME and compensatory techniques as needed  Pt will complete LB ADL tasks with Mod I using appropriate DME and compensatory techniques as needed  Pt will complete toileting with Mod I using appropriate DME as needed  Pt will complete household functional mobility with appropriate DME as needed  Pt will complete functional daily tasks for at least 30 minutes with breaks as needed  Pt will utilize energy conservation techniques to complete functional daily tasks with Mod I  Pt will complete functional transfers with Mod I and appropriate DME as needed in order to complete functional daily tasks  Pt will complete household maintenance tasks with Mod I  Pt will utilize compensatory techniques as needed in order to complete daily routine tasks  Pt will increase overall safety awareness and insight to condition for safe engagement in typical daily routine  Pt will engage in cognitive assessments to assist with safe d/c plan  Pt will adhere to WB status throughout ADL and FM tasks       Lear Lyndon, OTS

## 2023-05-28 NOTE — ASSESSMENT & PLAN NOTE
Background: History of hypertension CKD and PAD with carotid stenosis presents for anterior insufficiency of lower extremities  · On clopidogrel for carotid disease  · Underwent angiogram not amenable to endovascular revascularization  · Management per primary service  · Right foot ulcer: Seen and being followed by by podiatry  No evidence of acute infection  · Disposition: PT/OT recommending rehab

## 2023-05-28 NOTE — ASSESSMENT & PLAN NOTE
· Continue hydralazine and isosorbide  · Carvedilol further increased by nephrology to 25 mg today due to elevated blood pressures

## 2023-05-28 NOTE — PROGRESS NOTES
Follow up Consultation    Nephrology   Harsh Humphries [de-identified] y o  male MRN: [id number]  Unit/Bed#: [facility] 501-01 Encounter: [id number]      Physician Requesting Consult: Jessica Lopez MD        ASSESSMENT/PLAN:  [de-identified] y o   male with pmh of peripheral chill disease, CAD, hypertension, hyperlipidemia, protein calorie malnutrition and CKD stage III initially presented to vascular center with rest pain and foot wound and subsequently transferred over to ED for expedited arteriogram   Nephrology has been consulted for perioperative optimization to reduce incidence for acute kidney injury as patient is likely for angiogram          Perioperative optimization to reduce incidence for acute kidney injury/CKD stage IIIb:  At risk for CEE multifactorial most likely secondary to NITHIN due to contrast exposure on 5/26/2023 with angiogram   Needs close monitoring of renal parameters  After review of records In [facility] as well as Care everywhere patient has a baseline Creatinine of 1 5-1 8 mg/dL  Admission creatinine of 1 76 mg/dL on 5/25/2023  Creatinine today is at  1 90 mg/dL appears stable for now closer to baseline, however at risk for worsening secondary to prerenal azotemia  check BMP in a m  Optimize hemodynamic status to avoid delay in renal recovery  Avoid nephrotoxins, adjust meds to appropriate GFR  At risk for another episode of acute kidney injury if patient pursues surgical intervention this was discussed with the patient  Strict I/O  Daily weights  Completing Mucomyst  Urinary retention protocol if patient does not have a Eduardo  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 plus age-related nephron loss  will need to set up patient for follow up with Nephrology as an outpatient post hospitalization    Outpatient nephrology follow-up with Dr Shana Bonilla final notice from primary team and care management for discharge of patient is to be discharged patient will to arrange need follow-up labs on 6/6/2023 as well as follow-up in the office which will need to be arranged      Acid-base electrolytes:     Mild hyponatremia:    Recent serum sodium 133 mill equivalents  Likely secondary to increased ADH secondary to pain  Continue to monitor for now  Placed on fluid restriction 1 8 L     Acid-base:    Most recent bicarb stable 23     H/H/anemia:  most recent hemoglobin at 11 7g/dL  maintain hemoglobin greater than 8 grams/deciliter     Blood pressure/hypertension:  home medications: Coreg 6 25 mg p o  twice daily, hydralazine 10 mg p o  every 8, Isordil 10 mg p o  3 times daily  current medications: Coreg 25 mg p o  twice daily, hydralazine 10 mg p o  every 8, Isordil 10 mg p o  3 times daily  recommendations:  Blood pressures fluctuating with pain however overall stable no changes for today  Optimize hemodynamics  Maintain MAP > 65mmHg  Avoid BP fluctuations      Other medical problems:  Proteinuria: Most recent UA with no blood or protein  Peripheral arterial disease/lower extremity foot wound:  Management per primary team   Follow-up with vascular and podiatry  Status post lower extremity angiogram 5/26  Ongoing discussions between patient and vascular and podiatry with regards to local wound care versus aggressive surgical revascularization as per vascular note patient overall is a poor surgical candidate   As per most recent conversation no surgery planned  Pending discharge placement    Thanks for the consult  Will continue to follow  Please call with questions/ concerns  Above-mentioned orders and Plan that we will continue to monitor his renal parameters while he is in the hospital for now no further surgical management is planned once we know discharge planning then we will arrange for outpatient follow-up were discussed with the team in depth and they agree      Meeta Pizarro MD, FASNENA, 5/29/2023, 10:06 AM                    Objective :   Patient seen and "examined in his room blood pressure slightly fluctuating at times normalized this a m  to 140s urine output 350 cc  Decision likely not to proceed with further surgery on sure of discharge plans overall  Has no complaints      PHYSICAL EXAM  /79   Pulse 98   Temp 98 6 °F (37 °C) (Oral)   Resp 16   Ht 5' 7\" (1 702 m)   Wt 45 kg (99 lb 3 3 oz)   SpO2 94%   BMI 15 54 kg/m²   Temp (24hrs), Av 5 °F (36 9 °C), Min:98 1 °F (36 7 °C), Max:98 7 °F (37 1 °C)        Intake/Output Summary (Last 24 hours) at 2023 1006  Last data filed at 2023 0700  Gross per 24 hour   Intake 520 ml   Output 350 ml   Net 170 ml       I/O last 24 hours: In: 760 [P O :760]  Out: 350 [Urine:350]      Current Weight: Weight - Scale: 45 kg (99 lb 3 3 oz)  First Weight: Weight - Scale: 42 7 kg (94 lb 2 2 oz)  Physical Exam  Vitals and nursing note reviewed  Constitutional:       General: He is not in acute distress  Appearance: Normal appearance  He is normal weight  He is ill-appearing  He is not toxic-appearing or diaphoretic  HENT:      Head: Normocephalic and atraumatic  Mouth/Throat:      Pharynx: Oropharynx is clear  No oropharyngeal exudate  Eyes:      General: No scleral icterus  Conjunctiva/sclera: Conjunctivae normal    Cardiovascular:      Rate and Rhythm: Normal rate  Heart sounds: No friction rub  Pulmonary:      Effort: No respiratory distress  Breath sounds: Normal breath sounds  No stridor  Abdominal:      General: There is no distension  Palpations: Abdomen is soft  Tenderness: There is no abdominal tenderness  Musculoskeletal:         General: No swelling  Cervical back: Normal range of motion  No rigidity  Skin:     General: Skin is warm  Coloration: Skin is not jaundiced  Neurological:      General: No focal deficit present  Mental Status: He is alert and oriented to person, place, and time  Mental status is at baseline     Psychiatric: " Mood and Affect: Mood normal          Behavior: Behavior normal              Review of Systems   Constitutional: Negative for chills and fatigue  HENT: Negative for congestion  Respiratory: Negative for cough and shortness of breath  Cardiovascular: Negative for leg swelling  Gastrointestinal: Negative for abdominal pain and diarrhea  Genitourinary: Negative for dysuria  Musculoskeletal: Negative for back pain  Neurological: Negative for headaches  Psychiatric/Behavioral: Negative for agitation and confusion  All other systems reviewed and are negative  Scheduled Meds:  Current Facility-Administered Medications   Medication Dose Route Frequency Provider Last Rate   • acetaminophen  650 mg Oral Q6H PRN Lacie Kali, DO     • albuterol  2 puff Inhalation Q6H PRN Alessio George PA-C     • ALPRAZolam  0 25 mg Oral BID PRN Lacie Bras, DO     • atorvastatin  80 mg Oral Daily Margot Duarte PA-C     • carvedilol  25 mg Oral BID With Meals Laury Rab Celia Contreras MD     • clopidogrel  75 mg Oral Daily Alessio George PA-C     • heparin (porcine)  5,000 Units Subcutaneous [facility] Margot Duarte PA-C     • hydrALAZINE  10 mg Oral Q8H [address] [facility] LEATHA Cedeno     • isosorbide dinitrate  10 mg Oral TID Alessio George PA-C     • labetalol  10 mg Intravenous Q10 Min PRN Aaron Rich CRNA     • magnesium Oxide  400 mg Oral Daily Tamiko Park MD     • neomycin-polymyxin-dexamethasone  1 drop Left Eye 6x Daily Lacie Bras, DO     • ondansetron  4 mg Intravenous Q4H PRN Lacie Bras, DO     • oxyCODONE  2 5 mg Oral Q6H PRN Alexis Moss PA-C     • zolpidem  5 mg Oral HS PRN Alessio George PA-C         PRN Meds: •  acetaminophen  •  albuterol  •  ALPRAZolam  •  labetalol  •  ondansetron  •  oxyCODONE  •  zolpidem    Continuous Infusions:       Invasive Devices:      Invasive Devices     Peripheral Intravenous Line  Duration           Peripheral IV 05/25/23 Left Antecubital 3 days                  LABORATORY:    Results from last 7 days   Lab Units 05/29/23  0516 05/28/23  0455 05/27/23  0224 05/27/23  0224 05/26/23  0449 05/25/23  1142   BUN mg/dL 38* 40*  --  41* 39* 41*   CALCIUM mg/dL 8 6 8 6  --  8 5 9 2 9 6   CHLORIDE mmol/L 107 109*  --  110* 110* 106   CO2 mmol/L 24 23  --  24 24 27   CREATININE mg/dL 1 90* 1 83*  --  1 99* 1 60* 1 76*   HEMATOCRIT %  --   --  36 0*  --  36 5 40 6   HEMOGLOBIN g/dL  --   --  11 7*  --  12 3 13 4   POTASSIUM mmol/L 4 3 4 1  --  4 2 3 7 4 4   PLATELETS Thousands/uL  --   --  272  --  297 323   WBC Thousand/uL  --   --  13 11*  --  10 36* 10 57*      rest all reviewed    RADIOLOGY:  IR lower extremity angiogram   Final Result by Wilner Dobbs MD (05/26 1528)   Impression:      Diffusely atherosclerotic abdominal aorta and iliac vessels without a significant stenosis  80% right common femoral artery stenosis with high-grade stenosis versus occlusion of the profunda femoris artery  Reconstitution of an isolated distal superficial femoral artery/above-knee popliteal artery with reconstitution of an atretic below-knee    popliteal artery supplying posterior tibial runoff to the foot  Moderate to severe stenoses in the left common femoral artery and proximal superficial femoral artery  Workstation performed: [id number]         XR foot 3+ vw right   Final Result by Jesus Lyon MD (05/26 1530)      Small soft tissue defect overlying the base of the right 5th metatarsal, however with no associated cortical erosion to suggest osteomyelitis        Large erosions involving the head of the 3rd metatarsal and the base of the 3rd proximal phalanx with associated soft tissue swelling and increased density, as well as small effusions involving the head of the 1st metatarsal, base of 1st proximal phalanx    and base of 2nd proximal phalanx also with some associated increased tissue swelling and density "similar to 5/11/2023  These findings are suggestive of gout, however superimposed osteomyelitis is not excluded  Cortical erosion involving the medial aspect of the proximal metaphysis of the right 4th proximal phalanx, increased compared to 5/11/2023  The study was marked in [facility] for immediate notification  Workstation performed: [id number]           Rest all reviewed    Portions of the record may have been created with voice recognition software  Occasional wrong word or \"sound a like\" substitutions may have occurred due to the inherent limitations of voice recognition software  Read the chart carefully and recognize, using context, where substitutions have occurred  If you have any questions, please contact the dictating provider      "

## 2023-05-28 NOTE — ASSESSMENT & PLAN NOTE
Lab Results   Component Value Date    CREATININE 1 83 (H) 05/28/2023    CREATININE 1 99 (H) 05/27/2023    CREATININE 1 60 (H) 05/26/2023    EGFR 34 05/28/2023    EGFR 30 05/27/2023    EGFR 40 05/26/2023     · Renal function appears to be at baseline    Nephrology consulted for management

## 2023-05-28 NOTE — PHYSICAL THERAPY NOTE
Physical Therapy Evaluation     Patient's Name: Amparo Christianson    Admitting Diagnosis  PVD (peripheral vascular disease) with claudication ([place] [address] ) [I73 9]  Peripheral arterial disease ([place] [address] ) [I73 9]  Right foot pain [M79 671]  PAD (peripheral artery disease) ([place] [address] ) [I73 9]  Hypertensive urgency [I16 0]  Ischemic rest pain of lower extremity [M79 606, I99 8]  Ischemic ulcer of foot, limited to breakdown of skin, right ([place] [address] ) [L97 511]  Gout of right foot, unspecified cause, unspecified chronicity [M10 9]  Stage 3b chronic kidney disease ([place] [address] ) [N18 32]    Problem List  Patient Active Problem List   Diagnosis    Essential hypertension    Gout    COPD (chronic obstructive pulmonary disease) ([place] [address] )    Esophagitis    Tobacco abuse    Insomnia    Stage 3b chronic kidney disease ([place] [address] )    Secondary hyperparathyroidism of renal origin ([place] [address] )    Hyperuricemia    CKD (chronic kidney disease)    Acute respiratory failure with hypoxia (HCC)    Severe protein-calorie malnutrition (HCC)    Vertigo    Hypertensive urgency    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    Asymptomatic carotid artery stenosis, bilateral    Peripheral arterial disease (HCC)    Preop pulmonary/respiratory exam    Right foot pain    Atherosclerosis of artery of extremity with ulceration (HCC)    Ischemic rest pain of lower extremity    Conjunctivitis, left eye    Patient underweight    Anxiety about health       Past Medical History  Past Medical History:   Diagnosis Date    Abnormal thyroid function test     non specified /  LA    1/8/13      Chronic kidney disease (CKD), stage III (moderate) (HCC)     COPD (chronic obstructive pulmonary disease) (HCC)     Eczema     LA   11/26/13       Esophagitis     Gout     Hypertension     PAD (peripheral artery disease) ([place] [address] )     Pyloric channel ulcer        Past Surgical History  Past Surgical History:   Procedure Laterality Date    BACK SURGERY      ESOPHAGOGASTRODUODENOSCOPY N/A 12/1/2016 Procedure: ESOPHAGOGASTRODUODENOSCOPY (EGD); Surgeon: Yazan Real MD;  Location: [facility]; Service:     EYE SURGERY      IR LOWER EXTREMITY ANGIOGRAM  5/26/2023 05/28/23 1003   PT Last Visit   PT Visit Date 05/28/23   Note Type   Note type Evaluation   Pain Assessment   Pain Assessment Tool FLACC   Pain Score No Pain   Pain Rating: FLACC (Rest) - Face 0   Pain Rating: FLACC (Rest) - Legs 0   Pain Rating: FLACC (Rest) - Activity 0   Pain Rating: FLACC (Rest) - Cry 0   Pain Rating: FLACC (Rest) - Consolability 0   Score: FLACC (Rest) 0   Pain Rating: FLACC (Activity) - Face 1   Pain Rating: FLACC (Activity) - Legs 0   Pain Rating: FLACC (Activity) - Activity 0   Pain Rating: FLACC (Activity) - Cry 1   Pain Rating: FLACC (Activity) - Consolability 1   Score: FLACC (Activity) 3   Restrictions/Precautions   Weight Bearing Precautions Per Order Yes   RLE Weight Bearing Per Order (S)  WBAT   Other Precautions Telemetry;Pain;WBS;Fall Risk   Home Living   Type of Home Apartment   Home Layout One level;Stairs to enter with rails; Performs ADLs on one level  (17 FAN on top of a garage)   Bathroom Shower/Tub Walk-in shower   Delia Pina;Corey  (pt reports using RW in home and [facility] for comunity ambulation)   Additional Comments Pt's son reports that his father is able to get down the stairs however he has to pick him up by the waist to help him up   Prior Function   Level of [place] Independent with ADLs; Independent with functional mobility; Needs assistance with IADLS   Lives With Alone   Receives Help From Family   IADLs Family/Friend/Other provides transportation; Family/Friend/Other provides meals; Independent with medication management  (does drive at times)   Falls in the last 6 months 1 to 4   Vocational Retired   [employer]   Attention Attends with cues to redirect   Orientation Level Oriented X4   Memory Decreased recall of precautions Following Commands Follows one step commands with increased time or repetition   RLE Assessment   RLE Assessment X  (Grossly 3-/5 MMT)   LLE Assessment   LLE Assessment X  (Grossly 3-/5 MMT)   Bed Mobility   Rolling R 4  Minimal assistance   Additional items Assist x 1;HOB elevated; Increased time required   Supine to Sit 4  Minimal assistance   Additional items Assist x 1; Increased time required;Verbal cues;HOB elevated   Additional Comments Pt at end of sesion in bedside recliner all needs in reach   Transfers   Sit to Stand 3  Moderate assistance   Additional items Assist x 2; Increased time required;Verbal cues   Stand to Sit 3  Moderate assistance   Additional items Assist x 2; Increased time required   Additional Comments c/ RW, left knee block for buckleing   Ambulation/Elevation   Gait pattern Decreased foot clearance;Narrow FREDY; Forward Flexion   Gait Assistance 3  Moderate assist   Additional items Verbal cues; Assist x 2   Assistive Device Rolling walker   Distance 2' bed to chair   Ambulation/Elevation Additional Comments Tactile cuing for weight shifting   Balance   Static Sitting Fair   Dynamic Sitting Poor +   Static Standing Poor   Dynamic Standing Poor   Ambulatory Poor   Activity Tolerance   Activity Tolerance Patient limited by fatigue   Medical Staff Made Aware OT   Nurse Made Aware RN cleared and updated   Assessment   Prognosis Fair   Problem List Decreased strength;Decreased range of motion;Decreased endurance; Impaired balance;Decreased mobility; Decreased safety awareness;Pain   Assessment Pt seen for PT evaluation  Pt with active PT eval/treat orders  Pt is a [de-identified] y o  male who was admitted to [facility] on 5/2/23  Pt's current dx/ problem list include: atherosclerosis of artery of extremity wit ulceration   Comorbidities affecting pt's physical performance at time of assessment are as follows:  has a past medical history of Abnormal thyroid function test, Chronic kidney disease (CKD), stage III (moderate) (HCC), COPD (chronic obstructive pulmonary disease) ([facility] ), Eczema, Esophagitis, Gout, Hypertension, PAD (peripheral artery disease) ([facility] ), and Pyloric channel ulcer  Personal factors affecting pt at time of initial examination include: steps to enter environment, limited home support, past experience, inability to perform IADLs, inability to perform ADLs, inability to ambulate household distances  Due to acute medical issues, ongoing medical workup for primary dx; pain, fall risk, increased reliance on more restrictive AD compared to baseline;  decreased activity tolerance compared to baseline, increased assistance needed from caregiver at current time, multiple lines, decline in overall functional mobility status; health management issues  At baseline, pt resides alone in an apartment with 17 FAN and was independent with ADLs/ IADLs  Currently, upon initial examination, pt is requiring min Ax1 for supine to sit and mod A x 2 for sit to stand and ambulation  PT to continue to follow and assess functional transfers and ambulation as appropriate and able  Currently, pt presents functioning below baseline and with overall mobility deficits 2* to: decreased LE strength/AROM; limited flexibility;  generalized weakness/ deconditioning; decreased endurance; decreased activity tolerance; impaired balance; gait deviations  Pt currently at increased risk for falls  At the end of evaluation, pt was left seated in bed side recliner with all needs in reach  Pt would benefit from continued skilled PT services while in hospital to address remaining limitations and maximize functional potential  The patient's AM-PAC Basic Mobility Inpatient Short Form Raw Score is 12  A Raw score of less than or equal to 16 suggests the patient may benefit from discharge to post-acute rehabilitation services  Please also refer to the recommendation of the Physical Therapist for safe discharge planning     Barriers to Discharge Inaccessible home environment;Decreased caregiver support   Goals   Patient Goals To go home   STG Expiration Date 06/11/23   Short Term Goal #1 STG 1  Pt will be able to perform bed mobility with mod I in order to improve overall functional mobility and assist in safe d/c  STG 2  Pt will be able to perform functional transfer with mod I in order to improve overall functional mobility and assist in safe d/c  STG 3  Pt will be able to ambulate at least 200 feet with least restrictive device with mod I A in order to improve overall functional mobility and assist in safe d/c  STG 4  Pt will improve sitting/standing static/dynamic balance 1 grade in order to improve functional mobility and assist in safe d/c  STG 5  Pt will improve LE strength by one grade in order to improve functional mobility and assist in safe d/c  STG 6  Pt will negotiate at least 6 step at mod I level A in order to improve overall funcitonal mobility and assist in safe d/c   Plan   Treatment/Interventions ADL retraining;Functional transfer training;LE strengthening/ROM; Elevations; Therapeutic exercise; Endurance training;Patient/family training;Equipment eval/education; Bed mobility;Gait training; Compensatory technique education;Continued evaluation;Spoke to MD;Spoke to nursing;OT;Family   PT Frequency 3-5x/wk   Recommendation   PT Discharge Recommendation Post acute rehabilitation services   Equipment Recommended Walker  (pt owns)   AM-PAC Basic Mobility Inpatient   Turning in Flat Bed Without Bedrails 3   Lying on Back to Sitting on Edge of Flat Bed Without Bedrails 3   Moving Bed to Chair 2   Standing Up From Chair Using Arms 2   Walk in Room 1   Climb 3-5 Stairs With Railing 1   Basic Mobility Inpatient Raw Score 12   Basic Mobility Standardized Score 32 23   Highest Level Of Mobility   -HLM Goal 4: Move to chair/commode   JH-HLM Achieved 4: Move to chair/commode         Josefina Craig, PT

## 2023-05-28 NOTE — PROGRESS NOTES
[address] [facility]  Progress Note  Name: Edouard Martin  MRN: [id number]  Unit/Bed#: PPHP 501-01 I Date of Admission: 5/25/2023   Date of Service: 5/28/2023 I Hospital Day: 3    Assessment/Plan   * Atherosclerosis of artery of extremity with ulceration ([facility] [address] )  Assessment & Plan  Background: History of hypertension CKD and PAD with carotid stenosis presents for anterior insufficiency of lower extremities  · On clopidogrel for carotid disease  · Underwent angiogram not amenable to endovascular revascularization  · Management per primary service  · Right foot ulcer: Seen and being followed by by podiatry  No evidence of acute infection  · Disposition: PT/OT recommending rehab  Anxiety about health  Assessment & Plan  · Added alprazolam as needed    Patient underweight  Assessment & Plan  · Body mass index is 15 54 kg/m²  · Added ensure    Asymptomatic carotid artery stenosis, bilateral  Assessment & Plan  · Continue clopidogrel    Stage 3b chronic kidney disease [facility])  Assessment & Plan  Lab Results   Component Value Date    CREATININE 1 83 (H) 05/28/2023    CREATININE 1 99 (H) 05/27/2023    CREATININE 1 60 (H) 05/26/2023    EGFR 34 05/28/2023    EGFR 30 05/27/2023    EGFR 40 05/26/2023     · Renal function appears to be at baseline  Nephrology consulted for management    Essential hypertension  Assessment & Plan  · Continue hydralazine and isosorbide  · Carvedilol further increased by nephrology to 25 mg today due to elevated blood pressures    VTE Pharmacologic Prophylaxis:   High Risk (Score >/= 5) - Pharmacological DVT Prophylaxis Ordered: heparin  Sequential Compression Devices Ordered  Patient Centered Rounds: I have performed bedside rounds with nursing staff today  Discussions with Specialists or Other Care Team Provider:     Education and Discussions with Family / Patient: Updated  (son) at bedside yesterday    Time Spent for Care:    This time was "spent on one or more of the following: performing physical exam; counseling and coordination of care; obtaining or reviewing history; documenting in the medical record; reviewing/ordering tests, medications or procedures; communicating with other healthcare professionals and discussing with patient's family/caregivers  Current Length of Stay: 3 day(s)  Current Patient Status: Inpatient   Certification Statement:   Discharge Plan: Kanwal Cedeno is following this patient on consult  They are medically stable for discharge when deemed appropriate by primary service  Code Status: Level 1 - Full Code      Subjective:   Patient seen and examined  No new complaints  Pain controlled  Objective:   Vitals: Blood pressure 155/86, pulse 88, temperature 98 3 °F (36 8 °C), resp  rate 18, height 5' 7\" (1 702 m), weight 45 kg (99 lb 3 3 oz), SpO2 95 %  Intake/Output Summary (Last 24 hours) at 5/28/2023 1432  Last data filed at 5/28/2023 0852  Gross per 24 hour   Intake 1140 ml   Output 500 ml   Net 640 ml       Physical Exam  Vitals reviewed  Constitutional:       General: He is not in acute distress  HENT:      Head: Atraumatic  Eyes:      Extraocular Movements: Extraocular movements intact  Cardiovascular:      Rate and Rhythm: Regular rhythm  Heart sounds: Normal heart sounds  Pulmonary:      Effort: Pulmonary effort is normal       Breath sounds: Decreased breath sounds present  No wheezing  Abdominal:      Palpations: Abdomen is soft  Tenderness: There is no abdominal tenderness  There is no rebound  Musculoskeletal:         General: Deformity (Right foot wrapped) present  No swelling or tenderness  Skin:     General: Skin is warm and dry  Neurological:      General: No focal deficit present  Mental Status: He is alert  Cranial Nerves: No cranial nerve deficit     Psychiatric:         Mood and Affect: Mood normal        Additional Data:   Labs:  Results from last 7 days   Lab Units " 05/27/23 0224 05/26/23 0449 05/25/23  1142   HEMOGLOBIN g/dL 11 7* 12 3 13 4   INR   --   --  1 00   MCV fL 91 88 90   PLATELETS Thousands/uL 272 297 323   WBC Thousand/uL 13 11* 10 36* 10 57*     Results from last 7 days   Lab Units 05/28/23  0455 05/27/23 0224 05/26/23  0449   ANION GAP mmol/L 2* 1* 4   BUN mg/dL 40* 41* 39*   CALCIUM mg/dL 8 6 8 5 9 2   CHLORIDE mmol/L 109* 110* 110*   CO2 mmol/L 23 24 24   CREATININE mg/dL 1 83* 1 99* 1 60*   EGFR ml/min/1 73sq m 34 30 40   GLUCOSE RANDOM mg/dL 86 99 87   POTASSIUM mmol/L 4 1 4 2 3 7   SODIUM mmol/L 134* 135 138                     * I Have Reviewed All Lab Data Listed Above  Cultures:                   Lines/Drains:  Invasive Devices     Peripheral Intravenous Line  Duration           Peripheral IV 05/25/23 Left Antecubital 3 days              Telemetry:      Imaging:  Imaging Reports Reviewed Today Include:   XR foot 3+ vw right    Result Date: 5/26/2023  Impression: Small soft tissue defect overlying the base of the right 5th metatarsal, however with no associated cortical erosion to suggest osteomyelitis  Large erosions involving the head of the 3rd metatarsal and the base of the 3rd proximal phalanx with associated soft tissue swelling and increased density, as well as small effusions involving the head of the 1st metatarsal, base of 1st proximal phalanx  and base of 2nd proximal phalanx also with some associated increased tissue swelling and density similar to 5/11/2023  These findings are suggestive of gout, however superimposed osteomyelitis is not excluded  Cortical erosion involving the medial aspect of the proximal metaphysis of the right 4th proximal phalanx, increased compared to 5/11/2023  The study was marked in [facility] for immediate notification   Workstation performed: [id number]     IR lower extremity angiogram    Result Date: 5/26/2023  Impression: Impression: Diffusely atherosclerotic abdominal aorta and iliac vessels without a significant stenosis  80% right common femoral artery stenosis with high-grade stenosis versus occlusion of the profunda femoris artery  Reconstitution of an isolated distal superficial femoral artery/above-knee popliteal artery with reconstitution of an atretic below-knee popliteal artery supplying posterior tibial runoff to the foot  Moderate to severe stenoses in the left common femoral artery and proximal superficial femoral artery  Workstation performed: [id number]       Scheduled Meds:  Current Facility-Administered Medications   Medication Dose Route Frequency Provider Last Rate   • acetaminophen  650 mg Oral Q6H PRN Chet Gamino DO     • albuterol  2 puff Inhalation Q6H PRN Joni Nissen, PA-C     • ALPRAZolam  0 25 mg Oral BID PRN Chet Gamino DO     • atorvastatin  80 mg Oral Daily Margot Chaparro PA-C     • carvedilol  25 mg Oral BID With Meals Laury Rab Juliette Vines MD     • clopidogrel  75 mg Oral Daily Joni Nissen, PA-C     • heparin (porcine)  5,000 Units Subcutaneous [facility] Margot Chaparro PA-C     • hydrALAZINE  10 mg Oral Q8H [address] [facility]     • isosorbide dinitrate  10 mg Oral TID Joni Nissen, PA-C     • labetalol  10 mg Intravenous Q10 Min PRN Néstor Garza CRNA     • magnesium Oxide  400 mg Oral Daily Yordy Azul MD     • neomycin-polymyxin-dexamethasone  1 drop Left Eye 6x Daily Chet Gamino DO     • ondansetron  4 mg Intravenous Q4H PRN Chet Gamino DO     • oxyCODONE  2 5 mg Oral Q6H PRN Katrin Weinberg PA-C     • zolpidem  5 mg Oral HS PRN Joni Nissen, PA-C         Today, Patient Was Seen By: Chet Gamino DO    ** Please Note: Dictation voice to text software may have been used in the creation of this document   **

## 2023-05-28 NOTE — PROGRESS NOTES
"[address] [facility]  Progress Note  Name: Lane Gamboa  MRN: [id number]  Unit/Bed#: PPHP 501-01 I Date of Admission: 5/25/2023   Date of Service: 5/28/2023 I Hospital Day: 3    Assessment/Plan   * Atherosclerosis of artery of extremity with ulceration [facility])  Assessment & Plan  [de-identified] y/o M w/ Right leg rest pain and foot wound w/ known hx of PAD  PMHx CAD, severe Left subclavian stenosis/occl, CKD, COPD, severe protein calorie malnutrition, HTN, HLD, hyperparathyroidism, Gout, PUD, esophagitis, and tobacco abuse    5/26/23 diagnostic right lower extremity a gram    -Noninvasive arterial studies 5/17/2023 demonstrating AKUA 0 22 in the ischemic range  -Right lower extremity angiogram demonstrating 80% stenosis of right common femoral artery with high-grade stenosis/occlusion of the profunda  Long segment occlusion of the mid SFA with distal reconstitution above-knee pop  PT is primary runoff into the foot   -Unfortunately, not amenable to endovascular revascularization and would require extensive open revascularization including common femoral endarterectomy, profundoplasty, fem-PT bypass  -Discussed this with the patient and his son at bedside, given his severe protein calorie malnutrition and frailty and extent revascularization required, he is a poor surgical candidate with risk of poor wound healing, bypass graft failure, and need for further intervention  -Patient's pain is currently controlled and the wound is not infected  -Patient in agreement for palliative local wound care  -Updated daughter via telephone and all questions were answered and all is in agreement with the plan  -PT/OT - recommend rehab       Subjective:  No acute events overnight  Pain is relatively controlled  Vitals:  /86   Pulse 88   Temp 98 3 °F (36 8 °C)   Resp 18   Ht 5' 7\" (1 702 m)   Wt 45 kg (99 lb 3 3 oz)   SpO2 95%   BMI 15 54 kg/m²     I/Os:  I/O last 3 completed shifts:   In: 900 " "[P O :900]  Out: 800 [Urine:800]  I/O this shift:  In: 240 [P O :240]  Out: -     Lab Results and Cultures:   Lab Results   Component Value Date    HCT 36 0 (L) 05/27/2023    HGB 11 7 (L) 05/27/2023    MCV 91 05/27/2023     05/27/2023    WBC 13 11 (H) 05/27/2023     Lab Results   Component Value Date    BUN 40 (H) 05/28/2023    CALCIUM 8 6 05/28/2023     (H) 05/28/2023    CO2 23 05/28/2023    CREATININE 1 83 (H) 05/28/2023    K 4 1 05/28/2023     Lab Results   Component Value Date    INR 1 00 05/25/2023    INR 0 98 02/18/2023    INR 1 12 11/13/2022    PROTIME 13 4 05/25/2023    PROTIME 13 0 02/18/2023    PROTIME 14 5 11/13/2022        Blood Culture:   Lab Results   Component Value Date    BLOODCX No Growth After 5 Days  11/13/2022    BLOODCX No Growth After 5 Days   11/13/2022   ,   Urinalysis:   Lab Results   Component Value Date    BILIRUBINUR Negative 11/13/2022    BLOODU Negative 11/13/2022    CLARITYU Clear 11/13/2022    COLORU Yellow 11/13/2022    GLUCOSEU Negative 11/13/2022    KETONESU Negative 11/13/2022    LEUKOCYTESUR Negative 11/13/2022    NITRITE Negative 11/13/2022    PHUR 6 0 11/13/2022    SPECGRAV 1 010 11/13/2022   ,   Urine Culture: No results found for: \"URINECX\",   Wound Culure: No results found for: \"WOUNDCULT\"    Medications:  Current Facility-Administered Medications   Medication Dose Route Frequency   • acetaminophen (TYLENOL) tablet 650 mg  650 mg Oral Q6H PRN   • albuterol (PROVENTIL HFA,VENTOLIN HFA) inhaler 2 puff  2 puff Inhalation Q6H PRN   • ALPRAZolam (XANAX) tablet 0 25 mg  0 25 mg Oral BID PRN   • atorvastatin (LIPITOR) tablet 80 mg  80 mg Oral Daily   • carvedilol (COREG) tablet 25 mg  25 mg Oral BID With Meals   • clopidogrel (PLAVIX) tablet 75 mg  75 mg Oral Daily   • heparin (porcine) subcutaneous injection 5,000 Units  5,000 Units Subcutaneous Q8H [address]   • hydrALAZINE (APRESOLINE) tablet 10 mg  10 mg Oral Q8H [address]   • isosorbide dinitrate (ISORDIL) tablet 10 mg  10 mg " Oral TID   • labetalol (NORMODYNE) injection 10 mg  10 mg Intravenous Q10 Min PRN   • magnesium Oxide (MAG-OX) tablet 400 mg  400 mg Oral Daily   • neomycin-polymyxin-dexamethasone (MAXITROL) ophthalmic suspension 1 drop  1 drop Left Eye 6x Daily   • ondansetron (ZOFRAN) injection 4 mg  4 mg Intravenous Q4H PRN   • oxyCODONE (ROXICODONE) split tablet 2 5 mg  2 5 mg Oral Q6H PRN   • zolpidem (AMBIEN) tablet 5 mg  5 mg Oral HS PRN       Physical Exam:    General appearance: alert and oriented, in no acute distress  Lungs: clear to auscultation bilaterally  Heart: S1, S2 normal  Abdomen: soft, non-tender; bowel sounds normal; no masses,  no organomegaly  Extremities: M/S intact, feet cool    Pulse exam:  DP: Right: doppler signal Left: doppler signal  PT: Right: doppler signal Left: doppler signal  Left groin access site without hematoma    Dayna Steward MD  5/28/2023

## 2023-05-28 NOTE — PLAN OF CARE
Problem: OCCUPATIONAL THERAPY ADULT  Goal: Performs self-care activities at highest level of function for planned discharge setting  See evaluation for individualized goals  Description: Treatment Interventions: ADL retraining, Functional transfer training, Patient/family training, Equipment evaluation/education, Compensatory technique education, Continued evaluation, Energy conservation, Activityengagement          See flowsheet documentation for full assessment, interventions and recommendations  Note: Limitation: Decreased ADL status, Decreased Safe judgement during ADL, Decreased endurance, Decreased self-care trans, Decreased high-level ADLs  Prognosis: Fair  Assessment: Pt is an [de-identified]year old male admitted 5/25/23 with a non-healing R foot wound  Pt underwent R IR LE angiogram on 5/26/23 which found R CFA 80% stenosis, occlusion of mid SFA and severely diseased/isolated distal SFA/above knee popliteal artery  Per vascular 5/27/23, he is not a candidate for surgical intervention at this time  Pt is taking time to discuss with family and consider next steps  Pt  has a past medical history of Abnormal thyroid function test, Chronic kidney disease (CKD), stage III (moderate) ([facility]), COPD (chronic obstructive pulmonary disease) ([facility] [address] ), Eczema, Esophagitis, Gout, Hypertension, PAD (peripheral artery disease) ([facility] [address] ), and Pyloric channel ulcer  Pt with active OT eval and treat orders  Pt lives in a 2nd story apartment above a garage with 17-18 FAN  Pt lives alone, and his son and daugther live locally and check in a few times a week  Pt has a walk in shower and standard toilet  He has a RW which he uses in his apartment and a [facility] he uses in the community  Pt reports being an active , but also reports that his son drives him >75% of the time he needs to go somewhere   Pt's son reports he often just does the driving, but he is actually needed to help get his father in the apartment - son reports picking his father up at the waist to lift him for each step to get back into the apartment after being out  PTA, pt reports being I with ADLs, and FM (with RW and SPC)  Pt was Mod I for IADLs - typically utilizing frozen/microwave meals and meals out with son, and son typically drives  Pt I for med management  Pt is currently Min A for UB ADLs and ModA for LB ADLs  He is ModAx2 for functional transfers and functional mobility c RW  Pt is limited by dec ADL/self-care status, dec endurance, dec functional transfers/mobility, dec safety awareness  The patient's raw score on the -PAC Daily Activity Inpatient Short Form is 16  A raw score of less than 19 suggests the patient may benefit from discharge to post-acute rehabilitation services  Please refer to the recommendation of the Occupational Therapist for safe discharge planning  Pt lives alone and while he has support from his children, they are not able to check in with him every day  Pt would benefit from acute OT services focusing on ADL retraining, functional transfer/mobility training, endurance training, energy conservation/compensatory technique education and activity engagement 2-3x/week for 10-14 days to achieve goals       OT Discharge Recommendation: Post acute rehabilitation services

## 2023-05-29 LAB
ANION GAP SERPL CALCULATED.3IONS-SCNC: 2 MMOL/L (ref 4–13)
BUN SERPL-MCNC: 38 MG/DL (ref 5–25)
CALCIUM SERPL-MCNC: 8.6 MG/DL (ref 8.3–10.1)
CHLORIDE SERPL-SCNC: 107 MMOL/L (ref 96–108)
CO2 SERPL-SCNC: 24 MMOL/L (ref 21–32)
CREAT SERPL-MCNC: 1.9 MG/DL (ref 0.6–1.3)
GFR SERPL CREATININE-BSD FRML MDRD: 32 ML/MIN/1.73SQ M
GLUCOSE SERPL-MCNC: 105 MG/DL (ref 65–140)
POTASSIUM SERPL-SCNC: 4.3 MMOL/L (ref 3.5–5.3)
SODIUM SERPL-SCNC: 133 MMOL/L (ref 135–147)

## 2023-05-29 RX ORDER — CARVEDILOL 12.5 MG/1
12.5 TABLET ORAL 2 TIMES DAILY WITH MEALS
Status: DISCONTINUED | OUTPATIENT
Start: 2023-05-29 | End: 2023-05-31 | Stop reason: HOSPADM

## 2023-05-29 RX ADMIN — HEPARIN SODIUM 5000 UNITS: 5000 INJECTION INTRAVENOUS; SUBCUTANEOUS at 06:28

## 2023-05-29 RX ADMIN — HEPARIN SODIUM 5000 UNITS: 5000 INJECTION INTRAVENOUS; SUBCUTANEOUS at 21:02

## 2023-05-29 RX ADMIN — Medication 2.5 MG: at 14:02

## 2023-05-29 RX ADMIN — NEOMYCIN SULFATE, POLYMYXIN B SULFATE AND DEXAMETHASONE 1 DROP: 3.5; 10000; 1 SUSPENSION OPHTHALMIC at 21:03

## 2023-05-29 RX ADMIN — HEPARIN SODIUM 5000 UNITS: 5000 INJECTION INTRAVENOUS; SUBCUTANEOUS at 13:12

## 2023-05-29 RX ADMIN — ATORVASTATIN CALCIUM 80 MG: 80 TABLET, FILM COATED ORAL at 08:50

## 2023-05-29 RX ADMIN — Medication 2.5 MG: at 20:06

## 2023-05-29 RX ADMIN — CLOPIDOGREL BISULFATE 75 MG: 75 TABLET ORAL at 08:50

## 2023-05-29 RX ADMIN — NEOMYCIN SULFATE, POLYMYXIN B SULFATE AND DEXAMETHASONE 1 DROP: 3.5; 10000; 1 SUSPENSION OPHTHALMIC at 06:28

## 2023-05-29 RX ADMIN — NEOMYCIN SULFATE, POLYMYXIN B SULFATE AND DEXAMETHASONE 1 DROP: 3.5; 10000; 1 SUSPENSION OPHTHALMIC at 10:45

## 2023-05-29 RX ADMIN — MAGNESIUM GLUCONATE 500 MG ORAL TABLET 400 MG: 500 TABLET ORAL at 08:50

## 2023-05-29 RX ADMIN — NEOMYCIN SULFATE, POLYMYXIN B SULFATE AND DEXAMETHASONE 1 DROP: 3.5; 10000; 1 SUSPENSION OPHTHALMIC at 16:36

## 2023-05-29 RX ADMIN — NEOMYCIN SULFATE, POLYMYXIN B SULFATE AND DEXAMETHASONE 1 DROP: 3.5; 10000; 1 SUSPENSION OPHTHALMIC at 18:21

## 2023-05-29 RX ADMIN — NEOMYCIN SULFATE, POLYMYXIN B SULFATE AND DEXAMETHASONE 1 DROP: 3.5; 10000; 1 SUSPENSION OPHTHALMIC at 13:12

## 2023-05-29 RX ADMIN — CARVEDILOL 25 MG: 25 TABLET, FILM COATED ORAL at 07:55

## 2023-05-29 RX ADMIN — ISOSORBIDE DINITRATE 10 MG: 10 TABLET ORAL at 16:36

## 2023-05-29 RX ADMIN — ISOSORBIDE DINITRATE 10 MG: 10 TABLET ORAL at 21:03

## 2023-05-29 RX ADMIN — HYDRALAZINE HYDROCHLORIDE 10 MG: 10 TABLET, FILM COATED ORAL at 21:02

## 2023-05-29 RX ADMIN — HYDRALAZINE HYDROCHLORIDE 10 MG: 10 TABLET, FILM COATED ORAL at 13:19

## 2023-05-29 RX ADMIN — ISOSORBIDE DINITRATE 10 MG: 10 TABLET ORAL at 08:50

## 2023-05-29 RX ADMIN — ACETAMINOPHEN 650 MG: 325 TABLET ORAL at 13:21

## 2023-05-29 RX ADMIN — CARVEDILOL 12.5 MG: 12.5 TABLET, FILM COATED ORAL at 16:36

## 2023-05-29 RX ADMIN — HYDRALAZINE HYDROCHLORIDE 10 MG: 10 TABLET, FILM COATED ORAL at 06:28

## 2023-05-29 NOTE — ASSESSMENT & PLAN NOTE
· Continue hydralazine and isosorbide  · Carvedilol further increased by nephrology to 25 mg yesterday due to elevated blood pressures  · Patient with ongoing blood pressure elevation, suspect likely secondary to pain we will continue to monitor

## 2023-05-29 NOTE — MALNUTRITION/BMI
This medical record reflects one or more clinical indicators suggestive of malnutrition and/or morbid obesity  Malnutrition Findings:   Adult Malnutrition type: Chronic illness  Adult Degree of Malnutrition: Other severe protein calorie malnutrition  Malnutrition Characteristics: Fat loss, Muscle loss, Inadequate energy                  360 Statement: Severe protein energy malnutrition is related to physiological causes as evidenced by severe muscle and fast wasting (temples, clavicles, orbitals, triceps) and inadequate energy intake  Treated with addition of supplement and diet  BMI Findings: Body mass index is 15 54 kg/m²  See Nutrition note dated 5/29/23 for additional details  Completed nutrition assessment is viewable in the nutrition documentation

## 2023-05-29 NOTE — ASSESSMENT & PLAN NOTE
Lab Results   Component Value Date    CREATININE 1 90 (H) 05/29/2023    CREATININE 1 83 (H) 05/28/2023    CREATININE 1 99 (H) 05/27/2023    EGFR 32 05/29/2023    EGFR 34 05/28/2023    EGFR 30 05/27/2023     · Renal function appears to be at baseline    Nephrology consulted for management

## 2023-05-29 NOTE — ACP (ADVANCE CARE PLANNING)
Advanced Care Planning Progress Note    Serious Illness Conversation    1  What is your understanding now of where you are with your illness? Prognostic Understanding: appropriate understanding of prognosis  Rody Perez [de-identified]year-old male with extensive history of peripheral arterial disease with foot wounds, as well as coronary artery disease CKD COPD tobacco abuse, failureto thrive with BMI of 15  Patient was sent to the hospital for expedited arterial studies and angiogram   Unfortunately his condition is not amenable to endovascular revascularization  He would require open revascularization which is not mended by the vascular team and medical team due to his comorbidities  Discussion was held with the patient vascular surgery medicine and podiatry, ultimately patient and family have elected for palliative wound care  Apart from this condition patient does suffer from the above comorbidities, and has been losing weight over the years  Patient would like to maintain level 1 CODE STATUS but otherwise does not want any other aggressive treatments at this time                 2  How much information about what is likely to be ahead with your illness would you like to have? Information: patient wants to be fully informed  Patient and family want to be fully informed     3  What did you (clinician) communicate to the patient? Prognostic Communication: Uncertain - It can be difficult to predict what will happen with your illness  I hope you will continue to live well for a long time but I’m worried that you could get sick quickly, and I think it is important to prepare for that possibility  Discussed the expected progression of his peripheral arterial disease and wounds, well as the progression of his other comorbidities  Patient is also compromised from a nutritional standpoint with a significant low BMI and poor nutritional status, which would not promote wound healing     4   If your health situation worsens, what are your most important goals? Goals: be physically comfortable  Patient's comfort is most important to him, he does not want to be in pain     5  What are the biggest fears and worries about the future and your health? 6  What abilities are so critical to your life that you cannot imagine living without them? Unacceptable Function: being unable to interact with others     7  What gives you strength as you think about the future with your illness? His family supports him and provides him strength     8  If you become sicker, how much are you willing to go through for the possibility of gaining more time? Be in the hospital: Yes Have a feeding tube: No   Be in the ICU: Yes Live in a nursing home: Yes   Be on a ventilator: Yes Be uncomfortable: No   Be on dialysis: No Undergo aggressive test and/or procedures: No   Patient is agreeable to further hospital admissions as well as ICU admissions, he is agreeable to CPR and intubation with ventilator placement  He is not agreeable with dialysis and would not want a feeding tube  9  How much does your proxy and family know about your priorities and wishes? Discussion Discussion: extensive discussion with family about goals and wishes        How does this plan sound to you? I will do everything I can to help you through this       I have spent 35minutes speaking with my patient on advanced care planning today or during this visit     Advanced directives  Five Wishes: Patient does not have Five Wishes- would like information         [place], DO

## 2023-05-29 NOTE — PROGRESS NOTES
[address] [facility]  Progress Note  Name: Lane Gamboa  MRN: [id number]  [facility] I Date of Admission: 5/25/2023   Date of Service: 5/29/2023 I Hospital Day: 4    Assessment/Plan   Anxiety about health  Assessment & Plan  · Added alprazolam as needed    Patient underweight  Assessment & Plan  · Body mass index is 15 54 kg/m²  · Added ensure    Conjunctivitis, left eye  Assessment & Plan  · Unilateral eye erythema with irritation  · Denies any visual field deficits  · Started neomycin/polymyxin eyedrops with improvement    Asymptomatic carotid artery stenosis, bilateral  Assessment & Plan  · Continue clopidogrel    Stage 3b chronic kidney disease [facility])  Assessment & Plan  Lab Results   Component Value Date    CREATININE 1 90 (H) 05/29/2023    CREATININE 1 83 (H) 05/28/2023    CREATININE 1 99 (H) 05/27/2023    EGFR 32 05/29/2023    EGFR 34 05/28/2023    EGFR 30 05/27/2023     · Renal function appears to be at baseline  Nephrology consulted for management    COPD (chronic obstructive pulmonary disease) ([facility] [address] )  Assessment & Plan  · Reported history of COPD on albuterol as needed  · No longer smoking    Essential hypertension  Assessment & Plan  · Continue hydralazine and isosorbide  · Carvedilol further increased by nephrology to 25 mg yesterday due to elevated blood pressures  · Patient with ongoing blood pressure elevation, suspect likely secondary to pain we will continue to monitor    * Atherosclerosis of artery of extremity with ulceration (HCC)  Assessment & Plan  Background: History of hypertension CKD and PAD with carotid stenosis presents for anterior insufficiency of lower extremities  · On clopidogrel for carotid disease  · Underwent angiogram not amenable to endovascular revascularization  · Management per primary service  · Right foot ulcer: Seen and being followed by by podiatry  No evidence of acute infection    · Disposition: PT/OT recommending rehab                VTE Pharmacologic Prophylaxis:   High Risk (Score >/= 5) - Pharmacological DVT Prophylaxis Ordered: heparin  Sequential Compression Devices Ordered  Patient Centered Rounds: I performed bedside rounds with nursing staff today  Discussions with Specialists or Other Care Team Provider: n/a    Education and Discussions with Family / Patient: Updated  (son) at bedside  Total Time Spent on Date of Encounter in care of patient: 35 minutes This time was spent on one or more of the following: performing physical exam; counseling and coordination of care; obtaining or reviewing history; documenting in the medical record; reviewing/ordering tests, medications or procedures; communicating with other healthcare professionals and discussing with patient's family/caregivers  Current Length of Stay: 4 day(s)  Current Patient Status: Inpatient   Certification Statement: The patient will continue to require additional inpatient hospital stay due to Pending disposition per vascular  Discharge Plan: Buddymadinacristopher Juloi is following this patient on consult  They are medically stable for discharge when deemed appropriate by primary service  Code Status: Level 1 - Full Code    Subjective:   Patient reports improvement in his anxiety today, still having pain with blood pressure elevations  Objective:     Vitals:   Temp (24hrs), Av 5 °F (36 9 °C), Min:98 1 °F (36 7 °C), Max:98 7 °F (37 1 °C)    Temp:  [98 1 °F (36 7 °C)-98 7 °F (37 1 °C)] 98 6 °F (37 °C)  HR:  [98] 98  Resp:  [16] 16  BP: (147-190)/(72-88) 147/79  SpO2:  [94 %] 94 %  Body mass index is 15 54 kg/m²  Input and Output Summary (last 24 hours): Intake/Output Summary (Last 24 hours) at 2023 1315  Last data filed at 2023 1143  Gross per 24 hour   Intake 1240 ml   Output 350 ml   Net 890 ml       Physical Exam:   Physical Exam  Vitals and nursing note reviewed  Constitutional:       General: He is not in acute distress  Appearance: He is well-developed  He is not toxic-appearing or diaphoretic  HENT:      Head: Normocephalic and atraumatic  Eyes:      General: No scleral icterus  Conjunctiva/sclera: Conjunctivae normal    Cardiovascular:      Rate and Rhythm: Normal rate and regular rhythm  Heart sounds: No murmur heard  No friction rub  No gallop  Pulmonary:      Effort: Pulmonary effort is normal  No respiratory distress  Breath sounds: Normal breath sounds  No stridor  No wheezing, rhonchi or rales  Chest:      Chest wall: No tenderness  Abdominal:      General: There is no distension  Palpations: Abdomen is soft  Tenderness: There is no abdominal tenderness  There is no guarding or rebound  Hernia: No hernia is present  Musculoskeletal:         General: No swelling  Cervical back: Neck supple  Comments: Foot wrapped clean dry intact   Skin:     General: Skin is warm and dry  Capillary Refill: Capillary refill takes less than 2 seconds  Neurological:      Mental Status: He is alert  Psychiatric:         Mood and Affect: Mood normal           Additional Data:     Labs:  Results from last 7 days   Lab Units 05/27/23  0224 05/26/23  0449 05/25/23  1142   EOS PCT %  --   --  6   HEMATOCRIT % 36 0*   < > 40 6   HEMOGLOBIN g/dL 11 7*   < > 13 4   LYMPHS PCT %  --   --  15   MONOS PCT %  --   --  9   NEUTROS PCT %  --   --  68   PLATELETS Thousands/uL 272   < > 323   WBC Thousand/uL 13 11*   < > 10 57*    < > = values in this interval not displayed       Results from last 7 days   Lab Units 05/29/23  0516   ANION GAP mmol/L 2*   BUN mg/dL 38*   CALCIUM mg/dL 8 6   CHLORIDE mmol/L 107   CO2 mmol/L 24   CREATININE mg/dL 1 90*   GLUCOSE RANDOM mg/dL 105   POTASSIUM mmol/L 4 3   SODIUM mmol/L 133*     Results from last 7 days   Lab Units 05/25/23  1142   INR  1 00                   Lines/Drains:  Invasive Devices     None                       Imaging: No pertinent imaging reviewed  Recent Cultures (last 7 days):         Last 24 Hours Medication List:   Current Facility-Administered Medications   Medication Dose Route Frequency Provider Last Rate   • acetaminophen  650 mg Oral Q6H PRN Kristi Gutiérrez DO     • albuterol  2 puff Inhalation Q6H PRN Horace Matias PA-C     • ALPRAZolam  0 25 mg Oral BID PRN Kristi Gutiérrez, DO     • atorvastatin  80 mg Oral Daily Margot Sesay PA-C     • carvedilol  25 mg Oral BID With Meals Laury Rab Venus Juarez MD     • clopidogrel  75 mg Oral Daily Horace Matias PA-C     • heparin (porcine)  5,000 Units Subcutaneous [facility] Margot Sesay PA-C     • hydrALAZINE  10 [facility] Q8H [address] [facility]     • isosorbide dinitrate  10 mg Oral TID Horace Matias PA-C     • labetalol  10 mg Intravenous Q10 Min PRN Harjeet Hall CRNA     • magnesium Oxide  400 mg Oral Daily Jessy Benoit MD     • neomycin-polymyxin-dexamethasone  1 drop Left Eye 6x Daily Kristi Gutiérrez DO     • ondansetron  4 mg Intravenous Q4H PRN Kristi Gutiérrez DO     • oxyCODONE  2 5 mg Oral Q6H PRN Justin Gusman PA-C     • zolpidem  5 mg Oral HS PRN Horace Matias PA-C          Today, Patient Was Seen By: Cece Spivey DO    **Please Note: This note may have been constructed using a voice recognition system  **

## 2023-05-29 NOTE — PROGRESS NOTES
"Progress Note - Vascular Surgery   Torie Cowan [de-identified] y o  male MRN: [id number]  Unit/Bed#: [facility] 501-01 Encounter: [id number]      Assessment:  [de-identified] M w/ known peripheral arterial disease, severe L subclavian stenosis/occlusion, CKD3, COPD, severe protein-calorie malnutrition, HTN, and CAD admitted for management of RLE CLTI with (now-resolved/significantly improved) a chronic wound and foot pain  Afebrile, vitals WNL, and saturating adequately on room air    Plan:  - Following an extensive conversation with the patient and his family, they have opted to proceed with palliative wound care and no additional intervention/surgery  - Physical therapy evaluation recommending rehab, case management aware and attempting to find rehabilitation facility upon discharge, current barrier to discharge  - OK for regular diet  - Analgesia PRN  - Follow-up outpatient with PCP for analgesia and vascular surgery for routine follow-up  - OOB ad magda/with assistance  - DVT prophylaxis    Subjective/Objective     Subjective:   No acute events overnight  This AM the patient reported continued resolution of his previously reported lower extremity pain with no fever/chills, nausea/emesis, or issues with his diet  Objective:     Blood pressure 139/61, pulse 88, temperature 98 6 °F (37 °C), temperature source Oral, resp  rate 16, height 5' 7\" (1 702 m), weight 45 kg (99 lb 3 3 oz), SpO2 94 %  ,Body mass index is 15 54 kg/m²  Gen: Awake, alert, and in no acute distress  Head: Normocephalic, atraumatic  Neck: No obvious JVD, trachea midline  Cardiovascular: Regular rate  Respiratory:  In no acute respiratory distress w/ no use of accessory muscles of respiration  Abd: Soft, no signs of peritonitis  Extremities: Right lower extremity wound with dressing in place  Skin: Warm/dry  Psychiatric: Appropriate mood/affect    Intake/Output Summary (Last 24 hours) at 5/29/2023 1403  Last data filed at 5/29/2023 1143  Gross per 24 hour   Intake " "1240 ml   Output 350 ml   Net 890 ml       Invasive Devices     None                 I have personally reviewed pertinent lab results    , CBC: No results found for: \"ADJUSTEDWBC\", \"HCT\", \"HGB\", \"MCH\", \"MCHC\", \"MCV\", \"MPV\", \"NRBC\", \"PLT\", \"RBC\", \"RDW\", \"WBC\", CMP:   Lab Results   Component Value Date    BUN 38 (H) 05/29/2023    CALCIUM 8 6 05/29/2023     05/29/2023    CO2 24 05/29/2023    CREATININE 1 90 (H) 05/29/2023    EGFR 32 05/29/2023    K 4 3 05/29/2023    SODIUM 133 (L) 05/29/2023   , Coagulation: No results found for: \"APTT\", \"INR\", \"PT\", Urinalysis: No results found for: \"BILIRUBINUR\", \"BLOODU\", \"CLARITYU\", \"COLORU\", \"GLUCOSEU\", \"KETONESU\", \"LEUKOCYTESUR\", \"NITRITE\", \"PHUR\", \"PROTEINUA\", \"SPECGRAV\", Amylase: No results found for: \"AMYLASE\", Lipase: No results found for: \"LIPASE\"    "

## 2023-05-30 LAB
ANION GAP SERPL CALCULATED.3IONS-SCNC: 2 MMOL/L (ref 4–13)
BUN SERPL-MCNC: 49 MG/DL (ref 5–25)
CALCIUM SERPL-MCNC: 8.7 MG/DL (ref 8.3–10.1)
CHLORIDE SERPL-SCNC: 104 MMOL/L (ref 96–108)
CO2 SERPL-SCNC: 25 MMOL/L (ref 21–32)
CREAT SERPL-MCNC: 2.04 MG/DL (ref 0.6–1.3)
GFR SERPL CREATININE-BSD FRML MDRD: 29 ML/MIN/1.73SQ M
GLUCOSE SERPL-MCNC: 112 MG/DL (ref 65–140)
POTASSIUM SERPL-SCNC: 4.4 MMOL/L (ref 3.5–5.3)
SODIUM 24H UR-SCNC: 65 MOL/L
SODIUM SERPL-SCNC: 131 MMOL/L (ref 135–147)

## 2023-05-30 RX ORDER — HYDROMORPHONE HCL IN WATER/PF 6 MG/30 ML
0.2 PATIENT CONTROLLED ANALGESIA SYRINGE INTRAVENOUS EVERY 6 HOURS PRN
Status: DISCONTINUED | OUTPATIENT
Start: 2023-05-30 | End: 2023-05-31 | Stop reason: HOSPADM

## 2023-05-30 RX ORDER — OXYCODONE HYDROCHLORIDE 5 MG/1
5 TABLET ORAL EVERY 6 HOURS PRN
Status: DISCONTINUED | OUTPATIENT
Start: 2023-05-30 | End: 2023-05-31 | Stop reason: HOSPADM

## 2023-05-30 RX ADMIN — OXYCODONE HYDROCHLORIDE 5 MG: 5 TABLET ORAL at 11:56

## 2023-05-30 RX ADMIN — NEOMYCIN SULFATE, POLYMYXIN B SULFATE AND DEXAMETHASONE 1 DROP: 3.5; 10000; 1 SUSPENSION OPHTHALMIC at 16:58

## 2023-05-30 RX ADMIN — HEPARIN SODIUM 5000 UNITS: 5000 INJECTION INTRAVENOUS; SUBCUTANEOUS at 21:09

## 2023-05-30 RX ADMIN — NEOMYCIN SULFATE, POLYMYXIN B SULFATE AND DEXAMETHASONE 1 DROP: 3.5; 10000; 1 SUSPENSION OPHTHALMIC at 11:55

## 2023-05-30 RX ADMIN — MAGNESIUM GLUCONATE 500 MG ORAL TABLET 400 MG: 500 TABLET ORAL at 08:15

## 2023-05-30 RX ADMIN — HYDRALAZINE HYDROCHLORIDE 10 MG: 10 TABLET, FILM COATED ORAL at 05:53

## 2023-05-30 RX ADMIN — CARVEDILOL 12.5 MG: 12.5 TABLET, FILM COATED ORAL at 16:56

## 2023-05-30 RX ADMIN — OXYCODONE HYDROCHLORIDE 5 MG: 5 TABLET ORAL at 19:04

## 2023-05-30 RX ADMIN — OXYCODONE HYDROCHLORIDE 5 MG: 5 TABLET ORAL at 01:40

## 2023-05-30 RX ADMIN — CLOPIDOGREL BISULFATE 75 MG: 75 TABLET ORAL at 08:15

## 2023-05-30 RX ADMIN — ISOSORBIDE DINITRATE 10 MG: 10 TABLET ORAL at 17:01

## 2023-05-30 RX ADMIN — HEPARIN SODIUM 5000 UNITS: 5000 INJECTION INTRAVENOUS; SUBCUTANEOUS at 05:53

## 2023-05-30 RX ADMIN — HYDRALAZINE HYDROCHLORIDE 10 MG: 10 TABLET, FILM COATED ORAL at 21:09

## 2023-05-30 RX ADMIN — ISOSORBIDE DINITRATE 10 MG: 10 TABLET ORAL at 08:16

## 2023-05-30 RX ADMIN — CARVEDILOL 12.5 MG: 12.5 TABLET, FILM COATED ORAL at 08:15

## 2023-05-30 RX ADMIN — SODIUM CHLORIDE 500 ML: 0.9 INJECTION, SOLUTION INTRAVENOUS at 17:01

## 2023-05-30 RX ADMIN — HEPARIN SODIUM 5000 UNITS: 5000 INJECTION INTRAVENOUS; SUBCUTANEOUS at 16:56

## 2023-05-30 RX ADMIN — NEOMYCIN SULFATE, POLYMYXIN B SULFATE AND DEXAMETHASONE 1 DROP: 3.5; 10000; 1 SUSPENSION OPHTHALMIC at 05:52

## 2023-05-30 RX ADMIN — ATORVASTATIN CALCIUM 80 MG: 80 TABLET, FILM COATED ORAL at 08:15

## 2023-05-30 RX ADMIN — HYDRALAZINE HYDROCHLORIDE 10 MG: 10 TABLET, FILM COATED ORAL at 16:56

## 2023-05-30 RX ADMIN — ISOSORBIDE DINITRATE 10 MG: 10 TABLET ORAL at 21:09

## 2023-05-30 NOTE — PROGRESS NOTES
[address] [facility]  Progress Note  Name: Kaitlyn Lewis  MRN: [id number]  [facility] Date of Admission: 5/25/2023   Date of Service: 5/30/2023 I Hospital Day: 5    Assessment/Plan   * Atherosclerosis of artery of extremity with ulceration [facility])  Assessment & Plan  · Vascular Surgery primary  · Underwent angiogram not amenable to endovascular revascularization  Poor surgical candidate per Vascular  Plan for palliative local wound care  · Management per primary service  · Right foot ulcer: Seen and being followed by by podiatry  No evidence of acute infection  · PT/OT recommending rehab  · On Plavix and statin    Stage 3b chronic kidney disease [facility])  Assessment & Plan  Lab Results   Component Value Date    CREATININE 2 04 (H) 05/30/2023    CREATININE 1 90 (H) 05/29/2023    CREATININE 1 83 (H) 05/28/2023    EGFR 29 05/30/2023    EGFR 32 05/29/2023    EGFR 34 05/28/2023     · Baseline creatinine 1 5-1 8 per Nephrology  · Nephrology following   · Monitor  · Avoid nephrotoxins and hypotension     Essential hypertension  Assessment & Plan  · Continue hydralazine, isosorbide, Carvedilol     Conjunctivitis, left eye  Assessment & Plan  · Patient was noted to have unilateral eye erythema with irritation  · On neomycin/polymyxin eyedrops with improvement    COPD (chronic obstructive pulmonary disease) ([address] )  Assessment & Plan  · Reported history of COPD on albuterol as needed  · No longer smoking    Asymptomatic carotid artery stenosis, bilateral  Assessment & Plan  · Continue clopidogrel    Anxiety about health  Assessment & Plan  · Alprazolam on board as needed    Patient underweight  Assessment & Plan  · Body mass index is 15 63 kg/m²  · Ensure           VTE Pharmacologic Prophylaxis:   SC heparin    Patient Centered Rounds: I performed bedside rounds with nursing staff today   d/w RN Makeda  Discussions with Specialists or Other Care Team Provider:  Education and Discussions with Family / Patient: Updated  (son) at bedside  Total Time Spent on Date of Encounter in care of patient: 25 minutes This time was spent on one or more of the following: performing physical exam; counseling and coordination of care; obtaining or reviewing history; documenting in the medical record; reviewing/ordering tests, medications or procedures; communicating with other healthcare professionals and discussing with patient's family/caregivers  Current Length of Stay: 5 day(s)  Current Patient Status: Inpatient   Certification Statement: per primary, Vascular  Discharge Plan: Alpa Amador is following this patient on consult  They are medically stable for discharge when deemed appropriate by primary service  Code Status: Level 1 - Full Code    Subjective:   Mr Crystal Eduardo denies complaint  He denies leg pain  Patient reports that his eye feels much better than before and he and his son report that it looks better  Denies CP, SOB, abdominal pain    Objective:     Vitals:   Temp (24hrs), Av 9 °F (36 6 °C), Min:97 9 °F (36 6 °C), Max:98 °F (36 7 °C)    Temp:  [97 9 °F (36 6 °C)-98 °F (36 7 °C)] 97 9 °F (36 6 °C)  HR:  [68-88] 80  Resp:  [14-19] 19  BP: (110-158)/(47-70) 152/62  SpO2:  [93 %-95 %] 95 %  Body mass index is 15 63 kg/m²  Input and Output Summary (last 24 hours): Intake/Output Summary (Last 24 hours) at 2023 0909  Last data filed at 2023 0817  Gross per 24 hour   Intake 540 ml   Output 325 ml   Net 215 ml       Physical Exam:   Physical Exam  Vitals reviewed  Constitutional:       Comments: Patient seen sitting in bed comfortably resting, watching TV with his son at bedside, NAD  thin   Cardiovascular:      Rate and Rhythm: Normal rate and regular rhythm  Pulmonary:      Effort: Pulmonary effort is normal       Breath sounds: Normal breath sounds     Abdominal:      General: Bowel sounds are normal    Musculoskeletal:      Right lower leg: No edema  Left lower leg: No edema  Skin:     General: Skin is warm  Neurological:      Mental Status: He is alert and oriented to person, place, and time  Psychiatric:         Mood and Affect: Mood normal          Behavior: Behavior normal           Additional Data:     Labs:  Results from last 7 days   Lab Units 05/27/23  0224 05/26/23  0449 05/25/23  1142   EOS PCT %  --   --  6   HEMATOCRIT % 36 0*   < > 40 6   HEMOGLOBIN g/dL 11 7*   < > 13 4   LYMPHS PCT %  --   --  15   MONOS PCT %  --   --  9   NEUTROS PCT %  --   --  68   PLATELETS Thousands/uL 272   < > 323   WBC Thousand/uL 13 11*   < > 10 57*    < > = values in this interval not displayed       Results from last 7 days   Lab Units 05/30/23  0625   ANION GAP mmol/L 2*   BUN mg/dL 49*   CALCIUM mg/dL 8 7   CHLORIDE mmol/L 104   CO2 mmol/L 25   CREATININE mg/dL 2 04*   GLUCOSE RANDOM mg/dL 112   POTASSIUM mmol/L 4 4   SODIUM mmol/L 131*     Results from last 7 days   Lab Units 05/25/23  1142   INR  1 00                   Lines/Drains:  Invasive Devices     None                       Imaging: Reviewed radiology reports from this admission including: IR LE angiogram    Recent Cultures (last 7 days):         Last 24 Hours Medication List:   Current Facility-Administered Medications   Medication Dose Route Frequency Provider Last Rate   • acetaminophen  650 mg Oral Q6H PRN Kaela Liriano DO     • albuterol  2 puff Inhalation Q6H PRN Ilsa Zapata PA-C     • ALPRAZolam  0 25 mg Oral BID PRN Kaela Liriano DO     • atorvastatin  80 mg Oral Daily Margot Hinojosa PA-C     • carvedilol  12 5 mg Oral BID With Meals Harman Banai, DO     • clopidogrel  75 mg Oral Daily Margot Hinojosa PA-C     • heparin (porcine)  5,000 Units Subcutaneous [facility] Margot Hinojosa PA-C     • hydrALAZINE  10 mg Oral [facility] Ilsa Zapata PA-C     • HYDROmorphone  0 2 mg Intravenous Q6H PRN Yoko Albrecht PA-C     • isosorbide dinitrate  10 mg Oral TID Flakita Mukherjee PA-C     • labetalol  10 mg Intravenous Q10 Min PRN Rene Gonzalez CRNA     • magnesium Oxide  400 mg Oral Daily Juan Venegas MD     • neomycin-polymyxin-dexamethasone  1 drop Left Eye 6x Daily Rosezetta Breeding, DO     • ondansetron  4 mg Intravenous Q4H PRN Rosezetta Breeding, DO     • oxyCODONE  5 mg Oral Q6H PRN Isabella Otero PA-C     • oxyCODONE  2 5 mg Oral Q6H PRN Tam Keen PA-C     • zolpidem  5 mg Oral HS PRN Flakita Mukherjee PA-C          Today, Patient Was Seen By: Tra Lai PA-C    **Please Note: This note may have been constructed using a voice recognition system  **

## 2023-05-30 NOTE — PLAN OF CARE
Problem: PHYSICAL THERAPY ADULT  Goal: Performs mobility at highest level of function for planned discharge setting  See evaluation for individualized goals  Description: Treatment/Interventions: ADL retraining, Functional transfer training, LE strengthening/ROM, Therapeutic exercise, Endurance training, Elevations, Patient/family training, Equipment eval/education, Bed mobility, Gait training, Spoke to nursing, Spoke to case management, OT  Equipment Recommended: Dewayne Hylton       See flowsheet documentation for full assessment, interventions and recommendations  Outcome: Progressing  Note: Prognosis: Fair  Problem List: Decreased strength, Decreased range of motion, Decreased endurance, Impaired balance, Decreased mobility, Decreased safety awareness, Pain  Assessment: Patient received supine in bed  Patient agreeable to therapy  Patient performs bed mobility with Min A  Patient performs functional transfers with mod Ax2  Multiple STS transfers performed to increase upright stability, strength, and endurance  [place] [facility] B/L HHA utilized with B/L knee block  Patient performs all STS with Mod Ax2 and requires hand held Mod Ax2 to ambulate short distance to chair  Patient also performs BLE therapeutic exercise in sitting to facilitate LE mobility and strenghening  Patient left in bedside chair with all needs met and call bell/personal items within reach  The patient's AM-PAC Basic Mobility Inpatient Short Form Raw Score is 12 showing further need for skilled PT services in order to improve functional mobility, decrease need for assistance, and return to PLOF  A Raw score of less than 16 suggests the patient may benefit from discharge to post-acute rehabilitation services  PT continues to recommend rehab on d/c  Will continue to follow as able    Barriers to Discharge: Inaccessible home environment, Decreased caregiver support     PT Discharge Recommendation: Post acute rehabilitation services    See flowsheet documentation for full assessment

## 2023-05-30 NOTE — ASSESSMENT & PLAN NOTE
· Vascular Surgery primary  · Underwent angiogram not amenable to endovascular revascularization  Poor surgical candidate per Vascular  Plan for palliative local wound care  · Management per primary service  · Right foot ulcer: Seen and being followed by by podiatry  No evidence of acute infection  · PT/OT recommending rehab    · On Plavix and statin

## 2023-05-30 NOTE — PROGRESS NOTES
"Podiatry - Progress Note  Patient: Princess Torres [de-identified] y o  male   MRN: [id number]  PCP: Francisco Gutierrez DO  Unit/Bed#: [facility] 501-01 Encounter: [id number]  Date Of Visit: 23    ASSESSMENT:    Princess Torres is a [de-identified] y o  male with:    1  Right foot wound   2  PAD  3  Gout  4  Stage 3B kidney disease  5  Tobacco abuse       PLAN:    · Plan for palliative local wound care to the right lateral foot consisting of betadine DSD  Per vascular patient is poor candidate for further revascularization intervention  · Minor pain and edema noted to right foot on examination  Wound recommend checking repeat xray and uric acid if pain continues  Most likely related to procedure on   No clinical signs of acute infection noted    · Elevation and offloading on green foam wedges or pillows when non-ambulatory  · Rest of care per primary team      Weightbearing status: Weightbearing as tolerated    SUBJECTIVE:     The patient was seen, evaluated, and assessed at bedside today  The patient was awake, alert, and in no acute distress  No acute events overnight  The patient reports new pain and swelling to the right foot  Patient denies N/V/F/chills/SOB/CP  OBJECTIVE:     Vitals:   /62 (BP Location: Right arm)   Pulse 80   Temp 97 9 °F (36 6 °C) (Oral)   Resp 19   Ht 5' 7\" (1 702 m)   Wt 45 3 kg (99 lb 12 8 oz)   SpO2 95%   BMI 15 63 kg/m²     Temp (24hrs), Av 9 °F (36 6 °C), Min:97 9 °F (36 6 °C), Max:98 °F (36 7 °C)      Physical Exam:     Lungs: Non labored breathing  Abdomen: Soft, non-tender  Lower Extremity:  Cardiovascular status at baseline from admission  Neurological status at baseline from admission  Musculoskeletal status at baseline from admission  No calf tenderness noted  Dermatologic: Consistent with baseline  Eschar well adhered to the lateral fifth metatarsal base  No local signs of infection  Gouty tophi noted near the third MPJ  New edema noted on the foot   Foot is tender and " "non-specific for one area             Additional Data:     Labs:    Results from last 7 days   Lab Units 05/27/23  0224 05/26/23  0449 05/25/23  1142   EOS PCT %  --   --  6   HEMATOCRIT % 36 0*   < > 40 6   HEMOGLOBIN g/dL 11 7*   < > 13 4   LYMPHS PCT %  --   --  15   MONOS PCT %  --   --  9   NEUTROS PCT %  --   --  68   PLATELETS Thousands/uL 272   < > 323   WBC Thousand/uL 13 11*   < > 10 57*    < > = values in this interval not displayed  Results from last 7 days   Lab Units 05/30/23  0625   BUN mg/dL 49*   CALCIUM mg/dL 8 7   CHLORIDE mmol/L 104   CO2 mmol/L 25   CREATININE mg/dL 2 04*   POTASSIUM mmol/L 4 4     Results from last 7 days   Lab Units 05/25/23  1142   INR  1 00       * I Have Reviewed All Lab Data Listed Above  Recent Cultures (last 7 days):               Imaging: I have personally reviewed pertinent films in PACS  EKG, Pathology, and Other Studies: I have personally reviewed pertinent reports  ** Please Note: Portions of the record may have been created with voice recognition software  Occasional wrong word or \"sound a like\" substitutions may have occurred due to the inherent limitations of voice recognition software  Read the chart carefully and recognize, using context, where substitutions have occurred   **      "

## 2023-05-30 NOTE — ASSESSMENT & PLAN NOTE
· Patient was noted to have unilateral eye erythema with irritation  · On neomycin/polymyxin eyedrops with improvement

## 2023-05-30 NOTE — PROGRESS NOTES
"1425 [facility]  Progress Note  Name: Jacob Laughlin  MRN: [id number]  Unit/Bed#: [facility] 501-01 I Date of Admission: 5/25/2023   Date of Service: 5/30/2023 I Hospital Day: 5    Assessment/Plan   * Atherosclerosis of artery of extremity with ulceration [facility])  Assessment & Plan  [de-identified] y/o M w/ Right leg rest pain and foot wound w/ known hx of PAD  PMHx CAD, severe Left subclavian stenosis/occl, CKD, COPD, severe protein calorie malnutrition, HTN, HLD, hyperparathyroidism, Gout, PUD, esophagitis, and tobacco abuse    5/17/2023 LEAD- AKUA 0 22 in the ischemic range  5/26/23 Diagnostic right lower extremity a gram  -Right lower extremity angiogram demonstrating 80% stenosis of right common femoral artery with high-grade stenosis/occlusion of the profunda  Long segment occlusion of the mid SFA with distal reconstitution above-knee pop  PT is primary runoff into the foot  Plan:  -Unfortunately, not amenable to endovascular revascularization and would require extensive open revascularization including common femoral endarterectomy, profundoplasty, fem-PT bypass  -Due to severe protein calorie malnutrition and frailty and extent revascularization required, he is a poor surgical candidate with risk of poor wound healing, bypass graft failure, and need for further intervention  -Patient's pain is currently controlled and the wound is not infected  -Patient in agreement for palliative local wound care  -PT/OT - recommend rehab  -CM for dispo planning           Subjective:  Pt resting comfortably, No events overnight    Vitals:  /61   Pulse 75   Temp 97 9 °F (36 6 °C)   Resp 16   Ht 5' 7\" (1 702 m)   Wt 45 3 kg (99 lb 12 8 oz)   SpO2 94%   BMI 15 63 kg/m²     I/Os:  I/O last 3 completed shifts: In: 1480 [P O :1480]  Out: 675 [Urine:675]  No intake/output data recorded      Lab Results and Cultures:   Lab Results   Component Value Date    HCT 36 0 (L) 05/27/2023    HGB 11 7 (L) " "05/27/2023    MCV 91 05/27/2023     05/27/2023    WBC 13 11 (H) 05/27/2023     Lab Results   Component Value Date    BUN 49 (H) 05/30/2023    CALCIUM 8 7 05/30/2023     05/30/2023    CO2 25 05/30/2023    CREATININE 2 04 (H) 05/30/2023    K 4 4 05/30/2023     Lab Results   Component Value Date    INR 1 00 05/25/2023    INR 0 98 02/18/2023    INR 1 12 11/13/2022    PROTIME 13 4 05/25/2023    PROTIME 13 0 02/18/2023    PROTIME 14 5 11/13/2022        Blood Culture:   Lab Results   Component Value Date    BLOODCX No Growth After 5 Days  11/13/2022    BLOODCX No Growth After 5 Days   11/13/2022   ,   Urinalysis:   Lab Results   Component Value Date    BILIRUBINUR Negative 11/13/2022    BLOODU Negative 11/13/2022    CLARITYU Clear 11/13/2022    COLORU Yellow 11/13/2022    GLUCOSEU Negative 11/13/2022    KETONESU Negative 11/13/2022    LEUKOCYTESUR Negative 11/13/2022    NITRITE Negative 11/13/2022    PHUR 6 0 11/13/2022    SPECGRAV 1 010 11/13/2022   ,   Urine Culture: No results found for: \"URINECX\",   Wound Culure: No results found for: \"WOUNDCULT\"    Medications:  Current Facility-Administered Medications   Medication Dose Route Frequency   • acetaminophen (TYLENOL) tablet 650 mg  650 mg Oral Q6H PRN   • albuterol (PROVENTIL HFA,VENTOLIN HFA) inhaler 2 puff  2 puff Inhalation Q6H PRN   • ALPRAZolam (XANAX) tablet 0 25 mg  0 25 mg Oral BID PRN   • atorvastatin (LIPITOR) tablet 80 mg  80 mg Oral Daily   • carvedilol (COREG) tablet 12 5 mg  12 5 mg Oral BID With Meals   • clopidogrel (PLAVIX) tablet 75 mg  75 mg Oral Daily   • heparin (porcine) subcutaneous injection 5,000 Units  5,000 Units Subcutaneous Q8H [address]   • hydrALAZINE (APRESOLINE) tablet 10 mg  10 mg Oral Q8H [address]   • HYDROmorphone HCl (DILAUDID) injection 0 2 mg  0 2 mg Intravenous Q6H PRN   • isosorbide dinitrate (ISORDIL) tablet 10 mg  10 mg Oral TID   • labetalol (NORMODYNE) injection 10 mg  10 mg Intravenous Q10 Min PRN   • magnesium Oxide " (MAG-OX) tablet 400 mg  400 mg Oral Daily   • neomycin-polymyxin-dexamethasone (MAXITROL) ophthalmic suspension 1 drop  1 drop Left Eye 6x Daily   • ondansetron (ZOFRAN) injection 4 mg  4 mg Intravenous Q4H PRN   • oxyCODONE (ROXICODONE) IR tablet 5 mg  5 mg Oral Q6H PRN   • oxyCODONE (ROXICODONE) split tablet 2 5 mg  2 5 mg Oral Q6H PRN   • zolpidem (AMBIEN) tablet 5 mg  5 mg Oral HS PRN         Physical Exam:    General appearance: alert and oriented, in no acute distress  Lungs: clear to auscultation bilaterally  Heart: S1, S2 normal  Abdomen: soft, non-tender; bowel sounds normal; no masses,  no organomegaly  Extremities: +M/S intact, no cyanosis, dry lateral right foot wound necrotic      Pulse exam:  DP: Right: doppler signal Left: doppler signal  PT: Right: doppler signal Left: doppler signal      Yazan Jaimes PA-C  5/30/2023

## 2023-05-30 NOTE — PROGRESS NOTES
NEPHROLOGY PROGRESS NOTE   Eliz Chambers [de-identified] y o  male MRN: [id number]  Unit/Bed#: [facility] 501-01 Encounter: [id number]      ASSESSMENT/PLAN:  1  CKD stage IIIb: Baseline creatinine 1 5-1 8 and follows with Dr Kalyn Vail   · Creatinine close to baseline at 2 04 today  · Status post angiogram 5/26  2  Mild hyponatremia: possibly SIADH related to pain  Sodium 131 today   · Continue 1 8L/day oral fluid restriction (started last night)  · If worsening will check work up   3  Hypertension: BP labile  A little above goal this morning likely due to pain  Continue current meds for now   4  PAD with R LE wound: poor surgical candidate and not amenable to endovascular revascularization on agram      Plan Summary:   • Check am BMP   • Case management working on d/c to rehab     SUBJECTIVE:  Complains of new swelling in his right foot  Also having pain in his right foot  No chest pain or shortness of breath  Appetite okay  OBJECTIVE:  Current Weight: Weight - Scale: 45 3 kg (99 lb 12 8 oz)  Vitals:    05/30/23 0800   BP:    Pulse:    Resp:    Temp:    SpO2: 96%       Intake/Output Summary (Last 24 hours) at 5/30/2023 1101  Last data filed at 5/30/2023 0817  Gross per 24 hour   Intake 540 ml   Output 325 ml   Net 215 ml       General:  appears comfortable and in no acute distress   Skin:  No rash  Eyes:  Sclerae anicteric, no periorbital edema   ENT:  Moist mucous membranes  Neck:  Trachea midline, symmetric   Chest:  Clear to auscultation bilaterally with no wheezes, rales or rhonchi  CVS:  Regular rate and rhythm  Abdomen:  Soft, nontender, nondistended  Neuro:  Awake and alert  Psych:  Appropriate affect  Extremities: R foot swelling   No L edema        Medications:  Scheduled Meds:  Current Facility-Administered Medications   Medication Dose Route Frequency Provider Last Rate   • acetaminophen  650 mg Oral Q6H PRN Tata Monroy DO     • albuterol  2 puff Inhalation Q6H PRN Darlene Alvarado PA-C     • ALPRAZolam  0 25 mg Oral BID PRN Champ Imus, DO     • atorvastatin  80 mg Oral Daily Frederick Casillas PA-C     • carvedilol  12 5 mg Oral BID With Meals Harman Banai, DO     • clopidogrel  75 mg Oral Daily Frederick Casillas PA-C     • heparin (porcine)  5,000 Units Subcutaneous [facility] Margot Mcdermott PA-C     • hydrALAZINE  10 mg Oral [facility] Frederick Casillas PA-C     • HYDROmorphone  0 2 mg Intravenous Q6H PRN Binh Bailon PA-C     • isosorbide dinitrate  10 mg Oral TID Frederick Casillas PA-C     • labetalol  10 mg Intravenous Q10 Min PRN Alexander Siddiqui CRNA     • magnesium Oxide  400 mg Oral Daily Woodrow Oropeza MD     • neomycin-polymyxin-dexamethasone  1 drop Left Eye 6x Daily Champ Imus, DO     • ondansetron  4 mg Intravenous Q4H PRN Champ Imus, DO     • oxyCODONE  5 mg Oral Q6H PRN Binh Bailon PA-C     • oxyCODONE  2 5 mg Oral Q6H PRN Maryjane Argueta PA-C     • zolpidem  5 mg Oral HS PRN Frederick Casillas PA-C         PRN Meds: •  acetaminophen  •  albuterol  •  ALPRAZolam  •  HYDROmorphone  •  labetalol  •  ondansetron  •  oxyCODONE  •  oxyCODONE  •  zolpidem    Laboratory Results:  Results from last 7 days   Lab Units 05/30/23  0625 05/29/23  0516 05/28/23  0455 05/27/23  0224 05/27/23  0224 05/26/23  0449 05/25/23  1142   BUN mg/dL 49* 38* 40*  --  41* 39* 41*   CALCIUM mg/dL 8 7 8 6 8 6  --  8 5 9 2 9 6   CHLORIDE mmol/L 104 107 109*  --  110* 110* 106   CO2 mmol/L 25 24 23  --  24 24 27   CREATININE mg/dL 2 04* 1 90* 1 83*  --  1 99* 1 60* 1 76*   HEMATOCRIT %  --   --   --  36 0*  --  36 5 40 6   HEMOGLOBIN g/dL  --   --   --  11 7*  --  12 3 13 4   POTASSIUM mmol/L 4 4 4 3 4 1  --  4 2 3 7 4 4   PLATELETS Thousands/uL  --   --   --  272  --  297 323   SODIUM mmol/L 131* 133* 134*  --  135 138 135   WBC Thousand/uL  --   --   --  13 11*  --  10 36* 10 57*

## 2023-05-30 NOTE — ASSESSMENT & PLAN NOTE
Lab Results   Component Value Date    CREATININE 2 04 (H) 05/30/2023    CREATININE 1 90 (H) 05/29/2023    CREATININE 1 83 (H) 05/28/2023    EGFR 29 05/30/2023    EGFR 32 05/29/2023    EGFR 34 05/28/2023     · Baseline creatinine 1 5-1 8 per Nephrology  · Nephrology following   · Monitor  · Avoid nephrotoxins and hypotension

## 2023-05-30 NOTE — CASE MANAGEMENT
Case Management Discharge Planning Note    Patient name Mary Sorensen  Location [facility] 501/[facility] 384-28 MRN [id number]  : 1940 Date 2023       Current Admission Date: 2023  Current Admission Diagnosis:Atherosclerosis of artery of extremity with ulceration [facility])   Patient Active Problem List    Diagnosis Date Noted   • Anxiety about health 2023   • Atherosclerosis of artery of extremity with ulceration ([facility] [address] ) 2023   • Ischemic rest pain of lower extremity 2023   • Conjunctivitis, left eye 2023   • Patient underweight 2023   • Right foot pain 2023   • Peripheral arterial disease ([address] ) 05/15/2023   • Preop pulmonary/respiratory exam 05/15/2023   • 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 2023   • Asymptomatic carotid artery stenosis, bilateral 2023   • Vertigo 2023   • Hypertensive urgency 2023   • Acute respiratory failure with hypoxia ([address] ) 2022   • Severe protein-calorie malnutrition ([facility] [address] ) 2022   • CKD (chronic kidney disease) 10/26/2022   • Stage 3b chronic kidney disease ([address] ) 2022   • Secondary hyperparathyroidism of renal origin ([facility] [address] ) 2022   • Hyperuricemia 2022   • Insomnia 12/10/2019   • Essential hypertension 2017   • Gout 2017   • COPD (chronic obstructive pulmonary disease) ([facility] [address] ) 2017   • Esophagitis 2017   • Tobacco abuse 2017      LOS (days): 5  Geometric Mean LOS (GMLOS) (days): 3 00  Days to GMLOS:-1 9     OBJECTIVE:  Risk of Unplanned Readmission Score: 17 9         Current admission status: Inpatient   Preferred Pharmacy:   [facility] [address] [place] [address]  Phone: [phone] Fax: [phone]    Primary Care Provider: Venessa Myaberry DO    Primary Insurance: [address] [facility] HOS[facility] REP  Secondary Insurance:     DISCHARGE DETAILS:    Discharge planning discussed with[de-identified] Patient and Luke-son  Freedom of Choice: Yes  Comments - Freedom of Choice: CM met with Pt and Chika Le to discuss the recommendation of SNF which they both reported being agreeable to, and gave permission for blanket referral   CM contacted family/caregiver?: Yes  Were Treatment Team discharge recommendations reviewed with patient/caregiver?: Yes  Did patient/caregiver verbalize understanding of patient care needs?: Yes  Were patient/caregiver advised of the risks associated with not following Treatment Team discharge recommendations?: Yes    Contacts  Patient Contacts: Chika Le  Relationship to Patient[de-identified] Family  Contact Method:  In Person  Reason/Outcome: Discharge [address]         Is the patient interested in [address] at discharge?: No    DME Referral Provided  Referral made for DME?: No    Other Referral/Resources/Interventions Provided:  Interventions: Short Term Rehab  Referral Comments: CM made SNF blanket referral for accepting facilites per Pt and family request          Treatment Team Recommendation: Short Term Rehab  Discharge Destination Plan[de-identified] Short Term Rehab  Transport at Discharge : Wheelchair Russel Cruz

## 2023-05-30 NOTE — PHYSICAL THERAPY NOTE
PHYSICAL THERAPY NOTE          Patient Name: Nate IVY Date: 5/30/2023 05/30/23 1451   PT Last Visit   PT Visit Date 05/30/23   Note Type   Note Type Treatment   Pain Assessment   Pain Assessment Tool 0-10   Pain Score 8   Pain Location/Orientation Orientation: Bilateral;Orientation: Lower; Location: Leg   Pain Onset/Description Onset: Ongoing;Frequency: Intermittent; Descriptor: Discomfort   Patient's Stated Pain Goal No pain   Hospital Pain Intervention(s) Repositioned; Ambulation/increased activity; Elevated; Emotional support; Rest   Restrictions/Precautions   Weight Bearing Precautions Per Order Yes   RLE Weight Bearing Per Order (S)  WBAT   Other Precautions WBS; Telemetry; Fall Risk;Pain   General   Chart Reviewed Yes   Family/Caregiver Present No   Cognition   Overall Cognitive Status WFL   Arousal/Participation Responsive; Cooperative   Attention Attends with cues to redirect   Orientation Level Oriented X4   Memory Decreased recall of precautions   Following Commands Follows one step commands with increased time or repetition   Comments Patient cooperative and pleasant  Subjective   Subjective Patient agreeable to mobilize  Bed Mobility   Supine to Sit 4  Minimal assistance   Additional items Assist x 1; Increased time required;Verbal cues   Additional Comments Patient in bed on arrival, but left in chair following activity  Transfers   Sit to Stand 3  Moderate assistance   Additional items Assist x 2; Increased time required;Verbal cues  (B/L HHA with B/L knee blocking)   Stand to Sit 3  Moderate assistance   Additional items Assist x 2; Increased time required;Verbal cues   Additional Comments unable to safely perform at RW despite cues, B/L HHA performed   Ambulation/Elevation   Gait pattern R Knee Daniela;L Knee Daniela; Improper Weight shift;Narrow FREDY; Short stride; Excessively slow; Foward flexed   Gait Assistance 3  Moderate assist   Additional items Assist x 2;Verbal cues   Assistive Device   (B/L HHA)   Distance 2' to chair   Ambulation/Elevation Additional Comments unable to safely use RW at this time, B/L HHA utilized   Balance   Static Sitting Fair   Dynamic Sitting Poor +   Static Standing Poor   Dynamic Standing Poor   Ambulatory Poor   Activity Tolerance   Activity Tolerance Patient limited by fatigue;Patient limited by pain   Medical Staff [address]   Nurse Made Aware RN cleared patient for therapy   Exercises   Hip Flexion Sitting;10 reps;AROM; Bilateral   Hip Abduction Sitting;10 reps;AROM; Bilateral   Hip Adduction Sitting;10 reps;AROM; Bilateral   Knee AROM Long Arc Quad Sitting;10 reps;AROM; Bilateral   Ankle Pumps Sitting;10 reps;AROM; Bilateral   Balance training  standing balance poor with Mod Ax2 via hand held support - patient able to stand <45 seconds at a time with cues needed to extend knees and stand upright   Assessment   Prognosis Fair   Problem List Decreased strength;Decreased range of motion;Decreased endurance; Impaired balance;Decreased mobility; Decreased safety awareness;Pain   Assessment Patient received supine in bed  Patient agreeable to therapy  Patient performs bed mobility with Min A  Patient performs functional transfers with mod Ax2  Multiple STS transfers performed to increase upright stability, strength, and endurance  Patient unable to perform safe STS at [facility], so B/L HHA utilized with B/L knee block  Patient performs all STS with Mod Ax2 and requires hand held Mod Ax2 to ambulate short distance to chair  Patient also performs BLE therapeutic exercise in sitting to facilitate LE mobility and strenghening  Patient left in bedside chair with all needs met and call bell/personal items within reach   The patient's AM-PAC Basic Mobility Inpatient Short Form Raw Score is 12 showing further need for skilled PT services in order to improve functional mobility, decrease need for assistance, and return to PLOF  A Raw score of less than 16 suggests the patient may benefit from discharge to post-acute rehabilitation services  PT continues to recommend rehab on d/c  Will continue to follow as able  Barriers to Discharge Inaccessible home environment;Decreased caregiver support   Goals   Patient Goals to get stronger   PT Treatment Day 1   Plan   Treatment/Interventions ADL retraining;Functional transfer training;LE strengthening/ROM; Therapeutic exercise; Endurance training;Elevations; Patient/family training;Equipment eval/education; Bed mobility;Gait training;Spoke to nursing;Spoke to case management;OT   Progress Progressing toward goals   PT Frequency 3-5x/wk   Recommendation   PT Discharge Recommendation Post acute rehabilitation services   Equipment Recommended [facility] Recommended Wheeled walker   AM-PAC Basic Mobility Inpatient   Turning in Flat Bed Without Bedrails 3   Lying on Back to Sitting on Edge of Flat Bed Without Bedrails 3   Moving Bed to Chair 2   Standing Up From Chair Using Arms 2   Walk in Room 1   Climb 3-5 Stairs With Railing 1   Basic Mobility Inpatient Raw Score 12   Basic Mobility Standardized Score 32 23   Highest Level Of Mobility   JH-HLM Goal 4: Move to chair/commode   JH-HLM Achieved 4: Move to chair/commode   Education   Education Provided Mobility training;Home exercise program   Patient Demonstrates acceptance/verbal understanding   End of Consult   Patient Position at End of Consult Bedside chair;Bed/Chair alarm activated; All needs within reach     Veverly Rashi, PT, DPT

## 2023-05-31 VITALS
OXYGEN SATURATION: 94 % | SYSTOLIC BLOOD PRESSURE: 112 MMHG | DIASTOLIC BLOOD PRESSURE: 65 MMHG | TEMPERATURE: 98.8 F | BODY MASS INDEX: 15.71 KG/M2 | WEIGHT: 100.1 LBS | HEIGHT: 67 IN | HEART RATE: 80 BPM | RESPIRATION RATE: 16 BRPM

## 2023-05-31 LAB
ANION GAP SERPL CALCULATED.3IONS-SCNC: 3 MMOL/L (ref 4–13)
BUN SERPL-MCNC: 52 MG/DL (ref 5–25)
CALCIUM SERPL-MCNC: 9 MG/DL (ref 8.3–10.1)
CHLORIDE SERPL-SCNC: 104 MMOL/L (ref 96–108)
CO2 SERPL-SCNC: 25 MMOL/L (ref 21–32)
CREAT SERPL-MCNC: 2.11 MG/DL (ref 0.6–1.3)
GFR SERPL CREATININE-BSD FRML MDRD: 28 ML/MIN/1.73SQ M
GLUCOSE SERPL-MCNC: 151 MG/DL (ref 65–140)
OSMOLALITY UR: 401 MMOL/KG
POTASSIUM SERPL-SCNC: 4.6 MMOL/L (ref 3.5–5.3)
SODIUM SERPL-SCNC: 132 MMOL/L (ref 135–147)

## 2023-05-31 RX ORDER — CARVEDILOL 12.5 MG/1
12.5 TABLET ORAL 2 TIMES DAILY WITH MEALS
Refills: 0
Start: 2023-05-31

## 2023-05-31 RX ORDER — OXYCODONE HYDROCHLORIDE 5 MG/1
5 TABLET ORAL EVERY 6 HOURS PRN
Qty: 15 TABLET | Refills: 0 | Status: SHIPPED | OUTPATIENT
Start: 2023-05-31 | End: 2023-06-10

## 2023-05-31 RX ORDER — LANOLIN ALCOHOL/MO/W.PET/CERES
400 CREAM (GRAM) TOPICAL DAILY
Refills: 0
Start: 2023-06-01

## 2023-05-31 RX ORDER — ACETAMINOPHEN 325 MG/1
650 TABLET ORAL EVERY 6 HOURS PRN
Refills: 0
Start: 2023-05-31

## 2023-05-31 RX ADMIN — ATORVASTATIN CALCIUM 80 MG: 80 TABLET, FILM COATED ORAL at 08:22

## 2023-05-31 RX ADMIN — OXYCODONE HYDROCHLORIDE 5 MG: 5 TABLET ORAL at 09:04

## 2023-05-31 RX ADMIN — HEPARIN SODIUM 5000 UNITS: 5000 INJECTION INTRAVENOUS; SUBCUTANEOUS at 05:25

## 2023-05-31 RX ADMIN — ISOSORBIDE DINITRATE 10 MG: 10 TABLET ORAL at 08:22

## 2023-05-31 RX ADMIN — MAGNESIUM GLUCONATE 500 MG ORAL TABLET 400 MG: 500 TABLET ORAL at 08:22

## 2023-05-31 RX ADMIN — CLOPIDOGREL BISULFATE 75 MG: 75 TABLET ORAL at 08:22

## 2023-05-31 RX ADMIN — HYDRALAZINE HYDROCHLORIDE 10 MG: 10 TABLET, FILM COATED ORAL at 05:25

## 2023-05-31 NOTE — PROGRESS NOTES
"1425 [facility]  Progress Note  Name: Akhil Jones  MRN: [id number]  [facility] Date of Admission: 5/25/2023   Date of Service: 5/31/2023 I Hospital Day: 6    Assessment/Plan   * Atherosclerosis of artery of extremity with ulceration [facility])  Assessment & Plan  [de-identified] y/o M w/ Right leg rest pain and foot wound w/ known hx of PAD  PMHx CAD, severe Left subclavian stenosis/occl, CKD, COPD, severe protein calorie malnutrition, HTN, HLD, hyperparathyroidism, Gout, PUD, esophagitis, and tobacco abuse    5/17/2023 LEAD- AKUA 0 22 in the ischemic range  5/26/23 Diagnostic right lower extremity a gram  -Right lower extremity angiogram demonstrating 80% stenosis of right common femoral artery with high-grade stenosis/occlusion of the profunda  Long segment occlusion of the mid SFA with distal reconstitution above-knee pop  PT is primary runoff into the foot  Plan:  -Unfortunately, not amenable to endovascular revascularization and would require extensive open revascularization including common femoral endarterectomy, profundoplasty, fem-PT bypass  -Due to severe protein calorie malnutrition and frailty and extent revascularization required, he is a poor surgical candidate with risk of poor wound healing, bypass graft failure, and need for further intervention  -Patient's pain is currently controlled and the wound is not infected  -Patient in agreement for palliative local wound care  -PT/OT - recommend rehab  -Discharge to [address] [place] [address] today for rehab         Subjective:  Pt doing well    Vitals:  BP 99/64   Pulse 93   Temp 99 7 °F (37 6 °C)   Resp 18   Ht 5' 7\" (1 702 m)   Wt 45 4 kg (100 lb 1 6 oz)   SpO2 94%   BMI 15 68 kg/m²     I/Os:  I/O last 3 completed shifts: In: 800 [P O :300; IV Piggyback:500]  Out: 675 [Urine:675]  No intake/output data recorded      Lab Results and Cultures:   Lab Results   Component Value Date    HCT 36 0 (L) 05/27/2023    HGB 11 7 (L) " "05/27/2023    MCV 91 05/27/2023     05/27/2023    WBC 13 11 (H) 05/27/2023     Lab Results   Component Value Date    BUN 49 (H) 05/30/2023    CALCIUM 8 7 05/30/2023     05/30/2023    CO2 25 05/30/2023    CREATININE 2 04 (H) 05/30/2023    K 4 4 05/30/2023     Lab Results   Component Value Date    INR 1 00 05/25/2023    INR 0 98 02/18/2023    INR 1 12 11/13/2022    PROTIME 13 4 05/25/2023    PROTIME 13 0 02/18/2023    PROTIME 14 5 11/13/2022        Blood Culture:   Lab Results   Component Value Date    BLOODCX No Growth After 5 Days  11/13/2022    BLOODCX No Growth After 5 Days   11/13/2022   ,   Urinalysis:   Lab Results   Component Value Date    BILIRUBINUR Negative 11/13/2022    BLOODU Negative 11/13/2022    CLARITYU Clear 11/13/2022    COLORU Yellow 11/13/2022    GLUCOSEU Negative 11/13/2022    KETONESU Negative 11/13/2022    LEUKOCYTESUR Negative 11/13/2022    NITRITE Negative 11/13/2022    PHUR 6 0 11/13/2022    SPECGRAV 1 010 11/13/2022   ,   Urine Culture: No results found for: \"URINECX\",   Wound Culure: No results found for: \"WOUNDCULT\"    Medications:  Current Facility-Administered Medications   Medication Dose Route Frequency   • acetaminophen (TYLENOL) tablet 650 mg  650 mg Oral Q6H PRN   • albuterol (PROVENTIL HFA,VENTOLIN HFA) inhaler 2 puff  2 puff Inhalation Q6H PRN   • ALPRAZolam (XANAX) tablet 0 25 mg  0 25 mg Oral BID PRN   • atorvastatin (LIPITOR) tablet 80 mg  80 mg Oral Daily   • carvedilol (COREG) tablet 12 5 mg  12 5 mg Oral BID With Meals   • clopidogrel (PLAVIX) tablet 75 mg  75 mg Oral Daily   • heparin (porcine) subcutaneous injection 5,000 Units  5,000 Units Subcutaneous Q8H [address]   • hydrALAZINE (APRESOLINE) tablet 10 mg  10 mg Oral Q8H [address]   • HYDROmorphone HCl (DILAUDID) injection 0 2 mg  0 2 mg Intravenous Q6H PRN   • isosorbide dinitrate (ISORDIL) tablet 10 mg  10 mg Oral TID   • labetalol (NORMODYNE) injection 10 mg  10 mg Intravenous Q10 Min PRN   • magnesium Oxide " (MAG-OX) tablet 400 mg  400 mg Oral Daily   • ondansetron (ZOFRAN) injection 4 mg  4 mg Intravenous Q4H PRN   • oxyCODONE (ROXICODONE) IR tablet 5 mg  5 mg Oral Q6H PRN   • oxyCODONE (ROXICODONE) split tablet 2 5 mg  2 5 mg Oral Q6H PRN   • zolpidem (AMBIEN) tablet 5 mg  5 mg Oral HS PRN           Physical Exam:    General appearance: alert and oriented, in no acute distress  Lungs: clear to auscultation bilaterally  Heart: S1, S2 normal  Abdomen: soft, non-tender; bowel sounds normal; no masses,  no organomegaly  Extremities: M/S intact    Wound/Incision:  groin incision clean, dry, and intact    Pulse exam:  DP: Right: doppler signal Left: doppler signal  PT: Right: doppler signal Left: doppler signal      Manfred Morse PA-C  5/31/2023

## 2023-05-31 NOTE — CASE MANAGEMENT
Case Management Discharge Planning Note    Patient name Misael Bowman  Location [address] [place] [address] 501[facility] [id number]  : 1940 Date 2023       Current Admission Date: 2023  Current Admission Diagnosis:Atherosclerosis of artery of extremity with ulceration [facility])   Patient Active Problem List    Diagnosis Date Noted   • Anxiety about health 2023   • Atherosclerosis of artery of extremity with ulceration ([facility] ) 2023   • Ischemic rest pain of lower extremity 2023   • Conjunctivitis, left eye 2023   • Patient underweight 2023   • Right foot pain 2023   • Peripheral arterial disease ([address] ) 05/15/2023   • Preop pulmonary/respiratory exam 05/15/2023   • 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 2023   • Asymptomatic carotid artery stenosis, bilateral 2023   • Vertigo 2023   • Hypertensive urgency 2023   • Acute respiratory failure with hypoxia ([address] ) 2022   • Severe protein-calorie malnutrition ([address] ) 2022   • CKD (chronic kidney disease) 10/26/2022   • Stage 3b chronic kidney disease ([address] ) 2022   • Secondary hyperparathyroidism of renal origin ([facility] ) 2022   • Hyperuricemia 2022   • Insomnia 12/10/2019   • Essential hypertension 2017   • Gout 2017   • COPD (chronic obstructive pulmonary disease) ([facility] ) 2017   • Esophagitis 2017   • Tobacco abuse 2017      LOS (days): 6  Geometric Mean LOS (GMLOS) (days): 3 90  Days to GMLOS:-2     OBJECTIVE:  Risk of Unplanned Readmission Score: 18 35         Current admission status: Inpatient   Preferred Pharmacy:   [facility] DR IVAN ACE [address] [place] [address]  Phone: [phone] Fax: [phone]    Primary Care Provider: Georga Swetha, DO    Primary Insurance: [facility] REP  Secondary Insurance:     DISCHARGE DETAILS:    Discharge planning discussed with[de-identified] Daughter Ty and son Rollene Gun by phone           IMM Given (Date):: 05/31/23 (Discussed by phone with both daughter and son at 8:50am and placed at the bedside )        Additional Comments: Son provided the following Edgar Johnson [id number] NRD 6/2 WITH HORN via Aidin  CM disucssed IMM with both daughter Ty and son Yulyene Gun by phone at 8:50am and placed at the bedside  Sergey Garza said if the medical team agrees he will transpoprt the patient to the facility at DC  Vascular PA Eilleen Gaucher and CAT Gutierrez were made aware STR auth was obtained via TT  Margot informed CM she contacted the daughter regarding DC and is in agreement for family to transport  A VM was left for daughter Ty and aundrea Garza said he will be at the patient's room shortly  Shereen Morrissey said DC will be ready around 1pm   P5 JODY Culp was made aware of the DC plan  DC envelope was completed and given to P5 JODY Grant  No further CM needs anticipated

## 2023-05-31 NOTE — DISCHARGE INSTR - AVS FIRST PAGE
DISCHARGE INSTRUCTIONS  ARTERIOGRAM/ANGIOPLASTY/STENT    ACTIVITY: On the evening following the procedure, you should be mostly resting  Someone should remain with you during the evening and overnight following the procedure  On the day after your procedure, limit your activity to walking  Avoid heavy lifting (no more than 15 lbs) for the first three days  Walking up steps and normal activities may be resumed as you feel ready  You should not drive a car for at least two days following discharge from the hospital  You may ride in a car  If you have any questions regarding a particular activity, please discuss with your doctor or nurse before you are discharged  DIET:  Resume your normal diet  Drink more water than usual for the next 24 hours  PROCEDURE SITE: You may have a procedure site in your groin, arm, or foot  You may have surgical glue at your procedure site  The glue is used to cover the procedure site, assist in closure, and prevent contamination  This adhesive will darken and peel away on its own within one to two weeks  Do not pick at it  You should shower daily  Wash incision daily with soap and water, but do not rub or scrub the incision; rinse thoroughly and pat dry  Do not bathe in a tub or swim for the first 2 week following your procedure or if you have any open wounds  It is normal to have some bruising, swelling or discoloration around the procedure site  IF increasing redness, pain, or a bulge develops, call our office immediately  If present, you may remove the band-aid or “steri-strips” over your procedure site after two days  If you notice any active bleeding at the site, apply pressure to the site and call our office ([phone]) or [phone]  FOLLOW UP STUDIES:  Your doctor will discuss whether further treatments or follow-up studies are necessary at your first post procedure visit      FOLLOW UP APPOINTMENTS:  Making and keeping follow up appointments and ultrasound tests are important to your recovery  If you have difficulty making it to or keeping your follow up appointments, call the office  If you have increased pain, fever >101 5, increased drainage, redness or a bad smell at your surgery site, new coldness/numbness of your arm or leg, please call us immediately and GO directly to the ER  PLEASE CALL THE OFFICE IF YOU HAVE ANY QUESTIONS  [phone]  [phone] [address] [facility] , [address], [place], [address]  [address], [address], [place], [address]  [address], [facility], [address] [facility] [place]  [address] [facility], [address], [facility], [address] [facility] [address]  [address]st [address], [address]  [facility], 2nd [address], [address] [address] [facility]  [address] [facility], [address] [facility], [address] [facility]  One [facility], [address] [facility], [address] [facility],[address], [address]  [address] [facility], [address]  Discharge Instructions - Podiatry    [facility] Bearing Status: weight bearing as tolerated                   Pain: Continue analgesics as directed    Follow-up appointment instructions: Please make an appointment within one week of discharge with Dr Margaret Morton for local wound care of the right foot  Contact sooner if any increase in pain, or signs of infection occur    Patient Wound Care Instructions: Remove dressings to the right foot  Apply betadine and border foam bandage to the right foot   Change on MWF schedule

## 2023-05-31 NOTE — ASSESSMENT & PLAN NOTE
Recently evaluated for asymptomatic bilateral carotid stenosis with plan for CEA in near future  Surgical clearances previously obtained from medicine, cardiology and pulmonary      Plan:  Outpatient follow-up   Continue Plavix, lipitor

## 2023-05-31 NOTE — ASSESSMENT & PLAN NOTE
[de-identified] y/o M w/ Right leg rest pain and foot wound w/ known hx of PAD  PMHx CAD, severe Left subclavian stenosis/occl, CKD, COPD, severe protein calorie malnutrition, HTN, HLD, hyperparathyroidism, Gout, PUD, esophagitis, and tobacco abuse    5/17/2023 LEAD- AKUA 0 22 in the ischemic range  5/26/23 Diagnostic right lower extremity a gram  -Right lower extremity angiogram demonstrating 80% stenosis of right common femoral artery with high-grade stenosis/occlusion of the profunda  Long segment occlusion of the mid SFA with distal reconstitution above-knee pop  PT is primary runoff into the foot      Plan:  -Unfortunately, not amenable to endovascular revascularization and would require extensive open revascularization including common femoral endarterectomy, profundoplasty, fem-PT bypass  -Due to severe protein calorie malnutrition and frailty and extent revascularization required, he is a poor surgical candidate with risk of poor wound healing, bypass graft failure, and need for further intervention  -Patient's pain is currently controlled and the wound is not infected  -Patient in agreement for palliative local wound care  -PT/OT - recommend rehab  -Discharge to [facility] today for rehab

## 2023-05-31 NOTE — CASE MANAGEMENT
Case Management Discharge Planning Note    Patient name Beth Lockwood  Location [address] [place] [address] 501/PPHP 022-82 MRN [id number]  : 1940 Date 2023       Current Admission Date: 2023  Current Admission Diagnosis:Atherosclerosis of artery of extremity with ulceration [facility])   Patient Active Problem List    Diagnosis Date Noted   • Anxiety about health 2023   • Atherosclerosis of artery of extremity with ulceration ([address] ) 2023   • Ischemic rest pain of lower extremity 2023   • Conjunctivitis, left eye 2023   • Patient underweight 2023   • Right foot pain 2023   • Peripheral arterial disease ([address] ) 05/15/2023   • Preop pulmonary/respiratory exam 05/15/2023   • 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 2023   • Asymptomatic carotid artery stenosis, bilateral 2023   • Vertigo 2023   • Hypertensive urgency 2023   • Acute respiratory failure with hypoxia ([address] ) 2022   • Severe protein-calorie malnutrition ([address] ) 2022   • CKD (chronic kidney disease) 10/26/2022   • Stage 3b chronic kidney disease ([address] ) 2022   • Secondary hyperparathyroidism of renal origin ([address] ) 2022   • Hyperuricemia 2022   • Insomnia 12/10/2019   • Essential hypertension 2017   • Gout 2017   • COPD (chronic obstructive pulmonary disease) ([facility] ) 2017   • Esophagitis 2017   • Tobacco abuse 2017      LOS (days): 6  Geometric Mean LOS (GMLOS) (days): 3 90  Days to GMLOS:-2     OBJECTIVE:  Risk of Unplanned Readmission Score: 18 35         Current admission status: Inpatient   Preferred Pharmacy:   [facility] [address] IVAN  [address] [place] [address]  Phone: [phone] Fax: [phone]    Primary Care Provider: Altagracia Adams DO    Primary Insurance: [address] [facility] REP  Secondary Insurance:     DISCHARGE DETAILS:    Discharge planning discussed with[de-identified] Daughter Hilario Jimenez and son Kathe Matthews by phone             IMM Given (Date):: 05/31/23 (Discussed by phone with both daughter and son at 8:50am and placed at the bedside )        Additional Comments: Gabriel Araujo provided the following Maikol Mow [id number] NRD 6/2 WITH HORN via Aidin  CM disucssed IMM with both daughter Hilario Jimenez and son Kathe Matthews by phone at 8:50am and placed at the bedside  Kathe Matthews said if the medical team agrees he will transpoprt the patient to the facility at DC  Vascular CAT Muir and CAT Acharya were made aware STR auth was obtained via TT  Lisa Harris said she will contact lee daughter regarding DC    CM to be available    Accepting Facility Name, [address] : [facility]  Receiving Facility/Agency Phone Number: [phone]  Facility/Agency Fax Number: [phone]

## 2023-05-31 NOTE — ASSESSMENT & PLAN NOTE
[de-identified] y/o M w/ Right leg rest pain and foot wound w/ known hx of PAD  PMHx CAD, severe Left subclavian stenosis/occl, CKD, COPD, severe protein calorie malnutrition, HTN, HLD, hyperparathyroidism, Gout, PUD, esophagitis, and tobacco abuse    5/17/2023 LEAD- AKUA 0 22 in the ischemic range  5/26/23 Diagnostic right lower extremity a gram  -Right lower extremity angiogram demonstrating 80% stenosis of right common femoral artery with high-grade stenosis/occlusion of the profunda  Long segment occlusion of the mid SFA with distal reconstitution above-knee pop  PT is primary runoff into the foot      Plan:  -Unfortunately, not amenable to endovascular revascularization and would require extensive open revascularization including common femoral endarterectomy, profundoplasty, fem-PT bypass  -Due to severe protein calorie malnutrition and frailty and extent revascularization required, he is a poor surgical candidate with risk of poor wound healing, bypass graft failure, and need for further intervention  -Patient's pain is currently controlled and the wound is not infected  -Patient in agreement for palliative local wound care and pain mgt  -PT/OT - recommend rehab  -Discharge to [facility] for rehab

## 2023-05-31 NOTE — ASSESSMENT & PLAN NOTE
Chronic kidney disease stage IIIB    Plan:  Outpatient follow-up with Nephrology  Follow-up labs as per CEE protocol Modified Advancement Flap Text: The defect edges were debeveled with a #15 scalpel blade.  Given the location of the defect, shape of the defect and the proximity to free margins a modified advancement flap was deemed most appropriate.  Using a sterile surgical marker, an appropriate advancement flap was drawn incorporating the defect and placing the expected incisions within the relaxed skin tension lines where possible.    The area thus outlined was incised deep to adipose tissue with a #15 scalpel blade.  The skin margins were undermined to an appropriate distance in all directions utilizing iris scissors.

## 2023-05-31 NOTE — RESTORATIVE TECHNICIAN NOTE
Restorative Technician Note      Patient Name: James Reza     Note Type: Mobility  Patient Position Upon Consult: Supine  Activity Performed: Transferred; Stood  Assistive Device: Roller walker; Other (Comment) (x2)  Patient Position at End of Consult: Bedside chair;  All needs within reach; Bed/Chair alarm activated

## 2023-06-01 ENCOUNTER — TELEPHONE (OUTPATIENT)
Dept: NEPHROLOGY | Facility: CLINIC | Age: 83
End: 2023-06-01

## 2023-06-01 NOTE — TELEPHONE ENCOUNTER
----- Message from Kwan Hobson PA-C sent at 6/1/2023  8:13 AM EDT -----  Patient d/c from [facility] please schedule hospital follow up with Dr Mary Coker or ARTUR in 4 weeks

## 2023-06-14 ENCOUNTER — TELEPHONE (OUTPATIENT)
Dept: VASCULAR SURGERY | Facility: CLINIC | Age: 83
End: 2023-06-14

## 2023-06-14 NOTE — TELEPHONE ENCOUNTER
Spoke w/ [facility], pt's daughter wanted appt cx on 6/15/23 from f/u from dmitry, we rescheduled for 6/22/23 and awaiting for response from daughter to make sure this appt is ok

## 2023-06-20 ENCOUNTER — TELEPHONE (OUTPATIENT)
Dept: VASCULAR SURGERY | Facility: CLINIC | Age: 83
End: 2023-06-20

## 2023-07-27 ENCOUNTER — HOME CARE VISIT (OUTPATIENT)
Dept: HOME HEALTH SERVICES | Facility: HOME HEALTHCARE | Age: 83
End: 2023-07-27
Payer: MEDICARE

## 2023-07-27 NOTE — Clinical Note
Stephania Perry, 81 yo male  1940 Pt with scpm, non healing r lateral foot and r heel arterial wounds, hypertensive chronic kidney disease, copd and pvd. Pt has continuous weight loss 23 100 6/15 94.2 current 89.5. Pt with pain with wound and is not a candidate for surgery.  pt is not getting out of bed and not eating pps 20 request rloc

## 2023-07-28 ENCOUNTER — HOME CARE VISIT (OUTPATIENT)
Dept: HOME HOSPICE | Facility: HOSPICE | Age: 83
End: 2023-07-28
Payer: MEDICARE

## 2023-07-28 ENCOUNTER — HOME CARE VISIT (OUTPATIENT)
Dept: HOME HEALTH SERVICES | Facility: HOME HEALTHCARE | Age: 83
End: 2023-07-28
Payer: MEDICARE

## 2023-07-28 VITALS
SYSTOLIC BLOOD PRESSURE: 100 MMHG | WEIGHT: 89.31 LBS | HEART RATE: 72 BPM | DIASTOLIC BLOOD PRESSURE: 68 MMHG | RESPIRATION RATE: 18 BRPM | HEIGHT: 67 IN | BODY MASS INDEX: 14.02 KG/M2

## 2023-07-28 PROCEDURE — G0299 HHS/HOSPICE OF RN EA 15 MIN: HCPCS

## 2023-07-28 PROCEDURE — G0155 HHCP-SVS OF CSW,EA 15 MIN: HCPCS

## 2023-07-29 PROBLEM — H10.9 CONJUNCTIVITIS, LEFT EYE: Status: RESOLVED | Noted: 2023-01-01 | Resolved: 2023-01-01

## 2023-08-02 ENCOUNTER — HOME CARE VISIT (OUTPATIENT)
Dept: HOME HOSPICE | Facility: HOSPICE | Age: 83
End: 2023-08-02
Payer: MEDICARE

## 2023-08-10 ENCOUNTER — HOME CARE VISIT (OUTPATIENT)
Dept: HOME HOSPICE | Facility: HOSPICE | Age: 83
End: 2023-08-10
Payer: MEDICARE